# Patient Record
Sex: MALE | Race: WHITE | NOT HISPANIC OR LATINO | Employment: OTHER | URBAN - NONMETROPOLITAN AREA
[De-identification: names, ages, dates, MRNs, and addresses within clinical notes are randomized per-mention and may not be internally consistent; named-entity substitution may affect disease eponyms.]

---

## 2017-02-01 ENCOUNTER — OFFICE VISIT (OUTPATIENT)
Dept: NEUROLOGY | Facility: CLINIC | Age: 77
End: 2017-02-01

## 2017-02-01 VITALS
BODY MASS INDEX: 22.73 KG/M2 | WEIGHT: 150 LBS | SYSTOLIC BLOOD PRESSURE: 110 MMHG | DIASTOLIC BLOOD PRESSURE: 64 MMHG | HEIGHT: 68 IN

## 2017-02-01 DIAGNOSIS — I65.21 STENOSIS OF RIGHT CAROTID ARTERY: ICD-10-CM

## 2017-02-01 DIAGNOSIS — G62.9 AXONAL POLYNEUROPATHY: ICD-10-CM

## 2017-02-01 DIAGNOSIS — I63.313 CEREBROVASCULAR ACCIDENT (CVA) DUE TO BILATERAL THROMBOSIS OF MIDDLE CEREBRAL ARTERIES (HCC): Primary | ICD-10-CM

## 2017-02-01 DIAGNOSIS — G25.81 RLS (RESTLESS LEGS SYNDROME): ICD-10-CM

## 2017-02-01 DIAGNOSIS — I99.9 VASCULAR DISEASE: ICD-10-CM

## 2017-02-01 PROCEDURE — 99214 OFFICE O/P EST MOD 30 MIN: CPT | Performed by: PHYSICIAN ASSISTANT

## 2017-02-01 RX ORDER — ATENOLOL 50 MG/1
TABLET ORAL
COMMUNITY
End: 2018-01-31 | Stop reason: SDUPTHER

## 2017-02-01 RX ORDER — MONTELUKAST SODIUM 4 MG/1
1 TABLET, CHEWABLE ORAL 2 TIMES DAILY
COMMUNITY
End: 2017-10-05 | Stop reason: SDUPTHER

## 2017-02-01 RX ORDER — ASPIRIN 325 MG
TABLET ORAL
COMMUNITY
Start: 2015-12-12 | End: 2017-08-01

## 2017-02-01 RX ORDER — ASPIRIN AND DIPYRIDAMOLE 25; 200 MG/1; MG/1
1 CAPSULE, EXTENDED RELEASE ORAL 2 TIMES DAILY
Qty: 180 CAPSULE | Refills: 3 | Status: SHIPPED | OUTPATIENT
Start: 2017-02-01 | End: 2017-02-06

## 2017-02-02 DIAGNOSIS — N40.0 BENIGN PROSTATIC HYPERPLASIA, PRESENCE OF LOWER URINARY TRACT SYMPTOMS UNSPECIFIED, UNSPECIFIED MORPHOLOGY: ICD-10-CM

## 2017-02-02 DIAGNOSIS — E55.9 UNSPECIFIED VITAMIN D DEFICIENCY: ICD-10-CM

## 2017-02-02 DIAGNOSIS — R53.83 FATIGUE, UNSPECIFIED TYPE: ICD-10-CM

## 2017-02-02 DIAGNOSIS — R73.9 HYPERGLYCEMIA: ICD-10-CM

## 2017-02-02 DIAGNOSIS — E78.2 MIXED HYPERLIPIDEMIA: Primary | ICD-10-CM

## 2017-02-02 LAB
25(OH)D3+25(OH)D2 SERPL-MCNC: 30.6 NG/ML (ref 30–100)
ALBUMIN SERPL-MCNC: 4.5 G/DL (ref 3.5–5)
ALBUMIN/GLOB SERPL: 1.4 G/DL (ref 1.1–2.5)
ALP SERPL-CCNC: 153 U/L (ref 24–120)
ALT SERPL-CCNC: 30 U/L (ref 0–54)
AST SERPL-CCNC: 23 U/L (ref 7–45)
BASOPHILS # BLD AUTO: 0.02 10*3/MM3 (ref 0–0.2)
BASOPHILS NFR BLD AUTO: 0.3 % (ref 0–2)
BILIRUB SERPL-MCNC: 0.8 MG/DL (ref 0.1–1)
BUN SERPL-MCNC: 27 MG/DL (ref 5–21)
BUN/CREAT SERPL: 16.8 (ref 7–25)
CALCIUM SERPL-MCNC: 10.1 MG/DL (ref 8.4–10.4)
CHLORIDE SERPL-SCNC: 97 MMOL/L (ref 98–110)
CHOLEST SERPL-MCNC: 216 MG/DL (ref 130–200)
CO2 SERPL-SCNC: 31 MMOL/L (ref 24–31)
CREAT SERPL-MCNC: 1.61 MG/DL (ref 0.5–1.4)
EOSINOPHIL # BLD AUTO: 0.19 10*3/MM3 (ref 0–0.7)
EOSINOPHIL NFR BLD AUTO: 3 % (ref 0–4)
ERYTHROCYTE [DISTWIDTH] IN BLOOD BY AUTOMATED COUNT: 14.3 % (ref 12–15)
GLOBULIN SER CALC-MCNC: 3.3 GM/DL
GLUCOSE SERPL-MCNC: 122 MG/DL (ref 70–100)
HBA1C MFR BLD: 6.4 %
HCT VFR BLD AUTO: 46.2 % (ref 40–52)
HDLC SERPL-MCNC: 37 MG/DL
HGB BLD-MCNC: 14.9 G/DL (ref 14–18)
IMM GRANULOCYTES # BLD: 0.16 10*3/MM3 (ref 0–0.03)
IMM GRANULOCYTES NFR BLD: 2.6 % (ref 0–5)
LDLC SERPL CALC-MCNC: 125 MG/DL (ref 0–99)
LYMPHOCYTES # BLD AUTO: 1.2 10*3/MM3 (ref 0.72–4.86)
LYMPHOCYTES NFR BLD AUTO: 19.2 % (ref 15–45)
MCH RBC QN AUTO: 30.6 PG (ref 28–32)
MCHC RBC AUTO-ENTMCNC: 32.3 G/DL (ref 33–36)
MCV RBC AUTO: 94.9 FL (ref 82–95)
MONOCYTES # BLD AUTO: 1.03 10*3/MM3 (ref 0.19–1.3)
MONOCYTES NFR BLD AUTO: 16.5 % (ref 4–12)
NEUTROPHILS # BLD AUTO: 3.66 10*3/MM3 (ref 1.87–8.4)
NEUTROPHILS NFR BLD AUTO: 58.4 % (ref 39–78)
PLATELET # BLD AUTO: 207 10*3/MM3 (ref 130–400)
POTASSIUM SERPL-SCNC: 5.3 MMOL/L (ref 3.5–5.3)
PROT SERPL-MCNC: 7.8 G/DL (ref 6.3–8.7)
PSA SERPL-MCNC: 0.98 NG/ML (ref 0–4)
RBC # BLD AUTO: 4.87 10*6/MM3 (ref 4.8–5.9)
SODIUM SERPL-SCNC: 137 MMOL/L (ref 135–145)
T4 FREE SERPL-MCNC: 0.98 NG/DL (ref 0.78–2.19)
TRIGL SERPL-MCNC: 272 MG/DL (ref 0–149)
TSH SERPL DL<=0.005 MIU/L-ACNC: 1.57 MIU/ML (ref 0.47–4.68)
VLDLC SERPL CALC-MCNC: 54.4 MG/DL
WBC # BLD AUTO: 6.26 10*3/MM3 (ref 4.8–10.8)

## 2017-02-02 NOTE — PROGRESS NOTES
Subjective   Shabbir Gambino is a 76 y.o. male is here today for follow-up.    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  Returns in follow-up with no new stroke symptoms.  Overall activity is been reasonably well tolerated though he continues to have some chronic fatigue.  He denies any new visual changes, focal weakness that is new or other focal neurologic deficits that are new.    He also has issues with chronic peripheral neuropathy is multifactorial.  An associated with peripheral vascular disease.    Continues to follow with Dr. Barnes and affect plans to see him tomorrow for his annual physical.    Stroke   This is a chronic problem. The current episode started more than 1 year ago. The problem occurs daily. The problem has been unchanged. Associated symptoms include fatigue and weakness. Pertinent negatives include no chills, fever, headaches or neck pain. The symptoms are aggravated by stress. Treatments tried: Pharmacotherapy, control underlying disease. The treatment provided moderate relief.       The following portions of the patient's history were reviewed and updated as appropriate: allergies, current medications, past family history, past medical history, past social history, past surgical history and problem list.    Review of Systems   Constitutional: Positive for fatigue. Negative for chills and fever.   HENT: Positive for hearing loss. Negative for nosebleeds and trouble swallowing.    Eyes: Negative.    Respiratory: Negative.    Cardiovascular: Negative.    Gastrointestinal: Negative.    Endocrine: Negative.    Genitourinary: Negative.    Musculoskeletal: Positive for gait problem. Negative for back pain and neck pain.   Skin: Negative.    Allergic/Immunologic: Negative.    Neurological: Positive for weakness. Negative for dizziness, speech difficulty and headaches.   Hematological: Negative.    Psychiatric/Behavioral: Positive  for sleep disturbance. Negative for dysphoric mood. The patient is not nervous/anxious.        Objective   Physical Exam   Constitutional: He is oriented to person, place, and time. Vital signs are normal. He appears well-developed and well-nourished. No distress.   HENT:   Head: Normocephalic and atraumatic.   Right Ear: External ear normal. Decreased hearing is noted.   Left Ear: External ear normal. Decreased hearing is noted.   Nose: Nose normal.   Mouth/Throat: Uvula is midline, oropharynx is clear and moist and mucous membranes are normal.   Eyes: Conjunctivae, EOM and lids are normal. Pupils are equal, round, and reactive to light. No scleral icterus.   Fundoscopic exam:       The right eye shows no hemorrhage and no papilledema. The right eye shows red reflex.        The left eye shows no hemorrhage and no papilledema. The left eye shows red reflex.   Neck: Phonation normal. No spinous process tenderness and no muscular tenderness present. Carotid bruit is not present. Decreased range of motion present. No thyroid mass and no thyromegaly present.   Cardiovascular: Normal rate, regular rhythm, S1 normal, S2 normal and normal heart sounds.    No murmur heard.  Pulmonary/Chest: Effort normal and breath sounds normal. No stridor. No respiratory distress. He has no wheezes. He has no rales.   Musculoskeletal: He exhibits no edema or tenderness.        Lumbar back: Normal.   Lymphadenopathy:     He has no cervical adenopathy.   Neurological: He is alert and oriented to person, place, and time. He displays atrophy. He displays no tremor. A cranial nerve deficit and sensory deficit is present. He exhibits normal muscle tone. Coordination and gait abnormal. GCS eye subscore is 4. GCS verbal subscore is 5. GCS motor subscore is 6.   Reflex Scores:       Tricep reflexes are 1+ on the right side and 1+ on the left side.       Bicep reflexes are 1+ on the right side and 1+ on the left side.       Brachioradialis reflexes  are 1+ on the right side and 1+ on the left side.       Patellar reflexes are 1+ on the right side and 1+ on the left side.       Achilles reflexes are 0 on the right side and 0 on the left side.  Presbycusis, peripheral sensory deficits in all 4 extremities worse in the lower extremities with diminished light touch and vibratory sense.  Deep tendon reflexes diminished but symmetric.  Mild distal atrophy in bilateral lower extremities.  Functional strength bilaterally.  Functional ataxia likely due to sensory deficit.   Skin: Skin is warm and dry. No ecchymosis, no lesion and no rash noted. No cyanosis. Nails show no clubbing.   Psychiatric: He has a normal mood and affect. His speech is normal and behavior is normal. Judgment and thought content normal. He is not actively hallucinating. Cognition and memory are normal. He is attentive.   Nursing note and vitals reviewed.        Assessment/Plan   Shabbir was seen today for stroke.    Diagnoses and all orders for this visit:    Cerebrovascular accident (CVA) due to bilateral thrombosis of middle cerebral arteries  -     aspirin-dipyridamole (AGGRENOX)  MG per 12 hr capsule; Take 1 capsule by mouth 2 (Two) Times a Day.  -     US Color Flow Doppler Carotid Bilateral; Future    Axonal polyneuropathy    Vascular disease  -     aspirin-dipyridamole (AGGRENOX)  MG per 12 hr capsule; Take 1 capsule by mouth 2 (Two) Times a Day.    Stenosis of right carotid artery  -     aspirin-dipyridamole (AGGRENOX)  MG per 12 hr capsule; Take 1 capsule by mouth 2 (Two) Times a Day.  -     US Color Flow Doppler Carotid Bilateral; Future    RLS (restless legs syndrome)    Medications will be unchanged.    After review of September 2015 carotid ultrasound, it is recommended that he have a repeat carotid ultrasound performed.    15 minutes a 25 minute outpatient visit was spent in counseling and coordination of care.          EMR Dragon transcription disclaimer:  Much of  this encounter note is an electronic transcription/translation of spoken language to printed text.  The electronic translation of spoken language may permit erroneous, or at times, nonsensical words or phrases to be inadvertently transcribed.  The author has reviewed the note for such errors, however some may still exist.

## 2017-02-06 ENCOUNTER — OFFICE VISIT (OUTPATIENT)
Dept: FAMILY MEDICINE CLINIC | Facility: CLINIC | Age: 77
End: 2017-02-06

## 2017-02-06 VITALS
WEIGHT: 146.8 LBS | HEART RATE: 117 BPM | OXYGEN SATURATION: 98 % | HEIGHT: 68 IN | BODY MASS INDEX: 22.25 KG/M2 | TEMPERATURE: 98.1 F | SYSTOLIC BLOOD PRESSURE: 120 MMHG | DIASTOLIC BLOOD PRESSURE: 62 MMHG

## 2017-02-06 DIAGNOSIS — Z12.11 ENCOUNTER FOR COLONOSCOPY DUE TO HISTORY OF ADENOMATOUS COLONIC POLYPS: ICD-10-CM

## 2017-02-06 DIAGNOSIS — Z00.00 ANNUAL PHYSICAL EXAM: Primary | ICD-10-CM

## 2017-02-06 DIAGNOSIS — Z23 NEED FOR PROPHYLACTIC VACCINATION AGAINST STREPTOCOCCUS PNEUMONIAE (PNEUMOCOCCUS): ICD-10-CM

## 2017-02-06 DIAGNOSIS — Z86.010 ENCOUNTER FOR COLONOSCOPY DUE TO HISTORY OF ADENOMATOUS COLONIC POLYPS: ICD-10-CM

## 2017-02-06 LAB
BILIRUB BLD-MCNC: NEGATIVE MG/DL
CLARITY, POC: CLEAR
COLOR UR: YELLOW
GLUCOSE UR STRIP-MCNC: NEGATIVE MG/DL
KETONES UR QL: NEGATIVE
LEUKOCYTE EST, POC: NEGATIVE
NITRITE UR-MCNC: NEGATIVE MG/ML
PH UR: 5.5 [PH] (ref 5–8)
PROT UR STRIP-MCNC: NEGATIVE MG/DL
RBC # UR STRIP: NEGATIVE /UL
SP GR UR: 1.02 (ref 1–1.03)
UROBILINOGEN UR QL: NORMAL

## 2017-02-06 PROCEDURE — 90670 PCV13 VACCINE IM: CPT | Performed by: FAMILY MEDICINE

## 2017-02-06 PROCEDURE — G0009 ADMIN PNEUMOCOCCAL VACCINE: HCPCS | Performed by: FAMILY MEDICINE

## 2017-02-06 PROCEDURE — G0439 PPPS, SUBSEQ VISIT: HCPCS | Performed by: FAMILY MEDICINE

## 2017-02-06 NOTE — PROGRESS NOTES
QUICK REFERENCE INFORMATION:  The ABCs of the Annual Wellness Visit    Subsequent Medicare Wellness Visit    HEALTH RISK ASSESSMENT    Recent Hospitalizations:  No recent hospitalization(s)..        Current Medical Providers:  Patient Care Team:  Rigoberto Barnes MD as PCP - General (Family Medicine)  Rigoberto Barnes MD as PCP - Family Medicine  MD Ruddy Padron PA as Physician Assistant (Neurology)  Stanton Escalante MD as Consulting Physician (Cardiology)        Smoking Status:  History   Smoking Status   • Never Smoker   Smokeless Tobacco   • Never Used       Alcohol Consumption:  History   Alcohol Use No       Depression Screen:   PHQ-9 Depression Screening 2/6/2017   Little interest or pleasure in doing things 0   Feeling down, depressed, or hopeless 0   Trouble falling or staying asleep, or sleeping too much 0   Feeling tired or having little energy 0   Poor appetite or overeating 0   Feeling bad about yourself - or that you are a failure or have let yourself or your family down 0   Trouble concentrating on things, such as reading the newspaper or watching television 0   Moving or speaking so slowly that other people could have noticed. Or the opposite - being so fidgety or restless that you have been moving around a lot more than usual 0   Thoughts that you would be better off dead, or of hurting yourself in some way 0   PHQ-9 Total Score 0   If you checked off any problems, how difficult have these problems made it for you to do your work, take care of things at home, or get along with other people? Not difficult at all       Health Habits and Functional and Cognitive Screening:  Functional & Cognitive Status 2/6/2017   Do you have difficulty preparing food and eating? No   Do you have difficulty bathing yourself? No   Do you have difficulty getting dressed? No   Do you have difficulty using the toilet? No   Do you have difficulty moving around from place to place?  "No   In the past year have you fallen or experienced a near fall? No   Do you need help using the phone?  No   Are you deaf or do you have serious difficulty hearing?  No   Do you need help with transportation? No   Do you need help shopping? No   Do you need help preparing meals?  No   Do you need help with housework?  No   Do you need help with laundry? No   Do you need help taking your medications? No   Do you need help managing money? No   Do you have difficulty concentrating, remembering or making decisions? No     -- The Timed Up and Go (TUG) Test (CDC Version)                  - Testing directions adhered to:                                  1) Patients wears regular footwear and may use walking aid(s) if    needed.                                  2) Patient to sit back in standard arm chair and identify a line 10 feet    away from patient on floor.                                  3) Test time begins at \"Go\" and stops when patient reseated in arm    chair.                    - Instructions read aloud (and demonstrated as applicable) to patient:                                      When I say \"Go,\" I want you to:                                    1) Stand up from the chair.                                  2) Walk to the line on the floor (10 feet away) at your normal pace.                                  3) Turn.                                  4) Walk back to the chair at your normal pace.                                  5) Sit down again.                    - Result: 5                    - Observations during test:Slow tentative gait               Does the patient have evidence of cognitive impairment? No    Asprin use counseling:yes    Finger Rub Hearing Test (right ear):passed  Finger Rub Hearing Test (left ear):passed    Recent Lab Results:  CMP:  Lab Results   Component Value Date     (H) 02/02/2017    BUN 27 (H) 02/02/2017    CREATININE 1.61 (H) 02/02/2017    EGFRIFNONA 42 (L) 02/02/2017 "    EGFRIFAFRI 51 (L) 02/02/2017    BCR 16.8 02/02/2017     02/02/2017    K 5.3 02/02/2017    CO2 31.0 02/02/2017    CALCIUM 10.1 02/02/2017    PROTENTOTREF 7.8 02/02/2017    ALBUMIN 4.50 02/02/2017    LABGLOBREF 3.3 02/02/2017    LABIL2 1.4 02/02/2017    BILITOT 0.8 02/02/2017    ALKPHOS 153 (H) 02/02/2017    AST 23 02/02/2017    ALT 30 02/02/2017     Lipid Panel:  Lab Results   Component Value Date    CHLPL 216 (H) 02/02/2017    TRIG 272 (H) 02/02/2017    HDL 37 (L) 02/02/2017    VLDL 54.4 02/02/2017     (H) 02/02/2017     LDL:     HbA1c:  Lab Results   Component Value Date    HGBA1C 6.40 02/02/2017     Urine Microalbumin:     Visual Acuity:  No exam data present    Age-appropriate Screening Schedule:  Refer to the list below for future screening recommendations based on patient's age, sex and/or medical conditions. Orders for these recommended tests are listed in the plan section. The patient has been provided with a written plan.    Health Maintenance   Topic Date Due   • TDAP/TD VACCINES (1 - Tdap) 07/29/1959   • PNEUMOCOCCAL VACCINES (65+ LOW/MEDIUM RISK) (1 of 2 - PCV13) 07/29/2004   • INFLUENZA VACCINE  08/01/2016   • ZOSTER VACCINE  02/02/2017   • LIPID PANEL  02/02/2018        Subjective   History of Present Illness    Shabbir Gambino is a 76 y.o. male who presents for an Subsequent Wellness Visit. In addition, we addressed the following health issues:    The following portions of the patient's history were reviewed and updated as appropriate: allergies, current medications, past medical history, past surgical history and problem list.    Outpatient Medications Prior to Visit   Medication Sig Dispense Refill   • allopurinol (ZYLOPRIM) 300 MG tablet Take 300 mg by mouth Daily.     • amLODIPine (NORVASC) 2.5 MG tablet Take 2.5 mg by mouth Daily.     • aspirin (AJIT ASPIRIN) 325 MG tablet Take 1 tablet by mouth 2 times daily     • atenolol (TENORMIN) 50 MG tablet Take 50 mg by mouth daily      • carbidopa-levodopa (SINEMET)  MG per tablet Take 2 tablets by mouth Every Night.     • CINNAMON PO Take  by mouth Daily.     • colestipol (COLESTID) 1 G tablet Take 1 g by mouth 2 (Two) Times a Day.     • indapamide (LOZOL) 2.5 MG tablet Take 2.5 mg by mouth Every Morning.     • Multiple Vitamins-Minerals (EYE VITAMINS PO) Take  by mouth Daily.     • aspirin-dipyridamole (AGGRENOX)  MG per 12 hr capsule Take 1 capsule by mouth 2 (Two) Times a Day. 180 capsule 3   • B Complex Vitamins (VITAMIN B COMPLEX PO) Take  by mouth Daily.     • Cyanocobalamin (VITAMIN B-12) 1000 MCG sublingual tablet Place  under the tongue Every 3 (Three) Months.       No facility-administered medications prior to visit.        Patient Active Problem List   Diagnosis   • Vascular disease   • Stroke   • RLS (restless legs syndrome)   • Insomnia   • Hypertension   • Gout   • Axonal polyneuropathy   • Atherosclerotic coronary vascular disease   • Arthritis   • Stenosis of right carotid artery       Identification of Risk Factors:  Risk factors include: cardiovascular risk and inactivity.    Review of Systems   Constitutional: Negative.    HENT: Negative.    Eyes: Negative.    Respiratory: Negative.    Cardiovascular: Negative.    Gastrointestinal:        History of adenomatous polyps last colonoscopy 2012   Endocrine:        Borderline diabetic-dietary guidelines given   Genitourinary: Negative.    Musculoskeletal: Negative.    Neurological: Negative.    Hematological: Negative.    Psychiatric/Behavioral: Negative.        Compared to one year ago, the patient feels his physical health is the same.  Compared to one year ago, the patient feels his mental health is the same.    Objective     Physical Exam   Constitutional: He is oriented to person, place, and time. He appears well-developed and well-nourished.   HENT:   Head: Normocephalic.   Right Ear: External ear normal.   Left Ear: External ear normal.   Mouth/Throat: Oropharynx  "is clear and moist.   Eyes: Conjunctivae are normal. Pupils are equal, round, and reactive to light.   Neck: No thyromegaly present.   Cardiovascular: Normal rate and regular rhythm.    Pulmonary/Chest: Effort normal and breath sounds normal.   Abdominal: Soft. Bowel sounds are normal.   No pulsatile mass   Genitourinary: Rectum normal, prostate normal and penis normal.   Musculoskeletal: Normal range of motion.   Lymphadenopathy:     He has no cervical adenopathy.   Neurological: He is alert and oriented to person, place, and time.   Skin: Skin is warm and dry.   Psychiatric: He has a normal mood and affect. His behavior is normal. Judgment and thought content normal.   Vitals reviewed.      Vitals:    02/06/17 1356   BP: 120/62   Pulse: 117   Temp: 98.1 °F (36.7 °C)   SpO2: 98%   Weight: 146 lb 12.8 oz (66.6 kg)   Height: 68\" (172.7 cm)   PainSc: 0-No pain       Body mass index is 22.32 kg/(m^2).  Discussed the patient's BMI with him. The BMI is in the acceptable range.    Assessment/Plan   Patient Self-Management and Personalized Health Advice  The patient has been provided with information about: diet, exercise, prevention of cardiac or vascular disease and fall prevention and preventive services including:   · Advanced directives: has NO advanced directive - not interested in additional information, Colorectal cancer screening, colonoscopy referral placed, Diabetes screening, see lab orders, Fall Risk plan of care done, Pneumococcal vaccine , Prostate cancer screening discussed.    Visit Diagnoses:  No diagnosis found.    No orders of the defined types were placed in this encounter.      Outpatient Encounter Prescriptions as of 2/6/2017   Medication Sig Dispense Refill   • allopurinol (ZYLOPRIM) 300 MG tablet Take 300 mg by mouth Daily.     • amLODIPine (NORVASC) 2.5 MG tablet Take 2.5 mg by mouth Daily.     • aspirin (AJIT ASPIRIN) 325 MG tablet Take 1 tablet by mouth 2 times daily     • atenolol (TENORMIN) " 50 MG tablet Take 50 mg by mouth daily     • carbidopa-levodopa (SINEMET)  MG per tablet Take 2 tablets by mouth Every Night.     • CINNAMON PO Take  by mouth Daily.     • colestipol (COLESTID) 1 G tablet Take 1 g by mouth 2 (Two) Times a Day.     • indapamide (LOZOL) 2.5 MG tablet Take 2.5 mg by mouth Every Morning.     • Multiple Vitamins-Minerals (EYE VITAMINS PO) Take  by mouth Daily.     • [DISCONTINUED] aspirin-dipyridamole (AGGRENOX)  MG per 12 hr capsule Take 1 capsule by mouth 2 (Two) Times a Day. 180 capsule 3   • [DISCONTINUED] B Complex Vitamins (VITAMIN B COMPLEX PO) Take  by mouth Daily.     • [DISCONTINUED] Cyanocobalamin (VITAMIN B-12) 1000 MCG sublingual tablet Place  under the tongue Every 3 (Three) Months.       No facility-administered encounter medications on file as of 2/6/2017.        Reviewed use of high risk medication in the elderly: yes  Reviewed for potential of harmful drug interactions in the elderly: yes    Follow Up:  Return in 6 months (on 8/6/2017).     An After Visit Summary and PPPS with all of these plans were given to the patient.

## 2017-02-06 NOTE — PATIENT INSTRUCTIONS
Fall Prevention in the Home   Falls can cause injuries and can affect people from all age groups. There are many simple things that you can do to make your home safe and to help prevent falls.  WHAT CAN I DO ON THE OUTSIDE OF MY HOME?  · Regularly repair the edges of walkways and driveways and fix any cracks.  · Remove high doorway thresholds.  · Trim any shrubbery on the main path into your home.  · Use bright outdoor lighting.  · Clear walkways of debris and clutter, including tools and rocks.  · Regularly check that handrails are securely fastened and in good repair. Both sides of any steps should have handrails.  · Install guardrails along the edges of any raised decks or porches.  · Have leaves, snow, and ice cleared regularly.  · Use sand or salt on walkways during winter months.  · In the garage, clean up any spills right away, including grease or oil spills.  WHAT CAN I DO IN THE BATHROOM?  · Use night lights.  · Install grab bars by the toilet and in the tub and shower. Do not use towel bars as grab bars.  · Use non-skid mats or decals on the floor of the tub or shower.  · If you need to sit down while you are in the shower, use a plastic, non-slip stool..  · Keep the floor dry. Immediately clean up any water that spills on the floor.  · Remove soap buildup in the tub or shower on a regular basis.  · Attach bath mats securely with double-sided non-slip rug tape.  · Remove throw rugs and other tripping hazards from the floor.  WHAT CAN I DO IN THE BEDROOM?  · Use night lights.  · Make sure that a bedside light is easy to reach.  · Do not use oversized bedding that drapes onto the floor.  · Have a firm chair that has side arms to use for getting dressed.  · Remove throw rugs and other tripping hazards from the floor.  WHAT CAN I DO IN THE KITCHEN?   · Clean up any spills right away.  · Avoid walking on wet floors.  · Place frequently used items in easy-to-reach places.  · If you need to reach for something  above you, use a sturdy step stool that has a grab bar.  · Keep electrical cables out of the way.  · Do not use floor polish or wax that makes floors slippery. If you have to use wax, make sure that it is non-skid floor wax.  · Remove throw rugs and other tripping hazards from the floor.  WHAT CAN I DO IN THE STAIRWAYS?  · Do not leave any items on the stairs.  · Make sure that there are handrails on both sides of the stairs. Fix handrails that are broken or loose. Make sure that handrails are as long as the stairways.  · Check any carpeting to make sure that it is firmly attached to the stairs. Fix any carpet that is loose or worn.  · Avoid having throw rugs at the top or bottom of stairways, or secure the rugs with carpet tape to prevent them from moving.  · Make sure that you have a light switch at the top of the stairs and the bottom of the stairs. If you do not have them, have them installed.  WHAT ARE SOME OTHER FALL PREVENTION TIPS?  · Wear closed-toe shoes that fit well and support your feet. Wear shoes that have rubber soles or low heels.  · When you use a stepladder, make sure that it is completely opened and that the sides are firmly locked. Have someone hold the ladder while you are using it. Do not climb a closed stepladder.  · Add color or contrast paint or tape to grab bars and handrails in your home. Place contrasting color strips on the first and last steps.  · Use mobility aids as needed, such as canes, walkers, scooters, and crutches.  · Turn on lights if it is dark. Replace any light bulbs that burn out.  · Set up furniture so that there are clear paths. Keep the furniture in the same spot.  · Fix any uneven floor surfaces.  · Choose a carpet design that does not hide the edge of steps of a stairway.  · Be aware of any and all pets.  · Review your medicines with your healthcare provider. Some medicines can cause dizziness or changes in blood pressure, which increase your risk of falling.  Talk  with your health care provider about other ways that you can decrease your risk of falls. This may include working with a physical therapist or  to improve your strength, balance, and endurance.     This information is not intended to replace advice given to you by your health care provider. Make sure you discuss any questions you have with your health care provider.     Document Released: 2003 Document Revised: 2016 Document Reviewed: 2016  [employer] Interactive Patient Education © [employer]    Medicare Wellness  Personal Prevention Plan of Service     Date of Office Visit:  2017  Encounter Provider:  Rigoberto Barnes MD  Place of Service:  [facility]  Patient Name: Shabbir Gambino  :  1940    As part of the Medicare Wellness portion of your visit today, we are providing you with this personalized preventive plan of services (PPPS). This plan is based upon recommendations of the [place] Preventive Services Task Force (USPSTF) and the Advisory Committee on Immunization Practices (ACIP).    This lists the preventive care services that should be considered, and provides dates of when you are due. Items listed as completed are up-to-date and do not require any further intervention.    Health Maintenance   Topic Date Due   • TDAP/TD VACCINES (1 - Tdap) 1958   • PNEUMOCOCCAL VACCINES (65+ LOW/MEDIUM RISK) (1 of 2 - PCV13) 2004   • INFLUENZA VACCINE  2016   • ZOSTER VACCINE  2017   • MEDICARE ANNUAL WELLNESS  2017   • LIPID PANEL  2018            Diabetes Mellitus and Food  It is important for you to manage your blood sugar (glucose) level. Your blood glucose level can be greatly affected by what you eat. Eating healthier foods in the appropriate amounts throughout the day at about the same time each day will help you control your blood glucose level. It can also help slow or prevent worsening  of your diabetes mellitus. Healthy eating may even help you improve the level of your blood pressure and reach or maintain a healthy weight.   General recommendations for healthful eating and cooking habits include:  · Eating meals and snacks regularly. Avoid going long periods of time without eating to lose weight.  · Eating a diet that consists mainly of plant-based foods, such as fruits, vegetables, nuts, legumes, and whole grains.  · Using low-heat cooking methods, such as baking, instead of high-heat cooking methods, such as deep frying.  Work with your dietitian to make sure you understand how to use the Nutrition Facts information on food labels.  HOW CAN FOOD AFFECT ME?  Carbohydrates  Carbohydrates affect your blood glucose level more than any other type of food. Your dietitian will help you determine how many carbohydrates to eat at each meal and teach you how to count carbohydrates. Counting carbohydrates is important to keep your blood glucose at a healthy level, especially if you are using insulin or taking certain medicines for diabetes mellitus.  Alcohol  Alcohol can cause sudden decreases in blood glucose (hypoglycemia), especially if you use insulin or take certain medicines for diabetes mellitus. Hypoglycemia can be a life-threatening condition. Symptoms of hypoglycemia (sleepiness, dizziness, and disorientation) are similar to symptoms of having too much alcohol.   If your health care provider has given you approval to drink alcohol, do so in moderation and use the following guidelines:  · Women should not have more than one drink per day, and men should not have more than two drinks per day. One drink is equal to:    12 oz of beer.    5 oz of wine.    1½ oz of hard liquor.  · Do not drink on an empty stomach.  · Keep yourself hydrated. Have water, diet soda, or unsweetened iced tea.  · Regular soda, juice, and other mixers might contain a lot of carbohydrates and should be counted.  WHAT FOODS  ARE NOT RECOMMENDED?  As you make food choices, it is important to remember that all foods are not the same. Some foods have fewer nutrients per serving than other foods, even though they might have the same number of calories or carbohydrates. It is difficult to get your body what it needs when you eat foods with fewer nutrients. Examples of foods that you should avoid that are high in calories and carbohydrates but low in nutrients include:  · Trans fats (most processed foods list trans fats on the Nutrition Facts label).  · Regular soda.  · Juice.  · Candy.  · Sweets, such as cake, pie, doughnuts, and cookies.  · Fried foods.  WHAT FOODS CAN I EAT?  Eat nutrient-rich foods, which will nourish your body and keep you healthy. The food you should eat also will depend on several factors, including:  · The calories you need.  · The medicines you take.  · Your weight.  · Your blood glucose level.  · Your blood pressure level.  · Your cholesterol level.  You should eat a variety of foods, including:  · Protein.    Lean cuts of meat.    Proteins low in saturated fats, such as fish, egg whites, and beans. Avoid processed meats.  · Fruits and vegetables.    Fruits and vegetables that may help control blood glucose levels, such as apples, mangoes, and yams.  · Dairy products.    Choose fat-free or low-fat dairy products, such as milk, yogurt, and cheese.  · Grains, bread, pasta, and rice.    Choose whole grain products, such as multigrain bread, whole oats, and brown rice. These foods may help control blood pressure.  · Fats.    Foods containing healthful fats, such as nuts, avocado, olive oil, canola oil, and fish.  DOES EVERYONE WITH DIABETES MELLITUS HAVE THE SAME MEAL PLAN?  Because every person with diabetes mellitus is different, there is not one meal plan that works for everyone. It is very important that you meet with a dietitian who will help you create a meal plan that is just right for you.     This information  is not intended to replace advice given to you by your health care provider. Make sure you discuss any questions you have with your health care provider.     Document Released: 09/14/2006 Document Revised: 01/08/2016 Document Reviewed: 11/14/2014  [employer] Interactive Patient Education ©2016 [employer]

## 2017-02-08 DIAGNOSIS — I63.313 CEREBRAL INFARCTION DUE TO BILATERAL THROMBOSIS OF MIDDLE CEREBRAL ARTERIES (HCC): ICD-10-CM

## 2017-02-08 DIAGNOSIS — I65.21 STENOSIS OF RIGHT CAROTID ARTERY: Primary | ICD-10-CM

## 2017-02-17 ENCOUNTER — HOSPITAL ENCOUNTER (OUTPATIENT)
Dept: ULTRASOUND IMAGING | Facility: HOSPITAL | Age: 77
Discharge: HOME | End: 2017-02-17
Admitting: PHYSICIAN ASSISTANT

## 2017-02-17 DIAGNOSIS — I63.313 CEREBRAL INFARCTION DUE TO BILATERAL THROMBOSIS OF MIDDLE CEREBRAL ARTERIES (HCC): ICD-10-CM

## 2017-02-17 DIAGNOSIS — I65.21 STENOSIS OF RIGHT CAROTID ARTERY: ICD-10-CM

## 2017-02-17 PROCEDURE — 93880 EXTRACRANIAL BILAT STUDY: CPT | Performed by: SURGERY

## 2017-02-17 PROCEDURE — 93880 EXTRACRANIAL BILAT STUDY: CPT

## 2017-07-13 ENCOUNTER — OFFICE VISIT (OUTPATIENT)
Dept: FAMILY MEDICINE CLINIC | Facility: CLINIC | Age: 77
End: 2017-07-13

## 2017-07-13 VITALS
SYSTOLIC BLOOD PRESSURE: 120 MMHG | WEIGHT: 148.8 LBS | TEMPERATURE: 98.1 F | HEART RATE: 57 BPM | OXYGEN SATURATION: 98 % | DIASTOLIC BLOOD PRESSURE: 60 MMHG | BODY MASS INDEX: 22.55 KG/M2 | HEIGHT: 68 IN

## 2017-07-13 DIAGNOSIS — M10.9 GOUT, UNSPECIFIED CAUSE, UNSPECIFIED CHRONICITY, UNSPECIFIED SITE: Primary | ICD-10-CM

## 2017-07-13 PROCEDURE — 96372 THER/PROPH/DIAG INJ SC/IM: CPT | Performed by: FAMILY MEDICINE

## 2017-07-13 PROCEDURE — 99213 OFFICE O/P EST LOW 20 MIN: CPT | Performed by: FAMILY MEDICINE

## 2017-07-13 RX ORDER — METHYLPREDNISOLONE ACETATE 40 MG/ML
40 INJECTION, SUSPENSION INTRA-ARTICULAR; INTRALESIONAL; INTRAMUSCULAR; SOFT TISSUE ONCE
Status: COMPLETED | OUTPATIENT
Start: 2017-07-13 | End: 2017-07-13

## 2017-07-13 RX ORDER — PROBENECID AND COLCHICINE 500; .5 MG/1; MG/1
1 TABLET ORAL DAILY
Qty: 20 TABLET | Refills: 1 | Status: SHIPPED | OUTPATIENT
Start: 2017-07-13 | End: 2017-08-01

## 2017-07-13 RX ADMIN — METHYLPREDNISOLONE ACETATE 40 MG: 40 INJECTION, SUSPENSION INTRA-ARTICULAR; INTRALESIONAL; INTRAMUSCULAR; SOFT TISSUE at 09:52

## 2017-07-14 LAB
BASOPHILS # BLD AUTO: 0.02 10*3/MM3 (ref 0–0.2)
BASOPHILS NFR BLD AUTO: 0.2 % (ref 0–2)
BUN SERPL-MCNC: 26 MG/DL (ref 5–21)
BUN/CREAT SERPL: 17.7 (ref 7–25)
CALCIUM SERPL-MCNC: 9.7 MG/DL (ref 8.4–10.4)
CHLORIDE SERPL-SCNC: 98 MMOL/L (ref 98–110)
CO2 SERPL-SCNC: 25 MMOL/L (ref 24–31)
CREAT SERPL-MCNC: 1.47 MG/DL (ref 0.5–1.4)
EOSINOPHIL # BLD AUTO: 0.08 10*3/MM3 (ref 0–0.7)
EOSINOPHIL NFR BLD AUTO: 0.8 % (ref 0–4)
ERYTHROCYTE [DISTWIDTH] IN BLOOD BY AUTOMATED COUNT: 14.2 % (ref 12–15)
GLUCOSE SERPL-MCNC: 122 MG/DL (ref 70–100)
HCT VFR BLD AUTO: 43.5 % (ref 40–52)
HGB BLD-MCNC: 14.2 G/DL (ref 14–18)
IMM GRANULOCYTES # BLD: 0.12 10*3/MM3 (ref 0–0.03)
IMM GRANULOCYTES NFR BLD: 1.2 % (ref 0–5)
LYMPHOCYTES # BLD AUTO: 1.48 10*3/MM3 (ref 0.72–4.86)
LYMPHOCYTES NFR BLD AUTO: 14.8 % (ref 15–45)
MCH RBC QN AUTO: 30.4 PG (ref 28–32)
MCHC RBC AUTO-ENTMCNC: 32.6 G/DL (ref 33–36)
MCV RBC AUTO: 93.1 FL (ref 82–95)
MONOCYTES # BLD AUTO: 1.09 10*3/MM3 (ref 0.19–1.3)
MONOCYTES NFR BLD AUTO: 10.9 % (ref 4–12)
NEUTROPHILS # BLD AUTO: 7.18 10*3/MM3 (ref 1.87–8.4)
NEUTROPHILS NFR BLD AUTO: 72.1 % (ref 39–78)
PLATELET # BLD AUTO: 206 10*3/MM3 (ref 130–400)
POTASSIUM SERPL-SCNC: 5 MMOL/L (ref 3.5–5.3)
RBC # BLD AUTO: 4.67 10*6/MM3 (ref 4.8–5.9)
SODIUM SERPL-SCNC: 134 MMOL/L (ref 135–145)
URATE SERPL-MCNC: 5 MG/DL (ref 3.5–8.5)
WBC # BLD AUTO: 9.97 10*3/MM3 (ref 4.8–10.8)

## 2017-07-28 RX ORDER — AMLODIPINE BESYLATE 2.5 MG/1
2.5 TABLET ORAL NIGHTLY
Qty: 30 TABLET | Refills: 5 | Status: SHIPPED | OUTPATIENT
Start: 2017-07-28 | End: 2018-02-01 | Stop reason: SDUPTHER

## 2017-08-01 ENCOUNTER — OFFICE VISIT (OUTPATIENT)
Dept: NEUROLOGY | Facility: CLINIC | Age: 77
End: 2017-08-01

## 2017-08-01 VITALS
BODY MASS INDEX: 22.28 KG/M2 | SYSTOLIC BLOOD PRESSURE: 130 MMHG | HEART RATE: 56 BPM | HEIGHT: 68 IN | OXYGEN SATURATION: 98 % | WEIGHT: 147 LBS | DIASTOLIC BLOOD PRESSURE: 72 MMHG

## 2017-08-01 DIAGNOSIS — G62.9 AXONAL POLYNEUROPATHY: ICD-10-CM

## 2017-08-01 DIAGNOSIS — I63.313 CEREBROVASCULAR ACCIDENT (CVA) DUE TO BILATERAL THROMBOSIS OF MIDDLE CEREBRAL ARTERIES (HCC): Primary | ICD-10-CM

## 2017-08-01 DIAGNOSIS — G25.81 RLS (RESTLESS LEGS SYNDROME): ICD-10-CM

## 2017-08-01 DIAGNOSIS — I65.23 BILATERAL CAROTID ARTERY STENOSIS: ICD-10-CM

## 2017-08-01 DIAGNOSIS — R13.10 DYSPHAGIA, UNSPECIFIED TYPE: ICD-10-CM

## 2017-08-01 PROCEDURE — 99214 OFFICE O/P EST MOD 30 MIN: CPT | Performed by: PHYSICIAN ASSISTANT

## 2017-08-01 RX ORDER — ASPIRIN AND DIPYRIDAMOLE 25; 200 MG/1; MG/1
1 CAPSULE, EXTENDED RELEASE ORAL 2 TIMES DAILY
Qty: 60 CAPSULE | Refills: 6 | Status: SHIPPED | OUTPATIENT
Start: 2017-08-01 | End: 2018-02-01 | Stop reason: SDUPTHER

## 2017-08-01 RX ORDER — ASPIRIN AND DIPYRIDAMOLE 25; 200 MG/1; MG/1
1 CAPSULE, EXTENDED RELEASE ORAL 2 TIMES DAILY
COMMUNITY
End: 2017-08-01 | Stop reason: SDUPTHER

## 2017-08-04 NOTE — PROGRESS NOTES
Subjective   Shabbir Gambino is a 77 y.o. male is here today for follow-up.    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  Returns in follow-up with no new stroke symptoms.  Overall activity is been reasonably well tolerated though he continues to have some chronic fatigue.  He denies any new visual changes.    The patient has been having some issues with swallowing.  These issues are intermittent and tend to cause a central type discomfort with mechanical type symptoms.  He is able to swallow thin liquids fine and describes more of a mechanical type or obstructive type difficulty with swallowing.    He also has issues with chronic peripheral neuropathy is multifactorial.  An associated with peripheral vascular disease.    Continues to follow with Dr. Barnes and affect plans to see him tomorrow for his annual physical.    Stroke   This is a chronic problem. The current episode started more than 1 year ago. The problem occurs daily. The problem has been unchanged. Associated symptoms include fatigue. Pertinent negatives include no chills, fever, headaches or neck pain. The symptoms are aggravated by stress. Treatments tried: Pharmacotherapy, control underlying disease. The treatment provided moderate relief.       The following portions of the patient's history were reviewed and updated as appropriate: allergies, current medications, past family history, past medical history, past social history, past surgical history and problem list.    Review of Systems   Constitutional: Positive for fatigue. Negative for chills and fever.   HENT: Positive for hearing loss. Negative for nosebleeds and trouble swallowing.    Eyes: Negative.    Respiratory: Negative.    Cardiovascular: Negative.    Gastrointestinal: Negative.    Endocrine: Negative.    Genitourinary: Negative.    Musculoskeletal: Positive for gait problem. Negative for back pain and neck pain.   Skin:  Negative.    Allergic/Immunologic: Negative.    Neurological: Negative for dizziness, speech difficulty and headaches.   Hematological: Negative.    Psychiatric/Behavioral: Positive for sleep disturbance. Negative for dysphoric mood. The patient is not nervous/anxious.        Objective   Physical Exam   Constitutional: He is oriented to person, place, and time. Vital signs are normal. He appears well-developed and well-nourished. No distress.   HENT:   Head: Normocephalic and atraumatic.   Right Ear: External ear normal. Decreased hearing is noted.   Left Ear: External ear normal. Decreased hearing is noted.   Nose: Nose normal.   Mouth/Throat: Uvula is midline, oropharynx is clear and moist and mucous membranes are normal.   Eyes: Conjunctivae, EOM and lids are normal. Pupils are equal, round, and reactive to light. No scleral icterus.   Fundoscopic exam:       The right eye shows no hemorrhage and no papilledema. The right eye shows red reflex.        The left eye shows no hemorrhage and no papilledema. The left eye shows red reflex.   Neck: Phonation normal. No spinous process tenderness and no muscular tenderness present. Carotid bruit is not present. Decreased range of motion present. No thyroid mass and no thyromegaly present.   Cardiovascular: Normal rate, regular rhythm, S1 normal, S2 normal and normal heart sounds.    No murmur heard.  Pulmonary/Chest: Effort normal and breath sounds normal. No stridor. No respiratory distress. He has no wheezes. He has no rales.   Musculoskeletal: He exhibits no edema or tenderness.        Lumbar back: Normal.   Lymphadenopathy:     He has no cervical adenopathy.   Neurological: He is alert and oriented to person, place, and time. He displays atrophy. He displays no tremor. A cranial nerve deficit and sensory deficit is present. He exhibits normal muscle tone. Coordination and gait abnormal. GCS eye subscore is 4. GCS verbal subscore is 5. GCS motor subscore is 6.   Reflex  Scores:       Tricep reflexes are 1+ on the right side and 1+ on the left side.       Bicep reflexes are 1+ on the right side and 1+ on the left side.       Brachioradialis reflexes are 1+ on the right side and 1+ on the left side.       Patellar reflexes are 1+ on the right side and 1+ on the left side.       Achilles reflexes are 0 on the right side and 0 on the left side.  Presbycusis, peripheral sensory deficits in all 4 extremities worse in the lower extremities with diminished light touch and vibratory sense.  Deep tendon reflexes diminished but symmetric.  Mild distal atrophy in bilateral lower extremities.  Functional strength bilaterally.  Functional ataxia likely due to sensory deficit.   Skin: Skin is warm and dry. No ecchymosis, no lesion and no rash noted. No cyanosis. Nails show no clubbing.   Psychiatric: He has a normal mood and affect. His speech is normal and behavior is normal. Judgment and thought content normal. He is not actively hallucinating. Cognition and memory are normal. He is attentive.   Nursing note and vitals reviewed.        Assessment/Plan   Shabbir was seen today for cerebrovascular accident.    Diagnoses and all orders for this visit:    Cerebrovascular accident (CVA) due to bilateral thrombosis of middle cerebral arteries  -     aspirin-dipyridamole (AGGRENOX)  MG per 12 hr capsule; Take 1 capsule by mouth 2 (Two) Times a Day.    Axonal polyneuropathy    RLS (restless legs syndrome)    Bilateral carotid artery stenosis  -     aspirin-dipyridamole (AGGRENOX)  MG per 12 hr capsule; Take 1 capsule by mouth 2 (Two) Times a Day.    Dysphagia, unspecified type  -     Ambulatory Referral to Gastroenterology    The patient appears stable from a cerebrovascular standpoint.  Recommend continue Aggrenox unchanged.    The patient describes a mechanical type dysphagia and I have recommended evaluation with gastroenterology.    20 minutes of a 25 minute outpatient visit was spent  in counseling and coordination of care today.        EMR Dragon transcription disclaimer:  Much of this encounter note is an electronic transcription/translation of spoken language to printed text.  The electronic translation of spoken language may permit erroneous, or at times, nonsensical words or phrases to be inadvertently transcribed.  The author has reviewed the note for such errors, however some may still exist.

## 2017-08-09 ENCOUNTER — OFFICE VISIT (OUTPATIENT)
Dept: GASTROENTEROLOGY | Facility: CLINIC | Age: 77
End: 2017-08-09

## 2017-08-09 VITALS
BODY MASS INDEX: 21.92 KG/M2 | DIASTOLIC BLOOD PRESSURE: 58 MMHG | HEIGHT: 69 IN | HEART RATE: 54 BPM | WEIGHT: 148 LBS | TEMPERATURE: 96.1 F | OXYGEN SATURATION: 99 % | SYSTOLIC BLOOD PRESSURE: 132 MMHG

## 2017-08-09 DIAGNOSIS — R13.19 ESOPHAGEAL DYSPHAGIA: Primary | ICD-10-CM

## 2017-08-09 DIAGNOSIS — D68.9 COAGULOPATHY (HCC): ICD-10-CM

## 2017-08-09 DIAGNOSIS — I10 ESSENTIAL HYPERTENSION: ICD-10-CM

## 2017-08-09 DIAGNOSIS — Z86.010 PERSONAL HISTORY OF COLONIC POLYPS: ICD-10-CM

## 2017-08-09 PROCEDURE — 99204 OFFICE O/P NEW MOD 45 MIN: CPT | Performed by: NURSE PRACTITIONER

## 2017-08-09 NOTE — PROGRESS NOTES
"[facility] GASTROENTEROLOGY - OFFICE NOTE    8/9/2017    Shabbir Gambino   1940    Primary Physician: Rigoberto Barnes MD     Referring :  Babar PHILLIP ( neurology).     Chief Complaint   Patient presents with   • Difficulty Swallowing         HISTORY OF PRESENT ILLNESS    Shabbir Gambino is a 77 y.o. male presents  with difficulty swallowing x  1-2 mo.   Usually solid foods.  Points to lower chest area where it feels as if food is \"hanging up and then start piles up \". Sometimes taking a drink may help but at times can make it worse as well.  He denies unintentional weight loss.  Denies nausea or vomiting.  Denies fevers or chills.  No abdominal pain.  No reflux symptoms.  No history of upper endoscopy.  He is on Aggrenox for history of stroke.      Past Medical History:   Diagnosis Date   • Arthritis    • Atherosclerotic coronary vascular disease    • Axonal polyneuropathy    • Colon polyp    • Gout    • Hypertension    • Insomnia    • RLS (restless legs syndrome)    • Stroke     BILATERAL HEMISPHERIC    • Vascular disease     CAROTID       Past Surgical History:   Procedure Laterality Date   • CAROTID ENDARTERECTOMY Bilateral    • COLONOSCOPY     • CORONARY ARTERY BYPASS GRAFT     • EYE SURGERY      x 2   • KNEE SURGERY Right     x 2       Outpatient Prescriptions Marked as Taking for the 8/9/17 encounter (Office Visit) with VERONIKA Rodriguez   Medication Sig Dispense Refill   • allopurinol (ZYLOPRIM) 300 MG tablet Take 300 mg by mouth Daily.     • amLODIPine (NORVASC) 2.5 MG tablet Take 1 tablet by mouth Every Night. 30 tablet 5   • aspirin-dipyridamole (AGGRENOX)  MG per 12 hr capsule Take 1 capsule by mouth 2 (Two) Times a Day. 60 capsule 6   • atenolol (TENORMIN) 50 MG tablet Take 50 mg by mouth daily     • carbidopa-levodopa (SINEMET)  MG per tablet Take 2 tablets by mouth Every Night.     • CINNAMON PO Take  by mouth Daily.     • colestipol (COLESTID) 1 G tablet Take 1 " g by mouth 2 (Two) Times a Day.     • indapamide (LOZOL) 2.5 MG tablet Take 2.5 mg by mouth Every Morning.     • Multiple Vitamins-Minerals (EYE VITAMINS PO) Take  by mouth Daily.         No Known Allergies    Social History     Social History   • Marital status:      Spouse name: N/A   • Number of children: N/A   • Years of education: N/A     Occupational History   • Not on file.     Social History Main Topics   • Smoking status: Never Smoker   • Smokeless tobacco: Never Used   • Alcohol use No   • Drug use: No   • Sexual activity: Defer     Other Topics Concern   • Not on file     Social History Narrative       Family History   Problem Relation Age of Onset   • Stroke Mother    • Diabetes Mother    • No Known Problems Father    • Esophageal cancer Sister    • No Known Problems Maternal Grandmother    • No Known Problems Maternal Grandfather    • No Known Problems Paternal Grandmother    • No Known Problems Paternal Grandfather    • Colon polyps Neg Hx    • Colon cancer Neg Hx        Review of Systems   Constitutional: Negative for appetite change, chills, fatigue, fever and unexpected weight change.   HENT: Positive for hearing loss and trouble swallowing. Negative for sore throat.    Respiratory: Negative for cough, chest tightness, shortness of breath and wheezing.    Cardiovascular: Negative for chest pain and palpitations.   Gastrointestinal: Negative for abdominal distention, abdominal pain, anal bleeding, blood in stool, constipation, diarrhea, nausea, rectal pain and vomiting.        As mentioned in hpi   Genitourinary: Negative for difficulty urinating, dysuria and hematuria.   Musculoskeletal: Negative for arthralgias and back pain.   Skin: Negative for color change and rash.   Neurological: Negative for dizziness, syncope, light-headedness and headaches.   Psychiatric/Behavioral: Negative for confusion. The patient is not nervous/anxious.        Vitals:    08/09/17 0826   BP: 132/58   BP Location:  "Left arm   Patient Position: Sitting   Cuff Size: Adult   Pulse: 54   Temp: 96.1 °F (35.6 °C)   SpO2: 99%   Weight: 148 lb (67.1 kg)   Height: 68.5\" (174 cm)      Body mass index is 22.18 kg/(m^2).    Physical Exam   Constitutional: He appears well-developed and well-nourished. No distress.   HENT:   Head: Normocephalic and atraumatic.   Eyes: EOM are normal. No scleral icterus.   Neck: Neck supple. No JVD present.   Cardiovascular: Normal rate, regular rhythm and normal heart sounds.    Pulmonary/Chest: Effort normal and breath sounds normal. No stridor.   Abdominal: Soft. Bowel sounds are normal. He exhibits no distension and no mass. There is no tenderness. There is no rebound and no guarding.   Musculoskeletal: He exhibits no edema.   Neurological: He is alert.   Skin: Skin is warm and dry. No rash noted.   Psychiatric: He has a normal mood and affect. His behavior is normal.   Vitals reviewed.      Results for orders placed or performed in visit on 07/13/17   Basic Metabolic Panel   Result Value Ref Range    Glucose 122 (H) 70 - 100 mg/dL    BUN 26 (H) 5 - 21 mg/dL    Creatinine 1.47 (H) 0.50 - 1.40 mg/dL    eGFR Non African Am 46 (L) >60 mL/min/1.73    eGFR African Am 56 (L) >60 mL/min/1.73    BUN/Creatinine Ratio 17.7 7.0 - 25.0    Sodium 134 (L) 135 - 145 mmol/L    Potassium 5.0 3.5 - 5.3 mmol/L    Chloride 98 98 - 110 mmol/L    Total CO2 25.0 24.0 - 31.0 mmol/L    Calcium 9.7 8.4 - 10.4 mg/dL   Uric Acid   Result Value Ref Range    Uric Acid 5.0 3.5 - 8.5 mg/dL   CBC & Differential   Result Value Ref Range    WBC 9.97 4.80 - 10.80 10*3/mm3    RBC 4.67 (L) 4.80 - 5.90 10*6/mm3    Hemoglobin 14.2 14.0 - 18.0 g/dL    Hematocrit 43.5 40.0 - 52.0 %    MCV 93.1 82.0 - 95.0 fL    MCH 30.4 28.0 - 32.0 pg    MCHC 32.6 (L) 33.0 - 36.0 g/dL    RDW 14.2 12.0 - 15.0 %    Platelets 206 130 - 400 10*3/mm3    Neutrophil Rel % 72.1 39.0 - 78.0 %    Lymphocyte Rel % 14.8 (L) 15.0 - 45.0 %    Monocyte Rel % 10.9 4.0 - 12.0 % "    Eosinophil Rel % 0.8 0.0 - 4.0 %    Basophil Rel % 0.2 0.0 - 2.0 %    Neutrophils Absolute 7.18 1.87 - 8.40 10*3/mm3    Lymphocytes Absolute 1.48 0.72 - 4.86 10*3/mm3    Monocytes Absolute 1.09 0.19 - 1.30 10*3/mm3    Eosinophils Absolute 0.08 0.00 - 0.70 10*3/mm3    Basophils Absolute 0.02 0.00 - 0.20 10*3/mm3    Immature Granulocyte Rel % 1.2 0.0 - 5.0 %    Immature Grans Absolute 0.12 (H) 0.00 - 0.03 10*3/mm3           ASSESSMENT AND PLAN    Shabbir was seen today for difficulty swallowing.    Diagnoses and all orders for this visit:    Esophageal dysphagia  -     Case Request; Standing  -     Case Request    Essential hypertension    Coagulopathy  Comments:  aggrenox    Personal history of colonic polyps  -     Case Request; Standing  -     Case Request    Other orders  -     Implement Anesthesia Orders Day of Procedure; Standing  -     Obtain Informed Consent; Standing  -     Modify Scheduled Case Request; Standing  -     Modify Scheduled Case Obtain Informed Consent; Standing        Differential diagnoses discussed, rec cut food small pieces and chew well. Plan for egd. Will send letter to gabriella ventura/dr medina to see if can hold aggrenox 5 d prior to procedure. Instructed patient to take heart or blood pressure medication a.m. of procedure if that is when normally taken    ESOPHAGOGASTRODUODENOSCOPY WITH ANESTHESIA (N/A)   Risk, benefits, and alternatives of endoscopy were explained in full.  They understand that there is a risk of bleeding, perforation, and infection.  The risk of perforation goes up with esophageal dilation.  Other options to evaluate UGI complaints could involve barium swallow or UGI series, but these would be diagnostic tests only.  Patient was given time to ask questions.  I answered them to their satisfaction and they are agreeable to proceeding    The patient was advised on the risks of stopping blood thinners for a procedure.  The risks discussed included the risk of developing  myocardial infarction, CVA, embolus, clogging of the arteries or stents, etc.  We discussed the potential consequences of the risks discussed.  The benefits of stopping as well as the alternatives were discussed as well.   Patient is to hold their anticoagulation medication per the direction of their prescribing physician, Dr Lauren/gabriella wolf .    A letter will be sent to prescribing This is to prevent any risk or complication from bleeding intra and post procedure. If they develop bleeding post procedure they are to go the emergency department for further evaluation and treatment immediately.         Body mass index is 22.18 kg/(m^2).                Stormy Pena, VERONIKA    EMR Dragon/transcription disclaimer:  Much of this encounter note is electronic transcription/translation of spoken language to printed text.  The electronic translation of spoken language may be erroneous, or at times, nonsensical words or phrases may be inadvertently transcribed.  Although I have reviewed the note for such errors, some may still exist.    Addendum; received colonoscopy report, 09/07/2012, by Dr. Camejo, colon polyps noted, there was a small sessile polyp in the ascending colon which was biopsied and removed, and the transverse colon he had a rather large sessile polyp and was taken off by piecemeal technique, he did have scattered diverticula in the left colon and also had internal hemorrhoids noted.  Path report for the polyp in the ascending and transverse were both tubular adenomas with no high-grade dysplasia.

## 2017-08-11 ENCOUNTER — TELEPHONE (OUTPATIENT)
Dept: GASTROENTEROLOGY | Facility: CLINIC | Age: 77
End: 2017-08-11

## 2017-08-11 NOTE — TELEPHONE ENCOUNTER
Let patient know that Babar PHILLIP, has okayed him to be off Aggrenox 3 days prior to procedure.  No Lovenox needed.

## 2017-08-11 NOTE — TELEPHONE ENCOUNTER
Please let patient know that I did receive his colonoscopy report from September 2012, done by Dr. Camejo, based on the findings he is due for a colonoscopy at this time.  We can do the colonoscopy at the same time as the upper endoscopy that is scheduled for 09/07/2017.  Just let me know and I can modify the case request.

## 2017-08-24 NOTE — TELEPHONE ENCOUNTER
PT wife LVM for me.  Called and spoke with PT.  PT told me send a clearance sheet to Dr. Escalante (cardiologist) and he would complete it and send back to us.

## 2017-08-24 NOTE — TELEPHONE ENCOUNTER
Ok, now she says she will call dr samayoa ( his cardiologist and make sure he is ok with mr novak being sedated for procedures including cscope).  Please call her tomorrow to see if she got in touch with him.thanks

## 2017-08-24 NOTE — TELEPHONE ENCOUNTER
Wife phoned and would like to speak with you about colonoscopy.  Dr. Camejo wouldn't do another scope on PT due to his age. In the past, PT has had to get clearance from his heart dr. Wife would like for him to have one.

## 2017-08-28 NOTE — TELEPHONE ENCOUNTER
Ok,  he is scheduled for EGD as well as colonoscopy, remember you will not see that on your side that i modified the case request.   patient know that he is scheduled for both procedures.  I will send laxative prescription to his pharmacy now thinks

## 2017-08-29 RX ORDER — ALLOPURINOL 300 MG/1
TABLET ORAL
Qty: 30 TABLET | Refills: 5 | Status: SHIPPED | OUTPATIENT
Start: 2017-08-29 | End: 2019-04-23

## 2017-09-07 ENCOUNTER — ANESTHESIA EVENT (OUTPATIENT)
Dept: GASTROENTEROLOGY | Facility: HOSPITAL | Age: 77
End: 2017-09-07

## 2017-09-07 ENCOUNTER — ANESTHESIA (OUTPATIENT)
Dept: GASTROENTEROLOGY | Facility: HOSPITAL | Age: 77
End: 2017-09-07

## 2017-09-07 ENCOUNTER — HOSPITAL ENCOUNTER (OUTPATIENT)
Facility: HOSPITAL | Age: 77
Setting detail: HOSPITAL OUTPATIENT SURGERY
Discharge: HOME | End: 2017-09-07
Attending: INTERNAL MEDICINE | Admitting: INTERNAL MEDICINE

## 2017-09-07 VITALS
HEIGHT: 68 IN | RESPIRATION RATE: 19 BRPM | OXYGEN SATURATION: 100 % | TEMPERATURE: 97.4 F | HEART RATE: 62 BPM | DIASTOLIC BLOOD PRESSURE: 70 MMHG | SYSTOLIC BLOOD PRESSURE: 117 MMHG | WEIGHT: 146.6 LBS | BODY MASS INDEX: 22.22 KG/M2

## 2017-09-07 DIAGNOSIS — R13.19 ESOPHAGEAL DYSPHAGIA: ICD-10-CM

## 2017-09-07 PROCEDURE — 25010000002 PROPOFOL 10 MG/ML EMULSION: Performed by: NURSE ANESTHETIST, CERTIFIED REGISTERED

## 2017-09-07 PROCEDURE — 45381 COLONOSCOPY SUBMUCOUS NJX: CPT | Performed by: INTERNAL MEDICINE

## 2017-09-07 PROCEDURE — 43248 EGD GUIDE WIRE INSERTION: CPT | Performed by: INTERNAL MEDICINE

## 2017-09-07 PROCEDURE — 45380 COLONOSCOPY AND BIOPSY: CPT | Performed by: INTERNAL MEDICINE

## 2017-09-07 PROCEDURE — 88305 TISSUE EXAM BY PATHOLOGIST: CPT | Performed by: INTERNAL MEDICINE

## 2017-09-07 PROCEDURE — 45385 COLONOSCOPY W/LESION REMOVAL: CPT | Performed by: INTERNAL MEDICINE

## 2017-09-07 RX ORDER — ONDANSETRON 2 MG/ML
4 INJECTION INTRAMUSCULAR; INTRAVENOUS ONCE AS NEEDED
Status: DISCONTINUED | OUTPATIENT
Start: 2017-09-07 | End: 2017-09-07 | Stop reason: HOSPADM

## 2017-09-07 RX ORDER — PROPOFOL 10 MG/ML
VIAL (ML) INTRAVENOUS AS NEEDED
Status: DISCONTINUED | OUTPATIENT
Start: 2017-09-07 | End: 2017-09-07 | Stop reason: SURG

## 2017-09-07 RX ORDER — SODIUM CHLORIDE 0.9 % (FLUSH) 0.9 %
3 SYRINGE (ML) INJECTION AS NEEDED
Status: DISCONTINUED | OUTPATIENT
Start: 2017-09-07 | End: 2017-09-07 | Stop reason: HOSPADM

## 2017-09-07 RX ORDER — SODIUM CHLORIDE 9 MG/ML
500 INJECTION, SOLUTION INTRAVENOUS CONTINUOUS PRN
Status: DISCONTINUED | OUTPATIENT
Start: 2017-09-07 | End: 2017-09-07 | Stop reason: HOSPADM

## 2017-09-07 RX ORDER — LIDOCAINE HYDROCHLORIDE 20 MG/ML
INJECTION, SOLUTION INFILTRATION; PERINEURAL AS NEEDED
Status: DISCONTINUED | OUTPATIENT
Start: 2017-09-07 | End: 2017-09-07 | Stop reason: SURG

## 2017-09-07 RX ADMIN — SODIUM CHLORIDE 500 ML: 9 INJECTION, SOLUTION INTRAVENOUS at 09:08

## 2017-09-07 RX ADMIN — PROPOFOL 400 MG: 10 INJECTION, EMULSION INTRAVENOUS at 10:05

## 2017-09-07 RX ADMIN — LIDOCAINE HYDROCHLORIDE 50 MG: 20 INJECTION, SOLUTION INFILTRATION; PERINEURAL at 10:05

## 2017-09-07 NOTE — ANESTHESIA PREPROCEDURE EVALUATION
Anesthesia Evaluation     Patient summary reviewed   no history of anesthetic complications:         Airway   Mallampati: II  TM distance: >3 FB  Neck ROM: full  no difficulty expected  Dental      Comment: Partial upper    Pulmonary    (-) COPD, asthma, sleep apnea, not a smoker  Cardiovascular   Exercise tolerance: good (4-7 METS)    PT is on anticoagulation therapy  Patient on routine beta blocker and Beta blocker given within 24 hours of surgery    (+) hypertension, CAD, CABG (7 bypass 10 years ago), PVD (bilateral CEA),   (-) angina    ROS comment: Off aggrenox since 9/3    Neuro/Psych  (+) TIA (3 years ago, right leg weakness), CVA,    (-) seizures  GI/Hepatic/Renal/Endo    (-) liver disease, no renal disease, diabetes    Musculoskeletal     Abdominal    Substance History      OB/GYN          Other                                        Anesthesia Plan    ASA 3     general   total IV anesthesia  intravenous induction   Anesthetic plan and risks discussed with patient.

## 2017-09-07 NOTE — H&P (VIEW-ONLY)
"[facility] GASTROENTEROLOGY - OFFICE NOTE    8/9/2017    Shabbir Gambino   1940    Primary Physician: Rigoberto Barnes MD     Referring :  Babar PHILLIP ( neurology).     Chief Complaint   Patient presents with   • Difficulty Swallowing         HISTORY OF PRESENT ILLNESS    Shabbir Gambino is a 77 y.o. male presents  with difficulty swallowing x  1-2 mo.   Usually solid foods.  Points to lower chest area where it feels as if food is \"hanging up and then start piles up \". Sometimes taking a drink may help but at times can make it worse as well.  He denies unintentional weight loss.  Denies nausea or vomiting.  Denies fevers or chills.  No abdominal pain.  No reflux symptoms.  No history of upper endoscopy.  He is on Aggrenox for history of stroke.      Past Medical History:   Diagnosis Date   • Arthritis    • Atherosclerotic coronary vascular disease    • Axonal polyneuropathy    • Colon polyp    • Gout    • Hypertension    • Insomnia    • RLS (restless legs syndrome)    • Stroke     BILATERAL HEMISPHERIC    • Vascular disease     CAROTID       Past Surgical History:   Procedure Laterality Date   • CAROTID ENDARTERECTOMY Bilateral    • COLONOSCOPY     • CORONARY ARTERY BYPASS GRAFT     • EYE SURGERY      x 2   • KNEE SURGERY Right     x 2       Outpatient Prescriptions Marked as Taking for the 8/9/17 encounter (Office Visit) with VERONIKA Rodriguez   Medication Sig Dispense Refill   • allopurinol (ZYLOPRIM) 300 MG tablet Take 300 mg by mouth Daily.     • amLODIPine (NORVASC) 2.5 MG tablet Take 1 tablet by mouth Every Night. 30 tablet 5   • aspirin-dipyridamole (AGGRENOX)  MG per 12 hr capsule Take 1 capsule by mouth 2 (Two) Times a Day. 60 capsule 6   • atenolol (TENORMIN) 50 MG tablet Take 50 mg by mouth daily     • carbidopa-levodopa (SINEMET)  MG per tablet Take 2 tablets by mouth Every Night.     • CINNAMON PO Take  by mouth Daily.     • colestipol (COLESTID) 1 G tablet Take 1 " g by mouth 2 (Two) Times a Day.     • indapamide (LOZOL) 2.5 MG tablet Take 2.5 mg by mouth Every Morning.     • Multiple Vitamins-Minerals (EYE VITAMINS PO) Take  by mouth Daily.         No Known Allergies    Social History     Social History   • Marital status:      Spouse name: N/A   • Number of children: N/A   • Years of education: N/A     Occupational History   • Not on file.     Social History Main Topics   • Smoking status: Never Smoker   • Smokeless tobacco: Never Used   • Alcohol use No   • Drug use: No   • Sexual activity: Defer     Other Topics Concern   • Not on file     Social History Narrative       Family History   Problem Relation Age of Onset   • Stroke Mother    • Diabetes Mother    • No Known Problems Father    • Esophageal cancer Sister    • No Known Problems Maternal Grandmother    • No Known Problems Maternal Grandfather    • No Known Problems Paternal Grandmother    • No Known Problems Paternal Grandfather    • Colon polyps Neg Hx    • Colon cancer Neg Hx        Review of Systems   Constitutional: Negative for appetite change, chills, fatigue, fever and unexpected weight change.   HENT: Positive for hearing loss and trouble swallowing. Negative for sore throat.    Respiratory: Negative for cough, chest tightness, shortness of breath and wheezing.    Cardiovascular: Negative for chest pain and palpitations.   Gastrointestinal: Negative for abdominal distention, abdominal pain, anal bleeding, blood in stool, constipation, diarrhea, nausea, rectal pain and vomiting.        As mentioned in hpi   Genitourinary: Negative for difficulty urinating, dysuria and hematuria.   Musculoskeletal: Negative for arthralgias and back pain.   Skin: Negative for color change and rash.   Neurological: Negative for dizziness, syncope, light-headedness and headaches.   Psychiatric/Behavioral: Negative for confusion. The patient is not nervous/anxious.        Vitals:    08/09/17 0826   BP: 132/58   BP Location:  "Left arm   Patient Position: Sitting   Cuff Size: Adult   Pulse: 54   Temp: 96.1 °F (35.6 °C)   SpO2: 99%   Weight: 148 lb (67.1 kg)   Height: 68.5\" (174 cm)      Body mass index is 22.18 kg/(m^2).    Physical Exam   Constitutional: He appears well-developed and well-nourished. No distress.   HENT:   Head: Normocephalic and atraumatic.   Eyes: EOM are normal. No scleral icterus.   Neck: Neck supple. No JVD present.   Cardiovascular: Normal rate, regular rhythm and normal heart sounds.    Pulmonary/Chest: Effort normal and breath sounds normal. No stridor.   Abdominal: Soft. Bowel sounds are normal. He exhibits no distension and no mass. There is no tenderness. There is no rebound and no guarding.   Musculoskeletal: He exhibits no edema.   Neurological: He is alert.   Skin: Skin is warm and dry. No rash noted.   Psychiatric: He has a normal mood and affect. His behavior is normal.   Vitals reviewed.      Results for orders placed or performed in visit on 07/13/17   Basic Metabolic Panel   Result Value Ref Range    Glucose 122 (H) 70 - 100 mg/dL    BUN 26 (H) 5 - 21 mg/dL    Creatinine 1.47 (H) 0.50 - 1.40 mg/dL    eGFR Non African Am 46 (L) >60 mL/min/1.73    eGFR African Am 56 (L) >60 mL/min/1.73    BUN/Creatinine Ratio 17.7 7.0 - 25.0    Sodium 134 (L) 135 - 145 mmol/L    Potassium 5.0 3.5 - 5.3 mmol/L    Chloride 98 98 - 110 mmol/L    Total CO2 25.0 24.0 - 31.0 mmol/L    Calcium 9.7 8.4 - 10.4 mg/dL   Uric Acid   Result Value Ref Range    Uric Acid 5.0 3.5 - 8.5 mg/dL   CBC & Differential   Result Value Ref Range    WBC 9.97 4.80 - 10.80 10*3/mm3    RBC 4.67 (L) 4.80 - 5.90 10*6/mm3    Hemoglobin 14.2 14.0 - 18.0 g/dL    Hematocrit 43.5 40.0 - 52.0 %    MCV 93.1 82.0 - 95.0 fL    MCH 30.4 28.0 - 32.0 pg    MCHC 32.6 (L) 33.0 - 36.0 g/dL    RDW 14.2 12.0 - 15.0 %    Platelets 206 130 - 400 10*3/mm3    Neutrophil Rel % 72.1 39.0 - 78.0 %    Lymphocyte Rel % 14.8 (L) 15.0 - 45.0 %    Monocyte Rel % 10.9 4.0 - 12.0 % "    Eosinophil Rel % 0.8 0.0 - 4.0 %    Basophil Rel % 0.2 0.0 - 2.0 %    Neutrophils Absolute 7.18 1.87 - 8.40 10*3/mm3    Lymphocytes Absolute 1.48 0.72 - 4.86 10*3/mm3    Monocytes Absolute 1.09 0.19 - 1.30 10*3/mm3    Eosinophils Absolute 0.08 0.00 - 0.70 10*3/mm3    Basophils Absolute 0.02 0.00 - 0.20 10*3/mm3    Immature Granulocyte Rel % 1.2 0.0 - 5.0 %    Immature Grans Absolute 0.12 (H) 0.00 - 0.03 10*3/mm3           ASSESSMENT AND PLAN    Shabbir was seen today for difficulty swallowing.    Diagnoses and all orders for this visit:    Esophageal dysphagia  -     Case Request; Standing  -     Case Request    Essential hypertension    Coagulopathy  Comments:  aggrenox    Personal history of colonic polyps  -     Case Request; Standing  -     Case Request    Other orders  -     Implement Anesthesia Orders Day of Procedure; Standing  -     Obtain Informed Consent; Standing  -     Modify Scheduled Case Request; Standing  -     Modify Scheduled Case Obtain Informed Consent; Standing        Differential diagnoses discussed, rec cut food small pieces and chew well. Plan for egd. Will send letter to gabriella ventura/dr medina to see if can hold aggrenox 5 d prior to procedure. Instructed patient to take heart or blood pressure medication a.m. of procedure if that is when normally taken    ESOPHAGOGASTRODUODENOSCOPY WITH ANESTHESIA (N/A)   Risk, benefits, and alternatives of endoscopy were explained in full.  They understand that there is a risk of bleeding, perforation, and infection.  The risk of perforation goes up with esophageal dilation.  Other options to evaluate UGI complaints could involve barium swallow or UGI series, but these would be diagnostic tests only.  Patient was given time to ask questions.  I answered them to their satisfaction and they are agreeable to proceeding    The patient was advised on the risks of stopping blood thinners for a procedure.  The risks discussed included the risk of developing  myocardial infarction, CVA, embolus, clogging of the arteries or stents, etc.  We discussed the potential consequences of the risks discussed.  The benefits of stopping as well as the alternatives were discussed as well.   Patient is to hold their anticoagulation medication per the direction of their prescribing physician, Dr Laurne/gabriella wolf .    A letter will be sent to prescribing This is to prevent any risk or complication from bleeding intra and post procedure. If they develop bleeding post procedure they are to go the emergency department for further evaluation and treatment immediately.         Body mass index is 22.18 kg/(m^2).                Stormy Pena, VERONIKA    EMR Dragon/transcription disclaimer:  Much of this encounter note is electronic transcription/translation of spoken language to printed text.  The electronic translation of spoken language may be erroneous, or at times, nonsensical words or phrases may be inadvertently transcribed.  Although I have reviewed the note for such errors, some may still exist.    Addendum; received colonoscopy report, 09/07/2012, by Dr. Camejo, colon polyps noted, there was a small sessile polyp in the ascending colon which was biopsied and removed, and the transverse colon he had a rather large sessile polyp and was taken off by piecemeal technique, he did have scattered diverticula in the left colon and also had internal hemorrhoids noted.  Path report for the polyp in the ascending and transverse were both tubular adenomas with no high-grade dysplasia.

## 2017-09-07 NOTE — PLAN OF CARE
Problem: Patient Care Overview (Adult)  Goal: Plan of Care Review  Outcome: Outcome(s) achieved Date Met:  09/07/17 09/07/17 1107   Coping/Psychosocial Response Interventions   Plan Of Care Reviewed With patient;spouse   Patient Care Overview   Progress progress toward functional goals as expected   Outcome Evaluation   Outcome Summary/Follow up Plan d/c criteria met

## 2017-09-07 NOTE — PLAN OF CARE
Problem: Patient Care Overview (Adult)  Goal: Plan of Care Review  Outcome: Ongoing (interventions implemented as appropriate)    09/07/17 1018   Coping/Psychosocial Response Interventions   Plan Of Care Reviewed With patient   Patient Care Overview   Progress no change   Outcome Evaluation   Outcome Summary/Follow up Plan pt tolerated procedure well.

## 2017-09-07 NOTE — ANESTHESIA POSTPROCEDURE EVALUATION
Patient: Shabbir Gambino    Procedure Summary     Date Anesthesia Start Anesthesia Stop Room / Location    09/07/17 1005 1039 [facility] ENDOSCOPY 6 / [facility]       Procedure Diagnosis Surgeon Provider    ESOPHAGOGASTRODUODENOSCOPY WITH ANESTHESIA (N/A Esophagus); colonoscopy with anesthesia (N/A ) Esophageal dysphagia  (Esophageal dysphagia [R13.14]) MD Fernando Last CRNA          Anesthesia Type: general  Last vitals  BP        Temp        Pulse       Resp        SpO2          Post Anesthesia Care and Evaluation    Patient location during evaluation: PACU  Patient participation: complete - patient participated  Level of consciousness: awake and awake and alert  Pain score: 0  Pain management: adequate  Airway patency: patent  Anesthetic complications: No anesthetic complications    Cardiovascular status: acceptable and stable  Respiratory status: acceptable and unassisted  Hydration status: acceptable

## 2017-09-08 ENCOUNTER — TELEPHONE (OUTPATIENT)
Dept: NEUROLOGY | Facility: CLINIC | Age: 77
End: 2017-09-08

## 2017-09-08 LAB
CYTO UR: NORMAL
LAB AP CASE REPORT: NORMAL
LAB AP CLINICAL INFORMATION: NORMAL
Lab: NORMAL
PATH REPORT.FINAL DX SPEC: NORMAL
PATH REPORT.GROSS SPEC: NORMAL

## 2017-09-08 NOTE — TELEPHONE ENCOUNTER
Mrs Gambino called to see if all blood thinners are the same. Shabbir is on Aggrenox and they pay $140 a month since they are in the donut [place]. She does not want to change blood thinners, she said she was just curious. I told her to discuss it with Ruddy Rosenbaum at their follow up or with their Cardiologist. She did voice understanding.

## 2017-09-13 ENCOUNTER — TRANSCRIBE ORDERS (OUTPATIENT)
Dept: ADMINISTRATIVE | Facility: HOSPITAL | Age: 77
End: 2017-09-13

## 2017-09-13 DIAGNOSIS — I65.23 BILATERAL CAROTID ARTERY OCCLUSION: ICD-10-CM

## 2017-09-13 DIAGNOSIS — I99.9 VASCULAR DISEASE: ICD-10-CM

## 2017-09-13 DIAGNOSIS — R06.02 SOB (SHORTNESS OF BREATH): ICD-10-CM

## 2017-09-13 DIAGNOSIS — Z86.73 HISTORY OF TIA (TRANSIENT ISCHEMIC ATTACK): Primary | ICD-10-CM

## 2017-09-13 DIAGNOSIS — Z03.89 CORONARY ARTERY DISEASE (CAD) EXCLUDED: ICD-10-CM

## 2017-09-18 ENCOUNTER — TELEPHONE (OUTPATIENT)
Dept: GASTROENTEROLOGY | Facility: CLINIC | Age: 77
End: 2017-09-18

## 2017-09-18 NOTE — TELEPHONE ENCOUNTER
I called and was able to speak to the patient's wife.  She did inform me that the patient has seen Dr. Quintero.  The patient has scheduled cardiac evaluation and from her description carotid artery studies.  Then he is to follow back up with Dr. Quintero October 3 to finalize surgical plans.  I asked her to tell the patient call me if he had any questions or concerns.  She was thankful everything was being addressed and will have the patient call if needed

## 2017-09-26 ENCOUNTER — HOSPITAL ENCOUNTER (OUTPATIENT)
Dept: ULTRASOUND IMAGING | Facility: HOSPITAL | Age: 77
Discharge: HOME | End: 2017-09-26
Attending: SPECIALIST

## 2017-09-26 ENCOUNTER — HOSPITAL ENCOUNTER (OUTPATIENT)
Dept: CARDIOLOGY | Facility: HOSPITAL | Age: 77
Discharge: HOME | End: 2017-09-26
Attending: SPECIALIST | Admitting: SPECIALIST

## 2017-09-26 VITALS — DIASTOLIC BLOOD PRESSURE: 68 MMHG | HEART RATE: 54 BPM | SYSTOLIC BLOOD PRESSURE: 124 MMHG

## 2017-09-26 DIAGNOSIS — I65.23 BILATERAL CAROTID ARTERY OCCLUSION: ICD-10-CM

## 2017-09-26 DIAGNOSIS — Z86.73 HISTORY OF TIA (TRANSIENT ISCHEMIC ATTACK): ICD-10-CM

## 2017-09-26 DIAGNOSIS — Z03.89 CORONARY ARTERY DISEASE (CAD) EXCLUDED: ICD-10-CM

## 2017-09-26 DIAGNOSIS — R06.02 SOB (SHORTNESS OF BREATH): ICD-10-CM

## 2017-09-26 LAB
BH CV STRESS BP STAGE 1: NORMAL
BH CV STRESS BP STAGE 2: NORMAL
BH CV STRESS BP STAGE 3: NORMAL
BH CV STRESS BP STAGE 4: NORMAL
BH CV STRESS DOB - ATROPINE STAGE 3: 1
BH CV STRESS DOSE DOBUTAMINE STAGE 1: 10
BH CV STRESS DOSE DOBUTAMINE STAGE 2: 20
BH CV STRESS DOSE DOBUTAMINE STAGE 3: 30
BH CV STRESS DOSE DOBUTAMINE STAGE 4: 40
BH CV STRESS DURATION MIN STAGE 1: 3
BH CV STRESS DURATION MIN STAGE 2: 3
BH CV STRESS DURATION MIN STAGE 3: 3
BH CV STRESS DURATION MIN STAGE 4: 2
BH CV STRESS DURATION SEC STAGE 1: 0
BH CV STRESS DURATION SEC STAGE 2: 0
BH CV STRESS DURATION SEC STAGE 3: 0
BH CV STRESS DURATION SEC STAGE 4: 37
BH CV STRESS HR STAGE 1: 53
BH CV STRESS HR STAGE 2: 60
BH CV STRESS HR STAGE 3: 100
BH CV STRESS HR STAGE 4: 121
BH CV STRESS PROTOCOL 1: NORMAL
BH CV STRESS RECOVERY BP: NORMAL MMHG
BH CV STRESS RECOVERY HR: 78 BPM
BH CV STRESS STAGE 1: 1
BH CV STRESS STAGE 2: 2
BH CV STRESS STAGE 3: 3
BH CV STRESS STAGE 4: 4
MAXIMAL PREDICTED HEART RATE: 142 BPM
PERCENT MAX PREDICTED HR: 85.21 %
STRESS BASELINE BP: NORMAL MMHG
STRESS BASELINE HR: 54 BPM
STRESS PERCENT HR: 100 %
STRESS POST EXERCISE DUR MIN: 11 MIN
STRESS POST EXERCISE DUR SEC: 37 SEC
STRESS POST PEAK BP: NORMAL MMHG
STRESS POST PEAK HR: 121 BPM
STRESS TARGET HR: 121 BPM

## 2017-09-26 PROCEDURE — 93350 STRESS TTE ONLY: CPT

## 2017-09-26 PROCEDURE — 93018 CV STRESS TEST I&R ONLY: CPT | Performed by: INTERNAL MEDICINE

## 2017-09-26 PROCEDURE — 93880 EXTRACRANIAL BILAT STUDY: CPT

## 2017-09-26 PROCEDURE — 93350 STRESS TTE ONLY: CPT | Performed by: INTERNAL MEDICINE

## 2017-09-26 PROCEDURE — 93880 EXTRACRANIAL BILAT STUDY: CPT | Performed by: SURGERY

## 2017-09-26 PROCEDURE — 25010000003 DOBUTAMINE PER 250 MG: Performed by: INTERNAL MEDICINE

## 2017-09-26 PROCEDURE — 93352 ADMIN ECG CONTRAST AGENT: CPT | Performed by: INTERNAL MEDICINE

## 2017-09-26 PROCEDURE — 25010000002 PERFLUTREN 6.52 MG/ML SUSPENSION: Performed by: INTERNAL MEDICINE

## 2017-09-26 PROCEDURE — 93017 CV STRESS TEST TRACING ONLY: CPT

## 2017-09-26 RX ORDER — DOBUTAMINE HYDROCHLORIDE 100 MG/100ML
10-50 INJECTION INTRAVENOUS CONTINUOUS
Status: DISCONTINUED | OUTPATIENT
Start: 2017-09-26 | End: 2017-09-27 | Stop reason: HOSPADM

## 2017-09-26 RX ADMIN — DOBUTAMINE 10 MCG/KG/MIN: 12.5 INJECTION, SOLUTION, CONCENTRATE INTRAVENOUS at 08:51

## 2017-09-26 RX ADMIN — PERFLUTREN 8.48 MG: 6.52 INJECTION, SUSPENSION INTRAVENOUS at 08:51

## 2017-09-26 RX ADMIN — ATROPINE SULFATE 1 MG: 0.1 INJECTION, SOLUTION ENDOTRACHEAL; INTRAMUSCULAR; INTRAVENOUS; SUBCUTANEOUS at 09:03

## 2017-10-05 RX ORDER — MONTELUKAST SODIUM 4 MG/1
TABLET, CHEWABLE ORAL
Qty: 180 TABLET | Refills: 1 | Status: SHIPPED | OUTPATIENT
Start: 2017-10-05 | End: 2019-04-23

## 2017-10-18 ENCOUNTER — APPOINTMENT (OUTPATIENT)
Dept: PREADMISSION TESTING | Facility: HOSPITAL | Age: 77
End: 2017-10-18

## 2017-10-18 ENCOUNTER — HOSPITAL ENCOUNTER (OUTPATIENT)
Dept: GENERAL RADIOLOGY | Facility: HOSPITAL | Age: 77
Discharge: HOME | End: 2017-10-18
Admitting: SPECIALIST

## 2017-10-18 VITALS
BODY MASS INDEX: 23.63 KG/M2 | HEIGHT: 66 IN | HEART RATE: 64 BPM | WEIGHT: 147 LBS | RESPIRATION RATE: 18 BRPM | DIASTOLIC BLOOD PRESSURE: 79 MMHG | SYSTOLIC BLOOD PRESSURE: 140 MMHG | OXYGEN SATURATION: 98 %

## 2017-10-18 LAB
ALBUMIN SERPL-MCNC: 4.3 G/DL (ref 3.5–5)
ALBUMIN/GLOB SERPL: 1.3 G/DL (ref 1.1–2.5)
ALP SERPL-CCNC: 130 U/L (ref 24–120)
ALT SERPL W P-5'-P-CCNC: 28 U/L (ref 0–54)
ANION GAP SERPL CALCULATED.3IONS-SCNC: 11 MMOL/L (ref 4–13)
AST SERPL-CCNC: 24 U/L (ref 7–45)
BASOPHILS # BLD AUTO: 0.04 10*3/MM3 (ref 0–0.2)
BASOPHILS NFR BLD AUTO: 0.5 % (ref 0–2)
BILIRUB SERPL-MCNC: 0.5 MG/DL (ref 0.1–1)
BUN BLD-MCNC: 22 MG/DL (ref 5–21)
BUN/CREAT SERPL: 16.8 (ref 7–25)
CALCIUM SPEC-SCNC: 9.7 MG/DL (ref 8.4–10.4)
CHLORIDE SERPL-SCNC: 98 MMOL/L (ref 98–110)
CO2 SERPL-SCNC: 28 MMOL/L (ref 24–31)
CREAT BLD-MCNC: 1.31 MG/DL (ref 0.5–1.4)
DEPRECATED RDW RBC AUTO: 52.5 FL (ref 40–54)
EOSINOPHIL # BLD AUTO: 0.3 10*3/MM3 (ref 0–0.7)
EOSINOPHIL NFR BLD AUTO: 4 % (ref 0–4)
ERYTHROCYTE [DISTWIDTH] IN BLOOD BY AUTOMATED COUNT: 15 % (ref 12–15)
GFR SERPL CREATININE-BSD FRML MDRD: 53 ML/MIN/1.73
GLOBULIN UR ELPH-MCNC: 3.3 GM/DL
GLUCOSE BLD-MCNC: 109 MG/DL (ref 70–100)
HCT VFR BLD AUTO: 41.6 % (ref 40–52)
HGB BLD-MCNC: 13.7 G/DL (ref 14–18)
IMM GRANULOCYTES # BLD: 0.18 10*3/MM3 (ref 0–0.03)
IMM GRANULOCYTES NFR BLD: 2.4 % (ref 0–5)
LYMPHOCYTES # BLD AUTO: 1.73 10*3/MM3 (ref 0.72–4.86)
LYMPHOCYTES NFR BLD AUTO: 22.9 % (ref 15–45)
MCH RBC QN AUTO: 31.5 PG (ref 28–32)
MCHC RBC AUTO-ENTMCNC: 32.9 G/DL (ref 33–36)
MCV RBC AUTO: 95.6 FL (ref 82–95)
MONOCYTES # BLD AUTO: 0.95 10*3/MM3 (ref 0.19–1.3)
MONOCYTES NFR BLD AUTO: 12.6 % (ref 4–12)
NEUTROPHILS # BLD AUTO: 4.35 10*3/MM3 (ref 1.87–8.4)
NEUTROPHILS NFR BLD AUTO: 57.6 % (ref 39–78)
PLATELET # BLD AUTO: 204 10*3/MM3 (ref 130–400)
PMV BLD AUTO: 10.7 FL (ref 6–12)
POTASSIUM BLD-SCNC: 4.3 MMOL/L (ref 3.5–5.3)
PROT SERPL-MCNC: 7.6 G/DL (ref 6.3–8.7)
RBC # BLD AUTO: 4.35 10*6/MM3 (ref 4.8–5.9)
SODIUM BLD-SCNC: 137 MMOL/L (ref 135–145)
WBC NRBC COR # BLD: 7.55 10*3/MM3 (ref 4.8–10.8)

## 2017-10-18 PROCEDURE — 80053 COMPREHEN METABOLIC PANEL: CPT | Performed by: SPECIALIST

## 2017-10-18 PROCEDURE — 71010 HC CHEST PA OR AP: CPT

## 2017-10-18 PROCEDURE — 93005 ELECTROCARDIOGRAM TRACING: CPT

## 2017-10-18 PROCEDURE — 85025 COMPLETE CBC W/AUTO DIFF WBC: CPT | Performed by: SPECIALIST

## 2017-10-18 PROCEDURE — 93010 ELECTROCARDIOGRAM REPORT: CPT | Performed by: INTERNAL MEDICINE

## 2017-10-18 NOTE — DISCHARGE INSTRUCTIONS
DAY OF SURGERY INSTRUCTIONS        YOUR SURGEON: LLOYD BRIAN    PROCEDURE: COLON RESECTION MID TRANSVERSE LAPAROSCOPIC     DATE OF SURGERY: October 24, 2017    ARRIVAL TIME: AS DIRECTED BY OFFICE    DAY OF SURGERY TAKE ONLY THESE MEDICATIONS: TBD         BEFORE YOU COME TO THE HOSPITAL  (Pre-op instructions)  • Do not eat, drink, smoke or chew gum after midnight the night before surgery.  This also includes no mints.  • Morning of surgery take only the medicines you have been instructed with a sip of water unless otherwise instructed  by your physician.  • Do not shave, wear makeup or dark nail polish.  • Remove all jewelry including rings.  • Leave anything you consider valuable at home.  • Leave your suitcase in the car until after your surgery.  • Bring the following with you if applicable:  o Picture ID and insurance, Medicare or Medicaid cards  o Co-pay/deductible required by insurance (cash, check, credit card)  o Copy of advance directive, living will or power-of- documents if not brought to PAT  o CPAP or BIPAP mask and tubing  o Relaxation aids (MP3 player, book, magazine)  • On the day of surgery check in at registration located at the main entrance of the hospital.       Outpatient Surgery Guidelines, Adult  Outpatient procedures are those for which the person having the procedure is allowed to go home the same day as the procedure. Various procedures are done on an outpatient basis. You should follow some general guidelines if you will be having an outpatient procedure.  LET YOUR HEALTH CARE PROVIDER KNOW ABOUT:  · Any allergies you have.  · All medicines you are taking, including vitamins, herbs, eye drops, creams, and over-the-counter medicines.  · Previous problems you or members of your family have had with the use of anesthetics.  · Any blood disorders you have.  · Previous surgeries you have had.  · Medical conditions you have.  RISKS AND COMPLICATIONS  Your health care provider will  discuss possible risks and complications with you before surgery. Common risks and complications include:    · Problems due to the use of anesthetics.  · Blood loss and replacement (does not apply to minor surgical procedures).  · Temporary increase in pain due to surgery.  · Uncorrected pain or problems that the surgery was meant to correct.  · Infection.  · New damage.  BEFORE THE PROCEDURE  · Ask your health care provider about changing or stopping your regular medicines. You may need to stop taking certain medicines in the days or weeks before the procedure.  · Stop smoking at least 2 weeks before surgery. This lowers your risk for complications during and after surgery. Ask your health care provider for help with this if needed.  · Eat your usual meals and a light supper the day before surgery. Continue fluid intake. Do not drink alcohol.  · Do not eat or drink after midnight the night before your surgery.   · Arrange for someone to take you home and to stay with you for 24 hours after the procedure. Medicine given for your procedure may affect your ability to drive or to care for yourself.  · Call your health care provider's office if you develop an illness or problem that may prevent you from safely having your procedure.  AFTER THE PROCEDURE  After surgery, you will be taken to a recovery area, where your progress will be monitored. If there are no complications, you will be allowed to go home when you are awake, stable, and taking fluids well. You may have numbness around the surgical site. Healing will take some time. You will have tenderness at the surgical site and may have some swelling and bruising. You may also have some nausea.  HOME CARE INSTRUCTIONS  · Do not drive for 24 hours, or as directed by your health care provider. Do not drive while taking prescription pain medicines.  · Do not drink alcohol for 24 hours.  · Do not make important decisions or sign legal documents for 24 hours.  · You may  resume a normal diet and activities as directed.  · Do not lift anything heavier than 10 pounds (4.5 kg) or play contact sports until your health care provider says it is okay.  · Change your bandages (dressings) as directed.  · Only take over-the-counter or prescription medicines as directed by your health care provider.  · Follow up with your health care provider as directed.  SEEK MEDICAL CARE IF:  · You have increased bleeding (more than a small spot) from the surgical site.  · You have redness, swelling, or increasing pain in the wound.  · You see pus coming from the wound.  · You have a fever.  · You notice a bad smell coming from the wound or dressing.  · You feel lightheaded or faint.  · You develop a rash.  · You have trouble breathing.  · You develop allergies.  MAKE SURE YOU:  · Understand these instructions.  · Will watch your condition.  · Will get help right away if you are not doing well or get worse.     This information is not intended to replace advice given to you by your health care provider. Make sure you discuss any questions you have with your health care provider.     Document Released: 09/12/2002 Document Revised: 05/03/2016 Document Reviewed: 05/22/2014  [employer] Interactive Patient Education ©2016 [employer]       Fall Prevention in Hospitals, Adult  As a hospital patient, your condition and the treatments you receive can increase your risk for falls. Some additional risk factors for falls in a hospital include:  · Being in an unfamiliar environment.  · Being on bed rest.  · Your surgery.  · Taking certain medicines.  · Your tubing requirements, such as intravenous (IV) therapy or catheters.  It is important that you learn how to decrease fall risks while at the hospital. Below are important tips that can help prevent falls.  SAFETY TIPS FOR PREVENTING FALLS  Talk about your risk of falling.  · Ask your health care provider why you are at risk for falling. Is it your medicine, illness,  tubing placement, or something else?  · Make a plan with your health care provider to keep you safe from falls.  · Ask your health care provider or pharmacist about side effects of your medicines. Some medicines can make you dizzy or affect your coordination.  Ask for help.  · Ask for help before getting out of bed. You may need to press your call button.  · Ask for assistance in getting safely to the toilet.  · Ask for a walker or cane to be put at your bedside. Ask that most of the side rails on your bed be placed up before your health care provider leaves the room.  · Ask family or friends to sit with you.  · Ask for things that are out of your reach, such as your glasses, hearing aids, telephone, bedside table, or call button.  Follow these tips to avoid falling:  · Stay lying or seated, rather than standing, while waiting for help.  · Wear rubber-soled slippers or shoes whenever you walk in the hospital.  · Avoid quick, sudden movements.  ¨ Change positions slowly.  ¨ Sit on the side of your bed before standing.  ¨ Stand up slowly and wait before you start to walk.  · Let your health care provider know if there is a spill on the floor.  · Pay careful attention to the medical equipment, electrical cords, and tubes around you.  · When you need help, use your call button by your bed or in the bathroom. Wait for one of your health care providers to help you.  · If you feel dizzy or unsure of your footing, return to bed and wait for assistance.  · Avoid being distracted by the TV, telephone, or another person in your room.  · Do not lean or support yourself on rolling objects, such as IV poles or bedside tables.     This information is not intended to replace advice given to you by your health care provider. Make sure you discuss any questions you have with your health care provider.     Document Released: 12/15/2001 Document Revised: 01/08/2016 Document Reviewed: 08/25/2013  [employer] Interactive Patient Education  ©2016 [employer]       Surgical Site Infections FAQs  What is a Surgical Site Infection (SSI)?  A surgical site infection is an infection that occurs after surgery in the part of the body where the surgery took place. Most patients who have surgery do not develop an infection. However, infections develop in about 1 to 3 out of every 100 patients who have surgery.  Some of the common symptoms of a surgical site infection are:  · Redness and pain around the area where you had surgery  · Drainage of cloudy fluid from your surgical wound  · Fever  Can SSIs be treated?  Yes. Most surgical site infections can be treated with antibiotics. The antibiotic given to you depends on the bacteria (germs) causing the infection. Sometimes patients with SSIs also need another surgery to treat the infection.  What are some of the things that hospitals are doing to prevent SSIs?  To prevent SSIs, doctors, nurses, and other healthcare providers:  · Clean their hands and arms up to their elbows with an antiseptic agent just before the surgery.  · Clean their hands with soap and water or an alcohol-based hand rub before and after caring for each patient.  · May remove some of your hair immediately before your surgery using electric clippers if the hair is in the same area where the procedure will occur. They should not shave you with a razor.  · Wear special hair covers, masks, gowns, and gloves during surgery to keep the surgery area clean.  · Give you antibiotics before your surgery starts. In most cases, you should get antibiotics within 60 minutes before the surgery starts and the antibiotics should be stopped within 24 hours after surgery.  · Clean the skin at the site of your surgery with a special soap that kills germs.  What can I do to help prevent SSIs?  Before your surgery:  · Tell your doctor about other medical problems you may have. Health problems such as allergies, diabetes, and obesity could affect your surgery and  your treatment.  · Quit smoking. Patients who smoke get more infections. Talk to your doctor about how you can quit before your surgery.  · Do not shave near where you will have surgery. Shaving with a razor can irritate your skin and make it easier to develop an infection.  At the time of your surgery:  · Speak up if someone tries to shave you with a razor before surgery. Ask why you need to be shaved and talk with your surgeon if you have any concerns.  · Ask if you will get antibiotics before surgery.  After your surgery:  · Make sure that your healthcare providers clean their hands before examining you, either with soap and water or an alcohol-based hand rub.  · If you do not see your providers clean their hands, please ask them to do so.  · Family and friends who visit you should not touch the surgical wound or dressings.  · Family and friends should clean their hands with soap and water or an alcohol-based hand rub before and after visiting you. If you do not see them clean their hands, ask them to clean their hands.  What do I need to do when I go home from the hospital?  · Before you go home, your doctor or nurse should explain everything you need to know about taking care of your wound. Make sure you understand how to care for your wound before you leave the hospital.  · Always clean your hands before and after caring for your wound.  · Before you go home, make sure you know who to contact if you have questions or problems after you get home.  · If you have any symptoms of an infection, such as redness and pain at the surgery site, drainage, or fever, call your doctor immediately.  If you have additional questions, please ask your doctor or nurse.  Developed and co-sponsored by The Society for Healthcare Epidemiology of [place] (SHEA); Infectious Diseases Society of [place] (IDSA); American Hospital Association; Association for Professionals in Infection Control and Epidemiology (APIC); Centers for Disease  Control and Prevention (CDC); and The Joint Commission.     This information is not intended to replace advice given to you by your health care provider. Make sure you discuss any questions you have with your health care provider.     Document Released: 12/23/2014 Document Revised: 01/08/2016 Document Reviewed: 03/02/2016  [employer] Interactive Patient Education ©2016 [employer].       [facility]  CHG 4% Patient Instruction Sheet    Preparing the Skin Before Surgery  Preparing or “prepping” skin before surgery can reduce the risk of infection at the surgical site. To make the process easier,[facility] has chosen 4% Chlorhexidine Gluconate (CHG) antiseptic solution.   The steps below outline the prepping process and should be carefully followed.                                                                                                                                                      Prep the skin at the following time(s):                                                      We recommend you shower the night before surgery, and again the morning of surgery with the 4% CHG antiseptic solution using half of the bottle and a cloth each time.  Dress in clean clothes/sleepwear after showering.  See instructions below for application.          Do not apply any lotions or moisturizers.       Do not shave the area to be prepped for at least 2 days prior to surgery.    Clipping the hair may be done immediately prior to your surgery at the hospital    if needed.    Directions:  Thoroughly rinse your body with water.  Apply 4% CHG to a cloth and wash skin gently, paying special attention to the operative site.  Rinse again thoroughly.  Once you have begun using this product do not apply anything else to your skin. If itching or redness persists, rinse affected areas and discontinue use.    When using this product:  • Keep out of eyes, ears, and mouth.  • If solution should contact these areas, rinse out promptly  and thoroughly with water.  • For external use only.  • Do not use in genital area, on your face or head.      PATIENT/FAMILY/RESPONSIBLE PARTY VERBALIZES UNDERSTANDING OF ABOVE EDUCATION.  COPY OF PAIN SCALE GIVEN AND REVIEWED WITH VERBALIZED UNDERSTANDING.

## 2017-10-24 ENCOUNTER — ANESTHESIA EVENT (OUTPATIENT)
Dept: PERIOP | Facility: HOSPITAL | Age: 77
End: 2017-10-24

## 2017-10-24 ENCOUNTER — ANESTHESIA (OUTPATIENT)
Dept: PERIOP | Facility: HOSPITAL | Age: 77
End: 2017-10-24

## 2017-10-24 ENCOUNTER — HOSPITAL ENCOUNTER (INPATIENT)
Facility: HOSPITAL | Age: 77
LOS: 5 days | Discharge: HOME | End: 2017-10-29
Attending: SPECIALIST | Admitting: SPECIALIST

## 2017-10-24 DIAGNOSIS — D12.6 DYSPLASIA OF COLON: ICD-10-CM

## 2017-10-24 PROBLEM — I67.9 CEREBROVASCULAR DISEASE: Status: ACTIVE | Noted: 2017-10-24

## 2017-10-24 PROBLEM — I65.23 BILATERAL CAROTID ARTERY STENOSIS: Status: RESOLVED | Noted: 2017-02-01 | Resolved: 2017-10-24

## 2017-10-24 PROBLEM — I49.9 IRREGULAR HEART BEAT: Status: ACTIVE | Noted: 2017-10-24

## 2017-10-24 LAB
ABO GROUP BLD: NORMAL
BLD GP AB SCN SERPL QL: NEGATIVE
RH BLD: POSITIVE

## 2017-10-24 PROCEDURE — 0DTL4ZZ RESECTION OF TRANSVERSE COLON, PERCUTANEOUS ENDOSCOPIC APPROACH: ICD-10-PCS | Performed by: SPECIALIST

## 2017-10-24 PROCEDURE — 25010000002 CEFOXITIN PER 1 G: Performed by: SPECIALIST

## 2017-10-24 PROCEDURE — 25010000002 PROPOFOL 10 MG/ML EMULSION: Performed by: NURSE ANESTHETIST, CERTIFIED REGISTERED

## 2017-10-24 PROCEDURE — 94799 UNLISTED PULMONARY SVC/PX: CPT

## 2017-10-24 PROCEDURE — 25010000002 ONDANSETRON PER 1 MG: Performed by: NURSE ANESTHETIST, CERTIFIED REGISTERED

## 2017-10-24 PROCEDURE — 25010000002 CEFOXITIN: Performed by: SPECIALIST

## 2017-10-24 PROCEDURE — 86901 BLOOD TYPING SEROLOGIC RH(D): CPT | Performed by: SPECIALIST

## 2017-10-24 PROCEDURE — C1765 ADHESION BARRIER: HCPCS | Performed by: SPECIALIST

## 2017-10-24 PROCEDURE — 86900 BLOOD TYPING SEROLOGIC ABO: CPT | Performed by: SPECIALIST

## 2017-10-24 PROCEDURE — 25010000002 MORPHINE PER 10 MG: Performed by: SPECIALIST

## 2017-10-24 PROCEDURE — 86850 RBC ANTIBODY SCREEN: CPT | Performed by: SPECIALIST

## 2017-10-24 PROCEDURE — 88309 TISSUE EXAM BY PATHOLOGIST: CPT | Performed by: SPECIALIST

## 2017-10-24 PROCEDURE — 25010000002 DEXAMETHASONE PER 1 MG: Performed by: NURSE ANESTHETIST, CERTIFIED REGISTERED

## 2017-10-24 RX ORDER — SODIUM CHLORIDE, SODIUM LACTATE, POTASSIUM CHLORIDE, CALCIUM CHLORIDE 600; 310; 30; 20 MG/100ML; MG/100ML; MG/100ML; MG/100ML
30 INJECTION, SOLUTION INTRAVENOUS CONTINUOUS
Status: DISCONTINUED | OUTPATIENT
Start: 2017-10-24 | End: 2017-10-24

## 2017-10-24 RX ORDER — FAMOTIDINE 20 MG/1
20 TABLET, FILM COATED ORAL EVERY 12 HOURS SCHEDULED
Status: DISCONTINUED | OUTPATIENT
Start: 2017-10-24 | End: 2017-10-29 | Stop reason: HOSPADM

## 2017-10-24 RX ORDER — ATENOLOL 50 MG/1
50 TABLET ORAL
Status: DISCONTINUED | OUTPATIENT
Start: 2017-10-24 | End: 2017-10-29 | Stop reason: HOSPADM

## 2017-10-24 RX ORDER — HALOPERIDOL 5 MG/ML
1 INJECTION INTRAMUSCULAR ONCE
Status: DISCONTINUED | OUTPATIENT
Start: 2017-10-24 | End: 2017-10-24

## 2017-10-24 RX ORDER — ROCURONIUM BROMIDE 10 MG/ML
INJECTION, SOLUTION INTRAVENOUS AS NEEDED
Status: DISCONTINUED | OUTPATIENT
Start: 2017-10-24 | End: 2017-10-24 | Stop reason: SURG

## 2017-10-24 RX ORDER — MAGNESIUM HYDROXIDE 1200 MG/15ML
LIQUID ORAL AS NEEDED
Status: DISCONTINUED | OUTPATIENT
Start: 2017-10-24 | End: 2017-10-24 | Stop reason: HOSPADM

## 2017-10-24 RX ORDER — ALLOPURINOL 300 MG/1
300 TABLET ORAL DAILY
Status: DISCONTINUED | OUTPATIENT
Start: 2017-10-27 | End: 2017-10-29 | Stop reason: HOSPADM

## 2017-10-24 RX ORDER — FLUMAZENIL 0.1 MG/ML
0.2 INJECTION INTRAVENOUS AS NEEDED
Status: DISCONTINUED | OUTPATIENT
Start: 2017-10-24 | End: 2017-10-24 | Stop reason: HOSPADM

## 2017-10-24 RX ORDER — ASPIRIN AND DIPYRIDAMOLE 25; 200 MG/1; MG/1
1 CAPSULE, EXTENDED RELEASE ORAL EVERY 12 HOURS SCHEDULED
Status: DISCONTINUED | OUTPATIENT
Start: 2017-10-24 | End: 2017-10-29 | Stop reason: HOSPADM

## 2017-10-24 RX ORDER — MORPHINE SULFATE 2 MG/ML
1 INJECTION, SOLUTION INTRAMUSCULAR; INTRAVENOUS EVERY 4 HOURS PRN
Status: DISCONTINUED | OUTPATIENT
Start: 2017-10-24 | End: 2017-10-29 | Stop reason: HOSPADM

## 2017-10-24 RX ORDER — INDAPAMIDE 2.5 MG/1
2.5 TABLET, FILM COATED ORAL EVERY MORNING
Status: DISCONTINUED | OUTPATIENT
Start: 2017-10-25 | End: 2017-10-29 | Stop reason: HOSPADM

## 2017-10-24 RX ORDER — SODIUM CHLORIDE 0.9 % (FLUSH) 0.9 %
1-10 SYRINGE (ML) INJECTION AS NEEDED
Status: DISCONTINUED | OUTPATIENT
Start: 2017-10-24 | End: 2017-10-24 | Stop reason: HOSPADM

## 2017-10-24 RX ORDER — AMLODIPINE BESYLATE 5 MG/1
2.5 TABLET ORAL NIGHTLY
Status: DISCONTINUED | OUTPATIENT
Start: 2017-10-24 | End: 2017-10-29 | Stop reason: HOSPADM

## 2017-10-24 RX ORDER — MORPHINE SULFATE 2 MG/ML
2 INJECTION, SOLUTION INTRAMUSCULAR; INTRAVENOUS AS NEEDED
Status: DISCONTINUED | OUTPATIENT
Start: 2017-10-24 | End: 2017-10-24 | Stop reason: HOSPADM

## 2017-10-24 RX ORDER — ONDANSETRON 2 MG/ML
4 INJECTION INTRAMUSCULAR; INTRAVENOUS AS NEEDED
Status: DISCONTINUED | OUTPATIENT
Start: 2017-10-24 | End: 2017-10-24 | Stop reason: HOSPADM

## 2017-10-24 RX ORDER — SUFENTANIL CITRATE 50 UG/ML
INJECTION EPIDURAL; INTRAVENOUS AS NEEDED
Status: DISCONTINUED | OUTPATIENT
Start: 2017-10-24 | End: 2017-10-24 | Stop reason: SURG

## 2017-10-24 RX ORDER — MEPERIDINE HYDROCHLORIDE 25 MG/ML
12.5 INJECTION INTRAMUSCULAR; INTRAVENOUS; SUBCUTANEOUS
Status: DISCONTINUED | OUTPATIENT
Start: 2017-10-24 | End: 2017-10-24 | Stop reason: HOSPADM

## 2017-10-24 RX ORDER — NALOXONE HCL 0.4 MG/ML
0.1 VIAL (ML) INJECTION
Status: DISCONTINUED | OUTPATIENT
Start: 2017-10-24 | End: 2017-10-29 | Stop reason: HOSPADM

## 2017-10-24 RX ORDER — LIDOCAINE HYDROCHLORIDE 40 MG/ML
SOLUTION TOPICAL AS NEEDED
Status: DISCONTINUED | OUTPATIENT
Start: 2017-10-24 | End: 2017-10-24 | Stop reason: SURG

## 2017-10-24 RX ORDER — ALVIMOPAN 12 MG/1
12 CAPSULE ORAL EVERY 12 HOURS SCHEDULED
Status: DISCONTINUED | OUTPATIENT
Start: 2017-10-25 | End: 2017-10-29 | Stop reason: HOSPADM

## 2017-10-24 RX ORDER — LABETALOL HYDROCHLORIDE 5 MG/ML
5 INJECTION, SOLUTION INTRAVENOUS
Status: DISCONTINUED | OUTPATIENT
Start: 2017-10-24 | End: 2017-10-24 | Stop reason: HOSPADM

## 2017-10-24 RX ORDER — OXYCODONE HYDROCHLORIDE AND ACETAMINOPHEN 5; 325 MG/1; MG/1
1 TABLET ORAL EVERY 4 HOURS PRN
Status: DISCONTINUED | OUTPATIENT
Start: 2017-10-24 | End: 2017-10-29 | Stop reason: HOSPADM

## 2017-10-24 RX ORDER — MINERAL OIL AND WHITE PETROLATUM 150; 830 MG/G; MG/G
OINTMENT OPHTHALMIC AS NEEDED
Status: DISCONTINUED | OUTPATIENT
Start: 2017-10-24 | End: 2017-10-24 | Stop reason: SURG

## 2017-10-24 RX ORDER — DEXAMETHASONE SODIUM PHOSPHATE 4 MG/ML
INJECTION, SOLUTION INTRA-ARTICULAR; INTRALESIONAL; INTRAMUSCULAR; INTRAVENOUS; SOFT TISSUE AS NEEDED
Status: DISCONTINUED | OUTPATIENT
Start: 2017-10-24 | End: 2017-10-24 | Stop reason: SURG

## 2017-10-24 RX ORDER — PHENYLEPHRINE HCL IN 0.9% NACL 0.8MG/10ML
SYRINGE (ML) INTRAVENOUS AS NEEDED
Status: DISCONTINUED | OUTPATIENT
Start: 2017-10-24 | End: 2017-10-24 | Stop reason: SURG

## 2017-10-24 RX ORDER — NALOXONE HCL 0.4 MG/ML
0.04 VIAL (ML) INJECTION AS NEEDED
Status: DISCONTINUED | OUTPATIENT
Start: 2017-10-24 | End: 2017-10-24 | Stop reason: HOSPADM

## 2017-10-24 RX ORDER — NALOXONE HCL 0.4 MG/ML
0.4 VIAL (ML) INJECTION
Status: DISCONTINUED | OUTPATIENT
Start: 2017-10-24 | End: 2017-10-29 | Stop reason: HOSPADM

## 2017-10-24 RX ORDER — BUPIVACAINE HYDROCHLORIDE AND EPINEPHRINE 5; 5 MG/ML; UG/ML
INJECTION, SOLUTION PERINEURAL AS NEEDED
Status: DISCONTINUED | OUTPATIENT
Start: 2017-10-24 | End: 2017-10-24 | Stop reason: HOSPADM

## 2017-10-24 RX ORDER — ASPIRIN 81 MG/1
81 TABLET, CHEWABLE ORAL DAILY
Status: DISCONTINUED | OUTPATIENT
Start: 2017-10-25 | End: 2017-10-29 | Stop reason: HOSPADM

## 2017-10-24 RX ORDER — ONDANSETRON HCL IN 0.9 % NACL 8 MG/50 ML
8 INTRAVENOUS SOLUTION, PIGGYBACK (ML) INTRAVENOUS EVERY 6 HOURS PRN
Status: DISCONTINUED | OUTPATIENT
Start: 2017-10-24 | End: 2017-10-29 | Stop reason: HOSPADM

## 2017-10-24 RX ORDER — METOCLOPRAMIDE HYDROCHLORIDE 5 MG/ML
5 INJECTION INTRAMUSCULAR; INTRAVENOUS
Status: DISCONTINUED | OUTPATIENT
Start: 2017-10-24 | End: 2017-10-24 | Stop reason: HOSPADM

## 2017-10-24 RX ORDER — PROPOFOL 10 MG/ML
VIAL (ML) INTRAVENOUS AS NEEDED
Status: DISCONTINUED | OUTPATIENT
Start: 2017-10-24 | End: 2017-10-24 | Stop reason: SURG

## 2017-10-24 RX ORDER — LORAZEPAM 2 MG/ML
1 INJECTION INTRAMUSCULAR EVERY 4 HOURS PRN
Status: DISCONTINUED | OUTPATIENT
Start: 2017-10-24 | End: 2017-10-24

## 2017-10-24 RX ORDER — ONDANSETRON 2 MG/ML
INJECTION INTRAMUSCULAR; INTRAVENOUS AS NEEDED
Status: DISCONTINUED | OUTPATIENT
Start: 2017-10-24 | End: 2017-10-24 | Stop reason: SURG

## 2017-10-24 RX ORDER — SUCRALFATE ORAL 1 G/10ML
1 SUSPENSION ORAL EVERY 6 HOURS SCHEDULED
Status: DISCONTINUED | OUTPATIENT
Start: 2017-10-24 | End: 2017-10-29 | Stop reason: HOSPADM

## 2017-10-24 RX ORDER — MIDAZOLAM HYDROCHLORIDE 1 MG/ML
1 INJECTION INTRAMUSCULAR; INTRAVENOUS
Status: DISCONTINUED | OUTPATIENT
Start: 2017-10-24 | End: 2017-10-24 | Stop reason: HOSPADM

## 2017-10-24 RX ORDER — LORAZEPAM 2 MG/ML
0.5 INJECTION INTRAMUSCULAR EVERY 4 HOURS PRN
Status: DISCONTINUED | OUTPATIENT
Start: 2017-10-24 | End: 2017-10-29 | Stop reason: HOSPADM

## 2017-10-24 RX ORDER — IPRATROPIUM BROMIDE AND ALBUTEROL SULFATE 2.5; .5 MG/3ML; MG/3ML
3 SOLUTION RESPIRATORY (INHALATION) ONCE AS NEEDED
Status: DISCONTINUED | OUTPATIENT
Start: 2017-10-24 | End: 2017-10-24 | Stop reason: HOSPADM

## 2017-10-24 RX ORDER — HYDRALAZINE HYDROCHLORIDE 20 MG/ML
5 INJECTION INTRAMUSCULAR; INTRAVENOUS
Status: DISCONTINUED | OUTPATIENT
Start: 2017-10-24 | End: 2017-10-24 | Stop reason: HOSPADM

## 2017-10-24 RX ORDER — DEXTROSE AND SODIUM CHLORIDE 5; .45 G/100ML; G/100ML
75 INJECTION, SOLUTION INTRAVENOUS CONTINUOUS
Status: DISCONTINUED | OUTPATIENT
Start: 2017-10-24 | End: 2017-10-29 | Stop reason: HOSPADM

## 2017-10-24 RX ORDER — MORPHINE SULFATE IN 0.9 % NACL 50 MG/50ML
PATIENT CONTROLLED ANALGESIA SYRINGE INTRAVENOUS CONTINUOUS
Status: DISCONTINUED | OUTPATIENT
Start: 2017-10-24 | End: 2017-10-26

## 2017-10-24 RX ORDER — ONDANSETRON 8 MG/1
8 TABLET, ORALLY DISINTEGRATING ORAL EVERY 6 HOURS PRN
Status: DISCONTINUED | OUTPATIENT
Start: 2017-10-24 | End: 2017-10-29 | Stop reason: HOSPADM

## 2017-10-24 RX ORDER — SODIUM CHLORIDE 9 MG/ML
INJECTION, SOLUTION INTRAVENOUS AS NEEDED
Status: DISCONTINUED | OUTPATIENT
Start: 2017-10-24 | End: 2017-10-24 | Stop reason: HOSPADM

## 2017-10-24 RX ORDER — MIDAZOLAM HYDROCHLORIDE 1 MG/ML
2 INJECTION INTRAMUSCULAR; INTRAVENOUS
Status: DISCONTINUED | OUTPATIENT
Start: 2017-10-24 | End: 2017-10-24 | Stop reason: HOSPADM

## 2017-10-24 RX ORDER — VASOPRESSIN 20 U/ML
INJECTION PARENTERAL AS NEEDED
Status: DISCONTINUED | OUTPATIENT
Start: 2017-10-24 | End: 2017-10-24 | Stop reason: SURG

## 2017-10-24 RX ORDER — SODIUM CHLORIDE, SODIUM LACTATE, POTASSIUM CHLORIDE, CALCIUM CHLORIDE 600; 310; 30; 20 MG/100ML; MG/100ML; MG/100ML; MG/100ML
100 INJECTION, SOLUTION INTRAVENOUS CONTINUOUS
Status: DISCONTINUED | OUTPATIENT
Start: 2017-10-24 | End: 2017-10-24

## 2017-10-24 RX ADMIN — SODIUM CHLORIDE, POTASSIUM CHLORIDE, SODIUM LACTATE AND CALCIUM CHLORIDE 30 ML/HR: 600; 310; 30; 20 INJECTION, SOLUTION INTRAVENOUS at 07:11

## 2017-10-24 RX ADMIN — EPHEDRINE SULFATE 5 MG: 50 INJECTION INTRAMUSCULAR; INTRAVENOUS; SUBCUTANEOUS at 09:14

## 2017-10-24 RX ADMIN — ROCURONIUM BROMIDE 10 MG: 10 INJECTION INTRAVENOUS at 10:10

## 2017-10-24 RX ADMIN — LIDOCAINE HYDROCHLORIDE 1 EACH: 40 SOLUTION TOPICAL at 08:04

## 2017-10-24 RX ADMIN — ROCURONIUM BROMIDE 10 MG: 10 INJECTION INTRAVENOUS at 09:30

## 2017-10-24 RX ADMIN — Medication 800 MCG: at 08:21

## 2017-10-24 RX ADMIN — MINERAL OIL AND WHITE PETROLATUM 1 APPLICATION: 150; 830 OINTMENT OPHTHALMIC at 08:21

## 2017-10-24 RX ADMIN — METRONIDAZOLE 500 MG: 500 INJECTION, SOLUTION INTRAVENOUS at 15:46

## 2017-10-24 RX ADMIN — METRONIDAZOLE 500 MG: 500 INJECTION, SOLUTION INTRAVENOUS at 21:17

## 2017-10-24 RX ADMIN — SUGAMMADEX 100 MG: 100 INJECTION, SOLUTION INTRAVENOUS at 11:27

## 2017-10-24 RX ADMIN — EPHEDRINE SULFATE 15 MG: 50 INJECTION INTRAMUSCULAR; INTRAVENOUS; SUBCUTANEOUS at 09:20

## 2017-10-24 RX ADMIN — AMLODIPINE BESYLATE 2.5 MG: 5 TABLET ORAL at 23:22

## 2017-10-24 RX ADMIN — ROCURONIUM BROMIDE 5 MG: 10 INJECTION INTRAVENOUS at 10:50

## 2017-10-24 RX ADMIN — SODIUM CHLORIDE, POTASSIUM CHLORIDE, SODIUM LACTATE AND CALCIUM CHLORIDE: 600; 310; 30; 20 INJECTION, SOLUTION INTRAVENOUS at 09:05

## 2017-10-24 RX ADMIN — METRONIDAZOLE 500 MG: 500 INJECTION, SOLUTION INTRAVENOUS at 08:04

## 2017-10-24 RX ADMIN — SODIUM CHLORIDE, POTASSIUM CHLORIDE, SODIUM LACTATE AND CALCIUM CHLORIDE: 600; 310; 30; 20 INJECTION, SOLUTION INTRAVENOUS at 08:55

## 2017-10-24 RX ADMIN — CEFOXITIN 2 G: 2 INJECTION, POWDER, FOR SOLUTION INTRAVENOUS at 23:29

## 2017-10-24 RX ADMIN — VASOPRESSIN 2 UNITS: 20 INJECTION INTRAVENOUS at 08:15

## 2017-10-24 RX ADMIN — EPHEDRINE SULFATE 20 MG: 50 INJECTION INTRAMUSCULAR; INTRAVENOUS; SUBCUTANEOUS at 08:35

## 2017-10-24 RX ADMIN — SUFENTANIL CITRATE 50 MCG: 50 INJECTION, SOLUTION EPIDURAL; INTRAVENOUS at 08:04

## 2017-10-24 RX ADMIN — PROPOFOL 50 MG: 10 INJECTION, EMULSION INTRAVENOUS at 08:04

## 2017-10-24 RX ADMIN — DEXAMETHASONE SODIUM PHOSPHATE 4 MG: 4 INJECTION, SOLUTION INTRAMUSCULAR; INTRAVENOUS at 08:55

## 2017-10-24 RX ADMIN — DEXTROSE AND SODIUM CHLORIDE 125 ML/HR: 5; 450 INJECTION, SOLUTION INTRAVENOUS at 13:39

## 2017-10-24 RX ADMIN — ASPIRIN AND DIPYRIDAMOLE 1 CAPSULE: 25; 200 CAPSULE, EXTENDED RELEASE ORAL at 23:22

## 2017-10-24 RX ADMIN — ROCURONIUM BROMIDE 50 MG: 10 INJECTION INTRAVENOUS at 08:04

## 2017-10-24 RX ADMIN — LIDOCAINE HYDROCHLORIDE 0.5 ML: 10 INJECTION, SOLUTION EPIDURAL; INFILTRATION; INTRACAUDAL; PERINEURAL at 07:41

## 2017-10-24 RX ADMIN — CEFOXITIN 2 G: 2 INJECTION, POWDER, FOR SOLUTION INTRAVENOUS at 08:04

## 2017-10-24 RX ADMIN — Medication: at 13:36

## 2017-10-24 RX ADMIN — FAMOTIDINE 20 MG: 20 TABLET, FILM COATED ORAL at 23:23

## 2017-10-24 RX ADMIN — SODIUM CHLORIDE, POTASSIUM CHLORIDE, SODIUM LACTATE AND CALCIUM CHLORIDE: 600; 310; 30; 20 INJECTION, SOLUTION INTRAVENOUS at 11:20

## 2017-10-24 RX ADMIN — ONDANSETRON HYDROCHLORIDE 4 MG: 2 SOLUTION INTRAMUSCULAR; INTRAVENOUS at 08:55

## 2017-10-24 RX ADMIN — METRONIDAZOLE 500 MG: 500 INJECTION, SOLUTION INTRAVENOUS at 07:41

## 2017-10-24 RX ADMIN — CEFOXITIN 2 G: 2 INJECTION, POWDER, FOR SOLUTION INTRAVENOUS at 16:50

## 2017-10-24 RX ADMIN — SODIUM CHLORIDE, POTASSIUM CHLORIDE, SODIUM LACTATE AND CALCIUM CHLORIDE 100 ML/HR: 600; 310; 30; 20 INJECTION, SOLUTION INTRAVENOUS at 07:41

## 2017-10-24 NOTE — ANESTHESIA PROCEDURE NOTES
Airway  Urgency: elective    Date/Time: 10/24/2017 8:05 AM  Airway not difficult    General Information and Staff    Patient location during procedure: OR  CRNA: TASIA TAM    Indications and Patient Condition  Indications for airway management: airway protection    Preoxygenated: yes  MILS maintained throughout  Mask difficulty assessment: 1 - vent by mask    Final Airway Details  Final airway type: endotracheal airway      Successful airway: ETT  Cuffed: yes   Successful intubation technique: direct laryngoscopy  Endotracheal tube insertion site: oral  Blade: Ash  Blade size: #2  ETT size: 8.0 mm  Cormack-Lehane Classification: grade IIa - partial view of glottis  Placement verified by: chest auscultation   Cuff volume (mL): 18  Measured from: lips  ETT to lips (cm): 22  Number of attempts at approach: 1

## 2017-10-24 NOTE — ANESTHESIA POSTPROCEDURE EVALUATION
Patient: Shabbir Gambino    Procedure Summary     Date Anesthesia Start Anesthesia Stop Room / Location    10/24/17 0804 1140 [facility] OR 04 / [facility]       Procedure Diagnosis Surgeon Provider    COLON RESECTION  MID TRANSVERSE LAPAROSCOPIC (N/A Abdomen) (COLONO POLYP ) MD Mello Hightower CRNA          Anesthesia Type: general  Last vitals  BP   122/58 (10/24/17 1309)   Temp   97.2 °F (36.2 °C) (10/24/17 1309)   Pulse   80 (10/24/17 1309)   Resp   16 (10/24/17 1309)     SpO2   97 % (10/24/17 1309)     Post Anesthesia Care and Evaluation      Comments: Patient discharged from PACU prior to anesthesia evaluation based on Nilsa Score.  For details, see RN note.     /58 (BP Location: Left arm, Patient Position: Lying)  Pulse 80  Temp 97.2 °F (36.2 °C) (Oral)   Resp 16  SpO2 97%

## 2017-10-24 NOTE — ANESTHESIA PREPROCEDURE EVALUATION
Anesthesia Evaluation     NPO Solid Status: > 8 hours  NPO Liquid Status: > 8 hours     Airway   Mallampati: II  TM distance: >3 FB  no difficulty expected  Dental          Pulmonary - normal exam   (-) pneumonia, asthma, shortness of breath, sleep apnea, not a smoker  Cardiovascular - normal exam    (+) hypertension well controlled, CAD, CABG,       Neuro/Psych  (+) TIA, CVA,    (-) seizures  GI/Hepatic/Renal/Endo    (-) PUD, liver disease, no renal disease    Musculoskeletal     (+) gait problem, joint swelling,   Abdominal  - normal exam   Substance History   (-) alcohol use     OB/GYN negative ob/gyn ROS         Other   (+) arthritis         Phys Exam Other: Partial plate ouyt                                Anesthesia Plan    ASA 3     general     intravenous induction   Anesthetic plan and risks discussed with patient.

## 2017-10-25 ENCOUNTER — APPOINTMENT (OUTPATIENT)
Dept: GENERAL RADIOLOGY | Facility: HOSPITAL | Age: 77
End: 2017-10-25

## 2017-10-25 PROBLEM — D72.829 LEUKOCYTOSIS: Status: ACTIVE | Noted: 2017-10-25

## 2017-10-25 LAB
ALBUMIN SERPL-MCNC: 3.1 G/DL (ref 3.5–5)
ALBUMIN/GLOB SERPL: 1.1 G/DL (ref 1.1–2.5)
ALP SERPL-CCNC: 73 U/L (ref 24–120)
ALT SERPL W P-5'-P-CCNC: 30 U/L (ref 0–54)
ANION GAP SERPL CALCULATED.3IONS-SCNC: 10 MMOL/L (ref 4–13)
AST SERPL-CCNC: 23 U/L (ref 7–45)
BASOPHILS # BLD AUTO: 0 10*3/MM3 (ref 0–0.2)
BASOPHILS NFR BLD AUTO: 0 % (ref 0–2)
BILIRUB SERPL-MCNC: 0.6 MG/DL (ref 0.1–1)
BUN BLD-MCNC: 22 MG/DL (ref 5–21)
BUN/CREAT SERPL: 19.8 (ref 7–25)
CALCIUM SPEC-SCNC: 8.4 MG/DL (ref 8.4–10.4)
CHLORIDE SERPL-SCNC: 100 MMOL/L (ref 98–110)
CO2 SERPL-SCNC: 23 MMOL/L (ref 24–31)
CREAT BLD-MCNC: 1.11 MG/DL (ref 0.5–1.4)
CYTO UR: NORMAL
DEPRECATED RDW RBC AUTO: 49.4 FL (ref 40–54)
EOSINOPHIL # BLD AUTO: 0 10*3/MM3 (ref 0–0.7)
EOSINOPHIL NFR BLD AUTO: 0 % (ref 0–4)
ERYTHROCYTE [DISTWIDTH] IN BLOOD BY AUTOMATED COUNT: 14.5 % (ref 12–15)
GFR SERPL CREATININE-BSD FRML MDRD: 64 ML/MIN/1.73
GLOBULIN UR ELPH-MCNC: 2.7 GM/DL
GLUCOSE BLD-MCNC: 164 MG/DL (ref 70–100)
HCT VFR BLD AUTO: 34.4 % (ref 40–52)
HGB BLD-MCNC: 11.5 G/DL (ref 14–18)
IMM GRANULOCYTES # BLD: 0.07 10*3/MM3 (ref 0–0.03)
IMM GRANULOCYTES NFR BLD: 0.5 % (ref 0–5)
LAB AP CASE REPORT: NORMAL
LYMPHOCYTES # BLD AUTO: 0.45 10*3/MM3 (ref 0.72–4.86)
LYMPHOCYTES NFR BLD AUTO: 3.4 % (ref 15–45)
Lab: NORMAL
MCH RBC QN AUTO: 31.1 PG (ref 28–32)
MCHC RBC AUTO-ENTMCNC: 33.4 G/DL (ref 33–36)
MCV RBC AUTO: 93 FL (ref 82–95)
MONOCYTES # BLD AUTO: 1.27 10*3/MM3 (ref 0.19–1.3)
MONOCYTES NFR BLD AUTO: 9.7 % (ref 4–12)
NEUTROPHILS # BLD AUTO: 11.31 10*3/MM3 (ref 1.87–8.4)
NEUTROPHILS NFR BLD AUTO: 86.4 % (ref 39–78)
PATH REPORT.FINAL DX SPEC: NORMAL
PATH REPORT.GROSS SPEC: NORMAL
PLATELET # BLD AUTO: 178 10*3/MM3 (ref 130–400)
PMV BLD AUTO: 11.2 FL (ref 6–12)
POTASSIUM BLD-SCNC: 4.6 MMOL/L (ref 3.5–5.3)
PROT SERPL-MCNC: 5.8 G/DL (ref 6.3–8.7)
RBC # BLD AUTO: 3.7 10*6/MM3 (ref 4.8–5.9)
SODIUM BLD-SCNC: 133 MMOL/L (ref 135–145)
WBC NRBC COR # BLD: 13.1 10*3/MM3 (ref 4.8–10.8)

## 2017-10-25 PROCEDURE — 71010 HC CHEST PA OR AP: CPT

## 2017-10-25 PROCEDURE — 80053 COMPREHEN METABOLIC PANEL: CPT | Performed by: SPECIALIST

## 2017-10-25 PROCEDURE — 25010000002 CEFOXITIN PER 1 G: Performed by: SPECIALIST

## 2017-10-25 PROCEDURE — 25010000002 ENOXAPARIN PER 10 MG: Performed by: SPECIALIST

## 2017-10-25 PROCEDURE — 85025 COMPLETE CBC W/AUTO DIFF WBC: CPT | Performed by: FAMILY MEDICINE

## 2017-10-25 RX ORDER — DIPHENHYDRAMINE HCL 25 MG
25 CAPSULE ORAL NIGHTLY PRN
Status: DISCONTINUED | OUTPATIENT
Start: 2017-10-25 | End: 2017-10-29 | Stop reason: HOSPADM

## 2017-10-25 RX ADMIN — ENOXAPARIN SODIUM 30 MG: 30 INJECTION SUBCUTANEOUS at 13:21

## 2017-10-25 RX ADMIN — ASPIRIN AND DIPYRIDAMOLE 1 CAPSULE: 25; 200 CAPSULE, EXTENDED RELEASE ORAL at 20:39

## 2017-10-25 RX ADMIN — INDAPAMIDE 2.5 MG: 2.5 TABLET, FILM COATED ORAL at 08:24

## 2017-10-25 RX ADMIN — METRONIDAZOLE 500 MG: 500 INJECTION, SOLUTION INTRAVENOUS at 02:15

## 2017-10-25 RX ADMIN — DEXTROSE AND SODIUM CHLORIDE 125 ML/HR: 5; 450 INJECTION, SOLUTION INTRAVENOUS at 02:14

## 2017-10-25 RX ADMIN — SUCRALFATE 1 G: 1 SUSPENSION ORAL at 18:27

## 2017-10-25 RX ADMIN — ATENOLOL 50 MG: 50 TABLET ORAL at 08:23

## 2017-10-25 RX ADMIN — CEFOXITIN 2 G: 2 INJECTION, POWDER, FOR SOLUTION INTRAVENOUS at 15:49

## 2017-10-25 RX ADMIN — SUCRALFATE 1 G: 1 SUSPENSION ORAL at 08:24

## 2017-10-25 RX ADMIN — DEXTROSE AND SODIUM CHLORIDE 125 ML/HR: 5; 450 INJECTION, SOLUTION INTRAVENOUS at 12:33

## 2017-10-25 RX ADMIN — SUCRALFATE 1 G: 1 SUSPENSION ORAL at 12:08

## 2017-10-25 RX ADMIN — DEXTROSE AND SODIUM CHLORIDE 125 ML/HR: 5; 450 INJECTION, SOLUTION INTRAVENOUS at 22:24

## 2017-10-25 RX ADMIN — AMLODIPINE BESYLATE 2.5 MG: 5 TABLET ORAL at 20:38

## 2017-10-25 RX ADMIN — FAMOTIDINE 20 MG: 20 TABLET, FILM COATED ORAL at 20:38

## 2017-10-25 RX ADMIN — ASPIRIN AND DIPYRIDAMOLE 1 CAPSULE: 25; 200 CAPSULE, EXTENDED RELEASE ORAL at 08:24

## 2017-10-25 RX ADMIN — ALVIMOPAN 12 MG: 12 CAPSULE ORAL at 08:24

## 2017-10-25 RX ADMIN — FAMOTIDINE 20 MG: 20 TABLET, FILM COATED ORAL at 08:23

## 2017-10-25 RX ADMIN — Medication 81 MG: at 08:24

## 2017-10-25 RX ADMIN — CEFOXITIN 2 G: 2 INJECTION, POWDER, FOR SOLUTION INTRAVENOUS at 08:23

## 2017-10-25 RX ADMIN — ALVIMOPAN 12 MG: 12 CAPSULE ORAL at 20:38

## 2017-10-26 LAB
BASOPHILS # BLD AUTO: 0.02 10*3/MM3 (ref 0–0.2)
BASOPHILS NFR BLD AUTO: 0.2 % (ref 0–2)
DEPRECATED RDW RBC AUTO: 51.4 FL (ref 40–54)
EOSINOPHIL # BLD AUTO: 0.02 10*3/MM3 (ref 0–0.7)
EOSINOPHIL NFR BLD AUTO: 0.2 % (ref 0–4)
ERYTHROCYTE [DISTWIDTH] IN BLOOD BY AUTOMATED COUNT: 14.9 % (ref 12–15)
HCT VFR BLD AUTO: 36.1 % (ref 40–52)
HGB BLD-MCNC: 11.8 G/DL (ref 14–18)
IMM GRANULOCYTES # BLD: 0.11 10*3/MM3 (ref 0–0.03)
IMM GRANULOCYTES NFR BLD: 0.9 % (ref 0–5)
LYMPHOCYTES # BLD AUTO: 1.1 10*3/MM3 (ref 0.72–4.86)
LYMPHOCYTES NFR BLD AUTO: 9.3 % (ref 15–45)
MCH RBC QN AUTO: 31.1 PG (ref 28–32)
MCHC RBC AUTO-ENTMCNC: 32.7 G/DL (ref 33–36)
MCV RBC AUTO: 95 FL (ref 82–95)
MONOCYTES # BLD AUTO: 1.52 10*3/MM3 (ref 0.19–1.3)
MONOCYTES NFR BLD AUTO: 12.8 % (ref 4–12)
NEUTROPHILS # BLD AUTO: 9.09 10*3/MM3 (ref 1.87–8.4)
NEUTROPHILS NFR BLD AUTO: 76.6 % (ref 39–78)
PLATELET # BLD AUTO: 206 10*3/MM3 (ref 130–400)
PMV BLD AUTO: 11.4 FL (ref 6–12)
RBC # BLD AUTO: 3.8 10*6/MM3 (ref 4.8–5.9)
WBC NRBC COR # BLD: 11.86 10*3/MM3 (ref 4.8–10.8)

## 2017-10-26 PROCEDURE — 25010000002 ENOXAPARIN PER 10 MG: Performed by: SPECIALIST

## 2017-10-26 PROCEDURE — 85025 COMPLETE CBC W/AUTO DIFF WBC: CPT | Performed by: FAMILY MEDICINE

## 2017-10-26 PROCEDURE — 25010000002 CEFOXITIN PER 1 G: Performed by: SPECIALIST

## 2017-10-26 RX ADMIN — ASPIRIN AND DIPYRIDAMOLE 1 CAPSULE: 25; 200 CAPSULE, EXTENDED RELEASE ORAL at 20:56

## 2017-10-26 RX ADMIN — SUCRALFATE 1 G: 1 SUSPENSION ORAL at 06:40

## 2017-10-26 RX ADMIN — CEFOXITIN 2 G: 2 INJECTION, POWDER, FOR SOLUTION INTRAVENOUS at 00:10

## 2017-10-26 RX ADMIN — AMLODIPINE BESYLATE 2.5 MG: 5 TABLET ORAL at 20:56

## 2017-10-26 RX ADMIN — ASPIRIN AND DIPYRIDAMOLE 1 CAPSULE: 25; 200 CAPSULE, EXTENDED RELEASE ORAL at 09:42

## 2017-10-26 RX ADMIN — DEXTROSE AND SODIUM CHLORIDE 125 ML/HR: 5; 450 INJECTION, SOLUTION INTRAVENOUS at 15:12

## 2017-10-26 RX ADMIN — ENOXAPARIN SODIUM 30 MG: 30 INJECTION SUBCUTANEOUS at 03:12

## 2017-10-26 RX ADMIN — ATENOLOL 50 MG: 50 TABLET ORAL at 08:43

## 2017-10-26 RX ADMIN — CEFOXITIN 2 G: 2 INJECTION, POWDER, FOR SOLUTION INTRAVENOUS at 08:43

## 2017-10-26 RX ADMIN — FAMOTIDINE 20 MG: 20 TABLET, FILM COATED ORAL at 08:43

## 2017-10-26 RX ADMIN — METHYLCELLULOSE 1000 MG: 500 TABLET ORAL at 17:20

## 2017-10-26 RX ADMIN — SUCRALFATE 1 G: 1 SUSPENSION ORAL at 00:10

## 2017-10-26 RX ADMIN — METHYLCELLULOSE 1000 MG: 500 TABLET ORAL at 08:43

## 2017-10-26 RX ADMIN — ENOXAPARIN SODIUM 30 MG: 30 INJECTION SUBCUTANEOUS at 15:00

## 2017-10-26 RX ADMIN — INDAPAMIDE 2.5 MG: 2.5 TABLET, FILM COATED ORAL at 08:43

## 2017-10-26 RX ADMIN — Medication 81 MG: at 08:43

## 2017-10-26 RX ADMIN — CEFOXITIN 2 G: 2 INJECTION, POWDER, FOR SOLUTION INTRAVENOUS at 15:09

## 2017-10-26 RX ADMIN — SUCRALFATE 1 G: 1 SUSPENSION ORAL at 11:53

## 2017-10-26 RX ADMIN — FAMOTIDINE 20 MG: 20 TABLET, FILM COATED ORAL at 20:56

## 2017-10-26 RX ADMIN — ALVIMOPAN 12 MG: 12 CAPSULE ORAL at 20:56

## 2017-10-26 RX ADMIN — DEXTROSE AND SODIUM CHLORIDE 125 ML/HR: 5; 450 INJECTION, SOLUTION INTRAVENOUS at 06:48

## 2017-10-26 RX ADMIN — SUCRALFATE 1 G: 1 SUSPENSION ORAL at 17:20

## 2017-10-26 RX ADMIN — ALVIMOPAN 12 MG: 12 CAPSULE ORAL at 08:43

## 2017-10-27 LAB
ALBUMIN SERPL-MCNC: 3.4 G/DL (ref 3.5–5)
ALBUMIN/GLOB SERPL: 1.2 G/DL (ref 1.1–2.5)
ALP SERPL-CCNC: 99 U/L (ref 24–120)
ALT SERPL W P-5'-P-CCNC: 26 U/L (ref 0–54)
ANION GAP SERPL CALCULATED.3IONS-SCNC: 6 MMOL/L (ref 4–13)
AST SERPL-CCNC: 26 U/L (ref 7–45)
BILIRUB SERPL-MCNC: 0.8 MG/DL (ref 0.1–1)
BUN BLD-MCNC: 12 MG/DL (ref 5–21)
BUN/CREAT SERPL: 11.1 (ref 7–25)
CALCIUM SPEC-SCNC: 8.9 MG/DL (ref 8.4–10.4)
CHLORIDE SERPL-SCNC: 100 MMOL/L (ref 98–110)
CO2 SERPL-SCNC: 29 MMOL/L (ref 24–31)
CREAT BLD-MCNC: 1.08 MG/DL (ref 0.5–1.4)
DEPRECATED RDW RBC AUTO: 51.5 FL (ref 40–54)
ERYTHROCYTE [DISTWIDTH] IN BLOOD BY AUTOMATED COUNT: 14.7 % (ref 12–15)
GFR SERPL CREATININE-BSD FRML MDRD: 66 ML/MIN/1.73
GLOBULIN UR ELPH-MCNC: 2.8 GM/DL
GLUCOSE BLD-MCNC: 138 MG/DL (ref 70–100)
HCT VFR BLD AUTO: 35.1 % (ref 40–52)
HGB BLD-MCNC: 11.4 G/DL (ref 14–18)
MCH RBC QN AUTO: 31 PG (ref 28–32)
MCHC RBC AUTO-ENTMCNC: 32.5 G/DL (ref 33–36)
MCV RBC AUTO: 95.4 FL (ref 82–95)
PLATELET # BLD AUTO: 179 10*3/MM3 (ref 130–400)
PMV BLD AUTO: 11.1 FL (ref 6–12)
POTASSIUM BLD-SCNC: 3.8 MMOL/L (ref 3.5–5.3)
PROT SERPL-MCNC: 6.2 G/DL (ref 6.3–8.7)
RBC # BLD AUTO: 3.68 10*6/MM3 (ref 4.8–5.9)
SODIUM BLD-SCNC: 135 MMOL/L (ref 135–145)
WBC NRBC COR # BLD: 6.79 10*3/MM3 (ref 4.8–10.8)

## 2017-10-27 PROCEDURE — G8979 MOBILITY GOAL STATUS: HCPCS | Performed by: PHYSICAL THERAPIST

## 2017-10-27 PROCEDURE — 97162 PT EVAL MOD COMPLEX 30 MIN: CPT

## 2017-10-27 PROCEDURE — 25010000002 ENOXAPARIN PER 10 MG: Performed by: SPECIALIST

## 2017-10-27 PROCEDURE — G8978 MOBILITY CURRENT STATUS: HCPCS | Performed by: PHYSICAL THERAPIST

## 2017-10-27 PROCEDURE — 85027 COMPLETE CBC AUTOMATED: CPT | Performed by: SPECIALIST

## 2017-10-27 PROCEDURE — 80053 COMPREHEN METABOLIC PANEL: CPT | Performed by: SPECIALIST

## 2017-10-27 PROCEDURE — 25010000002 CEFOXITIN PER 1 G: Performed by: SPECIALIST

## 2017-10-27 RX ORDER — ONDANSETRON HCL 8 MG
8 TABLET ORAL EVERY 8 HOURS PRN
Qty: 10 TABLET | Refills: 1 | Status: SHIPPED | OUTPATIENT
Start: 2017-10-29 | End: 2018-07-23

## 2017-10-27 RX ORDER — METHYLCELLULOSE 2 G/19G
2 POWDER, FOR SOLUTION ORAL DAILY
Qty: 60 G | Refills: 6 | Status: SHIPPED | OUTPATIENT
Start: 2017-10-27 | End: 2017-11-26

## 2017-10-27 RX ORDER — HYDROCODONE BITARTRATE AND ACETAMINOPHEN 7.5; 325 MG/1; MG/1
1 TABLET ORAL EVERY 4 HOURS PRN
Qty: 40 TABLET | Refills: 0 | Status: SHIPPED | OUTPATIENT
Start: 2017-10-29 | End: 2018-02-01

## 2017-10-27 RX ADMIN — SUCRALFATE 1 G: 1 SUSPENSION ORAL at 12:25

## 2017-10-27 RX ADMIN — AMLODIPINE BESYLATE 2.5 MG: 5 TABLET ORAL at 20:25

## 2017-10-27 RX ADMIN — INDAPAMIDE 2.5 MG: 2.5 TABLET, FILM COATED ORAL at 06:28

## 2017-10-27 RX ADMIN — ATENOLOL 50 MG: 50 TABLET ORAL at 09:22

## 2017-10-27 RX ADMIN — DEXTROSE AND SODIUM CHLORIDE 125 ML/HR: 5; 450 INJECTION, SOLUTION INTRAVENOUS at 09:18

## 2017-10-27 RX ADMIN — SUCRALFATE 1 G: 1 SUSPENSION ORAL at 06:28

## 2017-10-27 RX ADMIN — SUCRALFATE 1 G: 1 SUSPENSION ORAL at 17:51

## 2017-10-27 RX ADMIN — CARBIDOPA AND LEVODOPA 1 TABLET: 25; 100 TABLET ORAL at 20:25

## 2017-10-27 RX ADMIN — ASPIRIN AND DIPYRIDAMOLE 1 CAPSULE: 25; 200 CAPSULE, EXTENDED RELEASE ORAL at 09:21

## 2017-10-27 RX ADMIN — DEXTROSE AND SODIUM CHLORIDE 75 ML/HR: 5; 450 INJECTION, SOLUTION INTRAVENOUS at 19:47

## 2017-10-27 RX ADMIN — ALVIMOPAN 12 MG: 12 CAPSULE ORAL at 20:25

## 2017-10-27 RX ADMIN — SUCRALFATE 1 G: 1 SUSPENSION ORAL at 00:08

## 2017-10-27 RX ADMIN — METHYLCELLULOSE 1000 MG: 500 TABLET ORAL at 09:21

## 2017-10-27 RX ADMIN — DEXTROSE AND SODIUM CHLORIDE 125 ML/HR: 5; 450 INJECTION, SOLUTION INTRAVENOUS at 00:06

## 2017-10-27 RX ADMIN — CEFOXITIN 2 G: 2 INJECTION, POWDER, FOR SOLUTION INTRAVENOUS at 10:09

## 2017-10-27 RX ADMIN — CEFOXITIN 2 G: 2 INJECTION, POWDER, FOR SOLUTION INTRAVENOUS at 17:51

## 2017-10-27 RX ADMIN — ALVIMOPAN 12 MG: 12 CAPSULE ORAL at 09:21

## 2017-10-27 RX ADMIN — ENOXAPARIN SODIUM 30 MG: 30 INJECTION SUBCUTANEOUS at 15:57

## 2017-10-27 RX ADMIN — METHYLCELLULOSE 1000 MG: 500 TABLET ORAL at 17:51

## 2017-10-27 RX ADMIN — ASPIRIN AND DIPYRIDAMOLE 1 CAPSULE: 25; 200 CAPSULE, EXTENDED RELEASE ORAL at 20:25

## 2017-10-27 RX ADMIN — ALLOPURINOL 300 MG: 300 TABLET ORAL at 09:24

## 2017-10-27 RX ADMIN — Medication 81 MG: at 09:22

## 2017-10-27 RX ADMIN — FAMOTIDINE 20 MG: 20 TABLET, FILM COATED ORAL at 20:25

## 2017-10-27 RX ADMIN — CEFOXITIN 2 G: 2 INJECTION, POWDER, FOR SOLUTION INTRAVENOUS at 00:07

## 2017-10-27 RX ADMIN — FAMOTIDINE 20 MG: 20 TABLET, FILM COATED ORAL at 09:21

## 2017-10-27 RX ADMIN — ENOXAPARIN SODIUM 30 MG: 30 INJECTION SUBCUTANEOUS at 02:07

## 2017-10-28 PROCEDURE — 25010000002 CEFOXITIN PER 1 G: Performed by: SPECIALIST

## 2017-10-28 PROCEDURE — 25010000002 ENOXAPARIN PER 10 MG: Performed by: SPECIALIST

## 2017-10-28 RX ADMIN — SUCRALFATE 1 G: 1 SUSPENSION ORAL at 17:42

## 2017-10-28 RX ADMIN — ATENOLOL 50 MG: 50 TABLET ORAL at 08:27

## 2017-10-28 RX ADMIN — FAMOTIDINE 20 MG: 20 TABLET, FILM COATED ORAL at 20:41

## 2017-10-28 RX ADMIN — ENOXAPARIN SODIUM 30 MG: 30 INJECTION SUBCUTANEOUS at 14:22

## 2017-10-28 RX ADMIN — CEFOXITIN 2 G: 2 INJECTION, POWDER, FOR SOLUTION INTRAVENOUS at 09:35

## 2017-10-28 RX ADMIN — CEFOXITIN 2 G: 2 INJECTION, POWDER, FOR SOLUTION INTRAVENOUS at 17:42

## 2017-10-28 RX ADMIN — ASPIRIN AND DIPYRIDAMOLE 1 CAPSULE: 25; 200 CAPSULE, EXTENDED RELEASE ORAL at 20:41

## 2017-10-28 RX ADMIN — ENOXAPARIN SODIUM 30 MG: 30 INJECTION SUBCUTANEOUS at 03:19

## 2017-10-28 RX ADMIN — ALLOPURINOL 300 MG: 300 TABLET ORAL at 08:27

## 2017-10-28 RX ADMIN — CEFOXITIN 2 G: 2 INJECTION, POWDER, FOR SOLUTION INTRAVENOUS at 03:19

## 2017-10-28 RX ADMIN — ALVIMOPAN 12 MG: 12 CAPSULE ORAL at 20:41

## 2017-10-28 RX ADMIN — CARBIDOPA AND LEVODOPA 1 TABLET: 25; 100 TABLET ORAL at 20:41

## 2017-10-28 RX ADMIN — ASPIRIN AND DIPYRIDAMOLE 1 CAPSULE: 25; 200 CAPSULE, EXTENDED RELEASE ORAL at 08:27

## 2017-10-28 RX ADMIN — SUCRALFATE 1 G: 1 SUSPENSION ORAL at 07:34

## 2017-10-28 RX ADMIN — ALVIMOPAN 12 MG: 12 CAPSULE ORAL at 08:27

## 2017-10-28 RX ADMIN — METHYLCELLULOSE 1000 MG: 500 TABLET ORAL at 08:31

## 2017-10-28 RX ADMIN — METHYLCELLULOSE 1000 MG: 500 TABLET ORAL at 17:42

## 2017-10-28 RX ADMIN — DEXTROSE AND SODIUM CHLORIDE 75 ML/HR: 5; 450 INJECTION, SOLUTION INTRAVENOUS at 09:35

## 2017-10-28 RX ADMIN — INDAPAMIDE 2.5 MG: 2.5 TABLET, FILM COATED ORAL at 07:35

## 2017-10-28 RX ADMIN — SUCRALFATE 1 G: 1 SUSPENSION ORAL at 00:17

## 2017-10-28 RX ADMIN — FAMOTIDINE 20 MG: 20 TABLET, FILM COATED ORAL at 08:27

## 2017-10-28 RX ADMIN — SUCRALFATE 1 G: 1 SUSPENSION ORAL at 11:24

## 2017-10-28 RX ADMIN — AMLODIPINE BESYLATE 2.5 MG: 5 TABLET ORAL at 20:41

## 2017-10-29 VITALS
DIASTOLIC BLOOD PRESSURE: 47 MMHG | BODY MASS INDEX: 23.63 KG/M2 | HEART RATE: 57 BPM | SYSTOLIC BLOOD PRESSURE: 115 MMHG | RESPIRATION RATE: 18 BRPM | OXYGEN SATURATION: 99 % | WEIGHT: 147 LBS | TEMPERATURE: 98.6 F | HEIGHT: 66 IN

## 2017-10-29 PROCEDURE — 25010000002 CEFOXITIN PER 1 G: Performed by: SPECIALIST

## 2017-10-29 PROCEDURE — 25010000002 ENOXAPARIN PER 10 MG: Performed by: SPECIALIST

## 2017-10-29 RX ADMIN — ALVIMOPAN 12 MG: 12 CAPSULE ORAL at 08:53

## 2017-10-29 RX ADMIN — ALLOPURINOL 300 MG: 300 TABLET ORAL at 08:53

## 2017-10-29 RX ADMIN — METHYLCELLULOSE 1000 MG: 500 TABLET ORAL at 08:53

## 2017-10-29 RX ADMIN — SUCRALFATE 1 G: 1 SUSPENSION ORAL at 11:59

## 2017-10-29 RX ADMIN — Medication 81 MG: at 08:53

## 2017-10-29 RX ADMIN — SUCRALFATE 1 G: 1 SUSPENSION ORAL at 00:39

## 2017-10-29 RX ADMIN — ENOXAPARIN SODIUM 30 MG: 30 INJECTION SUBCUTANEOUS at 00:39

## 2017-10-29 RX ADMIN — ASPIRIN AND DIPYRIDAMOLE 1 CAPSULE: 25; 200 CAPSULE, EXTENDED RELEASE ORAL at 08:53

## 2017-10-29 RX ADMIN — SUCRALFATE 1 G: 1 SUSPENSION ORAL at 05:50

## 2017-10-29 RX ADMIN — CEFOXITIN 2 G: 2 INJECTION, POWDER, FOR SOLUTION INTRAVENOUS at 00:39

## 2017-10-29 RX ADMIN — FAMOTIDINE 20 MG: 20 TABLET, FILM COATED ORAL at 08:53

## 2017-10-29 RX ADMIN — CEFOXITIN 2 G: 2 INJECTION, POWDER, FOR SOLUTION INTRAVENOUS at 09:33

## 2017-10-29 RX ADMIN — DEXTROSE AND SODIUM CHLORIDE 75 ML/HR: 5; 450 INJECTION, SOLUTION INTRAVENOUS at 00:39

## 2017-10-29 RX ADMIN — INDAPAMIDE 2.5 MG: 2.5 TABLET, FILM COATED ORAL at 05:50

## 2017-10-30 ENCOUNTER — EPISODE CHANGES (OUTPATIENT)
Dept: CASE MANAGEMENT | Facility: OTHER | Age: 77
End: 2017-10-30

## 2017-11-19 NOTE — PROGRESS NOTES
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    Procedure/CPT® Codes:      Procedure(s):  COLON RESECTION  MID TRANSVERSE LAPAROSCOPIC assisted    Surgeon(s):  Adal Quintero MD    Anesthesia: General    Staff:   Circulator: Augie Kevin RN; Stephenie Carvajal RN; Shanelle Baires RN  Scrub Person: Twyla Adkins; Shabbir Restrepo; Lynsey Dang; Leona Eng    Estimated Blood Loss: 100 mL    Specimens:                  ID Type Source Tests Collected by Time Destination   A : mid-transverse colon  Tissue Large Intestine, Transverse Colon TISSUE EXAM Adal Quintero MD 10/24/2017 1001          Drains:       [REMOVED] Urethral Catheter 10/24/17 0815 100% silicone 16 10 10 (Removed)   Removed 10/24/17 1145       Indications:Mr. Shabbir Gambino is a 77-year-old gentleman who had a colonoscopy accomplished and had an abnormality noted by Dr. Gomez.  He had 3 previous colonoscopies and upper endoscopy the last was in September and there were several sessile polyps in the descending colon and cecum.  These were all 6-7 mm there were subsequently removed but there was a larger polyp in the mid to distal transverse colon at 42 cm it was semi-sessile and covering two thirds of the colon.  Also another area at 40-43 cm showing high-grade dysplasia.  This area needs to be removed he is aware the procedure the risk and benefits and with full knowledge of this an apparent understanding he gives his informed consent for surgery     Findings: Laparoscopic exploration mobilization of the hepatic flexure mid transverse colon and splenic flexure and descending colon to permit a limited open exploration and resection of the transverse colon and splenic flexure and  anastomosis.    Complications: No complications pathology returned as tubulovillous adenoma with high-grade dysplasia 1.4 cm also 2 other tubular adenomas noted  no tumor in the lymph nodes.  Margins are clear        Procedure: Patient was placed in a supine position the surgical suite and after adequate prepping and draping had been completed with alcohol and Betadine, IV antibiotics given and a timeout completed an incision was completed in the infraumbilical region.  With this accomplished dissection was completed down to the fascia and 2-0 Vicryl stay sutures were placed on either side of midline.  The peritoneum was reached and entered a blunt trocar was inserted and insufflation with CO2 was completed to maintain the abdominal pressure between 10 and 14 mm of pressure.  At this point under direct visualization 3   5 mm trochars were placed one in the right mid abdomen and one left lower abdomen and also one in the left upper abdomen.  Using these 3 trocar sites mobilization of the descending colon the splenic flexure and the transverse colon was accomplished.  Full visualization was completed the omentum was fully taken off the transverse colon as well as the splenic flexure mobilization of splenic flexure was enabled to be accomplished and with this able to be accomplished the laparoscopic portion of the procedure was completed.  At this point   the laparoscopic instruments were removed from the field and correct sponge lap and needle count were obtained.  At this point we opened up the area around the periumbilical region and placed a small hand port around the central portion of the abdomen.  With this hand port in place this was enough to allow the mobilized transverse colon and splenic flexure to be brought into the incision and the areas that had been previously marked and noted with Natalya ink were well demonstrated and plans for resection of the central portion of the transverse colon to the splenic  flexure.  With this planned resection was accomplished as well as the corresponding mesentery and having completed this the mesentery was then closed to prevent any internal hernia and anastomosis was completed between the proximal transverse colon and the distal splenic flexure colon.  A handsewn single-layer anastomosis was accomplished and with this completed the colon was placed back into the abdomen the anastomosis was constructed using a single layer 2-0 Vicryl suture for the anastomosis.  At this point omentum was placed over the anastomosis the wound was irrigated with a bike irrigation which changed to a clean closing set and the fascia was closed using interrupted figure-of-eight suture of #1 Prolene these were all placed and then tied also a piece of Seprafilm were placed in the subfascial region and with the fascia closed the deep dermis was reapproximated using simple inverted interrupted sutures of 3-0 Vicryl and a running subcuticular suture of 4-0 Vicryl.  Following this Mastisol, Steri-Strips, 4 x 4 and Tegaderm applied the patient was then extubated and transferred to the recovery room in good condition.  Blood loss approximately 100 mL complications none.      Date: 11/19/2017  Time: 12:17 PM    EMR Dragon/Transcription disclaimer: Much of this encounter note is an electronic transcription/translation of spoken language to printed text. The electronic translation of spoken language may permit erroneous, or at times, nonsensical words or phrases to be inadvertently transcribed; although I have reviewed the note for such errors, some may still exist.

## 2017-11-21 ENCOUNTER — EPISODE CHANGES (OUTPATIENT)
Dept: CASE MANAGEMENT | Facility: OTHER | Age: 77
End: 2017-11-21

## 2017-12-11 DIAGNOSIS — M10.9 GOUT, UNSPECIFIED CAUSE, UNSPECIFIED CHRONICITY, UNSPECIFIED SITE: ICD-10-CM

## 2017-12-11 RX ORDER — PROBENECID AND COLCHICINE 500; .5 MG/1; MG/1
TABLET ORAL
Qty: 20 TABLET | Refills: 0 | Status: SHIPPED | OUTPATIENT
Start: 2017-12-11 | End: 2018-01-17 | Stop reason: SDUPTHER

## 2018-01-17 DIAGNOSIS — M10.9 GOUT, UNSPECIFIED CAUSE, UNSPECIFIED CHRONICITY, UNSPECIFIED SITE: ICD-10-CM

## 2018-01-17 RX ORDER — PROBENECID AND COLCHICINE 500; .5 MG/1; MG/1
TABLET ORAL
Qty: 20 TABLET | Refills: 0 | Status: SHIPPED | OUTPATIENT
Start: 2018-01-17 | End: 2018-03-02 | Stop reason: SDUPTHER

## 2018-01-31 RX ORDER — ATENOLOL 50 MG/1
TABLET ORAL
Qty: 90 TABLET | Refills: 0 | Status: SHIPPED | OUTPATIENT
Start: 2018-01-31 | End: 2018-05-04 | Stop reason: SDUPTHER

## 2018-02-01 ENCOUNTER — OFFICE VISIT (OUTPATIENT)
Dept: NEUROLOGY | Facility: CLINIC | Age: 78
End: 2018-02-01

## 2018-02-01 VITALS
HEIGHT: 66 IN | WEIGHT: 145 LBS | SYSTOLIC BLOOD PRESSURE: 120 MMHG | BODY MASS INDEX: 23.3 KG/M2 | HEART RATE: 62 BPM | DIASTOLIC BLOOD PRESSURE: 70 MMHG

## 2018-02-01 DIAGNOSIS — I63.313 CEREBROVASCULAR ACCIDENT (CVA) DUE TO BILATERAL THROMBOSIS OF MIDDLE CEREBRAL ARTERIES (HCC): ICD-10-CM

## 2018-02-01 DIAGNOSIS — I65.23 BILATERAL CAROTID ARTERY STENOSIS: ICD-10-CM

## 2018-02-01 DIAGNOSIS — I67.9 CEREBROVASCULAR DISEASE: Primary | ICD-10-CM

## 2018-02-01 PROCEDURE — 99213 OFFICE O/P EST LOW 20 MIN: CPT | Performed by: PHYSICIAN ASSISTANT

## 2018-02-01 RX ORDER — ASPIRIN AND DIPYRIDAMOLE 25; 200 MG/1; MG/1
1 CAPSULE, EXTENDED RELEASE ORAL 2 TIMES DAILY
Qty: 180 CAPSULE | Refills: 3 | Status: SHIPPED | OUTPATIENT
Start: 2018-02-01 | End: 2019-01-11 | Stop reason: SDUPTHER

## 2018-02-01 RX ORDER — AMLODIPINE BESYLATE 2.5 MG/1
TABLET ORAL
Qty: 30 TABLET | Refills: 0 | Status: SHIPPED | OUTPATIENT
Start: 2018-02-01 | End: 2018-03-02 | Stop reason: SDUPTHER

## 2018-02-01 NOTE — PROGRESS NOTES
Subjective   Shabbir Gambino is a 77 y.o. male is here today for follow-up.    HPI Comments: The patient had the discovery of some significant polyps in his colon and had a colon resection.  He has done well from this.    Stroke   This is a chronic problem. The current episode started more than 1 year ago. The problem occurs daily. The problem has been unchanged. Associated symptoms include fatigue. Pertinent negatives include no chills, fever, headaches or neck pain. The symptoms are aggravated by stress. Treatments tried: Pharmacotherapy, control underlying disease. The treatment provided moderate relief.       The following portions of the patient's history were reviewed and updated as appropriate: allergies, current medications, past family history, past medical history, past social history, past surgical history and problem list.    Review of Systems   Constitutional: Positive for fatigue. Negative for chills and fever.   HENT: Positive for hearing loss. Negative for nosebleeds and trouble swallowing.    Eyes: Negative.    Respiratory: Negative.    Cardiovascular: Negative.    Gastrointestinal: Negative.    Endocrine: Negative.    Genitourinary: Negative.    Musculoskeletal: Positive for gait problem. Negative for back pain and neck pain.   Skin: Negative.    Allergic/Immunologic: Negative.    Neurological: Negative for dizziness, speech difficulty and headaches.   Hematological: Negative.    Psychiatric/Behavioral: Positive for sleep disturbance. Negative for dysphoric mood. The patient is not nervous/anxious.        Objective   Physical Exam   Constitutional: He is oriented to person, place, and time. Vital signs are normal. He appears well-developed and well-nourished. No distress.   HENT:   Head: Normocephalic and atraumatic.   Right Ear: External ear normal. Decreased hearing is noted.   Left Ear: External ear normal. Decreased hearing is noted.   Nose: Nose normal.   Mouth/Throat: Uvula is midline,  oropharynx is clear and moist and mucous membranes are normal.   Eyes: Conjunctivae, EOM and lids are normal. Pupils are equal, round, and reactive to light. No scleral icterus.   Fundoscopic exam:       The right eye shows no hemorrhage and no papilledema. The right eye shows red reflex.        The left eye shows no hemorrhage and no papilledema. The left eye shows red reflex.   Neck: Phonation normal. No spinous process tenderness and no muscular tenderness present. Carotid bruit is not present. Decreased range of motion present. No thyroid mass and no thyromegaly present.   Cardiovascular: Normal rate, regular rhythm, S1 normal, S2 normal and normal heart sounds.    No murmur heard.  Pulmonary/Chest: Effort normal and breath sounds normal. No stridor. No respiratory distress. He has no wheezes. He has no rales.   Musculoskeletal: He exhibits no edema or tenderness.        Lumbar back: Normal.   Lymphadenopathy:     He has no cervical adenopathy.   Neurological: He is alert and oriented to person, place, and time. He displays atrophy. He displays no tremor. A cranial nerve deficit and sensory deficit is present. He exhibits normal muscle tone. Coordination and gait abnormal. GCS eye subscore is 4. GCS verbal subscore is 5. GCS motor subscore is 6.   Reflex Scores:       Tricep reflexes are 1+ on the right side and 1+ on the left side.       Bicep reflexes are 1+ on the right side and 1+ on the left side.       Brachioradialis reflexes are 1+ on the right side and 1+ on the left side.       Patellar reflexes are 1+ on the right side and 1+ on the left side.       Achilles reflexes are 0 on the right side and 0 on the left side.  Presbycusis, peripheral sensory deficits in all 4 extremities worse in the lower extremities with diminished light touch and vibratory sense.  Deep tendon reflexes diminished but symmetric.  Mild distal atrophy in bilateral lower extremities.  Functional strength bilaterally.  Functional  ataxia likely due to sensory deficit.   Skin: Skin is warm and dry. No ecchymosis, no lesion and no rash noted. No cyanosis. Nails show no clubbing.   Psychiatric: He has a normal mood and affect. His speech is normal and behavior is normal. Judgment and thought content normal. He is not actively hallucinating. Cognition and memory are normal. He is attentive.   Nursing note and vitals reviewed.        Assessment/Plan   Shabbir was seen today for stroke and restless legs syndrome.    Diagnoses and all orders for this visit:    Cerebrovascular disease  -     aspirin-dipyridamole (AGGRENOX)  MG per 12 hr capsule; Take 1 capsule by mouth 2 (Two) Times a Day.    Cerebrovascular accident (CVA) due to bilateral thrombosis of middle cerebral arteries  -     aspirin-dipyridamole (AGGRENOX)  MG per 12 hr capsule; Take 1 capsule by mouth 2 (Two) Times a Day.    Bilateral carotid artery stenosis  -     aspirin-dipyridamole (AGGRENOX)  MG per 12 hr capsule; Take 1 capsule by mouth 2 (Two) Times a Day.    Other orders  -     Cancel: Nerve Block    The patient has had some significant recent medical and surgical events, he is neurologically stable and recovering well.  He still has mild dysphagia but this is improving over time.  No changes were made in his medication regimen or therapy today.    10 minutes of a 15 minute outpatient visit was spent in counseling and coordination of care today.          EMR Dragon transcription disclaimer:  Much of this encounter note is an electronic transcription/translation of spoken language to printed text.  The electronic translation of spoken language may permit erroneous, or at times, nonsensical words or phrases to be inadvertently transcribed.  The author has reviewed the note for such errors, however some may still exist.

## 2018-02-16 ENCOUNTER — OFFICE VISIT (OUTPATIENT)
Dept: FAMILY MEDICINE CLINIC | Facility: CLINIC | Age: 78
End: 2018-02-16

## 2018-02-16 VITALS
BODY MASS INDEX: 22.92 KG/M2 | WEIGHT: 142.6 LBS | HEART RATE: 50 BPM | DIASTOLIC BLOOD PRESSURE: 58 MMHG | SYSTOLIC BLOOD PRESSURE: 124 MMHG | HEIGHT: 66 IN | OXYGEN SATURATION: 99 % | TEMPERATURE: 97.7 F

## 2018-02-16 DIAGNOSIS — R53.83 FATIGUE, UNSPECIFIED TYPE: ICD-10-CM

## 2018-02-16 DIAGNOSIS — Z00.00 ANNUAL PHYSICAL EXAM: Primary | ICD-10-CM

## 2018-02-16 DIAGNOSIS — D64.9 ANEMIA, UNSPECIFIED TYPE: ICD-10-CM

## 2018-02-16 DIAGNOSIS — Z12.5 SPECIAL SCREENING FOR MALIGNANT NEOPLASM OF PROSTATE: ICD-10-CM

## 2018-02-16 DIAGNOSIS — E55.9 VITAMIN D INSUFFICIENCY: ICD-10-CM

## 2018-02-16 DIAGNOSIS — M10.9 GOUT, UNSPECIFIED CAUSE, UNSPECIFIED CHRONICITY, UNSPECIFIED SITE: ICD-10-CM

## 2018-02-16 DIAGNOSIS — E78.2 MIXED HYPERLIPIDEMIA: ICD-10-CM

## 2018-02-16 DIAGNOSIS — Z12.12 SCREENING FOR MALIGNANT NEOPLASM OF THE RECTUM: ICD-10-CM

## 2018-02-16 LAB
BILIRUB BLD-MCNC: NEGATIVE MG/DL
CLARITY, POC: CLEAR
COLOR UR: YELLOW
GLUCOSE UR STRIP-MCNC: NEGATIVE MG/DL
KETONES UR QL: NEGATIVE
LEUKOCYTE EST, POC: NEGATIVE
NITRITE UR-MCNC: NEGATIVE MG/ML
PH UR: 5.5 [PH] (ref 5–8)
PROT UR STRIP-MCNC: NEGATIVE MG/DL
RBC # UR STRIP: ABNORMAL /UL
SP GR UR: 1.02 (ref 1–1.03)
UROBILINOGEN UR QL: NORMAL

## 2018-02-16 PROCEDURE — G0439 PPPS, SUBSEQ VISIT: HCPCS | Performed by: FAMILY MEDICINE

## 2018-02-16 PROCEDURE — G0102 PROSTATE CA SCREENING; DRE: HCPCS | Performed by: FAMILY MEDICINE

## 2018-02-16 PROCEDURE — 81003 URINALYSIS AUTO W/O SCOPE: CPT | Performed by: FAMILY MEDICINE

## 2018-02-16 NOTE — PROGRESS NOTES
QUICK REFERENCE INFORMATION:  The ABCs of the Annual Wellness Visit    Subsequent Medicare Wellness Visit    HEALTH RISK ASSESSMENT    1940    Recent Hospitalizations:  Recently treated at the following:  [facility].        Current Medical Providers:  Patient Care Team:  Rigoberto Barnes MD as PCP - General (Family Medicine)  Rigoberto Barnes MD as PCP - Family Medicine  Dania Epstein OD as PCP - Claims Attributed  MD Ruddy Padron PA as Physician Assistant (Neurology)  Stanton Escalante MD as Consulting Physician (Cardiology)  Denise Paredes RN as Care Coordinator (Population Health)        Smoking Status:  History   Smoking Status   • Never Smoker   Smokeless Tobacco   • Never Used       Alcohol Consumption:  History   Alcohol Use No       Depression Screen:   PHQ-2/PHQ-9 Depression Screening 2/16/2018   Little interest or pleasure in doing things 0   Feeling down, depressed, or hopeless 0   Trouble falling or staying asleep, or sleeping too much -   Feeling tired or having little energy -   Poor appetite or overeating -   Feeling bad about yourself - or that you are a failure or have let yourself or your family down -   Trouble concentrating on things, such as reading the newspaper or watching television -   Moving or speaking so slowly that other people could have noticed. Or the opposite - being so fidgety or restless that you have been moving around a lot more than usual -   Thoughts that you would be better off dead, or of hurting yourself in some way -   Total Score 0   If you checked off any problems, how difficult have these problems made it for you to do your work, take care of things at home, or get along with other people? -       Health Habits and Functional and Cognitive Screening:  Functional & Cognitive Status 2/16/2018   Do you have difficulty preparing food and eating? No   Do you have difficulty bathing yourself, getting dressed or  "grooming yourself? No   Do you have difficulty using the toilet? No   Do you have difficulty moving around from place to place? No   Do you have trouble with steps or getting out of a bed or a chair? No   In the past year have you fallen or experienced a near fall? No   Current Diet Well Balanced Diet   Dental Exam Up to date   Eye Exam Up to date   Exercise (times per week) 0 times per week   Current Exercise Activities Include None   Do you need help using the phone?  No   Are you deaf or do you have serious difficulty hearing?  No   Do you need help with transportation? No   Do you need help shopping? No   Do you need help preparing meals?  No   Do you need help with housework?  No   Do you need help with laundry? No   Do you need help taking your medications? No   Do you need help managing money? No   Have you felt unusual stress, anger or loneliness in the last month? No   Who do you live with? Spouse   If you need help, do you have trouble finding someone available to you? No   Have you been bothered in the last four weeks by sexual problems? No   Do you have difficulty concentrating, remembering or making decisions? No     -- The Timed Up and Go (TUG) Test (CDC Version)                  - Testing directions adhered to:                                  1) Patients wears regular footwear and may use walking aid(s) if    needed.                                  2) Patient to sit back in standard arm chair and identify a line 10 feet    away from patient on floor.                                  3) Test time begins at \"Go\" and stops when patient reseated in arm    chair.                    - Instructions read aloud (and demonstrated as applicable) to patient:                                      When I say \"Go,\" I want you to:                                    1) Stand up from the chair.                                  2) Walk to the line on the floor (10 feet away) at your normal pace.                         "          3) Turn.                                  4) Walk back to the chair at your normal pace.                                  5) Sit down again.                    - Result: 3                    - Observations during test:Normal gait            Does the patient have evidence of cognitive impairment? No    Aspirin use counseling: Taking ASA appropriately as indicated      Recent Lab Results:  CMP:  Lab Results   Component Value Date     (H) 07/14/2017    BUN 12 10/27/2017    CREATININE 1.08 10/27/2017    EGFRIFNONA 66 10/27/2017    EGFRIFAFRI 56 (L) 07/14/2017    BCR 11.1 10/27/2017     10/27/2017    K 3.8 10/27/2017    CO2 29.0 10/27/2017    CALCIUM 8.9 10/27/2017    PROTENTOTREF 7.8 02/02/2017    ALBUMIN 3.40 (L) 10/27/2017    LABGLOBREF 3.3 02/02/2017    LABIL2 1.2 10/27/2017    BILITOT 0.8 10/27/2017    ALKPHOS 99 10/27/2017    AST 26 10/27/2017    ALT 26 10/27/2017     Lipid Panel:  Lab Results   Component Value Date    TRIG 272 (H) 02/02/2017    HDL 37 (L) 02/02/2017    VLDL 54.4 02/02/2017     HbA1c:  Lab Results   Component Value Date    HGBA1C 6.40 02/02/2017       Visual Acuity:  Right eye 20/20 and Left eye 20/20 w/o glasses.      Age-appropriate Screening Schedule:  Refer to the list below for future screening recommendations based on patient's age, sex and/or medical conditions. Orders for these recommended tests are listed in the plan section. The patient has been provided with a written plan.    Health Maintenance   Topic Date Due   • ZOSTER VACCINE  02/02/2017   • LIPID PANEL  02/02/2018   • PNEUMOCOCCAL VACCINES (65+ LOW/MEDIUM RISK) (2 of 2 - PPSV23) 02/06/2018   • COLONOSCOPY  09/07/2018   • INFLUENZA VACCINE  Completed   • TDAP/TD VACCINES  Excluded        Subjective   History of Present Illness    Shabbir Gambino is a 77 y.o. male who presents for an Subsequent Wellness Visit.    The following portions of the patient's history were reviewed and updated as appropriate:  allergies, current medications, past family history, past medical history, past social history, past surgical history and problem list.    Outpatient Medications Prior to Visit   Medication Sig Dispense Refill   • allopurinol (ZYLOPRIM) 300 MG tablet TAKE ONE TABLET BY MOUTH EVERY DAY FOR GOUT PREVENTION 30 tablet 5   • amLODIPine (NORVASC) 2.5 MG tablet TAKE ONE TABLET BY MOUTH EVERY EVENING 30 tablet 0   • aspirin 81 MG tablet Take 81 mg by mouth Daily.     • aspirin-dipyridamole (AGGRENOX)  MG per 12 hr capsule Take 1 capsule by mouth 2 (Two) Times a Day. 180 capsule 3   • atenolol (TENORMIN) 50 MG tablet TAKE 1 TABLET BY MOUTH DAILY 90 tablet 0   • carbidopa-levodopa (SINEMET)  MG per tablet Take 1 tablet by mouth Every Night.     • CINNAMON PO Take 500 mg by mouth Daily.     • colchicine-probenecid (COL-BENEMID) 0.5-500 MG tablet TAKE ONE TABLET BY MOUTH EVERY DAY 20 tablet 0   • colestipol (COLESTID) 1 g tablet TAKE 1 TABLET BY MOUTH TWICE A  tablet 1   • DiphenhydrAMINE HCl, Sleep, (ZZZQUIL PO) Take 1 dose by mouth every night at bedtime.     • indapamide (LOZOL) 2.5 MG tablet Take 2.5 mg by mouth Every Morning.     • Multiple Vitamins-Minerals (EYE VITAMINS PO) Take 1 tablet by mouth Daily.     • ZOFRAN 8 MG tablet Take 1 tablet by mouth Every 8 (Eight) Hours As Needed for Nausea or Vomiting for up to 10 doses. 10 tablet 1     No facility-administered medications prior to visit.        Patient Active Problem List   Diagnosis   • Vascular disease   • RLS (restless legs syndrome)   • Insomnia   • Hypertension   • Gout   • Axonal polyneuropathy   • Atherosclerotic coronary vascular disease   • Arthritis   • Esophageal dysphagia   • Dysplasia of colon   • Irregular heart beat   • Cerebrovascular disease   • Leukocytosis       Advance Care Planning:  has NO advance directive - information provided to the patient today    Identification of Risk Factors:  Risk factors include: weight , unhealthy  diet, inactivity and increased fall risk.    Review of Systems   Cardiovascular: Negative for chest pain and leg swelling.   Gastrointestinal:        EGD and colonoscopy fall of 2017-exploratory for colonic polyp removal   Genitourinary: Negative for difficulty urinating.   Musculoskeletal: Positive for arthralgias.   All other systems reviewed and are negative.      Compared to one year ago, the patient feels his physical health is the same.  Compared to one year ago, the patient feels his mental health is the same.    Objective     Physical Exam   Constitutional: He is oriented to person, place, and time. He appears well-developed and well-nourished.   HENT:   Right Ear: External ear normal.   Left Ear: External ear normal.   Mouth/Throat: Oropharynx is clear and moist.   Eyes: EOM are normal. Pupils are equal, round, and reactive to light.   Neck: No thyromegaly present.   Surgical past carotid endarterectomies with bruits-checked yearly   Cardiovascular: Normal rate and regular rhythm.    Pulmonary/Chest: Effort normal and breath sounds normal.   Abdominal: Soft. Bowel sounds are normal.   Recent exploratory lap scars for colonic polyp removal-well-healed   Genitourinary: Rectum normal, prostate normal and penis normal. No penile tenderness.   Genitourinary Comments: Testicles normal no hernias or inguinal adenopathy-prostate normal no nodules guaiac-negative   Musculoskeletal: He exhibits no edema.   Lymphadenopathy:     He has no cervical adenopathy.   Neurological: He is alert and oriented to person, place, and time.   Skin: Skin is warm and dry.   Psychiatric: He has a normal mood and affect. His behavior is normal. Judgment and thought content normal.   Nursing note and vitals reviewed.      Vitals:    02/16/18 1004   BP: 124/58   BP Location: Left arm   Patient Position: Sitting   Cuff Size: Adult   Pulse: 50   Temp: 97.7 °F (36.5 °C)   TempSrc: Oral   SpO2: 99%   Weight: 64.7 kg (142 lb 9.6 oz)   Height:  "167.6 cm (65.98\")   PainSc: 0-No pain       Body mass index is 23.03 kg/(m^2).  Discussed the patient's BMI with him. BMI is within normal parameters. No follow-up required.    Assessment/Plan   Patient Self-Management and Personalized Health Advice  The patient has been provided with information about: diet, exercise, prevention of cardiac or vascular disease and fall prevention and preventive services including:   · Advance directive, Colorectal cancer screening, fecal occult blood test, Fall Risk plan of care done, Prostate cancer screening discussed.    Visit Diagnoses:    ICD-10-CM ICD-9-CM   1. Annual physical exam Z00.00 V70.0   2. Special screening for malignant neoplasm of prostate Z12.5 V76.44   3. Fatigue, unspecified type R53.83 780.79   4. Mixed hyperlipidemia E78.2 272.2   5. Vitamin D insufficiency E55.9 268.9   6. Gout, unspecified cause, unspecified chronicity, unspecified site M10.9 274.9   7. Anemia, unspecified type D64.9 285.9       No orders of the defined types were placed in this encounter.      Outpatient Encounter Prescriptions as of 2/16/2018   Medication Sig Dispense Refill   • allopurinol (ZYLOPRIM) 300 MG tablet TAKE ONE TABLET BY MOUTH EVERY DAY FOR GOUT PREVENTION 30 tablet 5   • amLODIPine (NORVASC) 2.5 MG tablet TAKE ONE TABLET BY MOUTH EVERY EVENING 30 tablet 0   • aspirin 81 MG tablet Take 81 mg by mouth Daily.     • aspirin-dipyridamole (AGGRENOX)  MG per 12 hr capsule Take 1 capsule by mouth 2 (Two) Times a Day. 180 capsule 3   • atenolol (TENORMIN) 50 MG tablet TAKE 1 TABLET BY MOUTH DAILY 90 tablet 0   • carbidopa-levodopa (SINEMET)  MG per tablet Take 1 tablet by mouth Every Night.     • CINNAMON PO Take 500 mg by mouth Daily.     • colchicine-probenecid (COL-BENEMID) 0.5-500 MG tablet TAKE ONE TABLET BY MOUTH EVERY DAY 20 tablet 0   • colestipol (COLESTID) 1 g tablet TAKE 1 TABLET BY MOUTH TWICE A  tablet 1   • DiphenhydrAMINE HCl, Sleep, (ZZZQUIL PO) Take " 1 dose by mouth every night at bedtime.     • indapamide (LOZOL) 2.5 MG tablet Take 2.5 mg by mouth Every Morning.     • Multiple Vitamins-Minerals (EYE VITAMINS PO) Take 1 tablet by mouth Daily.     • ZOFRAN 8 MG tablet Take 1 tablet by mouth Every 8 (Eight) Hours As Needed for Nausea or Vomiting for up to 10 doses. 10 tablet 1     No facility-administered encounter medications on file as of 2/16/2018.        Reviewed use of high risk medication in the elderly: yes  Reviewed for potential of harmful drug interactions in the elderly: yes    Follow Up:  Return in 6 months (on 8/16/2018).     An After Visit Summary and PPPS with all of these plans were given to the patient.       plan-continue seeing specialist

## 2018-02-16 NOTE — PATIENT INSTRUCTIONS
Exercising to Stay Healthy  Exercising regularly is important. It has many health benefits, such as:  · Improving your overall fitness, flexibility, and endurance.  · Increasing your bone density.  · Helping with weight control.  · Decreasing your body fat.  · Increasing your muscle strength.  · Reducing stress and tension.  · Improving your overall health.  In order to become healthy and stay healthy, it is recommended that you do moderate-intensity and vigorous-intensity exercise. You can tell that you are exercising at a moderate intensity if you have a higher heart rate and faster breathing, but you are still able to hold a conversation. You can tell that you are exercising at a vigorous intensity if you are breathing much harder and faster and cannot hold a conversation while exercising.  How often should I exercise?  Choose an activity that you enjoy and set realistic goals. Your health care provider can help you to make an activity plan that works for you. Exercise regularly as directed by your health care provider. This may include:  · Doing resistance training twice each week, such as:  ¨ Push-ups.  ¨ Sit-ups.  ¨ Lifting weights.  ¨ Using resistance bands.  · Doing a given intensity of exercise for a given amount of time. Choose from these options:  ¨ 150 minutes of moderate-intensity exercise every week.  ¨ 75 minutes of vigorous-intensity exercise every week.  ¨ A mix of moderate-intensity and vigorous-intensity exercise every week.  Children, pregnant women, people who are out of shape, people who are overweight, and older adults may need to consult a health care provider for individual recommendations. If you have any sort of medical condition, be sure to consult your health care provider before starting a new exercise program.  What are some exercise ideas?  Some moderate-intensity exercise ideas include:  · Walking at a rate of 1 mile in 15  minutes.  · Biking.  · Hiking.  · Golfing.  · Dancing.  Some vigorous-intensity exercise ideas include:  · Walking at a rate of at least 4.5 miles per hour.  · Jogging or running at a rate of 5 miles per hour.  · Biking at a rate of at least 10 miles per hour.  · Lap swimming.  · Roller-skating or in-line skating.  · Cross-country skiing.  · Vigorous competitive sports, such as football, basketball, and soccer.  · Jumping rope.  · Aerobic dancing.  What are some everyday activities that can help me to get exercise?  · Yard work, such as:  ¨ Pushing a .  ¨ Raking and bagging leaves.  · Washing and waxing your car.  · Pushing a stroller.  · Shoveling snow.  · Gardening.  · Washing windows or floors.  How can I be more active in my day-to-day activities?  · Use the stairs instead of the elevator.  · Take a walk during your lunch break.  · If you drive, park your car farther away from work or school.  · If you take public transportation, get off one stop early and walk the rest of the way.  · Make all of your phone calls while standing up and walking around.  · Get up, stretch, and walk around every 30 minutes throughout the day.  What guidelines should I follow while exercising?  · Do not exercise so much that you hurt yourself, feel dizzy, or get very short of breath.  · Consult your health care provider before starting a new exercise program.  · Wear comfortable clothes and shoes with good support.  · Drink plenty of water while you exercise to prevent dehydration or heat stroke. Body water is lost during exercise and must be replaced.  · Work out until you breathe faster and your heart beats faster.  This information is not intended to replace advice given to you by your health care provider. Make sure you discuss any questions you have with your health care provider.  Document Released: 01/20/2012 Document Revised: 05/25/2017 Document Reviewed: 05/21/2015  [employer] Interactive Patient Education © 2017  [employer]    Fall Prevention in the Home  Falls can cause injuries and can affect people from all age groups. There are many simple things that you can do to make your home safe and to help prevent falls.  What can I do on the outside of my home?  · Regularly repair the edges of walkways and driveways and fix any cracks.  · Remove high doorway thresholds.  · Trim any shrubbery on the main path into your home.  · Use bright outdoor lighting.  · Clear walkways of debris and clutter, including tools and rocks.  · Regularly check that handrails are securely fastened and in good repair. Both sides of any steps should have handrails.  · Install guardrails along the edges of any raised decks or porches.  · Have leaves, snow, and ice cleared regularly.  · Use sand or salt on walkways during winter months.  · In the garage, clean up any spills right away, including grease or oil spills.  What can I do in the bathroom?  · Use night lights.  · Install grab bars by the toilet and in the tub and shower. Do not use towel bars as grab bars.  · Use non-skid mats or decals on the floor of the tub or shower.  · If you need to sit down while you are in the shower, use a plastic, non-slip stool.  · Keep the floor dry. Immediately clean up any water that spills on the floor.  · Remove soap buildup in the tub or shower on a regular basis.  · Attach bath mats securely with double-sided non-slip rug tape.  · Remove throw rugs and other tripping hazards from the floor.  What can I do in the bedroom?  · Use night lights.  · Make sure that a bedside light is easy to reach.  · Do not use oversized bedding that drapes onto the floor.  · Have a firm chair that has side arms to use for getting dressed.  · Remove throw rugs and other tripping hazards from the floor.  What can I do in the kitchen?  · Clean up any spills right away.  · Avoid walking on wet floors.  · Place frequently used items in easy-to-reach places.  · If you need to reach  for something above you, use a sturdy step stool that has a grab bar.  · Keep electrical cables out of the way.  · Do not use floor polish or wax that makes floors slippery. If you have to use wax, make sure that it is non-skid floor wax.  · Remove throw rugs and other tripping hazards from the floor.  What can I do in the stairways?  · Do not leave any items on the stairs.  · Make sure that there are handrails on both sides of the stairs. Fix handrails that are broken or loose. Make sure that handrails are as long as the stairways.  · Check any carpeting to make sure that it is firmly attached to the stairs. Fix any carpet that is loose or worn.  · Avoid having throw rugs at the top or bottom of stairways, or secure the rugs with carpet tape to prevent them from moving.  · Make sure that you have a light switch at the top of the stairs and the bottom of the stairs. If you do not have them, have them installed.  What are some other fall prevention tips?  · Wear closed-toe shoes that fit well and support your feet. Wear shoes that have rubber soles or low heels.  · When you use a stepladder, make sure that it is completely opened and that the sides are firmly locked. Have someone hold the ladder while you are using it. Do not climb a closed stepladder.  · Add color or contrast paint or tape to grab bars and handrails in your home. Place contrasting color strips on the first and last steps.  · Use mobility aids as needed, such as canes, walkers, scooters, and crutches.  · Turn on lights if it is dark. Replace any light bulbs that burn out.  · Set up furniture so that there are clear paths. Keep the furniture in the same spot.  · Fix any uneven floor surfaces.  · Choose a carpet design that does not hide the edge of steps of a stairway.  · Be aware of any and all pets.  · Review your medicines with your healthcare provider. Some medicines can cause dizziness or changes in blood pressure, which increase your risk of  falling.  Talk with your health care provider about other ways that you can decrease your risk of falls. This may include working with a physical therapist or  to improve your strength, balance, and endurance.  This information is not intended to replace advice given to you by your health care provider. Make sure you discuss any questions you have with your health care provider.  Document Released: 2003 Document Revised: 2017 Document Reviewed: 2016  [employer] Interactive Patient Education © 2017 [employer]    Medicare Wellness  Personal Prevention Plan of Service     Date of Office Visit:  2018  Encounter Provider:  Rigoberto Barnes MD  Place of Service:  [facility]  Patient Name: Shabbir Gambino  :  1940    As part of the Medicare Wellness portion of your visit today, we are providing you with this personalized preventive plan of services (PPPS). This plan is based upon recommendations of the [place] Preventive Services Task Force (USPSTF) and the Advisory Committee on Immunization Practices (ACIP).    This lists the preventive care services that should be considered, and provides dates of when you are due. Items listed as completed are up-to-date and do not require any further intervention.    Health Maintenance   Topic Date Due   • TDAP/TD VACCINES (1 - Tdap) 1959   • ZOSTER VACCINE  2017   • LIPID PANEL  2018   • MEDICARE ANNUAL WELLNESS  2018   • PNEUMOCOCCAL VACCINES (65+ LOW/MEDIUM RISK) (2 of 2 - PPSV23) 2018   • COLONOSCOPY  2018   • INFLUENZA VACCINE  Completed       No orders of the defined types were placed in this encounter.      Return in 6 months (on 2018).

## 2018-02-17 LAB
25(OH)D3+25(OH)D2 SERPL-MCNC: 22.1 NG/ML (ref 30–100)
ALBUMIN SERPL-MCNC: 4.8 G/DL (ref 3.5–5)
ALBUMIN/GLOB SERPL: 1.5 G/DL (ref 1.1–2.5)
ALP SERPL-CCNC: 127 U/L (ref 24–120)
ALT SERPL-CCNC: 24 U/L (ref 0–54)
AST SERPL-CCNC: 25 U/L (ref 7–45)
BASOPHILS # BLD AUTO: 0.06 10*3/MM3 (ref 0–0.2)
BASOPHILS NFR BLD AUTO: 0.8 % (ref 0–2)
BILIRUB SERPL-MCNC: 0.9 MG/DL (ref 0.1–1)
BUN SERPL-MCNC: 25 MG/DL (ref 5–21)
BUN/CREAT SERPL: 17.2 (ref 7–25)
CALCIUM SERPL-MCNC: 10.7 MG/DL (ref 8.4–10.4)
CHLORIDE SERPL-SCNC: 94 MMOL/L (ref 98–110)
CHOLEST SERPL-MCNC: 266 MG/DL (ref 130–200)
CO2 SERPL-SCNC: 30 MMOL/L (ref 24–31)
CREAT SERPL-MCNC: 1.45 MG/DL (ref 0.5–1.4)
EOSINOPHIL # BLD AUTO: 0.22 10*3/MM3 (ref 0–0.7)
EOSINOPHIL NFR BLD AUTO: 3 % (ref 0–4)
ERYTHROCYTE [DISTWIDTH] IN BLOOD BY AUTOMATED COUNT: 13.4 % (ref 12–15)
FERRITIN SERPL-MCNC: 142 NG/ML (ref 17.9–464)
GFR SERPLBLD CREATININE-BSD FMLA CKD-EPI: 47 ML/MIN/1.73
GFR SERPLBLD CREATININE-BSD FMLA CKD-EPI: 57 ML/MIN/1.73
GLOBULIN SER CALC-MCNC: 3.2 GM/DL
GLUCOSE SERPL-MCNC: 110 MG/DL (ref 70–100)
HCT VFR BLD AUTO: 50.3 % (ref 40–52)
HDLC SERPL-MCNC: 41 MG/DL
HGB BLD-MCNC: 16.2 G/DL (ref 14–18)
IMM GRANULOCYTES # BLD: 0.13 10*3/MM3 (ref 0–0.03)
IMM GRANULOCYTES NFR BLD: 1.8 % (ref 0–5)
LDLC SERPL CALC-MCNC: 161 MG/DL (ref 0–99)
LYMPHOCYTES # BLD AUTO: 1.6 10*3/MM3 (ref 0.72–4.86)
LYMPHOCYTES NFR BLD AUTO: 21.7 % (ref 15–45)
MCH RBC QN AUTO: 29.9 PG (ref 28–32)
MCHC RBC AUTO-ENTMCNC: 32.2 G/DL (ref 33–36)
MCV RBC AUTO: 92.8 FL (ref 82–95)
MONOCYTES # BLD AUTO: 0.92 10*3/MM3 (ref 0.19–1.3)
MONOCYTES NFR BLD AUTO: 12.5 % (ref 4–12)
NEUTROPHILS # BLD AUTO: 4.45 10*3/MM3 (ref 1.87–8.4)
NEUTROPHILS NFR BLD AUTO: 60.2 % (ref 39–78)
NRBC BLD AUTO-RTO: 0 /100 WBC (ref 0–0)
PLATELET # BLD AUTO: 236 10*3/MM3 (ref 130–400)
POTASSIUM SERPL-SCNC: 5.2 MMOL/L (ref 3.5–5.3)
PROT SERPL-MCNC: 8 G/DL (ref 6.3–8.7)
PSA SERPL-MCNC: 1.31 NG/ML (ref 0–4)
RBC # BLD AUTO: 5.42 10*6/MM3 (ref 4.8–5.9)
SODIUM SERPL-SCNC: 137 MMOL/L (ref 135–145)
T4 FREE SERPL-MCNC: 1.01 NG/DL (ref 0.78–2.19)
TRIGL SERPL-MCNC: 318 MG/DL (ref 0–149)
TSH SERPL DL<=0.005 MIU/L-ACNC: 2.29 MIU/ML (ref 0.47–4.68)
URATE SERPL-MCNC: 8.4 MG/DL (ref 3.5–8.5)
VLDLC SERPL CALC-MCNC: 63.6 MG/DL
WBC # BLD AUTO: 7.38 10*3/MM3 (ref 4.8–10.8)

## 2018-02-20 DIAGNOSIS — Z12.12 SCREENING FOR MALIGNANT NEOPLASM OF THE RECTUM: ICD-10-CM

## 2018-02-21 LAB — HEMOCCULT STL QL IA: NEGATIVE

## 2018-03-02 DIAGNOSIS — M10.9 GOUT, UNSPECIFIED CAUSE, UNSPECIFIED CHRONICITY, UNSPECIFIED SITE: ICD-10-CM

## 2018-03-02 RX ORDER — AMLODIPINE BESYLATE 2.5 MG/1
TABLET ORAL
Qty: 90 TABLET | Refills: 3 | Status: SHIPPED | OUTPATIENT
Start: 2018-03-02 | End: 2019-02-26 | Stop reason: SDUPTHER

## 2018-03-02 RX ORDER — PROBENECID AND COLCHICINE 500; .5 MG/1; MG/1
TABLET ORAL
Qty: 20 TABLET | Refills: 0 | Status: SHIPPED | OUTPATIENT
Start: 2018-03-02 | End: 2018-04-17 | Stop reason: SDUPTHER

## 2018-04-17 DIAGNOSIS — M10.9 GOUT, UNSPECIFIED CAUSE, UNSPECIFIED CHRONICITY, UNSPECIFIED SITE: ICD-10-CM

## 2018-04-17 RX ORDER — PROBENECID AND COLCHICINE 500; .5 MG/1; MG/1
TABLET ORAL
Qty: 20 TABLET | Refills: 1 | Status: SHIPPED | OUTPATIENT
Start: 2018-04-17 | End: 2018-07-14 | Stop reason: SDUPTHER

## 2018-05-04 RX ORDER — ATENOLOL 50 MG/1
TABLET ORAL
Qty: 90 TABLET | Refills: 2 | Status: SHIPPED | OUTPATIENT
Start: 2018-05-04 | End: 2019-02-07 | Stop reason: SDUPTHER

## 2018-05-10 ENCOUNTER — TELEPHONE (OUTPATIENT)
Dept: CARDIOLOGY | Age: 78
End: 2018-05-10

## 2018-07-03 DIAGNOSIS — E78.5 DYSLIPIDEMIA: ICD-10-CM

## 2018-07-03 PROBLEM — I25.10 ATHEROSCLEROTIC CORONARY VASCULAR DISEASE: Status: ACTIVE | Noted: 2018-07-03

## 2018-07-03 RX ORDER — CHLORAL HYDRATE 500 MG
3000 CAPSULE ORAL 3 TIMES DAILY
COMMUNITY
End: 2018-07-06

## 2018-07-03 RX ORDER — ASPIRIN AND DIPYRIDAMOLE 25; 200 MG/1; MG/1
1 CAPSULE, EXTENDED RELEASE ORAL 2 TIMES DAILY
Status: ON HOLD | COMMUNITY
Start: 2018-02-01 | End: 2019-03-19 | Stop reason: HOSPADM

## 2018-07-03 RX ORDER — MONTELUKAST SODIUM 4 MG/1
TABLET, CHEWABLE ORAL
Status: ON HOLD | COMMUNITY
Start: 2017-10-05 | End: 2019-03-19

## 2018-07-06 ENCOUNTER — TELEPHONE (OUTPATIENT)
Dept: FAMILY MEDICINE CLINIC | Facility: CLINIC | Age: 78
End: 2018-07-06

## 2018-07-06 ENCOUNTER — OFFICE VISIT (OUTPATIENT)
Dept: CARDIOLOGY | Age: 78
End: 2018-07-06
Payer: MEDICARE

## 2018-07-06 VITALS
BODY MASS INDEX: 21.03 KG/M2 | WEIGHT: 134 LBS | HEART RATE: 48 BPM | DIASTOLIC BLOOD PRESSURE: 68 MMHG | HEIGHT: 67 IN | SYSTOLIC BLOOD PRESSURE: 120 MMHG

## 2018-07-06 DIAGNOSIS — E78.5 DYSLIPIDEMIA: ICD-10-CM

## 2018-07-06 DIAGNOSIS — I67.9 CEREBROVASCULAR DISEASE: Primary | Chronic | ICD-10-CM

## 2018-07-06 DIAGNOSIS — R09.89 BILATERAL CAROTID BRUITS: ICD-10-CM

## 2018-07-06 DIAGNOSIS — I65.23 CAROTID OCCLUSION, BILATERAL: ICD-10-CM

## 2018-07-06 PROBLEM — R13.10 DYSPHAGIA: Status: ACTIVE | Noted: 2018-07-06

## 2018-07-06 PROBLEM — K63.5 COLONIC POLYP: Status: ACTIVE | Noted: 2018-07-06

## 2018-07-06 PROBLEM — K21.9 GERD (GASTROESOPHAGEAL REFLUX DISEASE): Status: ACTIVE | Noted: 2018-07-06

## 2018-07-06 PROCEDURE — 99212 OFFICE O/P EST SF 10 MIN: CPT | Performed by: INTERNAL MEDICINE

## 2018-07-06 PROCEDURE — 4040F PNEUMOC VAC/ADMIN/RCVD: CPT | Performed by: INTERNAL MEDICINE

## 2018-07-06 PROCEDURE — G8598 ASA/ANTIPLAT THER USED: HCPCS | Performed by: INTERNAL MEDICINE

## 2018-07-06 PROCEDURE — 93000 ELECTROCARDIOGRAM COMPLETE: CPT | Performed by: INTERNAL MEDICINE

## 2018-07-06 PROCEDURE — G8420 CALC BMI NORM PARAMETERS: HCPCS | Performed by: INTERNAL MEDICINE

## 2018-07-06 PROCEDURE — 1036F TOBACCO NON-USER: CPT | Performed by: INTERNAL MEDICINE

## 2018-07-06 PROCEDURE — G8427 DOCREV CUR MEDS BY ELIG CLIN: HCPCS | Performed by: INTERNAL MEDICINE

## 2018-07-06 PROCEDURE — 1123F ACP DISCUSS/DSCN MKR DOCD: CPT | Performed by: INTERNAL MEDICINE

## 2018-07-06 ASSESSMENT — ENCOUNTER SYMPTOMS
CHOKING: 0
BACK PAIN: 0
WHEEZING: 0
NAUSEA: 0
APNEA: 0
CHEST TIGHTNESS: 0
ABDOMINAL DISTENTION: 0
BLOOD IN STOOL: 0
STRIDOR: 0
SHORTNESS OF BREATH: 1

## 2018-07-06 NOTE — PROGRESS NOTES
dipyridamole-aspirin (AGGRENOX)  MG per extended release capsule, Take 1 capsule by mouth, Disp: , Rfl:     colestipol (COLESTID) 1 g tablet, TAKE 1 TABLET BY MOUTH TWICE A DAY, Disp: , Rfl:     allopurinol (ZYLOPRIM) 300 MG tablet, Take 300 mg by mouth daily, Disp: , Rfl:     amLODIPine (NORVASC) 2.5 MG tablet, Take 2.5 mg by mouth daily, Disp: , Rfl:     atenolol (TENORMIN) 50 MG tablet, Take 50 mg by mouth daily, Disp: , Rfl:     indapamide (LOZOL) 2.5 MG tablet, Take 2.5 mg by mouth every morning, Disp: , Rfl:     carbidopa-levodopa (SINEMET)  MG per tablet, Take 1 tablet by mouth 2 times daily as needed, Disp: , Rfl:     Cinnamon 500 MG CAPS, Take 1 capsule by mouth nightly, Disp: , Rfl:     Review of Systems   Constitutional: Positive for fatigue. Negative for activity change, diaphoresis and unexpected weight change. HENT: Negative for congestion, dental problem, hearing loss and tinnitus. Eyes: Negative for visual disturbance. Respiratory: Positive for shortness of breath. Negative for apnea, choking, chest tightness, wheezing and stridor. Cardiovascular: Negative for chest pain, palpitations and leg swelling. 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 Dysphagia since carotid endarterectomies  S/P open colonic polypectomy 2017   Endocrine: Negative for cold intolerance, heat intolerance, polydipsia and polyuria. Dyslipidemia - statin intolerance   Genitourinary: Negative for decreased urine volume and difficulty urinating. Musculoskeletal: Negative for arthralgias, back pain, gait problem and myalgias. Skin: Negative for pallor and rash. Allergic/Immunologic: Negative for environmental allergies, food allergies and immunocompromised state.    Neurological: Negative for dizziness, tremors, seizures, syncope, speech difficulty, weakness, light-headedness, numbness and headaches. S/P CVA 6/11   Psychiatric/Behavioral: Negative for agitation, confusion, dysphoric mood and sleep disturbance. The patient is not nervous/anxious. /68   Pulse (!) 48   Ht 5' 7\" (1.702 m)   Wt 134 lb (60.8 kg)   BMI 20.99 kg/m²   Physical Exam   Constitutional: He is oriented to person, place, and time. He appears well-developed and well-nourished. No distress. HENT:   Head: Normocephalic. Normal male balding pattern   Eyes: Conjunctivae and EOM are normal. Pupils are equal, round, and reactive to light. Neck: No JVD present. Carotid bruit is not present. No thyromegaly present. Bilateral carotid endarterectomy scars  Bilateral elevated pitch bruits     Cardiovascular: Normal rate, regular rhythm, intact distal pulses and normal pulses. PMI is not displaced. Exam reveals gallop (S4). Exam reveals no friction rub. Pulmonary/Chest: Breath sounds normal. No respiratory distress. He has no wheezes. He has no rales. He exhibits no tenderness. Healed sternotomy scar   Abdominal: Soft. Bowel sounds are normal. He exhibits no distension and no mass. There is no tenderness. There is no rebound and no guarding. Musculoskeletal: He exhibits no deformity. Neurological: He is alert and oriented to person, place, and time. Skin: Skin is warm. No rash noted. He is not diaphoretic. No erythema. No pallor. Psychiatric: He has a normal mood and affect. His behavior is normal. Judgment and thought content normal.   Vitals reviewed. Orders Placed This Encounter   Procedures    Lipid Panel     Standing Status:   Future     Standing Expiration Date:   7/6/2019     Order Specific Question:   Is Patient Fasting?/# of Hours     Answer:    Yes    Hepatic Function Panel     Standing Status:   Future     Standing Expiration Date:   7/6/2019    EKG 12 lead     Order Specific

## 2018-07-06 NOTE — TELEPHONE ENCOUNTER
PT SAW DR LOPEZ THIS AM-HE IS WORKING ON CHOL MEDICATION CHANGE.  REQUESTING CHOL MEDS HAS TRIED IN THE PAST.           HOW MANY YEARS HAS PT BEEN UNDER CARE FOR CHOL MANAGEMENT?      PLEASE ADVISE YAIR         send the record--he refused chol med for yrs.

## 2018-07-06 NOTE — TELEPHONE ENCOUNTER
MEDICATIONS FROM [place] [id number] INCLUDED THE FOLLOWING:    CRESTOR 5MG  CRESTOR 10MG  COLESTID 5g  LIPITOR 80MG  COLESTID 5g  COLESTID 1g      PT'S SPOUSE WAS ADVISED OF MEDICATIONS LISTED ABOVE.

## 2018-07-10 ENCOUNTER — HOSPITAL ENCOUNTER (OUTPATIENT)
Dept: VASCULAR LAB | Age: 78
Discharge: HOME | End: 2018-07-10
Payer: MEDICARE

## 2018-07-10 DIAGNOSIS — R09.89 BILATERAL CAROTID BRUITS: ICD-10-CM

## 2018-07-10 DIAGNOSIS — I67.9 CEREBROVASCULAR DISEASE: Chronic | ICD-10-CM

## 2018-07-10 DIAGNOSIS — E78.5 DYSLIPIDEMIA: ICD-10-CM

## 2018-07-10 LAB
ALBUMIN SERPL-MCNC: 4.2 G/DL (ref 3.5–5.2)
ALP BLD-CCNC: 105 U/L (ref 40–130)
ALT SERPL-CCNC: <5 U/L (ref 5–41)
AST SERPL-CCNC: 18 U/L (ref 5–40)
BILIRUB SERPL-MCNC: 0.6 MG/DL (ref 0.2–1.2)
BILIRUBIN DIRECT: 0.2 MG/DL (ref 0–0.3)
BILIRUBIN, INDIRECT: 0.4 MG/DL (ref 0.1–1)
CHOLESTEROL, TOTAL: 251 MG/DL (ref 160–199)
HDLC SERPL-MCNC: 43 MG/DL (ref 55–121)
LDL CHOLESTEROL CALCULATED: 165 MG/DL
TOTAL PROTEIN: 7.4 G/DL (ref 6.6–8.7)
TRIGL SERPL-MCNC: 215 MG/DL (ref 0–149)

## 2018-07-10 PROCEDURE — 93880 EXTRACRANIAL BILAT STUDY: CPT

## 2018-07-13 DIAGNOSIS — M10.9 GOUT, UNSPECIFIED CAUSE, UNSPECIFIED CHRONICITY, UNSPECIFIED SITE: ICD-10-CM

## 2018-07-14 RX ORDER — PROBENECID AND COLCHICINE 500; .5 MG/1; MG/1
TABLET ORAL
Qty: 20 TABLET | Refills: 1 | Status: SHIPPED | OUTPATIENT
Start: 2018-07-14 | End: 2018-10-13 | Stop reason: SDUPTHER

## 2018-07-19 NOTE — PROGRESS NOTES
[facility] Gastroenterology    Primary Physician Deann Griffin MD    7/23/2018    Shabbir Gambino   1940      Chief Complaint   Patient presents with   • Difficulty Swallowing     due for colonoscopy in oct.       Subjective     HPI    Shabbir Gambino is a 77 y.o. male who presents as a referral for preventative maintenance. He has no complaints of nausea or vomiting. No change in bowels. No wt loss. No BRBPR. No melena. No abdominal pain.   The patient does  have history of colon polyps. The patient does  have history of colon cancer.   There is no family history of colon polyps. There is no family history of colon cancer.     Last colonoscopy 9/2017   - Three 6 to 7 mm polyps in the proximal ascending colon and in the cecum, removed with a hot snare.  Resected and retrieved.  - One large polyp in the transverse colon not amendable to colonoscopic removal with a small polyp  next to it. Biopsied. Tattooed.  - Diverticulosis in the entire examined colon.  - The examination was otherwise normal on direct and retroflexion views.      Notes Recorded by Randy Gomez MD on 9/18/2017 at 3:58 PM  Recall colonoscopy in 1 year.  Patient is aware pathology   Component 10mo ago   Clinical Information    pcp- danita hernandez, deann griffin.  Pre- hx colon polyps / post- colon polyps   Final Diagnosis   1.  Polyps, cecum/ascending colon, polypectomies:  Tubular adenomas, negative for high-grade dysplasia.     2.  Polyp, colon at 43 cm, polypectomy:  Tubular adenoma with focal high-grade dysplasia.           He had surgery by dr cerda 10-24-18 with laparoscopic expiration mobilization of splenic flexure and hepatic flexure and subsequent removal of this transverse colon with excision and closure.     Final Diagnosis  10/2017    Mid transverse colon, segmental resection:  Tubulovillous adenoma with focal high-grade dysplasia (measuring 1.4 cm in greatest dimension).  Tubular adenoma (x2) with no  evidence of high grade dysplasia (measuring 0.3 cm in greatest dimension each).   Negative for invasive carcinoma.  Two lymph nodes with no tumor seen (0/2).  Resection margins are negative for high grade dysplasia and invasive carcinoma (see comment).       Dysphagia  X 3 - 4 yrs, intermittent.  Worse x 2 mo.   Noted with solids and liquids. Solid foods worse. Points to nape of neck where food or liquids will hang. Sometimes regurgitation will relieve. Drinking liquids will help when solids hang. Has lost 10 # / 1 yr. Belches a lot. No gerd symptoms. No n/v. No fever.        Last egd 9/2017 benign esophageal stenosis dilated.   helped for about 1 week.       Past Medical History:   Diagnosis Date   • Atherosclerotic coronary vascular disease    • Axonal polyneuropathy    • Colon polyp    • Gout    • History of transfusion    • Hypertension    • Insomnia    • Irregular heart beat    • Precancerous skin lesion    • RLS (restless legs syndrome)    • TIA (transient ischemic attack)     2014   • Vascular disease     CAROTID       Past Surgical History:   Procedure Laterality Date   • CAROTID ENDARTERECTOMY Bilateral     2007, 2008   • COLON RESECTION N/A 10/24/2017    Procedure: COLON RESECTION  MID TRANSVERSE LAPAROSCOPIC;  Surgeon: Adal Quintero MD;  Location: [facility] OR;  Service:    • COLONOSCOPY      MULTIPLE    • COLONOSCOPY N/A 9/7/2017    Procedure: colonoscopy with anesthesia;  Surgeon: Randy Gomez MD;  Location: [facility] ENDOSCOPY;  Service:    • CORONARY ARTERY BYPASS GRAFT  2006   • ENDOSCOPY N/A 9/7/2017    Procedure: ESOPHAGOGASTRODUODENOSCOPY WITH ANESTHESIA;  Surgeon: Randy Gomez MD;  Location: [facility] ENDOSCOPY;  Service:    • EYE SURGERY Bilateral     2014, 2015   • KNEE SURGERY Right     x 2       Outpatient Prescriptions Marked as Taking for the 7/23/18 encounter (Office Visit) with VERONIKA Rodriguez   Medication Sig Dispense Refill   • allopurinol (ZYLOPRIM) 300 MG tablet TAKE ONE  TABLET BY MOUTH EVERY DAY FOR GOUT PREVENTION 30 tablet 5   • amLODIPine (NORVASC) 2.5 MG tablet TAKE ONE TABLET BY MOUTH EVERY EVENING 90 tablet 3   • aspirin-dipyridamole (AGGRENOX)  MG per 12 hr capsule Take 1 capsule by mouth 2 (Two) Times a Day. 180 capsule 3   • atenolol (TENORMIN) 50 MG tablet TAKE 1 TABLET BY MOUTH DAILY 90 tablet 2   • carbidopa-levodopa (SINEMET)  MG per tablet TAKE 1 OR 2 TABLETS AT BEDTIME 60 tablet 5   • CINNAMON PO Take 500 mg by mouth Daily.     • colestipol (COLESTID) 1 g tablet TAKE 1 TABLET BY MOUTH TWICE A  tablet 1   • indapamide (LOZOL) 2.5 MG tablet Take 2.5 mg by mouth Every Morning.     • Multiple Vitamins-Minerals (EYE VITAMINS PO) Take 1 tablet by mouth Daily.         Allergies   Allergen Reactions   • Other Anaphylaxis     UNKNOWN TO PATIENT ENVIRONMENTAL/FOOD ALLERGY    • Statins Other (See Comments)     Leg cramps       Social History     Social History   • Marital status:      Spouse name: N/A   • Number of children: N/A   • Years of education: N/A     Occupational History   • Not on file.     Social History Main Topics   • Smoking status: Never Smoker   • Smokeless tobacco: Never Used   • Alcohol use No   • Drug use: No   • Sexual activity: Defer     Other Topics Concern   • Not on file     Social History Narrative   • No narrative on file       Family History   Problem Relation Age of Onset   • Stroke Mother    • Diabetes Mother    • No Known Problems Father    • Esophageal cancer Sister    • No Known Problems Maternal Grandmother    • No Known Problems Maternal Grandfather    • No Known Problems Paternal Grandmother    • No Known Problems Paternal Grandfather    • Colon polyps Neg Hx    • Colon cancer Neg Hx        Review of Systems   Constitutional: Negative for appetite change, chills, fatigue, fever and unexpected weight change.   HENT: Negative for sore throat and trouble swallowing.    Eyes: Negative for visual disturbance.    Respiratory: Negative for cough, chest tightness, shortness of breath and wheezing.    Cardiovascular: Negative for chest pain and palpitations.   Gastrointestinal: Negative for abdominal distention, abdominal pain, anal bleeding, blood in stool, constipation, diarrhea, nausea, rectal pain and vomiting.        As mentioned in hpi   Genitourinary: Negative for difficulty urinating and hematuria.   Musculoskeletal: Negative for arthralgias and back pain.   Skin: Negative for color change and rash.   Neurological: Negative for dizziness, seizures, syncope, light-headedness and headaches.   Hematological: Negative for adenopathy.   Psychiatric/Behavioral: Negative for confusion. The patient is not nervous/anxious.        Objective     Vitals:    07/23/18 1355   BP: 156/64   Pulse: 56   Temp: 96.7 °F (35.9 °C)   SpO2: 100%     1    07/23/18  1355   Weight: 63 kg (139 lb)     Body mass index is 21.77 kg/m².    Physical Exam   Constitutional: He appears well-developed and well-nourished. No distress.   HENT:   Head: Normocephalic and atraumatic.   Eyes: EOM are normal. No scleral icterus.   Neck: Neck supple. No JVD present.   Cardiovascular: Normal rate, regular rhythm and normal heart sounds.    Pulmonary/Chest: Effort normal and breath sounds normal. No stridor.   Abdominal: Soft. Bowel sounds are normal. He exhibits no distension and no mass. There is no tenderness. There is no rebound and no guarding.   Musculoskeletal: Normal range of motion. He exhibits no deformity.   Neurological: He is alert.   Skin: Skin is warm and dry. No rash noted.   Psychiatric: He has a normal mood and affect. His behavior is normal.   Vitals reviewed.      Imaging Results (most recent)     None          Assessment/Plan     Shabbir was seen today for difficulty swallowing.    Diagnoses and all orders for this visit:    History of adenomatous polyp of colon  Comments:  history  of colon polyp path tubular adenoma with focal high grade  dyspasia requiring surgical resection 10/2017   Orders:  -     Case Request; Standing  -     Case Request    Pharyngoesophageal dysphagia  -     FL Esophagram Complete  -     Case Request; Standing  -     Case Request    Weight loss  -     Case Request; Standing  -     Case Request    Coagulopathy (CMS/HCC)  Comments:  aggrenox for TIA    Other orders  -     Follow Anesthesia Guidelines / Standing Orders; Future  -     Implement Anesthesia Orders Day of Procedure; Standing  -     Obtain Informed Consent; Standing  -     polyethylene glycol-electrolytes (NULYTELY WITH FLAVOR PACKS) 420 g solution; Take 4,000 mL by mouth See Admin Instructions.    plan for colonoscopy and egd.  Will also check esophagram. Recommend cut food small pieces and chew well. Will send letter to gabriella medeiros np to see if can hold aggrenox 5 days prior to procedures.            Body mass index is 21.77 kg/m².    Patient's Body mass index is 21.77 kg/m². BMI is within normal parameters. No follow-up required.      ESOPHAGOGASTRODUODENOSCOPY WITH ANESTHESIA (N/A), COLONOSCOPY WITH ANESTHESIA (N/A)  All risks, benefits, alternatives, and indications of colonoscopy procedure have been discussed with the patient. Risks to include perforation of the colon requiring possible surgery or colostomy, risk of bleeding from biopsies or removal of colon tissue, possibility of missing a colon polyp or cancer, or adverse drug reaction.  Benefits to include the diagnosis and management of disease of the colon and rectum. Alternatives to include barium enema, radiographic evaluation, lab testing or no intervention. Pt verbalizes understanding and agrees.     Risk, benefits, and alternatives of endoscopy were explained in full.  They understand that there is a risk of bleeding, perforation, and infection.  The risk of perforation goes up with esophageal dilation.  Other options to evaluate UGI complaints could involve barium swallow or UGI series, but these would  be diagnostic tests only.  Patient was given time to ask questions.  I answered them to their satisfaction and they are agreeable to proceeding.     The patient was advised on the risks of stopping blood thinners for a procedure.  The risks discussed included the risk of developing myocardial infarction, CVA, embolus, clogging of the arteries or stents, etc.  We discussed the potential consequences of the risks discussed.  The benefits of stopping as well as the alternatives were discussed as well.   Patient is to hold their anticoagulation medication per the direction of their prescribing physician, Babar wolf .    A letter will be sent to prescribing This is to prevent any risk or complication from bleeding intra and post procedure. If they develop bleeding post procedure they are to go the emergency department for further evaluation and treatment immediately.           VERONIKA Ryan      EMR Dragon/transcription disclaimer:  Much of this encounter note is electronic transcription/translation of spoken language to printed text.  The electronic translation of spoken language may be erroneous, or at times, nonsensical words or phrases may be inadvertently transcribed.  Although I have reviewed the note for such errors, some may still exist.

## 2018-07-23 ENCOUNTER — OFFICE VISIT (OUTPATIENT)
Dept: GASTROENTEROLOGY | Facility: CLINIC | Age: 78
End: 2018-07-23

## 2018-07-23 VITALS
OXYGEN SATURATION: 100 % | WEIGHT: 139 LBS | HEART RATE: 56 BPM | SYSTOLIC BLOOD PRESSURE: 156 MMHG | DIASTOLIC BLOOD PRESSURE: 64 MMHG | BODY MASS INDEX: 21.82 KG/M2 | HEIGHT: 67 IN | TEMPERATURE: 96.7 F

## 2018-07-23 DIAGNOSIS — D68.9 COAGULOPATHY (HCC): ICD-10-CM

## 2018-07-23 DIAGNOSIS — R63.4 WEIGHT LOSS: ICD-10-CM

## 2018-07-23 DIAGNOSIS — Z86.010 HISTORY OF ADENOMATOUS POLYP OF COLON: Primary | ICD-10-CM

## 2018-07-23 DIAGNOSIS — R13.14 PHARYNGOESOPHAGEAL DYSPHAGIA: ICD-10-CM

## 2018-07-23 PROBLEM — Z86.0101 HISTORY OF ADENOMATOUS POLYP OF COLON: Status: ACTIVE | Noted: 2018-07-23

## 2018-07-23 PROCEDURE — 99214 OFFICE O/P EST MOD 30 MIN: CPT | Performed by: NURSE PRACTITIONER

## 2018-07-23 RX ORDER — POLYETHYLENE GLYCOL 3350, SODIUM CHLORIDE, SODIUM BICARBONATE, POTASSIUM CHLORIDE 420; 11.2; 5.72; 1.48 G/4L; G/4L; G/4L; G/4L
4000 POWDER, FOR SOLUTION ORAL SEE ADMIN INSTRUCTIONS
Qty: 4000 ML | Refills: 0 | Status: SHIPPED | OUTPATIENT
Start: 2018-07-23 | End: 2019-04-23

## 2018-07-24 ENCOUNTER — TELEPHONE (OUTPATIENT)
Dept: GASTROENTEROLOGY | Facility: CLINIC | Age: 78
End: 2018-07-24

## 2018-07-24 NOTE — TELEPHONE ENCOUNTER
Let patient know message below from gabriella wolf. thanks    ----- Message from MARJAN Arguelles sent at 7/23/2018  4:49 PM CDT -----      This patient can be off of his Aggrenox for 5 days as requested.  He will not require Lovenox.    He has an appointment with us prior to his scheduled procedure, I will review this issue with him then as well.    Thank you  ----- Message -----  From: VERONIKA Rodriguez  Sent: 7/23/2018   3:23 PM  To: MARJAN Arguelles

## 2018-07-31 ENCOUNTER — OFFICE VISIT (OUTPATIENT)
Dept: CARDIOLOGY | Age: 78
End: 2018-07-31
Payer: MEDICARE

## 2018-07-31 VITALS
HEIGHT: 67 IN | DIASTOLIC BLOOD PRESSURE: 70 MMHG | BODY MASS INDEX: 21.66 KG/M2 | SYSTOLIC BLOOD PRESSURE: 108 MMHG | WEIGHT: 138 LBS | HEART RATE: 46 BPM

## 2018-07-31 DIAGNOSIS — I25.10 CORONARY ARTERY DISEASE INVOLVING NATIVE CORONARY ARTERY OF NATIVE HEART WITHOUT ANGINA PECTORIS: Primary | ICD-10-CM

## 2018-07-31 PROCEDURE — G8420 CALC BMI NORM PARAMETERS: HCPCS | Performed by: INTERNAL MEDICINE

## 2018-07-31 PROCEDURE — 1101F PT FALLS ASSESS-DOCD LE1/YR: CPT | Performed by: INTERNAL MEDICINE

## 2018-07-31 PROCEDURE — G8598 ASA/ANTIPLAT THER USED: HCPCS | Performed by: INTERNAL MEDICINE

## 2018-07-31 PROCEDURE — 1036F TOBACCO NON-USER: CPT | Performed by: INTERNAL MEDICINE

## 2018-07-31 PROCEDURE — 99212 OFFICE O/P EST SF 10 MIN: CPT | Performed by: INTERNAL MEDICINE

## 2018-07-31 PROCEDURE — G8427 DOCREV CUR MEDS BY ELIG CLIN: HCPCS | Performed by: INTERNAL MEDICINE

## 2018-07-31 PROCEDURE — 1123F ACP DISCUSS/DSCN MKR DOCD: CPT | Performed by: INTERNAL MEDICINE

## 2018-07-31 PROCEDURE — 4040F PNEUMOC VAC/ADMIN/RCVD: CPT | Performed by: INTERNAL MEDICINE

## 2018-08-01 ENCOUNTER — HOSPITAL ENCOUNTER (OUTPATIENT)
Dept: GENERAL RADIOLOGY | Facility: HOSPITAL | Age: 78
Discharge: HOME | End: 2018-08-01
Admitting: NURSE PRACTITIONER

## 2018-08-01 PROCEDURE — 63710000001 BARIUM SULFATE 700 MG TABLET: Performed by: NURSE PRACTITIONER

## 2018-08-01 PROCEDURE — 63710000001 BARIUM SULFATE 96 % RECONSTITUTED SUSPENSION: Performed by: NURSE PRACTITIONER

## 2018-08-01 PROCEDURE — A9270 NON-COVERED ITEM OR SERVICE: HCPCS | Performed by: NURSE PRACTITIONER

## 2018-08-01 PROCEDURE — 74220 X-RAY XM ESOPHAGUS 1CNTRST: CPT

## 2018-08-01 PROCEDURE — 63710000001 BARIUM SULFATE 98 % RECONSTITUTED SUSPENSION: Performed by: NURSE PRACTITIONER

## 2018-08-01 PROCEDURE — 63710000001 SOD BICARB-CITRIC ACID-SIMETHICONE 2.21-1.53-0.04 G PACK: Performed by: NURSE PRACTITIONER

## 2018-08-01 RX ADMIN — BARIUM SULFATE 30 ML: 980 POWDER, FOR SUSPENSION ORAL at 11:24

## 2018-08-01 RX ADMIN — ANTACID/ANTIFLATULENT 1 TABLET: 380; 550; 10; 10 GRANULE, EFFERVESCENT ORAL at 11:23

## 2018-08-01 RX ADMIN — BARIUM SULFATE 700 MG: 700 TABLET ORAL at 11:23

## 2018-08-01 RX ADMIN — BARIUM SULFATE 65 ML: 960 POWDER, FOR SUSPENSION ORAL at 11:24

## 2018-08-02 ENCOUNTER — TELEPHONE (OUTPATIENT)
Dept: GASTROENTEROLOGY | Facility: CLINIC | Age: 78
End: 2018-08-02

## 2018-08-02 NOTE — TELEPHONE ENCOUNTER
Let patient know esophagram showed few tertiary contractions which means does not squeeze as well as used to, the barium tablet did delay  at the end of esophagus but passed.   No masses or strictures noted. I recommend he keep appt for egd on 8-7-18. I will send copy to pcp. Thanks

## 2018-08-07 ENCOUNTER — ANESTHESIA (OUTPATIENT)
Dept: GASTROENTEROLOGY | Facility: HOSPITAL | Age: 78
End: 2018-08-07

## 2018-08-07 ENCOUNTER — HOSPITAL ENCOUNTER (OUTPATIENT)
Facility: HOSPITAL | Age: 78
Setting detail: HOSPITAL OUTPATIENT SURGERY
Discharge: HOME | End: 2018-08-07
Attending: INTERNAL MEDICINE | Admitting: NURSE ANESTHETIST, CERTIFIED REGISTERED

## 2018-08-07 ENCOUNTER — ANESTHESIA EVENT (OUTPATIENT)
Dept: GASTROENTEROLOGY | Facility: HOSPITAL | Age: 78
End: 2018-08-07

## 2018-08-07 VITALS
RESPIRATION RATE: 20 BRPM | WEIGHT: 134 LBS | HEIGHT: 67 IN | TEMPERATURE: 97.2 F | SYSTOLIC BLOOD PRESSURE: 115 MMHG | OXYGEN SATURATION: 99 % | BODY MASS INDEX: 21.03 KG/M2 | HEART RATE: 53 BPM | DIASTOLIC BLOOD PRESSURE: 55 MMHG

## 2018-08-07 DIAGNOSIS — R13.14 PHARYNGOESOPHAGEAL DYSPHAGIA: ICD-10-CM

## 2018-08-07 DIAGNOSIS — R63.4 WEIGHT LOSS: ICD-10-CM

## 2018-08-07 DIAGNOSIS — Z86.010 HISTORY OF ADENOMATOUS POLYP OF COLON: ICD-10-CM

## 2018-08-07 PROCEDURE — 88305 TISSUE EXAM BY PATHOLOGIST: CPT | Performed by: INTERNAL MEDICINE

## 2018-08-07 PROCEDURE — 25010000002 PROPOFOL 10 MG/ML EMULSION: Performed by: NURSE ANESTHETIST, CERTIFIED REGISTERED

## 2018-08-07 PROCEDURE — 43248 EGD GUIDE WIRE INSERTION: CPT | Performed by: INTERNAL MEDICINE

## 2018-08-07 PROCEDURE — 45385 COLONOSCOPY W/LESION REMOVAL: CPT | Performed by: INTERNAL MEDICINE

## 2018-08-07 RX ORDER — ONDANSETRON 2 MG/ML
4 INJECTION INTRAMUSCULAR; INTRAVENOUS ONCE AS NEEDED
Status: DISCONTINUED | OUTPATIENT
Start: 2018-08-07 | End: 2018-08-07 | Stop reason: HOSPADM

## 2018-08-07 RX ORDER — SODIUM CHLORIDE 9 MG/ML
500 INJECTION, SOLUTION INTRAVENOUS CONTINUOUS PRN
Status: DISCONTINUED | OUTPATIENT
Start: 2018-08-07 | End: 2018-08-07 | Stop reason: HOSPADM

## 2018-08-07 RX ORDER — PROPOFOL 10 MG/ML
VIAL (ML) INTRAVENOUS AS NEEDED
Status: DISCONTINUED | OUTPATIENT
Start: 2018-08-07 | End: 2018-08-07 | Stop reason: SURG

## 2018-08-07 RX ORDER — LIDOCAINE HYDROCHLORIDE 20 MG/ML
INJECTION, SOLUTION INFILTRATION; PERINEURAL AS NEEDED
Status: DISCONTINUED | OUTPATIENT
Start: 2018-08-07 | End: 2018-08-07 | Stop reason: SURG

## 2018-08-07 RX ORDER — SODIUM CHLORIDE 0.9 % (FLUSH) 0.9 %
3 SYRINGE (ML) INJECTION AS NEEDED
Status: DISCONTINUED | OUTPATIENT
Start: 2018-08-07 | End: 2018-08-07 | Stop reason: HOSPADM

## 2018-08-07 RX ADMIN — PROPOFOL 70 MG: 10 INJECTION, EMULSION INTRAVENOUS at 08:29

## 2018-08-07 RX ADMIN — PROPOFOL 50 MG: 10 INJECTION, EMULSION INTRAVENOUS at 08:51

## 2018-08-07 RX ADMIN — SODIUM CHLORIDE 500 ML: 9 INJECTION, SOLUTION INTRAVENOUS at 07:21

## 2018-08-07 RX ADMIN — LIDOCAINE HYDROCHLORIDE 50 MG: 20 INJECTION, SOLUTION INFILTRATION; PERINEURAL at 08:29

## 2018-08-07 RX ADMIN — PROPOFOL 60 MG: 10 INJECTION, EMULSION INTRAVENOUS at 08:41

## 2018-08-07 RX ADMIN — PROPOFOL 70 MG: 10 INJECTION, EMULSION INTRAVENOUS at 08:37

## 2018-08-07 NOTE — H&P (VIEW-ONLY)
[facility] Gastroenterology    Primary Physician Deann Griffin MD    7/23/2018    Shabbir Gambino   1940      Chief Complaint   Patient presents with   • Difficulty Swallowing     due for colonoscopy in oct.       Subjective     HPI    Shabbir Gambino is a 77 y.o. male who presents as a referral for preventative maintenance. He has no complaints of nausea or vomiting. No change in bowels. No wt loss. No BRBPR. No melena. No abdominal pain.   The patient does  have history of colon polyps. The patient does  have history of colon cancer.   There is no family history of colon polyps. There is no family history of colon cancer.     Last colonoscopy 9/2017   - Three 6 to 7 mm polyps in the proximal ascending colon and in the cecum, removed with a hot snare.  Resected and retrieved.  - One large polyp in the transverse colon not amendable to colonoscopic removal with a small polyp  next to it. Biopsied. Tattooed.  - Diverticulosis in the entire examined colon.  - The examination was otherwise normal on direct and retroflexion views.      Notes Recorded by Randy Gomez MD on 9/18/2017 at 3:58 PM  Recall colonoscopy in 1 year.  Patient is aware pathology   Component 10mo ago   Clinical Information    pcp- danita hernandez, edann griffin.  Pre- hx colon polyps / post- colon polyps   Final Diagnosis   1.  Polyps, cecum/ascending colon, polypectomies:  Tubular adenomas, negative for high-grade dysplasia.     2.  Polyp, colon at 43 cm, polypectomy:  Tubular adenoma with focal high-grade dysplasia.           He had surgery by dr cerda 10-24-18 with laparoscopic expiration mobilization of splenic flexure and hepatic flexure and subsequent removal of this transverse colon with excision and closure.     Final Diagnosis  10/2017    Mid transverse colon, segmental resection:  Tubulovillous adenoma with focal high-grade dysplasia (measuring 1.4 cm in greatest dimension).  Tubular adenoma (x2) with no  evidence of high grade dysplasia (measuring 0.3 cm in greatest dimension each).   Negative for invasive carcinoma.  Two lymph nodes with no tumor seen (0/2).  Resection margins are negative for high grade dysplasia and invasive carcinoma (see comment).       Dysphagia  X 3 - 4 yrs, intermittent.  Worse x 2 mo.   Noted with solids and liquids. Solid foods worse. Points to nape of neck where food or liquids will hang. Sometimes regurgitation will relieve. Drinking liquids will help when solids hang. Has lost 10 # / 1 yr. Belches a lot. No gerd symptoms. No n/v. No fever.        Last egd 9/2017 benign esophageal stenosis dilated.   helped for about 1 week.       Past Medical History:   Diagnosis Date   • Atherosclerotic coronary vascular disease    • Axonal polyneuropathy    • Colon polyp    • Gout    • History of transfusion    • Hypertension    • Insomnia    • Irregular heart beat    • Precancerous skin lesion    • RLS (restless legs syndrome)    • TIA (transient ischemic attack)     2014   • Vascular disease     CAROTID       Past Surgical History:   Procedure Laterality Date   • CAROTID ENDARTERECTOMY Bilateral     2007, 2008   • COLON RESECTION N/A 10/24/2017    Procedure: COLON RESECTION  MID TRANSVERSE LAPAROSCOPIC;  Surgeon: Adal Quintero MD;  Location: [facility] OR;  Service:    • COLONOSCOPY      MULTIPLE    • COLONOSCOPY N/A 9/7/2017    Procedure: colonoscopy with anesthesia;  Surgeon: Randy Gomez MD;  Location: [facility] ENDOSCOPY;  Service:    • CORONARY ARTERY BYPASS GRAFT  2006   • ENDOSCOPY N/A 9/7/2017    Procedure: ESOPHAGOGASTRODUODENOSCOPY WITH ANESTHESIA;  Surgeon: Randy Gomez MD;  Location: [facility] ENDOSCOPY;  Service:    • EYE SURGERY Bilateral     2014, 2015   • KNEE SURGERY Right     x 2       Outpatient Prescriptions Marked as Taking for the 7/23/18 encounter (Office Visit) with VERONIKA Rodriguez   Medication Sig Dispense Refill   • allopurinol (ZYLOPRIM) 300 MG tablet TAKE ONE  TABLET BY MOUTH EVERY DAY FOR GOUT PREVENTION 30 tablet 5   • amLODIPine (NORVASC) 2.5 MG tablet TAKE ONE TABLET BY MOUTH EVERY EVENING 90 tablet 3   • aspirin-dipyridamole (AGGRENOX)  MG per 12 hr capsule Take 1 capsule by mouth 2 (Two) Times a Day. 180 capsule 3   • atenolol (TENORMIN) 50 MG tablet TAKE 1 TABLET BY MOUTH DAILY 90 tablet 2   • carbidopa-levodopa (SINEMET)  MG per tablet TAKE 1 OR 2 TABLETS AT BEDTIME 60 tablet 5   • CINNAMON PO Take 500 mg by mouth Daily.     • colestipol (COLESTID) 1 g tablet TAKE 1 TABLET BY MOUTH TWICE A  tablet 1   • indapamide (LOZOL) 2.5 MG tablet Take 2.5 mg by mouth Every Morning.     • Multiple Vitamins-Minerals (EYE VITAMINS PO) Take 1 tablet by mouth Daily.         Allergies   Allergen Reactions   • Other Anaphylaxis     UNKNOWN TO PATIENT ENVIRONMENTAL/FOOD ALLERGY    • Statins Other (See Comments)     Leg cramps       Social History     Social History   • Marital status:      Spouse name: N/A   • Number of children: N/A   • Years of education: N/A     Occupational History   • Not on file.     Social History Main Topics   • Smoking status: Never Smoker   • Smokeless tobacco: Never Used   • Alcohol use No   • Drug use: No   • Sexual activity: Defer     Other Topics Concern   • Not on file     Social History Narrative   • No narrative on file       Family History   Problem Relation Age of Onset   • Stroke Mother    • Diabetes Mother    • No Known Problems Father    • Esophageal cancer Sister    • No Known Problems Maternal Grandmother    • No Known Problems Maternal Grandfather    • No Known Problems Paternal Grandmother    • No Known Problems Paternal Grandfather    • Colon polyps Neg Hx    • Colon cancer Neg Hx        Review of Systems   Constitutional: Negative for appetite change, chills, fatigue, fever and unexpected weight change.   HENT: Negative for sore throat and trouble swallowing.    Eyes: Negative for visual disturbance.      Respiratory: Negative for cough, chest tightness, shortness of breath and wheezing.    Cardiovascular: Negative for chest pain and palpitations.   Gastrointestinal: Negative for abdominal distention, abdominal pain, anal bleeding, blood in stool, constipation, diarrhea, nausea, rectal pain and vomiting.        As mentioned in hpi   Genitourinary: Negative for difficulty urinating and hematuria.   Musculoskeletal: Negative for arthralgias and back pain.   Skin: Negative for color change and rash.   Neurological: Negative for dizziness, seizures, syncope, light-headedness and headaches.   Hematological: Negative for adenopathy.   Psychiatric/Behavioral: Negative for confusion. The patient is not nervous/anxious.        Objective     Vitals:    07/23/18 1355   BP: 156/64   Pulse: 56   Temp: 96.7 °F (35.9 °C)   SpO2: 100%     1    07/23/18  1355   Weight: 63 kg (139 lb)     Body mass index is 21.77 kg/m².    Physical Exam   Constitutional: He appears well-developed and well-nourished. No distress.   HENT:   Head: Normocephalic and atraumatic.   Eyes: EOM are normal. No scleral icterus.   Neck: Neck supple. No JVD present.   Cardiovascular: Normal rate, regular rhythm and normal heart sounds.    Pulmonary/Chest: Effort normal and breath sounds normal. No stridor.   Abdominal: Soft. Bowel sounds are normal. He exhibits no distension and no mass. There is no tenderness. There is no rebound and no guarding.   Musculoskeletal: Normal range of motion. He exhibits no deformity.   Neurological: He is alert.   Skin: Skin is warm and dry. No rash noted.   Psychiatric: He has a normal mood and affect. His behavior is normal.   Vitals reviewed.      Imaging Results (most recent)     None          Assessment/Plan     Shabbir was seen today for difficulty swallowing.    Diagnoses and all orders for this visit:    History of adenomatous polyp of colon  Comments:  history  of colon polyp path tubular adenoma with focal high grade  dyspasia requiring surgical resection 10/2017   Orders:  -     Case Request; Standing  -     Case Request    Pharyngoesophageal dysphagia  -     FL Esophagram Complete  -     Case Request; Standing  -     Case Request    Weight loss  -     Case Request; Standing  -     Case Request    Coagulopathy (CMS/HCC)  Comments:  aggrenox for TIA    Other orders  -     Follow Anesthesia Guidelines / Standing Orders; Future  -     Implement Anesthesia Orders Day of Procedure; Standing  -     Obtain Informed Consent; Standing  -     polyethylene glycol-electrolytes (NULYTELY WITH FLAVOR PACKS) 420 g solution; Take 4,000 mL by mouth See Admin Instructions.    plan for colonoscopy and egd.  Will also check esophagram. Recommend cut food small pieces and chew well. Will send letter to gabriella medeiros np to see if can hold aggrenox 5 days prior to procedures.            Body mass index is 21.77 kg/m².    Patient's Body mass index is 21.77 kg/m². BMI is within normal parameters. No follow-up required.      ESOPHAGOGASTRODUODENOSCOPY WITH ANESTHESIA (N/A), COLONOSCOPY WITH ANESTHESIA (N/A)  All risks, benefits, alternatives, and indications of colonoscopy procedure have been discussed with the patient. Risks to include perforation of the colon requiring possible surgery or colostomy, risk of bleeding from biopsies or removal of colon tissue, possibility of missing a colon polyp or cancer, or adverse drug reaction.  Benefits to include the diagnosis and management of disease of the colon and rectum. Alternatives to include barium enema, radiographic evaluation, lab testing or no intervention. Pt verbalizes understanding and agrees.     Risk, benefits, and alternatives of endoscopy were explained in full.  They understand that there is a risk of bleeding, perforation, and infection.  The risk of perforation goes up with esophageal dilation.  Other options to evaluate UGI complaints could involve barium swallow or UGI series, but these would  be diagnostic tests only.  Patient was given time to ask questions.  I answered them to their satisfaction and they are agreeable to proceeding.     The patient was advised on the risks of stopping blood thinners for a procedure.  The risks discussed included the risk of developing myocardial infarction, CVA, embolus, clogging of the arteries or stents, etc.  We discussed the potential consequences of the risks discussed.  The benefits of stopping as well as the alternatives were discussed as well.   Patient is to hold their anticoagulation medication per the direction of their prescribing physician, Babar wolf .    A letter will be sent to prescribing This is to prevent any risk or complication from bleeding intra and post procedure. If they develop bleeding post procedure they are to go the emergency department for further evaluation and treatment immediately.           VERONIKA Ryan      EMR Dragon/transcription disclaimer:  Much of this encounter note is electronic transcription/translation of spoken language to printed text.  The electronic translation of spoken language may be erroneous, or at times, nonsensical words or phrases may be inadvertently transcribed.  Although I have reviewed the note for such errors, some may still exist.

## 2018-08-07 NOTE — ANESTHESIA PREPROCEDURE EVALUATION
Anesthesia Evaluation     Patient summary reviewed   no history of anesthetic complications:  NPO Solid Status: > 8 hours  NPO Liquid Status: > 2 hours           Airway   Mallampati: II  TM distance: >3 FB  Neck ROM: full  no difficulty expected  Dental      Comment: Partial upper    Pulmonary    (-) COPD, asthma, sleep apnea, not a smoker  Cardiovascular   Exercise tolerance: good (4-7 METS)    PT is on anticoagulation therapy  Patient on routine beta blocker and Beta blocker given within 24 hours of surgery    (+) hypertension, CAD, CABG (12 years ago, 7v), PVD (bilateral CEA), hyperlipidemia,   (-) angina, cardiac stents    ROS comment: Off aggrenox since 8/1    Neuro/Psych  (+) TIA (3 years ago, right leg weakness), CVA,     (-) seizures  GI/Hepatic/Renal/Endo    (-) liver disease, no renal disease, diabetes    Musculoskeletal     Abdominal    Substance History      OB/GYN          Other                          Anesthesia Plan    ASA 3     general   total IV anesthesia  intravenous induction   Anesthetic plan and risks discussed with patient.

## 2018-08-07 NOTE — ANESTHESIA POSTPROCEDURE EVALUATION
Patient: Shabbir Gambino    Procedure Summary     Date:  08/07/18 Room / Location:  [facility] ENDOSCOPY 5 / [facility]    Anesthesia Start:  0824 Anesthesia Stop:  0905    Procedures:       ESOPHAGOGASTRODUODENOSCOPY WITH ANESTHESIA (N/A )      COLONOSCOPY WITH ANESTHESIA (N/A ) Diagnosis:       Weight loss      Pharyngoesophageal dysphagia      History of adenomatous polyp of colon      (Weight loss [R63.4])      (Pharyngoesophageal dysphagia [R13.14])      (History of adenomatous polyp of colon [Z86.010])    Surgeon:  Randy Gomez MD Provider:  Sameer Tovar CRNA    Anesthesia Type:  general ASA Status:  3          Anesthesia Type: general  Last vitals  BP   97/52 (08/07/18 0902)   Temp   97.2 °F (36.2 °C) (08/07/18 0659)   Pulse   56 (08/07/18 0902)   Resp   14 (08/07/18 0905)     SpO2   100 % (08/07/18 0902)     Post Anesthesia Care and Evaluation    Patient location during evaluation: PHASE II  Patient participation: complete - patient participated  Level of consciousness: awake  Pain score: 1  Pain management: adequate  Airway patency: patent  Anesthetic complications: No anesthetic complications  PONV Status: none  Cardiovascular status: acceptable  Respiratory status: acceptable  Hydration status: acceptable

## 2018-08-10 LAB
CYTO UR: NORMAL
LAB AP CASE REPORT: NORMAL
PATH REPORT.FINAL DX SPEC: NORMAL
PATH REPORT.GROSS SPEC: NORMAL

## 2018-09-11 ENCOUNTER — TELEPHONE (OUTPATIENT)
Dept: CARDIOLOGY | Age: 78
End: 2018-09-11

## 2018-09-11 NOTE — TELEPHONE ENCOUNTER
Patients primary care office called stating the patient called them because they were unable to get a hold of us. The patient states that Dr. Gregory Huerta started him on an injectable medication and he was not given instructions on how to administer it. The patient would like a call back about this. I am not sure who is supposed to educate patients on injecting Praulent.

## 2018-09-11 NOTE — TELEPHONE ENCOUNTER
I'm not either, I have never gave an injection before. Do you think I should get with Palaklouiseshai Ready on this since she is covering for Ravindra Carter while she is on maternity leave?

## 2018-10-12 DIAGNOSIS — M10.9 GOUT, UNSPECIFIED CAUSE, UNSPECIFIED CHRONICITY, UNSPECIFIED SITE: ICD-10-CM

## 2018-10-13 RX ORDER — PROBENECID AND COLCHICINE 500; .5 MG/1; MG/1
TABLET ORAL
Qty: 20 TABLET | Refills: 1 | Status: SHIPPED | OUTPATIENT
Start: 2018-10-13 | End: 2019-01-10 | Stop reason: SDUPTHER

## 2018-10-22 ENCOUNTER — EPISODE CHANGES (OUTPATIENT)
Dept: CASE MANAGEMENT | Facility: OTHER | Age: 78
End: 2018-10-22

## 2018-10-23 ENCOUNTER — TELEPHONE (OUTPATIENT)
Dept: CARDIOLOGY | Age: 78
End: 2018-10-23

## 2018-11-01 ENCOUNTER — OFFICE VISIT (OUTPATIENT)
Dept: NEUROLOGY | Facility: CLINIC | Age: 78
End: 2018-11-01

## 2018-11-01 VITALS
SYSTOLIC BLOOD PRESSURE: 110 MMHG | HEIGHT: 67 IN | BODY MASS INDEX: 21.03 KG/M2 | DIASTOLIC BLOOD PRESSURE: 50 MMHG | WEIGHT: 134 LBS | HEART RATE: 48 BPM

## 2018-11-01 DIAGNOSIS — I67.9 CEREBROVASCULAR DISEASE: Primary | ICD-10-CM

## 2018-11-01 DIAGNOSIS — G25.81 RLS (RESTLESS LEGS SYNDROME): ICD-10-CM

## 2018-11-01 PROCEDURE — 99213 OFFICE O/P EST LOW 20 MIN: CPT | Performed by: PHYSICIAN ASSISTANT

## 2018-11-05 NOTE — PROGRESS NOTES
Jaycee Gambino is a 78 y.o. male is here today for follow-up.    Stroke   This is a chronic problem. The current episode started more than 1 year ago. The problem occurs daily. The problem has been unchanged. Associated symptoms include fatigue. Pertinent negatives include no chills, fever, headaches, neck pain or weakness. The symptoms are aggravated by stress. Treatments tried: Pharmacotherapy, control underlying disease. The treatment provided moderate relief.       The following portions of the patient's history were reviewed and updated as appropriate: allergies, current medications, past family history, past medical history, past social history, past surgical history and problem list.    Review of Systems   Constitutional: Positive for fatigue. Negative for chills and fever.   HENT: Positive for hearing loss. Negative for trouble swallowing.    Eyes: Negative.    Respiratory: Negative.    Cardiovascular: Negative.    Gastrointestinal: Negative.    Endocrine: Negative.    Genitourinary: Negative.    Musculoskeletal: Positive for gait problem. Negative for back pain and neck pain.   Skin: Negative.    Allergic/Immunologic: Negative.    Neurological: Negative for dizziness, speech difficulty, weakness and headaches.   Hematological: Negative.    Psychiatric/Behavioral: Positive for sleep disturbance. Negative for dysphoric mood. The patient is not nervous/anxious.        Objective   Physical Exam   Constitutional: He is oriented to person, place, and time. Vital signs are normal. He appears well-developed and well-nourished. No distress.   HENT:   Head: Normocephalic and atraumatic.   Right Ear: External ear normal. Decreased hearing is noted.   Left Ear: External ear normal. Decreased hearing is noted.   Nose: Nose normal.   Mouth/Throat: Uvula is midline, oropharynx is clear and moist and mucous membranes are normal.   Eyes: Pupils are equal, round, and reactive to light. Conjunctivae, EOM and  lids are normal. No scleral icterus.   Fundoscopic exam:       The right eye shows no hemorrhage and no papilledema. The right eye shows red reflex.        The left eye shows no hemorrhage and no papilledema. The left eye shows red reflex.   Neck: Phonation normal. No spinous process tenderness and no muscular tenderness present. Carotid bruit is not present. Decreased range of motion present. No thyroid mass and no thyromegaly present.   Cardiovascular: Normal rate, regular rhythm, S1 normal, S2 normal and normal heart sounds.    No murmur heard.  Pulmonary/Chest: Effort normal and breath sounds normal. No stridor. No respiratory distress. He has no wheezes. He has no rales.   Musculoskeletal: He exhibits no edema or tenderness.        Lumbar back: Normal.   Lymphadenopathy:     He has no cervical adenopathy.   Neurological: He is alert and oriented to person, place, and time. He displays atrophy. He displays no tremor. A cranial nerve deficit and sensory deficit is present. He exhibits normal muscle tone. Coordination and gait abnormal. GCS eye subscore is 4. GCS verbal subscore is 5. GCS motor subscore is 6.   Reflex Scores:       Tricep reflexes are 1+ on the right side and 1+ on the left side.       Bicep reflexes are 1+ on the right side and 1+ on the left side.       Brachioradialis reflexes are 1+ on the right side and 1+ on the left side.       Patellar reflexes are 1+ on the right side and 1+ on the left side.       Achilles reflexes are 0 on the right side and 0 on the left side.  Presbycusis, peripheral sensory deficits in all 4 extremities worse in the lower extremities with diminished light touch and vibratory sense.  Deep tendon reflexes diminished but symmetric.  Mild distal atrophy in bilateral lower extremities.  Functional strength bilaterally.  Functional ataxia likely due to sensory deficit.   Skin: Skin is warm and dry. No ecchymosis, no lesion and no rash noted. No cyanosis. Nails show no  clubbing.   Psychiatric: He has a normal mood and affect. His speech is normal and behavior is normal. Judgment and thought content normal. He is not actively hallucinating. Cognition and memory are normal. He is attentive.   Nursing note and vitals reviewed.        Assessment/Plan   Shabbir was seen today for stroke.    Diagnoses and all orders for this visit:    Cerebrovascular disease    RLS (restless legs syndrome)    The patient is neurologically stable.  Recommended that he continue on his present regimen.    10 minutes of a 15 minute outpatient visit was spent in counseling and coordination of care today.    Dictated utilizing Dragon dictation.

## 2018-11-18 NOTE — PROGRESS NOTES
"Shabbir Gambino    1940    Chief Complaint   Patient presents with   • Gout     Left great toe       Vitals:    07/13/17 0924   BP: 120/60   BP Location: Right arm   Patient Position: Sitting   Cuff Size: Adult   Pulse: 57   Temp: 98.1 °F (36.7 °C)   TempSrc: Oral   SpO2: 98%   Weight: 148 lb 12.8 oz (67.5 kg)   Height: 68\" (172.7 cm)   PainSc: 4  Comment: Gout in great left toe   PainLoc: Toe       Gout   This is a recurrent problem. The current episode started yesterday. The problem occurs daily. The problem has been gradually worsening. Nothing aggravates the symptoms. He has tried nothing for the symptoms. The treatment provided no relief.       Review of Systems   Musculoskeletal: Positive for gout.        Left great toe       Past Medical History:   Diagnosis Date   • Arthritis    • Atherosclerotic coronary vascular disease    • Axonal polyneuropathy    • Gout    • Hypertension    • Insomnia    • RLS (restless legs syndrome)    • Stroke     BILATERAL HEMISPHERIC    • Vascular disease     CAROTID         Current Outpatient Prescriptions:   •  allopurinol (ZYLOPRIM) 300 MG tablet, Take 300 mg by mouth Daily., Disp: , Rfl:   •  amLODIPine (NORVASC) 2.5 MG tablet, Take 2.5 mg by mouth Daily., Disp: , Rfl:   •  aspirin (AJIT ASPIRIN) 325 MG tablet, Take 1 tablet by mouth 2 times daily, Disp: , Rfl:   •  atenolol (TENORMIN) 50 MG tablet, Take 50 mg by mouth daily, Disp: , Rfl:   •  carbidopa-levodopa (SINEMET)  MG per tablet, Take 2 tablets by mouth Every Night., Disp: , Rfl:   •  CINNAMON PO, Take  by mouth Daily., Disp: , Rfl:   •  colestipol (COLESTID) 1 G tablet, Take 1 g by mouth 2 (Two) Times a Day., Disp: , Rfl:   •  indapamide (LOZOL) 2.5 MG tablet, Take 2.5 mg by mouth Every Morning., Disp: , Rfl:   •  Multiple Vitamins-Minerals (EYE VITAMINS PO), Take  by mouth Daily., Disp: , Rfl:   •  colchicine-probenecid (COL-BENEMID) 0.5-500 MG tablet, Take 1 tablet by mouth Daily., Disp: 20 tablet, " "Rfl: 1    Current Facility-Administered Medications:   •  methylPREDNISolone acetate (DEPO-medrol) injection 40 mg, 40 mg, Intramuscular, Once, Rigoberto Barnes MD    No Known Allergies    Patient Active Problem List   Diagnosis   • Vascular disease   • Stroke   • RLS (restless legs syndrome)   • Insomnia   • Hypertension   • Gout   • Axonal polyneuropathy   • Atherosclerotic coronary vascular disease   • Arthritis   • Stenosis of right carotid artery       Social History     Social History   • Marital status:      Spouse name: N/A   • Number of children: N/A   • Years of education: N/A     Social History Main Topics   • Smoking status: Never Smoker   • Smokeless tobacco: Never Used   • Alcohol use No   • Drug use: No   • Sexual activity: Defer     Other Topics Concern   • None     Social History Narrative       Past Surgical History:   Procedure Laterality Date   • CAROTID ENDARTERECTOMY Bilateral    • CORONARY ARTERY BYPASS GRAFT     • EYE SURGERY     • KNEE SURGERY Right        Physical Exam   Constitutional: He is oriented to person, place, and time. He appears well-developed and well-nourished.   Cardiovascular: Normal rate and regular rhythm.    Pulmonary/Chest: Effort normal and breath sounds normal.   Musculoskeletal:   Warm red swollen left great toe   Neurological: He is alert and oriented to person, place, and time.   Skin: Skin is warm.   Psychiatric: He has a normal mood and affect. His behavior is normal. Judgment and thought content normal.   Vitals reviewed.      Vitals:    07/13/17 0924   BP: 120/60   BP Location: Right arm   Patient Position: Sitting   Cuff Size: Adult   Pulse: 57   Temp: 98.1 °F (36.7 °C)   TempSrc: Oral   SpO2: 98%   Weight: 148 lb 12.8 oz (67.5 kg)   Height: 68\" (172.7 cm)       Shabbir was seen today for gout.    Diagnoses and all orders for this visit:    Gout, unspecified cause, unspecified chronicity, unspecified site  -     methylPREDNISolone acetate " (DEPO-medrol) injection 40 mg; Inject 1 mL into the shoulder, thigh, or buttocks 1 (One) Time.  -     CBC & Differential  -     Basic Metabolic Panel  -     Uric Acid  -     colchicine-probenecid (COL-BENEMID) 0.5-500 MG tablet; Take 1 tablet by mouth Daily.        Problem List Items Addressed This Visit        Other    Gout - Primary    Relevant Medications    methylPREDNISolone acetate (DEPO-medrol) injection 40 mg (Start on 7/13/2017 10:15 AM)    colchicine-probenecid (COL-BENEMID) 0.5-500 MG tablet    Other Relevant Orders    CBC & Differential    Basic Metabolic Panel    Uric Acid                  Plan above plus may have to increase Zyloprim                    Rigoberto Barnes MD  7/13/2017   Home

## 2018-12-17 RX ORDER — INDAPAMIDE 2.5 MG/1
2.5 TABLET, FILM COATED ORAL EVERY MORNING
Qty: 30 TABLET | Refills: 5 | Status: SHIPPED | OUTPATIENT
Start: 2018-12-17 | End: 2019-06-24 | Stop reason: SDUPTHER

## 2019-01-10 DIAGNOSIS — M10.9 GOUT, UNSPECIFIED CAUSE, UNSPECIFIED CHRONICITY, UNSPECIFIED SITE: ICD-10-CM

## 2019-01-11 ENCOUNTER — TELEPHONE (OUTPATIENT)
Dept: NEUROLOGY | Facility: CLINIC | Age: 79
End: 2019-01-11

## 2019-01-11 DIAGNOSIS — I65.23 BILATERAL CAROTID ARTERY STENOSIS: ICD-10-CM

## 2019-01-11 DIAGNOSIS — I67.9 CEREBROVASCULAR DISEASE: ICD-10-CM

## 2019-01-11 DIAGNOSIS — I63.313 CEREBROVASCULAR ACCIDENT (CVA) DUE TO BILATERAL THROMBOSIS OF MIDDLE CEREBRAL ARTERIES (HCC): ICD-10-CM

## 2019-01-11 RX ORDER — ASPIRIN/DIPYRIDAMOLE 25MG-200MG
1 CAPSULE,EXTENDED RELEASE MULTIPHASE 12HR ORAL 2 TIMES DAILY
Qty: 180 CAPSULE | Refills: 3 | Status: SHIPPED | OUTPATIENT
Start: 2019-01-11 | End: 2019-02-05 | Stop reason: SDUPTHER

## 2019-01-11 RX ORDER — PROBENECID AND COLCHICINE 500; .5 MG/1; MG/1
TABLET ORAL
Qty: 20 TABLET | Refills: 1 | Status: SHIPPED | OUTPATIENT
Start: 2019-01-11 | End: 2019-04-01 | Stop reason: SDUPTHER

## 2019-01-31 ENCOUNTER — OFFICE VISIT (OUTPATIENT)
Dept: CARDIOLOGY | Age: 79
End: 2019-01-31
Payer: MEDICARE

## 2019-01-31 VITALS
HEART RATE: 53 BPM | BODY MASS INDEX: 21.35 KG/M2 | HEIGHT: 67 IN | DIASTOLIC BLOOD PRESSURE: 52 MMHG | SYSTOLIC BLOOD PRESSURE: 116 MMHG | WEIGHT: 136 LBS

## 2019-01-31 DIAGNOSIS — R09.89 LEFT CAROTID BRUIT: ICD-10-CM

## 2019-01-31 DIAGNOSIS — I10 ESSENTIAL HYPERTENSION: ICD-10-CM

## 2019-01-31 DIAGNOSIS — E78.2 MIXED HYPERLIPIDEMIA: ICD-10-CM

## 2019-01-31 DIAGNOSIS — I25.10 CORONARY ARTERY DISEASE INVOLVING NATIVE CORONARY ARTERY OF NATIVE HEART WITHOUT ANGINA PECTORIS: Primary | Chronic | ICD-10-CM

## 2019-01-31 PROCEDURE — 1123F ACP DISCUSS/DSCN MKR DOCD: CPT | Performed by: INTERNAL MEDICINE

## 2019-01-31 PROCEDURE — G8484 FLU IMMUNIZE NO ADMIN: HCPCS | Performed by: INTERNAL MEDICINE

## 2019-01-31 PROCEDURE — G8598 ASA/ANTIPLAT THER USED: HCPCS | Performed by: INTERNAL MEDICINE

## 2019-01-31 PROCEDURE — 1101F PT FALLS ASSESS-DOCD LE1/YR: CPT | Performed by: INTERNAL MEDICINE

## 2019-01-31 PROCEDURE — G8420 CALC BMI NORM PARAMETERS: HCPCS | Performed by: INTERNAL MEDICINE

## 2019-01-31 PROCEDURE — 1036F TOBACCO NON-USER: CPT | Performed by: INTERNAL MEDICINE

## 2019-01-31 PROCEDURE — 4040F PNEUMOC VAC/ADMIN/RCVD: CPT | Performed by: INTERNAL MEDICINE

## 2019-01-31 PROCEDURE — 93000 ELECTROCARDIOGRAM COMPLETE: CPT | Performed by: INTERNAL MEDICINE

## 2019-01-31 PROCEDURE — G8427 DOCREV CUR MEDS BY ELIG CLIN: HCPCS | Performed by: INTERNAL MEDICINE

## 2019-01-31 PROCEDURE — 99213 OFFICE O/P EST LOW 20 MIN: CPT | Performed by: INTERNAL MEDICINE

## 2019-02-05 ENCOUNTER — OFFICE VISIT (OUTPATIENT)
Dept: FAMILY MEDICINE CLINIC | Facility: CLINIC | Age: 79
End: 2019-02-05

## 2019-02-05 VITALS
HEIGHT: 67 IN | TEMPERATURE: 98 F | HEART RATE: 61 BPM | BODY MASS INDEX: 21.03 KG/M2 | WEIGHT: 134 LBS | DIASTOLIC BLOOD PRESSURE: 74 MMHG | OXYGEN SATURATION: 97 % | SYSTOLIC BLOOD PRESSURE: 140 MMHG

## 2019-02-05 DIAGNOSIS — R53.83 FATIGUE, UNSPECIFIED TYPE: Primary | ICD-10-CM

## 2019-02-05 DIAGNOSIS — R05.9 COUGH: ICD-10-CM

## 2019-02-05 DIAGNOSIS — M1A.9XX0 CHRONIC GOUT WITHOUT TOPHUS, UNSPECIFIED CAUSE, UNSPECIFIED SITE: ICD-10-CM

## 2019-02-05 DIAGNOSIS — J06.9 ACUTE URI: ICD-10-CM

## 2019-02-05 LAB
ALBUMIN SERPL-MCNC: 4.4 G/DL (ref 3.5–5)
ALBUMIN/GLOB SERPL: 1.4 G/DL (ref 1.1–2.5)
ALP SERPL-CCNC: 128 U/L (ref 24–120)
ALT SERPL-CCNC: 21 U/L (ref 0–54)
AST SERPL-CCNC: 29 U/L (ref 7–45)
BASOPHILS # BLD AUTO: 0.07 10*3/MM3 (ref 0–0.2)
BASOPHILS NFR BLD AUTO: 0.7 % (ref 0–2)
BILIRUB SERPL-MCNC: 1 MG/DL (ref 0.1–1)
BUN SERPL-MCNC: 33 MG/DL (ref 5–21)
BUN/CREAT SERPL: 18.8 (ref 7–25)
CALCIUM SERPL-MCNC: 9.8 MG/DL (ref 8.4–10.4)
CHLORIDE SERPL-SCNC: 93 MMOL/L (ref 98–110)
CO2 SERPL-SCNC: 27 MMOL/L (ref 24–31)
CREAT SERPL-MCNC: 1.76 MG/DL (ref 0.5–1.4)
EOSINOPHIL # BLD AUTO: 0.13 10*3/MM3 (ref 0–0.7)
EOSINOPHIL NFR BLD AUTO: 1.3 % (ref 0–4)
ERYTHROCYTE [DISTWIDTH] IN BLOOD BY AUTOMATED COUNT: 13.2 % (ref 12–15)
GLOBULIN SER CALC-MCNC: 3.1 GM/DL
GLUCOSE SERPL-MCNC: 104 MG/DL (ref 70–100)
HCT VFR BLD AUTO: 47.1 % (ref 40–52)
HGB BLD-MCNC: 15.2 G/DL (ref 14–18)
IMM GRANULOCYTES # BLD AUTO: 0.28 10*3/MM3 (ref 0–0.03)
IMM GRANULOCYTES NFR BLD AUTO: 2.8 % (ref 0–5)
LYMPHOCYTES # BLD AUTO: 1.32 10*3/MM3 (ref 0.72–4.86)
LYMPHOCYTES NFR BLD AUTO: 13.2 % (ref 15–45)
MCH RBC QN AUTO: 29.9 PG (ref 28–32)
MCHC RBC AUTO-ENTMCNC: 32.3 G/DL (ref 33–36)
MCV RBC AUTO: 92.7 FL (ref 82–95)
MONOCYTES # BLD AUTO: 1.47 10*3/MM3 (ref 0.19–1.3)
MONOCYTES NFR BLD AUTO: 14.7 % (ref 4–12)
NEUTROPHILS # BLD AUTO: 6.74 10*3/MM3 (ref 1.87–8.4)
NEUTROPHILS NFR BLD AUTO: 67.3 % (ref 39–78)
NRBC BLD AUTO-RTO: 0 /100 WBC (ref 0–0)
PLATELET # BLD AUTO: 240 10*3/MM3 (ref 130–400)
POTASSIUM SERPL-SCNC: 4.9 MMOL/L (ref 3.5–5.3)
PROT SERPL-MCNC: 7.5 G/DL (ref 6.3–8.7)
RBC # BLD AUTO: 5.08 10*6/MM3 (ref 4.8–5.9)
SODIUM SERPL-SCNC: 135 MMOL/L (ref 135–145)
T4 FREE SERPL-MCNC: 0.89 NG/DL (ref 0.78–2.19)
TSH SERPL DL<=0.005 MIU/L-ACNC: 1.49 MIU/ML (ref 0.47–4.68)
URATE SERPL-MCNC: 7.6 MG/DL (ref 3.5–8.5)
WBC # BLD AUTO: 10.01 10*3/MM3 (ref 4.8–10.8)

## 2019-02-05 PROCEDURE — 99213 OFFICE O/P EST LOW 20 MIN: CPT | Performed by: FAMILY MEDICINE

## 2019-02-05 PROCEDURE — 96372 THER/PROPH/DIAG INJ SC/IM: CPT | Performed by: FAMILY MEDICINE

## 2019-02-05 RX ORDER — METHYLPREDNISOLONE ACETATE 40 MG/ML
20 INJECTION, SUSPENSION INTRA-ARTICULAR; INTRALESIONAL; INTRAMUSCULAR; SOFT TISSUE ONCE
Status: COMPLETED | OUTPATIENT
Start: 2019-02-05 | End: 2019-02-05

## 2019-02-05 RX ORDER — ASPIRIN AND DIPYRIDAMOLE 25; 200 MG/1; MG/1
1 CAPSULE, EXTENDED RELEASE ORAL 2 TIMES DAILY
COMMUNITY
End: 2019-04-23

## 2019-02-05 RX ORDER — AZITHROMYCIN 250 MG/1
TABLET, FILM COATED ORAL
Qty: 6 TABLET | Refills: 0 | Status: SHIPPED | OUTPATIENT
Start: 2019-02-05 | End: 2019-02-27

## 2019-02-05 RX ADMIN — METHYLPREDNISOLONE ACETATE 20 MG: 40 INJECTION, SUSPENSION INTRA-ARTICULAR; INTRALESIONAL; INTRAMUSCULAR; SOFT TISSUE at 12:51

## 2019-02-07 RX ORDER — ATENOLOL 50 MG/1
TABLET ORAL
Qty: 90 TABLET | Refills: 0 | Status: SHIPPED | OUTPATIENT
Start: 2019-02-07 | End: 2019-05-10 | Stop reason: SDUPTHER

## 2019-02-20 ENCOUNTER — HOSPITAL ENCOUNTER (OUTPATIENT)
Dept: NUCLEAR MEDICINE | Age: 79
Discharge: HOME | End: 2019-02-22
Payer: MEDICARE

## 2019-02-20 ENCOUNTER — HOSPITAL ENCOUNTER (OUTPATIENT)
Dept: NON INVASIVE DIAGNOSTICS | Age: 79
Discharge: HOME | End: 2019-02-20
Payer: MEDICARE

## 2019-02-20 ENCOUNTER — HOSPITAL ENCOUNTER (OUTPATIENT)
Dept: VASCULAR LAB | Age: 79
Discharge: HOME | End: 2019-02-20
Payer: MEDICARE

## 2019-02-20 DIAGNOSIS — I25.10 CORONARY ARTERY DISEASE INVOLVING NATIVE CORONARY ARTERY OF NATIVE HEART WITHOUT ANGINA PECTORIS: Chronic | ICD-10-CM

## 2019-02-20 DIAGNOSIS — E78.2 MIXED HYPERLIPIDEMIA: ICD-10-CM

## 2019-02-20 DIAGNOSIS — R09.89 LEFT CAROTID BRUIT: ICD-10-CM

## 2019-02-20 LAB
LV EF: 67 %
LVEF MODALITY: NORMAL

## 2019-02-20 PROCEDURE — 93017 CV STRESS TEST TRACING ONLY: CPT

## 2019-02-20 PROCEDURE — 6360000002 HC RX W HCPCS: Performed by: INTERNAL MEDICINE

## 2019-02-20 PROCEDURE — A9500 TC99M SESTAMIBI: HCPCS | Performed by: INTERNAL MEDICINE

## 2019-02-20 PROCEDURE — 3430000000 HC RX DIAGNOSTIC RADIOPHARMACEUTICAL: Performed by: INTERNAL MEDICINE

## 2019-02-20 PROCEDURE — 93880 EXTRACRANIAL BILAT STUDY: CPT

## 2019-02-20 PROCEDURE — 78452 HT MUSCLE IMAGE SPECT MULT: CPT

## 2019-02-20 RX ADMIN — TETRAKIS(2-METHOXYISOBUTYLISOCYANIDE)COPPER(I) TETRAFLUOROBORATE 30 MILLICURIE: 1 INJECTION, POWDER, LYOPHILIZED, FOR SOLUTION INTRAVENOUS at 13:02

## 2019-02-20 RX ADMIN — TETRAKIS(2-METHOXYISOBUTYLISOCYANIDE)COPPER(I) TETRAFLUOROBORATE 10 MILLICURIE: 1 INJECTION, POWDER, LYOPHILIZED, FOR SOLUTION INTRAVENOUS at 13:01

## 2019-02-20 RX ADMIN — REGADENOSON 0.4 MG: 0.08 INJECTION, SOLUTION INTRAVENOUS at 13:02

## 2019-02-22 ENCOUNTER — OFFICE VISIT (OUTPATIENT)
Dept: CARDIOLOGY | Age: 79
End: 2019-02-22
Payer: MEDICARE

## 2019-02-22 VITALS
HEART RATE: 52 BPM | SYSTOLIC BLOOD PRESSURE: 124 MMHG | HEIGHT: 68 IN | DIASTOLIC BLOOD PRESSURE: 64 MMHG | WEIGHT: 136 LBS | BODY MASS INDEX: 20.61 KG/M2

## 2019-02-22 DIAGNOSIS — C44.90 SKIN CANCER: ICD-10-CM

## 2019-02-22 DIAGNOSIS — I25.10 CORONARY ARTERY DISEASE INVOLVING NATIVE CORONARY ARTERY OF NATIVE HEART WITHOUT ANGINA PECTORIS: Chronic | ICD-10-CM

## 2019-02-22 DIAGNOSIS — R94.39 POSITIVE CARDIAC STRESS TEST: ICD-10-CM

## 2019-02-22 DIAGNOSIS — I67.9 CEREBROVASCULAR DISEASE: Primary | Chronic | ICD-10-CM

## 2019-02-22 DIAGNOSIS — I65.23 BILATERAL CAROTID ARTERY STENOSIS: ICD-10-CM

## 2019-02-22 DIAGNOSIS — I25.10 CORONARY ARTERY DISEASE INVOLVING NATIVE CORONARY ARTERY OF NATIVE HEART WITHOUT ANGINA PECTORIS: Primary | Chronic | ICD-10-CM

## 2019-02-22 PROCEDURE — G8598 ASA/ANTIPLAT THER USED: HCPCS | Performed by: INTERNAL MEDICINE

## 2019-02-22 PROCEDURE — G8427 DOCREV CUR MEDS BY ELIG CLIN: HCPCS | Performed by: INTERNAL MEDICINE

## 2019-02-22 PROCEDURE — 1036F TOBACCO NON-USER: CPT | Performed by: INTERNAL MEDICINE

## 2019-02-22 PROCEDURE — 4040F PNEUMOC VAC/ADMIN/RCVD: CPT | Performed by: INTERNAL MEDICINE

## 2019-02-22 PROCEDURE — 99212 OFFICE O/P EST SF 10 MIN: CPT | Performed by: INTERNAL MEDICINE

## 2019-02-22 PROCEDURE — 1123F ACP DISCUSS/DSCN MKR DOCD: CPT | Performed by: INTERNAL MEDICINE

## 2019-02-22 PROCEDURE — 1101F PT FALLS ASSESS-DOCD LE1/YR: CPT | Performed by: INTERNAL MEDICINE

## 2019-02-22 PROCEDURE — G8420 CALC BMI NORM PARAMETERS: HCPCS | Performed by: INTERNAL MEDICINE

## 2019-02-22 PROCEDURE — G8484 FLU IMMUNIZE NO ADMIN: HCPCS | Performed by: INTERNAL MEDICINE

## 2019-02-25 ENCOUNTER — TELEPHONE (OUTPATIENT)
Dept: CARDIOLOGY | Age: 79
End: 2019-02-25

## 2019-02-26 RX ORDER — AMLODIPINE BESYLATE 2.5 MG/1
TABLET ORAL
Qty: 90 TABLET | Refills: 3 | Status: SHIPPED | OUTPATIENT
Start: 2019-02-26 | End: 2020-02-24

## 2019-02-27 ENCOUNTER — OFFICE VISIT (OUTPATIENT)
Dept: FAMILY MEDICINE CLINIC | Facility: CLINIC | Age: 79
End: 2019-02-27

## 2019-02-27 VITALS
WEIGHT: 132.6 LBS | HEIGHT: 67 IN | BODY MASS INDEX: 20.81 KG/M2 | HEART RATE: 100 BPM | SYSTOLIC BLOOD PRESSURE: 118 MMHG | DIASTOLIC BLOOD PRESSURE: 52 MMHG | OXYGEN SATURATION: 98 % | TEMPERATURE: 97.4 F

## 2019-02-27 DIAGNOSIS — Z12.11 SCREENING FOR COLORECTAL CANCER: ICD-10-CM

## 2019-02-27 DIAGNOSIS — R53.83 FATIGUE, UNSPECIFIED TYPE: Primary | ICD-10-CM

## 2019-02-27 DIAGNOSIS — I10 ESSENTIAL HYPERTENSION: ICD-10-CM

## 2019-02-27 DIAGNOSIS — E55.9 VITAMIN D DEFICIENCY: ICD-10-CM

## 2019-02-27 DIAGNOSIS — Z12.12 SCREENING FOR COLORECTAL CANCER: ICD-10-CM

## 2019-02-27 DIAGNOSIS — E78.2 MIXED HYPERLIPIDEMIA: ICD-10-CM

## 2019-02-27 DIAGNOSIS — E79.0 HYPERURICEMIA: ICD-10-CM

## 2019-02-27 DIAGNOSIS — Z12.5 SPECIAL SCREENING FOR MALIGNANT NEOPLASM OF PROSTATE: ICD-10-CM

## 2019-02-27 DIAGNOSIS — Z00.00 ANNUAL PHYSICAL EXAM: ICD-10-CM

## 2019-02-27 PROBLEM — C44.90 SKIN CANCER: Status: ACTIVE | Noted: 2019-02-22

## 2019-02-27 LAB
25(OH)D3+25(OH)D2 SERPL-MCNC: 40.7 NG/ML (ref 30–100)
ALBUMIN SERPL-MCNC: 4.4 G/DL (ref 3.5–5)
ALBUMIN/GLOB SERPL: 1.5 G/DL (ref 1.1–2.5)
ALP SERPL-CCNC: 120 U/L (ref 24–120)
ALT SERPL-CCNC: 20 U/L (ref 0–54)
AST SERPL-CCNC: 35 U/L (ref 7–45)
BASOPHILS # BLD MANUAL: 0.06 10*3/MM3 (ref 0–0.2)
BASOPHILS NFR BLD MANUAL: 1 % (ref 0–2)
BILIRUB BLD-MCNC: NEGATIVE MG/DL
BILIRUB SERPL-MCNC: 0.6 MG/DL (ref 0.1–1)
BUN SERPL-MCNC: 35 MG/DL (ref 5–21)
BUN/CREAT SERPL: 22 (ref 7–25)
CALCIUM SERPL-MCNC: 9.7 MG/DL (ref 8.4–10.4)
CHLORIDE SERPL-SCNC: 93 MMOL/L (ref 98–110)
CHOLEST SERPL-MCNC: 240 MG/DL (ref 130–200)
CLARITY, POC: CLEAR
CO2 SERPL-SCNC: 26 MMOL/L (ref 24–31)
COLOR UR: YELLOW
CREAT SERPL-MCNC: 1.59 MG/DL (ref 0.5–1.4)
DIFFERENTIAL COMMENT: ABNORMAL
EOSINOPHIL # BLD MANUAL: 0.18 10*3/MM3 (ref 0–0.7)
EOSINOPHIL NFR BLD MANUAL: 3.1 % (ref 0–4)
ERYTHROCYTE [DISTWIDTH] IN BLOOD BY AUTOMATED COUNT: 13.5 % (ref 12–15)
GLOBULIN SER CALC-MCNC: 3 GM/DL
GLUCOSE SERPL-MCNC: 112 MG/DL (ref 70–100)
GLUCOSE UR STRIP-MCNC: NEGATIVE MG/DL
HCT VFR BLD AUTO: 46.9 % (ref 40–52)
HDLC SERPL-MCNC: 39 MG/DL
HGB BLD-MCNC: 15 G/DL (ref 14–18)
KETONES UR QL: NEGATIVE
LDLC SERPL CALC-MCNC: 165 MG/DL (ref 0–99)
LEUKOCYTE EST, POC: NEGATIVE
LYMPHOCYTES # BLD MANUAL: 0.41 10*3/MM3 (ref 0.72–4.86)
LYMPHOCYTES NFR BLD MANUAL: 7.1 % (ref 15–45)
MCH RBC QN AUTO: 30.1 PG (ref 28–32)
MCHC RBC AUTO-ENTMCNC: 32 G/DL (ref 33–36)
MCV RBC AUTO: 94.2 FL (ref 82–95)
MONOCYTES # BLD MANUAL: 1.25 10*3/MM3 (ref 0.19–1.3)
MONOCYTES NFR BLD MANUAL: 21.4 % (ref 4–12)
NEUTROPHILS # BLD MANUAL: 3.7 10*3/MM3 (ref 1.87–8.4)
NEUTROPHILS NFR BLD MANUAL: 63.3 % (ref 39–78)
NITRITE UR-MCNC: NEGATIVE MG/ML
PH UR: 5.5 [PH] (ref 5–8)
PLATELET # BLD AUTO: 206 10*3/MM3 (ref 130–400)
PLATELET BLD QL SMEAR: ABNORMAL
POTASSIUM SERPL-SCNC: 4.9 MMOL/L (ref 3.5–5.3)
PROT SERPL-MCNC: 7.4 G/DL (ref 6.3–8.7)
PROT UR STRIP-MCNC: NEGATIVE MG/DL
PSA SERPL-MCNC: 1.08 NG/ML (ref 0–4)
RBC # BLD AUTO: 4.98 10*6/MM3 (ref 4.8–5.9)
RBC # UR STRIP: NEGATIVE /UL
RBC MORPH BLD: ABNORMAL
SODIUM SERPL-SCNC: 132 MMOL/L (ref 135–145)
SP GR UR: 1.02 (ref 1–1.03)
T4 FREE SERPL-MCNC: 0.87 NG/DL (ref 0.78–2.19)
TRIGL SERPL-MCNC: 180 MG/DL (ref 0–149)
TSH SERPL DL<=0.005 MIU/L-ACNC: 1.48 MIU/ML (ref 0.47–4.68)
URATE SERPL-MCNC: 8.3 MG/DL (ref 3.5–8.5)
UROBILINOGEN UR QL: NORMAL
VLDLC SERPL CALC-MCNC: 36 MG/DL
WBC # BLD AUTO: 5.84 10*3/MM3 (ref 4.8–10.8)

## 2019-02-27 PROCEDURE — G0439 PPPS, SUBSEQ VISIT: HCPCS | Performed by: FAMILY MEDICINE

## 2019-02-27 PROCEDURE — 81003 URINALYSIS AUTO W/O SCOPE: CPT | Performed by: FAMILY MEDICINE

## 2019-02-27 NOTE — PROGRESS NOTES
QUICK REFERENCE INFORMATION:  The ABCs of the Annual Wellness Visit    Subsequent Medicare Wellness Visit    HEALTH RISK ASSESSMENT    1940    Recent Hospitalizations:  No hospitalization(s) within the last year..        Current Medical Providers:  Patient Care Team:  Rigoberto Barnes MD as PCP - General (Family Medicine)  Rigoberto Barnes MD as PCP - Family Medicine  Stanton Escalante MD as PCP - Claims Attributed  Rigoberto Barnes MD Hodges, Christopher Ray, PA as Physician Assistant (Neurology)  Stanton Escalante MD as Consulting Physician (Cardiology)  Randy Gomez MD as Consulting Physician (Gastroenterology)        Smoking Status:  Social History     Tobacco Use   Smoking Status Never Smoker   Smokeless Tobacco Never Used       Alcohol Consumption:  Social History     Substance and Sexual Activity   Alcohol Use No       Depression Screen:   PHQ-2/PHQ-9 Depression Screening 2/27/2019   Little interest or pleasure in doing things 0   Feeling down, depressed, or hopeless 0   Trouble falling or staying asleep, or sleeping too much -   Feeling tired or having little energy -   Poor appetite or overeating -   Feeling bad about yourself - or that you are a failure or have let yourself or your family down -   Trouble concentrating on things, such as reading the newspaper or watching television -   Moving or speaking so slowly that other people could have noticed. Or the opposite - being so fidgety or restless that you have been moving around a lot more than usual -   Thoughts that you would be better off dead, or of hurting yourself in some way -   Total Score 0   If you checked off any problems, how difficult have these problems made it for you to do your work, take care of things at home, or get along with other people? -       Health Habits and Functional and Cognitive Screening:  Functional & Cognitive Status 2/27/2019   Do you have difficulty preparing food and eating? No   Do you have  difficulty bathing yourself, getting dressed or grooming yourself? No   Do you have difficulty using the toilet? No   Do you have difficulty moving around from place to place? No   Do you have trouble with steps or getting out of a bed or a chair? No   In the past year have you fallen or experienced a near fall? No   Current Diet Well Balanced Diet   Dental Exam Up to date   Eye Exam Up to date   Exercise (times per week) 7 times per week   Current Exercise Activities Include Yard Work   Do you need help using the phone?  No   Are you deaf or do you have serious difficulty hearing?  Yes   Do you need help with transportation? Yes   Do you need help shopping? No   Do you need help preparing meals?  No   Do you need help with housework?  No   Do you need help with laundry? No   Do you need help taking your medications? No   Do you need help managing money? No   Do you ever drive or ride in a car without wearing a seat belt? No   Have you felt unusual stress, anger or loneliness in the last month? No   Who do you live with? Spouse   If you need help, do you have trouble finding someone available to you? No   Have you been bothered in the last four weeks by sexual problems? No   Do you have difficulty concentrating, remembering or making decisions? No           Does the patient have evidence of cognitive impairment? Yes    Aspirin use counseling: Taking ASA appropriately as indicated      Recent Lab Results:  CMP:  Lab Results   Component Value Date     (H) 02/05/2019    BUN 33 (H) 02/05/2019    CREATININE 1.76 (H) 02/05/2019    EGFRIFNONA 38 (L) 02/05/2019    EGFRIFAFRI 45 (L) 02/05/2019    BCR 18.8 02/05/2019     02/05/2019    K 4.9 02/05/2019    CO2 27.0 02/05/2019    CALCIUM 9.8 02/05/2019    PROTENTOTREF 7.5 02/05/2019    ALBUMIN 4.40 02/05/2019    LABGLOBREF 3.1 02/05/2019    LABIL2 1.4 02/05/2019    BILITOT 1.0 02/05/2019    ALKPHOS 128 (H) 02/05/2019    AST 29 02/05/2019    ALT 21 02/05/2019      Lipid Panel:  Lab Results   Component Value Date    TRIG 318 (H) 02/16/2018    HDL 41 02/16/2018    VLDL 63.6 02/16/2018     HbA1c:  Lab Results   Component Value Date    HGBA1C 6.40 02/02/2017       Visual Acuity:  No exam data present    Age-appropriate Screening Schedule:  Refer to the list below for future screening recommendations based on patient's age, sex and/or medical conditions. Orders for these recommended tests are listed in the plan section. The patient has been provided with a written plan.    Health Maintenance   Topic Date Due   • PNEUMOCOCCAL VACCINES (65+ LOW/MEDIUM RISK) (2 of 2 - PPSV23) 03/17/2019 (Originally 2/6/2018)   • ZOSTER VACCINE (1 of 2) 02/27/2020 (Originally 7/29/1989)   • LIPID PANEL  07/10/2019   • COLONOSCOPY  08/07/2021   • INFLUENZA VACCINE  Completed   • TDAP/TD VACCINES  Discontinued        Subjective   History of Present Illness    Shabbir Gambino is a 78 y.o. male who presents for an Subsequent Wellness Visit.    The following portions of the patient's history were reviewed and updated as appropriate: allergies, current medications, past family history, past medical history, past social history, past surgical history and problem list.    Outpatient Medications Prior to Visit   Medication Sig Dispense Refill   • allopurinol (ZYLOPRIM) 300 MG tablet TAKE ONE TABLET BY MOUTH EVERY DAY FOR GOUT PREVENTION 30 tablet 5   • amLODIPine (NORVASC) 2.5 MG tablet TAKE ONE TABLET BY MOUTH EVERY EVENING 90 tablet 3   • aspirin-dipyridamole (AGGRENOX)  MG per 12 hr capsule Take 1 capsule by mouth 2 (Two) Times a Day.     • atenolol (TENORMIN) 50 MG tablet TAKE 1 TABLET BY MOUTH DAILY 90 tablet 0   • carbidopa-levodopa (SINEMET)  MG per tablet TAKE ONE TO TWO TABLETS BY MOUTH AT BEDTIME 60 tablet 0   • CINNAMON PO Take 500 mg by mouth Daily.     • colchicine-probenecid (COL-BENEMID) 0.5-500 MG tablet TAKE ONE TABLET BY MOUTH EVERY DAY 20 tablet 1   • colestipol (COLESTID)  1 g tablet TAKE 1 TABLET BY MOUTH TWICE A  tablet 1   • indapamide (LOZOL) 2.5 MG tablet Take 2.5 mg by mouth Every Morning.     • Multiple Vitamins-Minerals (EYE VITAMINS PO) Take 1 tablet by mouth Daily.     • polyethylene glycol-electrolytes (NULYTELY WITH FLAVOR PACKS) 420 g solution Take 4,000 mL by mouth See Admin Instructions. 4000 mL 0   • azithromycin (ZITHROMAX Z-MALISSA) 250 MG tablet Take 2 tablets the first day, then 1 tablet daily for 4 days. 6 tablet 0     No facility-administered medications prior to visit.        Patient Active Problem List   Diagnosis   • Vascular disease   • RLS (restless legs syndrome)   • Insomnia   • Hypertension   • Gout   • Axonal polyneuropathy   • Atherosclerotic coronary vascular disease   • Arthritis   • Esophageal dysphagia   • Dysplasia of colon   • Irregular heart beat   • Cerebrovascular disease   • Leukocytosis   • Weight loss   • Pharyngoesophageal dysphagia   • History of adenomatous polyp of colon   • Skin cancer   • Coronary artery disease involving native coronary artery       Advance Care Planning:  has NO advance directive - information provided to the patient today    Identification of Risk Factors:  Risk factors include: inactivity and increased fall risk.    Review of Systems   Cardiovascular:        12 years out bypass surgery-having further testing done next week   Genitourinary: Negative for difficulty urinating.   Musculoskeletal: Positive for arthralgias.   All other systems reviewed and are negative.      Compared to one year ago, the patient feels his physical health is worse.  Compared to one year ago, the patient feels his mental health is worse.    Objective     Physical Exam   Constitutional: He is oriented to person, place, and time. He appears well-developed and well-nourished.   HENT:   Right Ear: External ear normal.   Left Ear: External ear normal.   Mouth/Throat: Oropharynx is clear and moist.   Eyes: EOM are normal. Pupils are equal,  "round, and reactive to light.   Neck: No thyromegaly present.   Recent carotid Dopplers okay   Cardiovascular: Normal rate and regular rhythm.   Pulmonary/Chest: Effort normal and breath sounds normal.   Abdominal: Soft. Bowel sounds are normal. He exhibits no mass.   Genitourinary:   Genitourinary Comments: Refused prostate exam because of age   Musculoskeletal: He exhibits no edema.   Lymphadenopathy:     He has no cervical adenopathy.   Neurological: He is alert and oriented to person, place, and time.   Skin: Skin is warm and dry. Capillary refill takes less than 2 seconds.   Psychiatric: He has a normal mood and affect. His behavior is normal. Judgment and thought content normal.   Nursing note and vitals reviewed.      Vitals:    02/27/19 0858 02/27/19 0902   BP:  118/52   BP Location:  Right arm   Patient Position:  Sitting   Cuff Size:  Adult   Pulse:  100   Temp:  97.4 °F (36.3 °C)   SpO2:  98%   Weight: 60.1 kg (132 lb 9.6 oz) 60.1 kg (132 lb 9.6 oz)   Height: 170.2 cm (67\") 170.2 cm (67\")   PainSc:  0-No pain       Patient's Body mass index is 20.77 kg/m². BMI is within normal parameters. No follow-up required..      Assessment/Plan   Patient Self-Management and Personalized Health Advice  The patient has been provided with information about: diet, exercise and fall prevention and preventive services including:   · Advance directive, Colorectal cancer screening, cologuard test ordered, Fall Risk plan of care done.    Visit Diagnoses:    ICD-10-CM ICD-9-CM   1. Fatigue, unspecified type R53.83 780.79   2. Screening for colorectal cancer Z12.11 V76.51    Z12.12    3. Mixed hyperlipidemia E78.2 272.2   4. Essential hypertension I10 401.9   5. Special screening for malignant neoplasm of prostate Z12.5 V76.44   6. Hyperuricemia E79.0 790.6   7. Vitamin D deficiency E55.9 268.9   8. Annual physical exam Z00.00 V70.0       Orders Placed This Encounter   Procedures   • TSH   • T4, free   • Cologuard - Stool, Per " Rectum     Standing Status:   Future     Standing Expiration Date:   2/27/2020   • Comprehensive Metabolic Panel   • Lipid Panel   • PSA Screen   • Vitamin D 25 Hydroxy   • Uric Acid   • POC Urinalysis Dipstick, Multipro   • CBC & Differential     Order Specific Question:   Manual Differential     Answer:   No       Outpatient Encounter Medications as of 2/27/2019   Medication Sig Dispense Refill   • allopurinol (ZYLOPRIM) 300 MG tablet TAKE ONE TABLET BY MOUTH EVERY DAY FOR GOUT PREVENTION 30 tablet 5   • amLODIPine (NORVASC) 2.5 MG tablet TAKE ONE TABLET BY MOUTH EVERY EVENING 90 tablet 3   • aspirin-dipyridamole (AGGRENOX)  MG per 12 hr capsule Take 1 capsule by mouth 2 (Two) Times a Day.     • atenolol (TENORMIN) 50 MG tablet TAKE 1 TABLET BY MOUTH DAILY 90 tablet 0   • carbidopa-levodopa (SINEMET)  MG per tablet TAKE ONE TO TWO TABLETS BY MOUTH AT BEDTIME 60 tablet 0   • CINNAMON PO Take 500 mg by mouth Daily.     • colchicine-probenecid (COL-BENEMID) 0.5-500 MG tablet TAKE ONE TABLET BY MOUTH EVERY DAY 20 tablet 1   • colestipol (COLESTID) 1 g tablet TAKE 1 TABLET BY MOUTH TWICE A  tablet 1   • indapamide (LOZOL) 2.5 MG tablet Take 2.5 mg by mouth Every Morning.     • Multiple Vitamins-Minerals (EYE VITAMINS PO) Take 1 tablet by mouth Daily.     • polyethylene glycol-electrolytes (NULYTELY WITH FLAVOR PACKS) 420 g solution Take 4,000 mL by mouth See Admin Instructions. 4000 mL 0   • [DISCONTINUED] azithromycin (ZITHROMAX Z-MALISSA) 250 MG tablet Take 2 tablets the first day, then 1 tablet daily for 4 days. 6 tablet 0     No facility-administered encounter medications on file as of 2/27/2019.        Reviewed use of high risk medication in the elderly: yes  Reviewed for potential of harmful drug interactions in the elderly: yes    Follow Up:  Return in 6 months (on 8/27/2019).     An After Visit Summary and PPPS with all of these plans were given to the patient.    Cardiovascular testing next  week

## 2019-02-27 NOTE — PATIENT INSTRUCTIONS
Exercising to Stay Healthy  Exercising regularly is important. It has many health benefits, such as:  · Improving your overall fitness, flexibility, and endurance.  · Increasing your bone density.  · Helping with weight control.  · Decreasing your body fat.  · Increasing your muscle strength.  · Reducing stress and tension.  · Improving your overall health.    In order to become healthy and stay healthy, it is recommended that you do moderate-intensity and vigorous-intensity exercise. You can tell that you are exercising at a moderate intensity if you have a higher heart rate and faster breathing, but you are still able to hold a conversation. You can tell that you are exercising at a vigorous intensity if you are breathing much harder and faster and cannot hold a conversation while exercising.  How often should I exercise?  Choose an activity that you enjoy and set realistic goals. Your health care provider can help you to make an activity plan that works for you. Exercise regularly as directed by your health care provider. This may include:  · Doing resistance training twice each week, such as:  ? Push-ups.  ? Sit-ups.  ? Lifting weights.  ? Using resistance bands.  · Doing a given intensity of exercise for a given amount of time. Choose from these options:  ? 150 minutes of moderate-intensity exercise every week.  ? 75 minutes of vigorous-intensity exercise every week.  ? A mix of moderate-intensity and vigorous-intensity exercise every week.    Children, pregnant women, people who are out of shape, people who are overweight, and older adults may need to consult a health care provider for individual recommendations. If you have any sort of medical condition, be sure to consult your health care provider before starting a new exercise program.  What are some exercise ideas?  Some moderate-intensity exercise ideas include:  · Walking at a rate of 1 mile in 15  minutes.  · Biking.  · Hiking.  · Golfing.  · Dancing.    Some vigorous-intensity exercise ideas include:  · Walking at a rate of at least 4.5 miles per hour.  · Jogging or running at a rate of 5 miles per hour.  · Biking at a rate of at least 10 miles per hour.  · Lap swimming.  · Roller-skating or in-line skating.  · Cross-country skiing.  · Vigorous competitive sports, such as football, basketball, and soccer.  · Jumping rope.  · Aerobic dancing.    What are some everyday activities that can help me to get exercise?  · Yard work, such as:  ? Pushing a .  ? Raking and bagging leaves.  · Washing and waxing your car.  · Pushing a stroller.  · Shoveling snow.  · Gardening.  · Washing windows or floors.  How can I be more active in my day-to-day activities?  · Use the stairs instead of the elevator.  · Take a walk during your lunch break.  · If you drive, park your car farther away from work or school.  · If you take public transportation, get off one stop early and walk the rest of the way.  · Make all of your phone calls while standing up and walking around.  · Get up, stretch, and walk around every 30 minutes throughout the day.  What guidelines should I follow while exercising?  · Do not exercise so much that you hurt yourself, feel dizzy, or get very short of breath.  · Consult your health care provider before starting a new exercise program.  · Wear comfortable clothes and shoes with good support.  · Drink plenty of water while you exercise to prevent dehydration or heat stroke. Body water is lost during exercise and must be replaced.  · Work out until you breathe faster and your heart beats faster.  This information is not intended to replace advice given to you by your health care provider. Make sure you discuss any questions you have with your health care provider.  Document Released: 01/20/2012 Document Revised: 05/25/2017 Document Reviewed: 05/21/2015  [employer] Interactive Patient Education © 2018  [employer]    Fall Prevention in the Home  Falls can cause injuries and can affect people from all age groups. There are many simple things that you can do to make your home safe and to help prevent falls.  What can I do on the outside of my home?  · Regularly repair the edges of walkways and driveways and fix any cracks.  · Remove high doorway thresholds.  · Trim any shrubbery on the main path into your home.  · Use bright outdoor lighting.  · Clear walkways of debris and clutter, including tools and rocks.  · Regularly check that handrails are securely fastened and in good repair. Both sides of any steps should have handrails.  · Install guardrails along the edges of any raised decks or porches.  · Have leaves, snow, and ice cleared regularly.  · Use sand or salt on walkways during winter months.  · In the garage, clean up any spills right away, including grease or oil spills.  What can I do in the bathroom?  · Use night lights.  · Install grab bars by the toilet and in the tub and shower. Do not use towel bars as grab bars.  · Use non-skid mats or decals on the floor of the tub or shower.  · If you need to sit down while you are in the shower, use a plastic, non-slip stool.  · Keep the floor dry. Immediately clean up any water that spills on the floor.  · Remove soap buildup in the tub or shower on a regular basis.  · Attach bath mats securely with double-sided non-slip rug tape.  · Remove throw rugs and other tripping hazards from the floor.  What can I do in the bedroom?  · Use night lights.  · Make sure that a bedside light is easy to reach.  · Do not use oversized bedding that drapes onto the floor.  · Have a firm chair that has side arms to use for getting dressed.  · Remove throw rugs and other tripping hazards from the floor.  What can I do in the kitchen?  · Clean up any spills right away.  · Avoid walking on wet floors.  · Place frequently used items in easy-to-reach places.  · If you need to reach  for something above you, use a sturdy step stool that has a grab bar.  · Keep electrical cables out of the way.  · Do not use floor polish or wax that makes floors slippery. If you have to use wax, make sure that it is non-skid floor wax.  · Remove throw rugs and other tripping hazards from the floor.  What can I do in the stairways?  · Do not leave any items on the stairs.  · Make sure that there are handrails on both sides of the stairs. Fix handrails that are broken or loose. Make sure that handrails are as long as the stairways.  · Check any carpeting to make sure that it is firmly attached to the stairs. Fix any carpet that is loose or worn.  · Avoid having throw rugs at the top or bottom of stairways, or secure the rugs with carpet tape to prevent them from moving.  · Make sure that you have a light switch at the top of the stairs and the bottom of the stairs. If you do not have them, have them installed.  What are some other fall prevention tips?  · Wear closed-toe shoes that fit well and support your feet. Wear shoes that have rubber soles or low heels.  · When you use a stepladder, make sure that it is completely opened and that the sides are firmly locked. Have someone hold the ladder while you are using it. Do not climb a closed stepladder.  · Add color or contrast paint or tape to grab bars and handrails in your home. Place contrasting color strips on the first and last steps.  · Use mobility aids as needed, such as canes, walkers, scooters, and crutches.  · Turn on lights if it is dark. Replace any light bulbs that burn out.  · Set up furniture so that there are clear paths. Keep the furniture in the same spot.  · Fix any uneven floor surfaces.  · Choose a carpet design that does not hide the edge of steps of a stairway.  · Be aware of any and all pets.  · Review your medicines with your healthcare provider. Some medicines can cause dizziness or changes in blood pressure, which increase your risk of  falling.  Talk with your health care provider about other ways that you can decrease your risk of falls. This may include working with a physical therapist or  to improve your strength, balance, and endurance.  This information is not intended to replace advice given to you by your health care provider. Make sure you discuss any questions you have with your health care provider.  Document Released: 2003 Document Revised: 2017 Document Reviewed: 2016  [employer] Interactive Patient Education © 2018 [employer]    Medicare Wellness  Personal Prevention Plan of Service     Date of Office Visit:  2019  Encounter Provider:  Rigoberto Barnes MD  Place of Service:  [facility]  Patient Name: Shabbir Gambino  :  1940    As part of the Medicare Wellness portion of your visit today, we are providing you with this personalized preventive plan of services (PPPS). This plan is based upon recommendations of the [place] Preventive Services Task Force (USPSTF) and the Advisory Committee on Immunization Practices (ACIP).    This lists the preventive care services that should be considered, and provides dates of when you are due. Items listed as completed are up-to-date and do not require any further intervention.    Health Maintenance   Topic Date Due   • PNEUMOCOCCAL VACCINES (65+ LOW/MEDIUM RISK) (2 of 2 - PPSV23) 2019 (Originally 2018)   • ZOSTER VACCINE (1 of 2) 2020 (Originally 1989)   • LIPID PANEL  07/10/2019   • MEDICARE ANNUAL WELLNESS  2020   • COLONOSCOPY  2021   • INFLUENZA VACCINE  Completed   • TDAP/TD VACCINES  Discontinued       Orders Placed This Encounter   Procedures   • TSH   • T4, free   • Cologuard - Stool, Per Rectum     Standing Status:   Future     Standing Expiration Date:   2020   • Comprehensive Metabolic Panel   • Lipid Panel   • PSA Screen   • Vitamin D 25 Hydroxy   • Uric Acid    • POC Urinalysis Dipstick, Multipro   • CBC & Differential     Order Specific Question:   Manual Differential     Answer:   No       Return in 6 months (on 8/27/2019).

## 2019-02-28 ENCOUNTER — OFFICE VISIT (OUTPATIENT)
Dept: VASCULAR SURGERY | Age: 79
End: 2019-02-28
Payer: MEDICARE

## 2019-02-28 VITALS
OXYGEN SATURATION: 98 % | SYSTOLIC BLOOD PRESSURE: 118 MMHG | DIASTOLIC BLOOD PRESSURE: 60 MMHG | RESPIRATION RATE: 18 BRPM | HEART RATE: 52 BPM

## 2019-02-28 DIAGNOSIS — I65.23 BILATERAL CAROTID ARTERY STENOSIS: ICD-10-CM

## 2019-02-28 PROCEDURE — 99202 OFFICE O/P NEW SF 15 MIN: CPT | Performed by: NURSE PRACTITIONER

## 2019-02-28 PROCEDURE — 4040F PNEUMOC VAC/ADMIN/RCVD: CPT | Performed by: NURSE PRACTITIONER

## 2019-02-28 PROCEDURE — 1123F ACP DISCUSS/DSCN MKR DOCD: CPT | Performed by: NURSE PRACTITIONER

## 2019-02-28 PROCEDURE — G8598 ASA/ANTIPLAT THER USED: HCPCS | Performed by: NURSE PRACTITIONER

## 2019-02-28 PROCEDURE — G8420 CALC BMI NORM PARAMETERS: HCPCS | Performed by: NURSE PRACTITIONER

## 2019-02-28 PROCEDURE — 1101F PT FALLS ASSESS-DOCD LE1/YR: CPT | Performed by: NURSE PRACTITIONER

## 2019-02-28 PROCEDURE — G8484 FLU IMMUNIZE NO ADMIN: HCPCS | Performed by: NURSE PRACTITIONER

## 2019-02-28 PROCEDURE — 1036F TOBACCO NON-USER: CPT | Performed by: NURSE PRACTITIONER

## 2019-02-28 PROCEDURE — G8427 DOCREV CUR MEDS BY ELIG CLIN: HCPCS | Performed by: NURSE PRACTITIONER

## 2019-03-07 ENCOUNTER — TELEPHONE (OUTPATIENT)
Dept: CARDIOLOGY | Age: 79
End: 2019-03-07

## 2019-03-19 ENCOUNTER — HOSPITAL ENCOUNTER (OUTPATIENT)
Dept: CARDIAC CATH/INVASIVE PROCEDURES | Age: 79
Discharge: HOME | End: 2019-03-19
Attending: INTERNAL MEDICINE | Admitting: INTERNAL MEDICINE
Payer: MEDICARE

## 2019-03-19 VITALS
DIASTOLIC BLOOD PRESSURE: 62 MMHG | OXYGEN SATURATION: 97 % | HEART RATE: 68 BPM | RESPIRATION RATE: 18 BRPM | HEIGHT: 68 IN | TEMPERATURE: 98.5 F | WEIGHT: 137 LBS | BODY MASS INDEX: 20.76 KG/M2 | SYSTOLIC BLOOD PRESSURE: 115 MMHG

## 2019-03-19 LAB
ALBUMIN SERPL-MCNC: 4.3 G/DL (ref 3.5–5.2)
ALP BLD-CCNC: 125 U/L (ref 40–130)
ALT SERPL-CCNC: <5 U/L (ref 5–41)
ANION GAP SERPL CALCULATED.3IONS-SCNC: 12 MMOL/L (ref 7–19)
AST SERPL-CCNC: 21 U/L (ref 5–40)
BILIRUB SERPL-MCNC: 0.4 MG/DL (ref 0.2–1.2)
BUN BLDV-MCNC: 31 MG/DL (ref 8–23)
CALCIUM SERPL-MCNC: 9.6 MG/DL (ref 8.8–10.2)
CHLORIDE BLD-SCNC: 95 MMOL/L (ref 98–111)
CHOLESTEROL, TOTAL: 241 MG/DL (ref 160–199)
CO2: 27 MMOL/L (ref 22–29)
CREAT SERPL-MCNC: 1.4 MG/DL (ref 0.5–1.2)
GFR NON-AFRICAN AMERICAN: 49
GLUCOSE BLD-MCNC: 123 MG/DL (ref 74–109)
HCT VFR BLD CALC: 45.1 % (ref 42–52)
HDLC SERPL-MCNC: 44 MG/DL (ref 55–121)
HEMOGLOBIN: 14.7 G/DL (ref 14–18)
LDL CHOLESTEROL CALCULATED: 155 MG/DL
MCH RBC QN AUTO: 30.1 PG (ref 27–31)
MCHC RBC AUTO-ENTMCNC: 32.6 G/DL (ref 33–37)
MCV RBC AUTO: 92.2 FL (ref 80–94)
PDW BLD-RTO: 13.2 % (ref 11.5–14.5)
PLATELET # BLD: 208 K/UL (ref 130–400)
PMV BLD AUTO: 9.9 FL (ref 9.4–12.4)
POTASSIUM SERPL-SCNC: 4.4 MMOL/L (ref 3.5–5)
RBC # BLD: 4.89 M/UL (ref 4.7–6.1)
SODIUM BLD-SCNC: 134 MMOL/L (ref 136–145)
TOTAL PROTEIN: 7.7 G/DL (ref 6.6–8.7)
TRIGL SERPL-MCNC: 208 MG/DL (ref 0–149)
WBC # BLD: 7 K/UL (ref 4.8–10.8)

## 2019-03-19 PROCEDURE — C1769 GUIDE WIRE: HCPCS

## 2019-03-19 PROCEDURE — 99152 MOD SED SAME PHYS/QHP 5/>YRS: CPT

## 2019-03-19 PROCEDURE — 93459 L HRT ART/GRFT ANGIO: CPT

## 2019-03-19 PROCEDURE — 6360000002 HC RX W HCPCS

## 2019-03-19 PROCEDURE — 80061 LIPID PANEL: CPT

## 2019-03-19 PROCEDURE — 85027 COMPLETE CBC AUTOMATED: CPT

## 2019-03-19 PROCEDURE — 2709999900 HC NON-CHARGEABLE SUPPLY

## 2019-03-19 PROCEDURE — 93459 L HRT ART/GRFT ANGIO: CPT | Performed by: INTERNAL MEDICINE

## 2019-03-19 PROCEDURE — 92929 PR PRQ TRLUML CORONARY STENT W/ANGIO ADDL ART/BRNCH: CPT | Performed by: INTERNAL MEDICINE

## 2019-03-19 PROCEDURE — 80053 COMPREHEN METABOLIC PANEL: CPT

## 2019-03-19 PROCEDURE — 36415 COLL VENOUS BLD VENIPUNCTURE: CPT

## 2019-03-19 PROCEDURE — 99153 MOD SED SAME PHYS/QHP EA: CPT

## 2019-03-19 PROCEDURE — C1887 CATHETER, GUIDING: HCPCS

## 2019-03-19 PROCEDURE — C1760 CLOSURE DEV, VASC: HCPCS

## 2019-03-19 PROCEDURE — 6370000000 HC RX 637 (ALT 250 FOR IP)

## 2019-03-19 PROCEDURE — 2500000003 HC RX 250 WO HCPCS

## 2019-03-19 PROCEDURE — C1725 CATH, TRANSLUMIN NON-LASER: HCPCS

## 2019-03-19 PROCEDURE — C1874 STENT, COATED/COV W/DEL SYS: HCPCS

## 2019-03-19 PROCEDURE — 92928 PRQ TCAT PLMT NTRAC ST 1 LES: CPT

## 2019-03-19 PROCEDURE — 99152 MOD SED SAME PHYS/QHP 5/>YRS: CPT | Performed by: INTERNAL MEDICINE

## 2019-03-19 PROCEDURE — 92929 HC PRQ CARD STENT W/ANGIO ADDL: CPT

## 2019-03-19 PROCEDURE — C1894 INTRO/SHEATH, NON-LASER: HCPCS

## 2019-03-19 PROCEDURE — 92928 PRQ TCAT PLMT NTRAC ST 1 LES: CPT | Performed by: INTERNAL MEDICINE

## 2019-03-19 PROCEDURE — 6360000004 HC RX CONTRAST MEDICATION: Performed by: INTERNAL MEDICINE

## 2019-03-19 PROCEDURE — 93005 ELECTROCARDIOGRAM TRACING: CPT

## 2019-03-19 RX ORDER — SODIUM CHLORIDE 0.9 % (FLUSH) 0.9 %
10 SYRINGE (ML) INJECTION EVERY 12 HOURS SCHEDULED
Status: DISCONTINUED | OUTPATIENT
Start: 2019-03-19 | End: 2019-03-19

## 2019-03-19 RX ORDER — NITROGLYCERIN 0.4 MG/1
0.4 TABLET SUBLINGUAL EVERY 5 MIN PRN
Status: DISCONTINUED | OUTPATIENT
Start: 2019-03-19 | End: 2019-03-19 | Stop reason: HOSPADM

## 2019-03-19 RX ORDER — SODIUM CHLORIDE 9 MG/ML
INJECTION, SOLUTION INTRAVENOUS CONTINUOUS
Status: DISCONTINUED | OUTPATIENT
Start: 2019-03-19 | End: 2019-03-19

## 2019-03-19 RX ORDER — CLOPIDOGREL BISULFATE 75 MG/1
75 TABLET ORAL DAILY
Status: DISCONTINUED | OUTPATIENT
Start: 2019-03-20 | End: 2019-03-19

## 2019-03-19 RX ORDER — IODIXANOL 320 MG/ML
200 INJECTION, SOLUTION INTRAVASCULAR
Status: COMPLETED | OUTPATIENT
Start: 2019-03-19 | End: 2019-03-19

## 2019-03-19 RX ORDER — SODIUM CHLORIDE 0.9 % (FLUSH) 0.9 %
10 SYRINGE (ML) INJECTION PRN
Status: DISCONTINUED | OUTPATIENT
Start: 2019-03-19 | End: 2019-03-19 | Stop reason: HOSPADM

## 2019-03-19 RX ORDER — SODIUM CHLORIDE 0.9 % (FLUSH) 0.9 %
10 SYRINGE (ML) INJECTION PRN
Status: DISCONTINUED | OUTPATIENT
Start: 2019-03-19 | End: 2019-03-19

## 2019-03-19 RX ORDER — ACETAMINOPHEN 325 MG/1
650 TABLET ORAL EVERY 4 HOURS PRN
Status: DISCONTINUED | OUTPATIENT
Start: 2019-03-19 | End: 2019-03-19 | Stop reason: HOSPADM

## 2019-03-19 RX ORDER — ASPIRIN 81 MG/1
81 TABLET ORAL DAILY
Status: DISCONTINUED | OUTPATIENT
Start: 2019-03-20 | End: 2019-03-19 | Stop reason: HOSPADM

## 2019-03-19 RX ORDER — ASPIRIN 81 MG/1
81 TABLET, CHEWABLE ORAL DAILY
Status: DISCONTINUED | OUTPATIENT
Start: 2019-03-20 | End: 2019-03-19

## 2019-03-19 RX ORDER — CLOPIDOGREL BISULFATE 75 MG/1
75 TABLET ORAL DAILY
Status: DISCONTINUED | OUTPATIENT
Start: 2019-03-20 | End: 2019-03-19 | Stop reason: HOSPADM

## 2019-03-19 RX ORDER — ACETAMINOPHEN 325 MG/1
650 TABLET ORAL EVERY 4 HOURS PRN
Status: DISCONTINUED | OUTPATIENT
Start: 2019-03-19 | End: 2019-03-19

## 2019-03-19 RX ORDER — FENTANYL CITRATE 50 UG/ML
25 INJECTION, SOLUTION INTRAMUSCULAR; INTRAVENOUS
Status: DISCONTINUED | OUTPATIENT
Start: 2019-03-19 | End: 2019-03-19 | Stop reason: HOSPADM

## 2019-03-19 RX ORDER — HYDROCODONE BITARTRATE AND ACETAMINOPHEN 5; 325 MG/1; MG/1
2 TABLET ORAL EVERY 4 HOURS PRN
Status: DISCONTINUED | OUTPATIENT
Start: 2019-03-19 | End: 2019-03-19 | Stop reason: HOSPADM

## 2019-03-19 RX ORDER — CLOPIDOGREL BISULFATE 75 MG/1
75 TABLET ORAL DAILY
Status: CANCELLED | OUTPATIENT
Start: 2019-03-20

## 2019-03-19 RX ORDER — SODIUM CHLORIDE 9 MG/ML
INJECTION, SOLUTION INTRAVENOUS CONTINUOUS
Status: DISCONTINUED | OUTPATIENT
Start: 2019-03-19 | End: 2019-03-19 | Stop reason: HOSPADM

## 2019-03-19 RX ORDER — ATROPINE SULFATE 0.4 MG/ML
0.5 AMPUL (ML) INJECTION
Status: DISCONTINUED | OUTPATIENT
Start: 2019-03-19 | End: 2019-03-19 | Stop reason: HOSPADM

## 2019-03-19 RX ORDER — CLOPIDOGREL BISULFATE 75 MG/1
75 TABLET ORAL DAILY
Qty: 90 TABLET | Refills: 3 | Status: SHIPPED | OUTPATIENT
Start: 2019-03-19 | End: 2020-03-11

## 2019-03-19 RX ORDER — HYDROCODONE BITARTRATE AND ACETAMINOPHEN 5; 325 MG/1; MG/1
1 TABLET ORAL EVERY 4 HOURS PRN
Status: DISCONTINUED | OUTPATIENT
Start: 2019-03-19 | End: 2019-03-19 | Stop reason: HOSPADM

## 2019-03-19 RX ORDER — ALPRAZOLAM 0.25 MG/1
0.5 TABLET ORAL
Status: DISCONTINUED | OUTPATIENT
Start: 2019-03-19 | End: 2019-03-19 | Stop reason: HOSPADM

## 2019-03-19 RX ORDER — ONDANSETRON 2 MG/ML
4 INJECTION INTRAMUSCULAR; INTRAVENOUS EVERY 6 HOURS PRN
Status: DISCONTINUED | OUTPATIENT
Start: 2019-03-19 | End: 2019-03-19 | Stop reason: HOSPADM

## 2019-03-19 RX ORDER — SODIUM CHLORIDE 0.9 % (FLUSH) 0.9 %
10 SYRINGE (ML) INJECTION EVERY 12 HOURS SCHEDULED
Status: DISCONTINUED | OUTPATIENT
Start: 2019-03-19 | End: 2019-03-19 | Stop reason: HOSPADM

## 2019-03-19 RX ADMIN — IODIXANOL 200 ML: 320 INJECTION, SOLUTION INTRAVASCULAR at 11:59

## 2019-03-20 DIAGNOSIS — R19.5 POSITIVE COLORECTAL CANCER SCREENING USING COLOGUARD TEST: Primary | ICD-10-CM

## 2019-03-20 LAB
EKG P AXIS: 60 DEGREES
EKG P-R INTERVAL: 192 MS
EKG Q-T INTERVAL: 422 MS
EKG QRS DURATION: 92 MS
EKG QTC CALCULATION (BAZETT): 417 MS
EKG T AXIS: 1 DEGREES

## 2019-04-01 DIAGNOSIS — M10.9 GOUT, UNSPECIFIED CAUSE, UNSPECIFIED CHRONICITY, UNSPECIFIED SITE: ICD-10-CM

## 2019-04-02 RX ORDER — PROBENECID AND COLCHICINE 500; .5 MG/1; MG/1
TABLET ORAL
Qty: 20 TABLET | Refills: 1 | Status: SHIPPED | OUTPATIENT
Start: 2019-04-02 | End: 2019-06-17 | Stop reason: SDUPTHER

## 2019-04-11 NOTE — PROGRESS NOTES
Subjective   Shabbir Gambino is a 78 y.o. male.     78-year-old  male with sinus congestion-history of hypertension diffuse ASCVD cerebrovascular disease and gout      URI    This is a new problem. The current episode started in the past 7 days. The problem has been waxing and waning. The maximum temperature recorded prior to his arrival was 100.4 - 100.9 F. The fever has been present for 1 to 2 days. Associated symptoms include congestion, coughing, sinus pain and sneezing. He has tried nothing for the symptoms.       The following portions of the patient's history were reviewed and updated as appropriate: allergies, current medications, past family history, past medical history, past social history, past surgical history and problem list.    Review of Systems   HENT: Positive for congestion, sinus pain and sneezing.    Respiratory: Positive for cough.        Objective   Physical Exam   Constitutional: He is oriented to person, place, and time. He appears well-developed and well-nourished.   HENT:   Right Ear: External ear normal.   Left Ear: External ear normal.   Mouth/Throat: Oropharynx is clear and moist.   Cardiovascular: Normal rate and regular rhythm.   Pulmonary/Chest: Effort normal.   Decreased breath sounds on left   Musculoskeletal: He exhibits no edema.   Neurological: He is alert and oriented to person, place, and time.   Psychiatric: He has a normal mood and affect. His behavior is normal.   Nursing note and vitals reviewed.      Assessment/Plan   Patient Active Problem List   Diagnosis   • Vascular disease   • RLS (restless legs syndrome)   • Insomnia   • Hypertension   • Gout   • Axonal polyneuropathy   • Atherosclerotic coronary vascular disease   • Arthritis   • Esophageal dysphagia   • Dysplasia of colon   • Irregular heart beat   • Cerebrovascular disease   • Leukocytosis   • Weight loss   • Pharyngoesophageal dysphagia   • History of adenomatous polyp of colon     Shabbir was seen  today for cough and congestion.    Diagnoses and all orders for this visit:    Fatigue, unspecified type  -     CBC & Differential  -     Comprehensive Metabolic Panel  -     TSH  -     T4, free    Chronic gout without tophus, unspecified cause, unspecified site  -     Comprehensive Metabolic Panel  -     Uric Acid    Acute URI  -     methylPREDNISolone acetate (DEPO-medrol) injection 20 mg; Inject 0.5 mL into the appropriate muscle as directed by prescriber 1 (One) Time.    Cough  -     XR Chest PA & Lateral; Future    Other orders  -     azithromycin (ZITHROMAX Z-MALISSA) 250 MG tablet; Take 2 tablets the first day, then 1 tablet daily for 4 days.       Return if symptoms worsen or fail to improve, for Medicare Wellness.  Plan-above plus chest x-ray        57 year old female with long term history of DM here for elbow osteomyelitis s/p wash out on 04/09/2019, endocrine consulted for uncontrolled DM. Pt w/severe insulin resistance and non-adherence to consistent carb diet at home. BG values remain above goal during daytime. Fasting glucose acceptable. Will increase premeal insulin while pt remains inpatient. BG goal (100-180mg/dl).

## 2019-04-17 ENCOUNTER — OFFICE VISIT (OUTPATIENT)
Dept: CARDIOLOGY | Age: 79
End: 2019-04-17
Payer: MEDICARE

## 2019-04-17 VITALS
BODY MASS INDEX: 20.61 KG/M2 | HEIGHT: 68 IN | DIASTOLIC BLOOD PRESSURE: 58 MMHG | SYSTOLIC BLOOD PRESSURE: 132 MMHG | WEIGHT: 136 LBS | HEART RATE: 52 BPM

## 2019-04-17 DIAGNOSIS — I25.10 CORONARY ARTERY DISEASE INVOLVING NATIVE CORONARY ARTERY OF NATIVE HEART WITHOUT ANGINA PECTORIS: Primary | ICD-10-CM

## 2019-04-17 PROCEDURE — 99203 OFFICE O/P NEW LOW 30 MIN: CPT | Performed by: INTERNAL MEDICINE

## 2019-04-17 RX ORDER — PROBENECID AND COLCHICINE 500; .5 MG/1; MG/1
1 TABLET ORAL DAILY
COMMUNITY

## 2019-04-17 ASSESSMENT — ENCOUNTER SYMPTOMS
SHORTNESS OF BREATH: 0
ABDOMINAL PAIN: 0
CHEST TIGHTNESS: 0
DIARRHEA: 0

## 2019-04-17 NOTE — PROGRESS NOTES
Lou Caal is a 77y.o. year old male presents for follow-up evaluation since his recent PCI 3/19/2019. He has been doing significantly better with no complaints of chest pain or shortness of breath. He reports no fatigue which she had experienced previously. He has been compliant with his medications. REVIEW OF SYSTEMS:  Review of Systems   Constitutional: Negative for activity change and fatigue. HENT: Negative for hearing loss. Eyes: Negative for visual disturbance. Respiratory: Negative for chest tightness and shortness of breath. Cardiovascular: Negative for palpitations and leg swelling. Gastrointestinal: Negative for abdominal pain and diarrhea. Genitourinary: Negative for flank pain and hematuria. Musculoskeletal: Negative for arthralgias and myalgias. Neurological: Negative for seizures and syncope. Psychiatric/Behavioral: Negative. All other systems reviewed and are negative.       Past Medical History:      Diagnosis Date    Arthritis     Skin cancer     On face       Past Surgical History:      Procedure Laterality Date    ABSCESS DRAINAGE Right     knee    CARDIAC SURGERY      cabg 2008    CAROTID ENDARTERECTOMY Left     [place]    CAROTID ENDARTERECTOMY Right     Dr. Severo Lek      summer cat    JOINT REPLACEMENT      rtk    POLYPECTOMY      Colon    SKIN CANCER EXCISION         Medications:  Current Outpatient Medications   Medication Sig Dispense Refill    colchicine-probenecid 0.5-500 MG per tablet Take 1 tablet by mouth daily      NONFORMULARY Over the counter \"Zz\"      clopidogrel (PLAVIX) 75 MG tablet Take 1 tablet by mouth daily 90 tablet 3    indapamide (LOZOL) 2.5 MG tablet Take 1 tablet by mouth every morning 30 tablet 5    Multiple Vitamins-Minerals (ICAPS AREDS 2 PO) Take by mouth daily      amLODIPine (NORVASC) 2.5 MG tablet Take 2.5 mg by mouth daily      atenolol (TENORMIN) 50 MG tablet Take 50 mg by mouth daily      carbidopa-levodopa (SINEMET)  MG per tablet Take 1 tablet by mouth 2 times daily as needed      Cinnamon 500 MG CAPS Take 1 capsule by mouth nightly       No current facility-administered medications for this visit. Allergies:   Other; Niacin and related; Statins; and Zetia [ezetimibe]    Past Social History:  Social History     Socioeconomic History    Marital status:      Spouse name: Not on file    Number of children: Not on file    Years of education: Not on file    Highest education level: Not on file   Occupational History    Not on file   Social Needs    Financial resource strain: Not on file    Food insecurity:     Worry: Not on file     Inability: Not on file    Transportation needs:     Medical: Not on file     Non-medical: Not on file   Tobacco Use    Smoking status: Never Smoker    Smokeless tobacco: Never Used   Substance and Sexual Activity    Alcohol use: No    Drug use: No    Sexual activity: Not on file   Lifestyle    Physical activity:     Days per week: Not on file     Minutes per session: Not on file    Stress: Not on file   Relationships    Social connections:     Talks on phone: Not on file     Gets together: Not on file     Attends Synagogue service: Not on file     Active member of club or organization: Not on file     Attends meetings of clubs or organizations: Not on file     Relationship status: Not on file    Intimate partner violence:     Fear of current or ex partner: Not on file     Emotionally abused: Not on file     Physically abused: Not on file     Forced sexual activity: Not on file   Other Topics Concern    Not on file   Social History Narrative    Not on file       Family History:       Problem Relation Age of Onset    Heart Disease Mother     Heart Disease Maternal Uncle          Physical Examination:  BP (!) 132/58 Comment: right  Pulse 52   Ht 5' 8\" (1.727 m)   Wt 136 lb (61.7 kg)   BMI Physician) on   2019 13:07   ----------------------------------------------------------------      Angiographic Findings      Cardiac Arteries and Lesion Findings     LMCA: Diffuse irregularity. LAD:       Lesion on Mid LAD: Proximal subsection. 100% stenosis 15 mm length.       Lesion on 1st Dia% stenosis 9 mm length.       Comments:D-1 small to moderate size     LCx:       Lesion on Prox CX: 50% stenosis 5 mm length.       Lesion on 1st Ob Ana Laura% stenosis 6 mm length reduced to 0%. Post    Procedure ARIC III flow was present.       Treatment results:Interventional treatment was successful.       Comments:OM-1 stented with 2.25 x 30 mm Resolute [place] Stent(post dilated    to 2.5mm)    Final kissing balloon with mid LCX (3.0mm)       Devices used       - John 2.25 x 30 DEIRDRE. Diameter: 2.25 mm. Length: 30 mm.       Lesion on Mid CX: 95% stenosis 19 mm length reduced to 0%. Post Procedure    ARIC III flow was present. The guidewire cross was successful. Lesion    plaque is ruptured.       Treatment results:Interventional treatment was successful.       Comments:Distal LCX gives rise to all PL branches.    Distal LCX stented with 2.5 x 26 mm Resolute John stent.       Devices used       - John 2.5 x 26 DEIRDRE. Diameter: 2.5 mm. Length: 26 mm.     RCA:       Lesion on Mid RCA: 99% stenosis 34 mm length.       Comments:Mid-distal RCA 99%; Left to right collaterals to small RPDA     Graft Lesions       Lesion on Aorta Right to R PDA: Proximal anastomosis. 100% stenosis 12 mm    length.       Lesion on Aorta Left to 1st Dia% stenosis 9 mm length.       Lesion on Aorta Left to Dist CX (complex): 100% stenosis 9 mm length.       Lesion on Aorta Left to 1st Ob Ana Laura (complex): 100% stenosis 12 mm    length.     Cardiac Grafts      -  There is a graft that originates at the LIMA and attaches to the Dist      LAD (Patent LIMA-dLAD).       - Veena Morteza is a graft that originates at the Aorta Right and attaches to the  [facility]  R PDA (Occluded SVG-RCA).     - Roger Skeens is a graft that originates at the Aorta Left and attaches to the      1st Diag (Occluded SVG to D1).     - Roger Skeens is a graft that originates at the Aorta Left and attaches to the      1st Ob Ana Laura (Occluded SVG-OM1) and to the Dist CX (Y graft; Occluded      SVG to dLCX).        ASSESSMENT and PLAN:    69-year-old gentleman with past medical history coronary artery disease, prior CABG 2006, with catheterization 3/19/2019 with patent LIMA to LAD, occluded vein grafts, status post PCI to mid and distal circumflex as well as OM1 with drug-eluting stents, normal LV ejection fraction, bilateral carotid disease with current intermediate lesions here for follow-up evaluation. 1. He's doing well post PCI. His groin site is healed well. He'll continue his current medications unchanged. He has been compliant. 2. He'll follow-up with me in 5 months. Orders:  No orders of the defined types were placed in this encounter. No orders of the defined types were placed in this encounter. Return in about 5 months (around 9/17/2019). Electronically signed by All Mathews MD on 4/17/2019 at 8:06 PM    [facility]      Thisdictation was generated by voice recognition computer software. Although all attempts are made to edit the dictation for accuracy, there may be errors in the transcription that are not intended.

## 2019-04-23 ENCOUNTER — OFFICE VISIT (OUTPATIENT)
Dept: GASTROENTEROLOGY | Facility: CLINIC | Age: 79
End: 2019-04-23

## 2019-04-23 VITALS
HEART RATE: 42 BPM | OXYGEN SATURATION: 99 % | WEIGHT: 133 LBS | HEIGHT: 68 IN | SYSTOLIC BLOOD PRESSURE: 102 MMHG | BODY MASS INDEX: 20.16 KG/M2 | TEMPERATURE: 95.7 F | DIASTOLIC BLOOD PRESSURE: 58 MMHG

## 2019-04-23 DIAGNOSIS — D68.9 COAGULOPATHY (HCC): ICD-10-CM

## 2019-04-23 DIAGNOSIS — I10 ESSENTIAL HYPERTENSION: ICD-10-CM

## 2019-04-23 DIAGNOSIS — R19.5 POSITIVE COLORECTAL CANCER SCREENING USING COLOGUARD TEST: Primary | ICD-10-CM

## 2019-04-23 DIAGNOSIS — Z86.010 HISTORY OF ADENOMATOUS POLYP OF COLON: ICD-10-CM

## 2019-04-23 PROCEDURE — 99213 OFFICE O/P EST LOW 20 MIN: CPT | Performed by: NURSE PRACTITIONER

## 2019-04-23 RX ORDER — CLOPIDOGREL BISULFATE 75 MG/1
75 TABLET ORAL DAILY
COMMUNITY

## 2019-04-23 NOTE — PROGRESS NOTES
Chief Complaint   Patient presents with   • GI Problem     possitive cologuard test       PCP: Rigoberto Barnes MD  REFER: Rigoberto Barnes,*    Subjective     HPI    He presents to office with positive cologaurd test from PCP office in 1 mo ago.  Coarse is constant.  Length of time test has been positive is unknown.  Testing performed as part of routine physical.  He denies change in bowels.  Bowels described as moving without difficulty, no change. No abdominal pain.  No BRBPR.  No melena.  Weight is stable.  He has undergone previous colonoscopy evaluation.  There is no  family history of colon cancer. Daughter and son with colon polyps.       He has history of incomplete removal of a large sessile adenoma transverse colon in 2017 that required surgical resection, high-grade dysplasia was noted.    Last colonoscopy 8/2018  - Three 5 to 6 mm polyps in the ascending colon, removed with a hot snare. Resected and retrieved.  - Two 3 to 4 mm polyps in the transverse colon, removed with a hot snare. Complete resection. Polyp  tissue not retrieved.  - Patent end-to-end colo-colonic anastomosis, characterized by healthy appearing mucosa.  - Diverticulosis in the sigmoid colon.  - The examination was otherwise normal on direct and retroflexion views.  Tissue Pathology Exam: [id number]   Order: [id number]   Collected:  8/7/2018 08:46 Status:  Final result   Visible to patient:  No (Not Released) Dx:  Weight loss; Pharyngoesophageal dysph...   Component    Final Diagnosis   Large intestine, ascending colon polyps ×3, polypectomy: Adenomatous polyps (3).           Recommend recall 3 year.     Past Medical History:   Diagnosis Date   • Atherosclerotic coronary vascular disease    • Axonal polyneuropathy    • Colon polyp    • Gout    • History of transfusion    • Hyperlipidemia    • Hypertension    • Insomnia    • Irregular heart beat    • Precancerous skin lesion    • RLS (restless legs syndrome)    • TIA  (transient ischemic attack)     2014   • Vascular disease     CAROTID       Past Surgical History:   Procedure Laterality Date   • CAROTID ENDARTERECTOMY Bilateral     2007, 2008   • COLON RESECTION N/A 10/24/2017    Procedure: COLON RESECTION  MID TRANSVERSE LAPAROSCOPIC;  Surgeon: Adal Quintero MD;  Location: [facility] OR;  Service:    • COLONOSCOPY      MULTIPLE    • COLONOSCOPY N/A 9/7/2017    Procedure: colonoscopy with anesthesia;  Surgeon: Randy Gomez MD;  Location: [facility] ENDOSCOPY;  Service:    • COLONOSCOPY N/A 8/7/2018    Procedure: COLONOSCOPY WITH ANESTHESIA;  Surgeon: Randy Gomez MD;  Location: [facility] ENDOSCOPY;  Service: Gastroenterology   • CORONARY ARTERY BYPASS GRAFT  2006   • CORONARY STENT PLACEMENT     • ENDOSCOPY N/A 9/7/2017    Procedure: ESOPHAGOGASTRODUODENOSCOPY WITH ANESTHESIA;  Surgeon: Randy Gomez MD;  Location: [facility] ENDOSCOPY;  Service:    • ENDOSCOPY N/A 8/7/2018    Procedure: ESOPHAGOGASTRODUODENOSCOPY WITH ANESTHESIA;  Surgeon: Randy Gomez MD;  Location: [facility] ENDOSCOPY;  Service: Gastroenterology   • EYE SURGERY Bilateral     2014, 2015   • KNEE SURGERY Right     x 2       Outpatient Medications Marked as Taking for the 4/23/19 encounter (Office Visit) with Stormy Pena APRN   Medication Sig Dispense Refill   • amLODIPine (NORVASC) 2.5 MG tablet TAKE ONE TABLET BY MOUTH EVERY EVENING 90 tablet 3   • atenolol (TENORMIN) 50 MG tablet TAKE 1 TABLET BY MOUTH DAILY 90 tablet 0   • carbidopa-levodopa (SINEMET)  MG per tablet TAKE ONE TO TWO TABLETS BY MOUTH AT BEDTIME 60 tablet 0   • CINNAMON PO Take 500 mg by mouth Daily.     • clopidogrel (PLAVIX) 75 MG tablet Take 75 mg by mouth Daily.     • colchicine-probenecid (COL-BENEMID) 0.5-500 MG tablet TAKE ONE TABLET BY MOUTH EVERY DAY 20 tablet 1   • indapamide (LOZOL) 2.5 MG tablet Take 2.5 mg by mouth Every Morning.     • Multiple Vitamins-Minerals (EYE VITAMINS PO) Take 1 tablet by mouth Daily.    "      Allergies   Allergen Reactions   • Other Anaphylaxis     UNKNOWN TO PATIENT ENVIRONMENTAL/FOOD ALLERGY    • Ezetimibe Unknown (See Comments)     Leg cramps   • Niacin And Related Unknown (See Comments)     Cramping red and flush   • Statins Other (See Comments)     Leg cramps       Social History     Socioeconomic History   • Marital status:      Spouse name: Not on file   • Number of children: Not on file   • Years of education: Not on file   • Highest education level: Not on file   Tobacco Use   • Smoking status: Never Smoker   • Smokeless tobacco: Never Used   Substance and Sexual Activity   • Alcohol use: No   • Drug use: No   • Sexual activity: Defer       Family History   Problem Relation Age of Onset   • Stroke Mother    • Diabetes Mother    • No Known Problems Father    • Esophageal cancer Sister    • No Known Problems Maternal Grandmother    • No Known Problems Maternal Grandfather    • No Known Problems Paternal Grandmother    • No Known Problems Paternal Grandfather    • Colon polyps Neg Hx    • Colon cancer Neg Hx        Review of Systems   Constitutional: Negative for chills, fever and unexpected weight change.   Respiratory: Negative for cough, shortness of breath and wheezing.    Cardiovascular: Negative for chest pain and palpitations.   Gastrointestinal: Negative for abdominal distention, abdominal pain, anal bleeding, blood in stool, constipation, diarrhea, nausea and vomiting.       Objective     Vitals:    04/23/19 0924   BP: 102/58   Pulse: (!) 42   Temp: 95.7 °F (35.4 °C)   SpO2: 99%   Weight: 60.3 kg (133 lb)   Height: 172.7 cm (68\")     Body mass index is 20.22 kg/m².    Physical Exam   Constitutional: He appears well-developed and well-nourished. No distress.   Cardiovascular: Normal rate, regular rhythm and normal heart sounds.   Pulmonary/Chest: Effort normal and breath sounds normal.   Abdominal: Soft. Bowel sounds are normal. He exhibits no distension. There is no " tenderness.   Musculoskeletal: He exhibits no edema.   Neurological: He is alert.   Skin: Skin is warm and dry.   Psychiatric: He has a normal mood and affect. His behavior is normal.   Vitals reviewed.      Imaging Results (most recent)     None          Body mass index is 20.22 kg/m².    Assessment/Plan     Shabbir was seen today for gi problem.    Diagnoses and all orders for this visit:    Positive colorectal cancer screening using Cologuard test    History of adenomatous polyp of colon    Essential hypertension    Coagulopathy (CMS/HCC)  Comments:  plavix , recent cardiac stent.          Will discuss with Dr. Gomez and contact patient.       I have explained a positive cologuard indicates the presence of DNA/hgb in stool that is associated with colon polyps or colon cancer.  There is a chance the test is a false positive with that chance being higher for people over the age of 65. This is due to normal changes in DNA associated with getting older (AGA).           Patient's Body mass index is 20.22 kg/m². BMI is within normal parameters. No follow-up required..      Discussed case with Dr. Gomez.  He recommend colonoscopy patient is able to come off Plavix 7 days prior to procedure the patient's wife is going to call his cardiologist and ask when he would be able to hold Plavix for 7 days.  Typically on [facility] cardiologist usually 1 uninterrupted Plavix therapy for 1 year after a cardiac stent.  However the patient's wife is going to call and confirm that with cardiology and then call me back.  The patient had a colonoscopy August 2018 to the chances of him having a colon cancer would be lower on the list of differential.  Was discussed with patient.      I called patient again today and discussed further that  Cologuard does have a 4% chance of when they are positive that there is a colon cancer present. That stat is when the patient has not had a prior screening exam.   This patient has had a prior screening  colonoscopy so the chance of a colon cancer being present is much less than 4%. Cologuard can be positive for multiple other reasons such as the presence of blood in the stool and not necessarily colon cancer.   The risk of complication with stopping the Plavix to investigate is greater than the very small percent a colon malignancy is present with a negative colonoscopy recently in this patient.  Therefore do not recommend colonoscopy investigation now.  Cologuard was not indicated  in this patient and does not need to be investigated with a colonoscopy at this time because it was done.  I do suggest a colonoscopy as soon as it is reasonable for the patient to come off Plavix per cardiology's recommendations. He verbalizes understanding.

## 2019-04-30 ENCOUNTER — TELEPHONE (OUTPATIENT)
Dept: GASTROENTEROLOGY | Facility: CLINIC | Age: 79
End: 2019-04-30

## 2019-04-30 NOTE — TELEPHONE ENCOUNTER
I called and spoke with the patient to let him know that I did get message back from his cardiologist that they did not want to stop Plavix until he has been on it for 1 year which would be March 2020.  Also discussed once again that  cologuard does have a 4% chance of when they are positive that there is a colon cancer present. That stat is when the patient has not had a prior screening exam.   This patient has had a prior screening colonoscopy so the chance of a colon cancer being present is much less than 4%. Cologuard can be positive for multiple other reasons such as the presence of blood in the stool and not necessarily colon cancer.   The risk of complication with stopping the Plavix to investigate is greater than the very small percent a colon malignancy is present with a negative colonoscopy recently in this patient.  Therefore do not recommend colonoscopy investigation now.  Cologuard was not indicated  in this patient and does not need to be investigated with a colonoscopy at this time because it was done.  I do suggest a colonoscopy as soon as it is reasonable for the patient to come off Plavix per cardiology's recommendations. The patient verbalizes understanding.

## 2019-05-02 ENCOUNTER — OFFICE VISIT (OUTPATIENT)
Dept: NEUROLOGY | Facility: CLINIC | Age: 79
End: 2019-05-02

## 2019-05-02 VITALS
DIASTOLIC BLOOD PRESSURE: 60 MMHG | WEIGHT: 134 LBS | HEIGHT: 68 IN | HEART RATE: 62 BPM | SYSTOLIC BLOOD PRESSURE: 104 MMHG | BODY MASS INDEX: 20.31 KG/M2

## 2019-05-02 DIAGNOSIS — G25.81 RLS (RESTLESS LEGS SYNDROME): ICD-10-CM

## 2019-05-02 DIAGNOSIS — I67.9 CEREBROVASCULAR DISEASE: Primary | ICD-10-CM

## 2019-05-02 PROCEDURE — 99213 OFFICE O/P EST LOW 20 MIN: CPT | Performed by: PHYSICIAN ASSISTANT

## 2019-05-02 NOTE — PROGRESS NOTES
Jaycee Gambino is a 78 y.o. male is here today for follow-up.    Recent significant cardiac intervention with stents at [facility].  The patient is doing well now.      Stroke   This is a chronic problem. The current episode started more than 1 year ago. The problem occurs daily. The problem has been unchanged. Associated symptoms include fatigue. Pertinent negatives include no chills, fever, headaches or weakness. The symptoms are aggravated by stress. Treatments tried: Pharmacotherapy, control underlying disease. The treatment provided moderate relief.       The following portions of the patient's history were reviewed and updated as appropriate: allergies, current medications, past family history, past medical history, past social history, past surgical history and problem list.    Review of Systems   Constitutional: Positive for fatigue. Negative for chills and fever.   HENT: Positive for hearing loss. Negative for trouble swallowing.    Eyes: Negative.    Respiratory: Negative.    Cardiovascular: Negative.    Gastrointestinal: Negative.    Endocrine: Negative.    Genitourinary: Negative.    Musculoskeletal: Positive for gait problem.   Skin: Negative.    Allergic/Immunologic: Negative.    Neurological: Negative for speech difficulty, weakness and headaches.   Hematological: Negative.    Psychiatric/Behavioral: Positive for sleep disturbance. Negative for dysphoric mood. The patient is not nervous/anxious.        Objective   Physical Exam   Constitutional: He is oriented to person, place, and time. Vital signs are normal. He appears well-developed and well-nourished. No distress.   HENT:   Head: Normocephalic and atraumatic.   Right Ear: External ear normal. Decreased hearing is noted.   Left Ear: External ear normal. Decreased hearing is noted.   Nose: Nose normal.   Mouth/Throat: Uvula is midline, oropharynx is clear and moist and mucous membranes are normal.   Eyes: Conjunctivae, EOM  and lids are normal. Pupils are equal, round, and reactive to light. No scleral icterus.   Fundoscopic exam:       The right eye shows no hemorrhage and no papilledema. The right eye shows red reflex.        The left eye shows no hemorrhage and no papilledema. The left eye shows red reflex.   Neck: Trachea normal, normal range of motion and phonation normal. No JVD present. Carotid bruit is not present.   Cardiovascular: Normal rate, regular rhythm, S1 normal, S2 normal and normal heart sounds.   No murmur heard.  Pulmonary/Chest: Effort normal and breath sounds normal.   Musculoskeletal: He exhibits no edema or tenderness.        Lumbar back: Normal.   Lymphadenopathy:     He has no cervical adenopathy.   Neurological: He is alert and oriented to person, place, and time. He displays atrophy. He displays no tremor. A cranial nerve deficit and sensory deficit is present. He exhibits normal muscle tone. Coordination and gait abnormal. GCS eye subscore is 4. GCS verbal subscore is 5. GCS motor subscore is 6.   Reflex Scores:       Tricep reflexes are 1+ on the right side and 1+ on the left side.       Bicep reflexes are 1+ on the right side and 1+ on the left side.       Brachioradialis reflexes are 1+ on the right side and 1+ on the left side.       Patellar reflexes are 1+ on the right side and 1+ on the left side.       Achilles reflexes are 0 on the right side and 0 on the left side.  Presbycusis, peripheral sensory deficits in all 4 extremities worse in the lower extremities with diminished light touch and vibratory sense.  Deep tendon reflexes diminished but symmetric.  Mild distal atrophy in bilateral lower extremities.  Functional strength bilaterally.  Functional ataxia likely due to sensory deficit.   Skin: Skin is warm, dry and intact.   Psychiatric: He has a normal mood and affect. His speech is normal and behavior is normal. Judgment and thought content normal. Cognition and memory are normal.   Nursing  note and vitals reviewed.        Assessment/Plan   Shabbir was seen today for stroke and restless legs syndrome.    Diagnoses and all orders for this visit:    Cerebrovascular disease    RLS (restless legs syndrome)    The patient is neurologically stable.  He is now on Plavix and off Aggrenox.  He will continue unchanged.  His restless leg symptoms are well treated.    No changes are made recommendations today.  Come back in 6 months.    10 minutes of a 15-minute outpatient visit was spent in counseling and coordination of care today.    Dictated utilizing Dragon dictation.

## 2019-05-10 RX ORDER — ATENOLOL 50 MG/1
TABLET ORAL
Qty: 90 TABLET | Refills: 2 | Status: SHIPPED | OUTPATIENT
Start: 2019-05-10 | End: 2020-02-07

## 2019-05-10 RX ORDER — ATENOLOL 50 MG/1
50 TABLET ORAL DAILY
Qty: 90 TABLET | Refills: 3 | Status: SHIPPED | OUTPATIENT
Start: 2019-05-10 | End: 2019-11-05 | Stop reason: SDUPTHER

## 2019-06-17 DIAGNOSIS — M10.9 GOUT, UNSPECIFIED CAUSE, UNSPECIFIED CHRONICITY, UNSPECIFIED SITE: ICD-10-CM

## 2019-06-18 RX ORDER — PROBENECID AND COLCHICINE 500; .5 MG/1; MG/1
TABLET ORAL
Qty: 20 TABLET | Refills: 1 | Status: SHIPPED | OUTPATIENT
Start: 2019-06-18 | End: 2019-08-27 | Stop reason: SDUPTHER

## 2019-06-24 RX ORDER — INDAPAMIDE 2.5 MG/1
2.5 TABLET, FILM COATED ORAL EVERY MORNING
Qty: 30 TABLET | Refills: 5 | Status: SHIPPED | OUTPATIENT
Start: 2019-06-24 | End: 2019-11-26 | Stop reason: SDUPTHER

## 2019-08-27 DIAGNOSIS — M10.9 GOUT, UNSPECIFIED CAUSE, UNSPECIFIED CHRONICITY, UNSPECIFIED SITE: ICD-10-CM

## 2019-08-27 RX ORDER — PROBENECID AND COLCHICINE 500; .5 MG/1; MG/1
TABLET ORAL
Qty: 20 TABLET | Refills: 1 | Status: SHIPPED | OUTPATIENT
Start: 2019-08-27 | End: 2019-11-27 | Stop reason: SDUPTHER

## 2019-08-28 ENCOUNTER — OFFICE VISIT (OUTPATIENT)
Dept: FAMILY MEDICINE CLINIC | Facility: CLINIC | Age: 79
End: 2019-08-28

## 2019-08-28 VITALS
HEART RATE: 48 BPM | OXYGEN SATURATION: 98 % | TEMPERATURE: 97.2 F | BODY MASS INDEX: 20.92 KG/M2 | WEIGHT: 138 LBS | DIASTOLIC BLOOD PRESSURE: 73 MMHG | HEIGHT: 68 IN | SYSTOLIC BLOOD PRESSURE: 164 MMHG

## 2019-08-28 DIAGNOSIS — I73.9 PERIPHERAL VASCULAR DISEASE (HCC): ICD-10-CM

## 2019-08-28 DIAGNOSIS — Z23 NEED FOR PROPHYLACTIC VACCINATION AGAINST STREPTOCOCCUS PNEUMONIAE (PNEUMOCOCCUS): ICD-10-CM

## 2019-08-28 DIAGNOSIS — R53.83 FATIGUE, UNSPECIFIED TYPE: ICD-10-CM

## 2019-08-28 DIAGNOSIS — E78.2 MIXED HYPERLIPIDEMIA: Primary | ICD-10-CM

## 2019-08-28 DIAGNOSIS — E79.0 HYPERURICEMIA: ICD-10-CM

## 2019-08-28 PROCEDURE — 99214 OFFICE O/P EST MOD 30 MIN: CPT | Performed by: FAMILY MEDICINE

## 2019-08-28 PROCEDURE — 90732 PPSV23 VACC 2 YRS+ SUBQ/IM: CPT | Performed by: FAMILY MEDICINE

## 2019-08-28 PROCEDURE — G0009 ADMIN PNEUMOCOCCAL VACCINE: HCPCS | Performed by: FAMILY MEDICINE

## 2019-08-28 NOTE — PROGRESS NOTES
Subjective   Shabbir Gambino is a 79 y.o. male.     -year-old male with recent coronary stents for ischemic heart disease and hyperlipidemia-will need colonoscopy after he had the stents 1 year      Hyperlipidemia   This is a chronic problem. The current episode started more than 1 year ago. Recent lipid tests were reviewed and are normal. Pertinent negatives include no chest pain. Current antihyperlipidemic treatment includes diet change and statins. The current treatment provides moderate improvement of lipids. Risk factors for coronary artery disease include dyslipidemia, hypertension, male sex and a sedentary lifestyle.       The following portions of the patient's history were reviewed and updated as appropriate: allergies, current medications, past family history, past medical history, past social history, past surgical history and problem list.    Review of Systems   Cardiovascular: Negative for chest pain and leg swelling.       Objective   Physical Exam   Constitutional: He is oriented to person, place, and time.   Cardiovascular: Normal rate and regular rhythm.   Pulmonary/Chest: Effort normal and breath sounds normal.   Musculoskeletal: He exhibits no edema.   Neurological: He is alert and oriented to person, place, and time.   Psychiatric: He has a normal mood and affect. His behavior is normal. Judgment and thought content normal.   Nursing note and vitals reviewed.      Assessment/Plan   Patient Active Problem List   Diagnosis   • Vascular disease   • RLS (restless legs syndrome)   • Insomnia   • Hypertension   • Gout   • Axonal polyneuropathy   • Atherosclerotic coronary vascular disease   • Arthritis   • Esophageal dysphagia   • Dysplasia of colon   • Irregular heart beat   • Cerebrovascular disease   • Leukocytosis   • Weight loss   • Pharyngoesophageal dysphagia   • History of adenomatous polyp of colon   • Skin cancer   • Coronary artery disease involving native coronary artery   • Peripheral  vascular disease (CMS/HCC)     Shabbir was seen today for follow-up.    Diagnoses and all orders for this visit:    Mixed hyperlipidemia  -     Comprehensive Metabolic Panel  -     Lipid Panel    Peripheral vascular disease (CMS/HCC)  -     Comprehensive Metabolic Panel  -     Lipid Panel    Fatigue, unspecified type  -     CBC & Differential    Hyperuricemia  -     Uric Acid    Need for prophylactic vaccination against Streptococcus pneumoniae (pneumococcus)  -     Pneumococcal Polysaccharide Vaccine 23-Valent Greater Than or Equal To 3yo Subcutaneous / IM       Return for Annual physical.       Plan above      Electronically signed by Rigoberto Barnes MD 08/28/2019

## 2019-08-28 NOTE — PROGRESS NOTES
Administered Pneumovax 23 into right deltoid. Patient tolerated well, observed no reactions.     Exp 10/26/19

## 2019-08-29 LAB
ALBUMIN SERPL-MCNC: 4.4 G/DL (ref 3.5–5.2)
ALBUMIN/GLOB SERPL: 1.6 G/DL
ALP SERPL-CCNC: 129 U/L (ref 39–117)
ALT SERPL-CCNC: 8 U/L (ref 1–41)
AST SERPL-CCNC: 20 U/L (ref 1–40)
BASOPHILS # BLD AUTO: 0 X10E3/UL (ref 0–0.2)
BASOPHILS NFR BLD AUTO: 0 %
BILIRUB SERPL-MCNC: 0.4 MG/DL (ref 0.2–1.2)
BUN SERPL-MCNC: 25 MG/DL (ref 8–23)
BUN/CREAT SERPL: 16.8 (ref 7–25)
CALCIUM SERPL-MCNC: 9.9 MG/DL (ref 8.6–10.5)
CHLORIDE SERPL-SCNC: 95 MMOL/L (ref 98–107)
CHOLEST SERPL-MCNC: 232 MG/DL (ref 0–200)
CO2 SERPL-SCNC: 27 MMOL/L (ref 22–29)
CREAT SERPL-MCNC: 1.49 MG/DL (ref 0.76–1.27)
EOSINOPHIL # BLD AUTO: 0.2 X10E3/UL (ref 0–0.4)
EOSINOPHIL NFR BLD AUTO: 3 %
ERYTHROCYTE [DISTWIDTH] IN BLOOD BY AUTOMATED COUNT: 14.8 % (ref 12.3–15.4)
GLOBULIN SER CALC-MCNC: 2.8 GM/DL
GLUCOSE SERPL-MCNC: 117 MG/DL (ref 65–99)
HCT VFR BLD AUTO: 39.8 % (ref 37.5–51)
HDLC SERPL-MCNC: 43 MG/DL (ref 40–60)
HGB BLD-MCNC: 13.2 G/DL (ref 13–17.7)
IMM GRANULOCYTES # BLD AUTO: 0.1 X10E3/UL (ref 0–0.1)
IMM GRANULOCYTES NFR BLD AUTO: 2 %
LDLC SERPL CALC-MCNC: 152 MG/DL (ref 0–100)
LYMPHOCYTES # BLD AUTO: 1.5 X10E3/UL (ref 0.7–3.1)
LYMPHOCYTES NFR BLD AUTO: 24 %
MCH RBC QN AUTO: 29.1 PG (ref 26.6–33)
MCHC RBC AUTO-ENTMCNC: 33.2 G/DL (ref 31.5–35.7)
MCV RBC AUTO: 88 FL (ref 79–97)
MONOCYTES # BLD AUTO: 1 X10E3/UL (ref 0.1–0.9)
MONOCYTES NFR BLD AUTO: 16 %
NEUTROPHILS # BLD AUTO: 3.6 X10E3/UL (ref 1.4–7)
NEUTROPHILS NFR BLD AUTO: 55 %
PLATELET # BLD AUTO: 206 X10E3/UL (ref 150–450)
POTASSIUM SERPL-SCNC: 4.8 MMOL/L (ref 3.5–5.2)
PROT SERPL-MCNC: 7.2 G/DL (ref 6–8.5)
RBC # BLD AUTO: 4.53 X10E6/UL (ref 4.14–5.8)
SODIUM SERPL-SCNC: 138 MMOL/L (ref 136–145)
TRIGL SERPL-MCNC: 187 MG/DL (ref 0–150)
URATE SERPL-MCNC: 7.7 MG/DL (ref 3.4–7)
VLDLC SERPL CALC-MCNC: 37.4 MG/DL
WBC # BLD AUTO: 6.5 X10E3/UL (ref 3.4–10.8)

## 2019-09-09 ENCOUNTER — HOSPITAL ENCOUNTER (OUTPATIENT)
Dept: VASCULAR LAB | Age: 79
Discharge: HOME | End: 2019-09-09
Payer: MEDICARE

## 2019-09-09 ENCOUNTER — OFFICE VISIT (OUTPATIENT)
Dept: VASCULAR SURGERY | Age: 79
End: 2019-09-09
Payer: MEDICARE

## 2019-09-09 VITALS
BODY MASS INDEX: 22.73 KG/M2 | DIASTOLIC BLOOD PRESSURE: 70 MMHG | HEIGHT: 68 IN | RESPIRATION RATE: 20 BRPM | WEIGHT: 150 LBS | HEART RATE: 46 BPM | SYSTOLIC BLOOD PRESSURE: 149 MMHG

## 2019-09-09 DIAGNOSIS — I65.23 BILATERAL CAROTID ARTERY STENOSIS: ICD-10-CM

## 2019-09-09 DIAGNOSIS — I65.23 BILATERAL CAROTID ARTERY STENOSIS: Primary | ICD-10-CM

## 2019-09-09 PROCEDURE — 99212 OFFICE O/P EST SF 10 MIN: CPT | Performed by: NURSE PRACTITIONER

## 2019-09-09 PROCEDURE — 1036F TOBACCO NON-USER: CPT | Performed by: NURSE PRACTITIONER

## 2019-09-09 PROCEDURE — G8427 DOCREV CUR MEDS BY ELIG CLIN: HCPCS | Performed by: NURSE PRACTITIONER

## 2019-09-09 PROCEDURE — 1123F ACP DISCUSS/DSCN MKR DOCD: CPT | Performed by: NURSE PRACTITIONER

## 2019-09-09 PROCEDURE — G8420 CALC BMI NORM PARAMETERS: HCPCS | Performed by: NURSE PRACTITIONER

## 2019-09-09 PROCEDURE — 4040F PNEUMOC VAC/ADMIN/RCVD: CPT | Performed by: NURSE PRACTITIONER

## 2019-09-09 PROCEDURE — 93880 EXTRACRANIAL BILAT STUDY: CPT

## 2019-09-09 PROCEDURE — G8598 ASA/ANTIPLAT THER USED: HCPCS | Performed by: NURSE PRACTITIONER

## 2019-09-09 NOTE — PROGRESS NOTES
Use    Smoking status: Never Smoker    Smokeless tobacco: Never Used   Substance Use Topics    Alcohol use: No           Review of Systems    Constitutional - no significant activity change, appetite change, or unexpected weight change. No fever or chills. No diaphoresis or significant fatigue. HENT - no significant rhinorrhea or epistaxis. No tinnitus or significant hearing loss. Eyes - no sudden vision change or amaurosis. Respiratory - no significant shortness of breath, wheezing, or stridor. No apnea, cough, or chest tightness associated with shortness of breath. Cardiovascular - no chest pain, syncope, or significant dizziness. No palpitations or significant leg swelling. No claudication. Gastrointestinal - no abdominal swelling or pain. No blood in stool. No severe constipation, diarrhea, nausea, or vomiting. Genitourinary - No difficulty urinating, dysuria, frequency, or urgency. No flank pain or hematuria. Musculoskeletal - no back pain, gait disturbance, or myalgia. Skin - no color change, rash, pallor, or new wound. Neurologic - no dizziness, facial asymmetry, or light headedness. No seizures. No speech difficulty or lateralizing weakness. Hematologic - no easy bruising or excessive bleeding. Psychiatric - no severe anxiety or nervousness. No confusion. All other review of systems are negative. Physical Exam    BP (!) 149/70 (Site: Right Upper Arm, Position: Sitting, Cuff Size: Medium Adult)   Pulse (!) 46   Resp 20   Ht 5' 8\" (1.727 m)   Wt 150 lb (68 kg)   BMI 22.81 kg/m²       Constitutional - well developed, well nourished. No diaphoresis or acute distress. HENT - head normocephalic. Right external ear canal appears normal.  Left external ear canal appears normal.  Septum appears midline. Eyes - conjunctiva normal.  EOMS normal.  No exudate. No icterus. Neck- ROM appears normal, no tracheal deviation. Cardiovascular - Regular rate and rhythm.   Heart sounds are normal.  No murmur, rub, or gallop. Carotid pulses are 2+ to palpation bilaterally without bruit. Pulmonary - effort appears normal.  No respiratory distress. Lungs - Breath sounds normal. No wheezes or rales. Extremities - Radial and brachial pulses are 2+ to palpation bilaterally. Neurologic - alert and oriented X 3. Physiologic. Face symmetric. Skin - warm, dry, and intact. No rash, erythema, or pallor. Psychiatric - mood, affect, and behavior appear normal.  Judgment and thought processes appear normal.    Risk factors for atherosclerosis of all vascular beds have been reviewed with the patient including:  Family history, tobacco abuse in all forms, elevated cholesterol, hyperlipidemia, and diabetes. Doppler results:    Right CCA/ICA widely patent  Left CCA/ICA 50-69% stenotic  Right vertebral artery flow is antegrade  Left vertebral artery flow is antegrade  Individual velocities reviewed: Yes. Test results were reviewed with the patient. Disease process is stable      Options have been discussed with the patient including continued medical management. Patient has opted to proceed with continued medical management. Assessment    1. Bilateral carotid artery stenosis          Plan    Start/Continue ASA EC 81 mg daily, plavix  plavix 75 mg po daily  Strongly encourage start/continue statin therapy  Recommend no smoking  Follow up in  6  Month(s) with CVLS  Patient instructed to call or proceed to the emergency room with any symptoms of lateralizing weakness, loss of vision in one eye, or episodes slurred speech.

## 2019-09-18 ENCOUNTER — OFFICE VISIT (OUTPATIENT)
Dept: CARDIOLOGY | Age: 79
End: 2019-09-18
Payer: MEDICARE

## 2019-09-18 VITALS
DIASTOLIC BLOOD PRESSURE: 60 MMHG | SYSTOLIC BLOOD PRESSURE: 146 MMHG | WEIGHT: 148 LBS | HEIGHT: 67 IN | BODY MASS INDEX: 23.23 KG/M2 | HEART RATE: 48 BPM

## 2019-09-18 DIAGNOSIS — I25.10 CORONARY ARTERY DISEASE INVOLVING NATIVE CORONARY ARTERY OF NATIVE HEART WITHOUT ANGINA PECTORIS: Primary | ICD-10-CM

## 2019-09-18 PROCEDURE — 99213 OFFICE O/P EST LOW 20 MIN: CPT | Performed by: INTERNAL MEDICINE

## 2019-09-18 ASSESSMENT — ENCOUNTER SYMPTOMS
DIARRHEA: 0
BLOOD IN STOOL: 0
ABDOMINAL PAIN: 0
SHORTNESS OF BREATH: 0
ABDOMINAL DISTENTION: 0
WHEEZING: 0
BACK PAIN: 0
COUGH: 0
VOMITING: 0

## 2019-09-18 NOTE — PROGRESS NOTES
[address] [facility]  Cardiology Office Note  [place], [place]  [address]  Phone: [phone]  Fax: [phone]                            Date:  9/18/2019  Patient: Andrey Parra  Age:  [de-identified] y.o., 1940    Referral: No ref. provider found      PROBLEM LIST:    Patient Active Problem List    Diagnosis Date Noted    Unstable angina ([facility] [address]      Priority: High    Bilateral carotid artery stenosis 02/28/2019     Priority: Low    Skin cancer 02/22/2019     Priority: Low     Overview Note:     S/P excision lesion right lateral canthus  Remaining lesion left malar area      Colonic polyp 07/06/2018     Priority: Low     Overview Note:     2017 S/P open removal      Dysphagia 07/06/2018     Priority: Low    GERD (gastroesophageal reflux disease) 07/06/2018     Priority: Low    Dyslipidemia 07/03/2018     Priority: Low    Acute blood loss anemia 12/12/2015     Priority: Low    Cerebrovascular disease 12/12/2015     Priority: Low     Overview Note:     S/P Bilateral endarterectomies   S/P CVA 6/11      Osteoarthritis of right knee 12/12/2015     Priority: Low    Coronary artery disease involving native coronary artery 12/11/2015     Priority: Low     Overview Note:     2006 CABG x 7  2/20/19 Tigist inferior ischemia EF 67% 8% ischemic burden at risk 0% at rest  3/19/19 Cath TVD patent LIMA-LAD, all VG occluded, successful PCI to distal cx & OM1 with DEIRDRE        Essential hypertension 12/11/2015     Priority: Low     1. Coronary artery disease, status post CABG 2006, catheterization 3/19/2019 with patent LIMA to distal LAD, occluded SVG to RCA, occluded SVG to 1st diagonal, occluded SVG to OM1 and distal circumflex with diffuse RCA small caliber vessel disease, status post PCI to distal circumflex, OM1 with drug-eluting stents. Normal LV ejection fraction  2.  Bilateral intermediate carotid disease, with bilateral prior carotid endarterectomies    PRESENTATION: Erika Malik current or ex partner: Not on file     Emotionally abused: Not on file     Physically abused: Not on file     Forced sexual activity: Not on file   Other Topics Concern    Not on file   Social History Narrative    Not on file       Family History:       Problem Relation Age of Onset    Heart Disease Mother     Heart Disease Maternal Uncle          Physical Examination:  BP (!) 146/60   Pulse (!) 48   Ht 5' 7\" (1.702 m)   Wt 148 lb (67.1 kg)   BMI 23.18 kg/m²   Physical Exam   Constitutional: He is oriented to person, place, and time. He appears well-developed. HENT:   Mouth/Throat: No oropharyngeal exudate. Eyes: Right eye exhibits no discharge. Left eye exhibits no discharge. No scleral icterus. Neck: No JVD present. No thyromegaly present. Bilateral carotid bruits soft on the right and harsh on the left. No JVD   Cardiovascular: Normal rate and regular rhythm. Exam reveals no gallop and no friction rub. No murmur heard. Pulmonary/Chest: Effort normal. No stridor. No respiratory distress. He has no wheezes. He has no rales. Diminished air entry on the left posterior basal segments with no dullness. Abdominal: Soft. Bowel sounds are normal. He exhibits no distension and no mass. There is no hepatosplenomegaly. There is no tenderness. There is no rebound and no guarding. Musculoskeletal: He exhibits no edema or deformity. Neurological: He is alert and oriented to person, place, and time. He displays normal reflexes. He exhibits normal muscle tone. Coordination normal.   Skin: Skin is warm. No rash noted. No erythema. No pallor. Psychiatric: He has a normal mood and affect. Labs:   CBC: No results for input(s): WBC, HGB, HCT, PLT in the last 72 hours. BMP:No results for input(s): NA, K, CO2, BUN, CREATININE, LABGLOM, GLUCOSE in the last 72 hours. BNP: No results for input(s): BNP in the last 72 hours. PT/INR: No results for input(s): PROTIME, INR in the last 72 hours.   APTT:No

## 2019-10-11 ENCOUNTER — FLU SHOT (OUTPATIENT)
Dept: FAMILY MEDICINE CLINIC | Facility: CLINIC | Age: 79
End: 2019-10-11

## 2019-10-11 DIAGNOSIS — Z23 NEED FOR INFLUENZA VACCINATION: Primary | ICD-10-CM

## 2019-10-11 PROCEDURE — G0008 ADMIN INFLUENZA VIRUS VAC: HCPCS | Performed by: FAMILY MEDICINE

## 2019-10-11 PROCEDURE — 90653 IIV ADJUVANT VACCINE IM: CPT | Performed by: FAMILY MEDICINE

## 2019-11-05 ENCOUNTER — OFFICE VISIT (OUTPATIENT)
Dept: NEUROLOGY | Facility: CLINIC | Age: 79
End: 2019-11-05

## 2019-11-05 VITALS
DIASTOLIC BLOOD PRESSURE: 72 MMHG | HEART RATE: 48 BPM | HEIGHT: 68 IN | WEIGHT: 137 LBS | SYSTOLIC BLOOD PRESSURE: 116 MMHG | BODY MASS INDEX: 20.76 KG/M2

## 2019-11-05 DIAGNOSIS — I67.9 CEREBROVASCULAR DISEASE: Primary | ICD-10-CM

## 2019-11-05 DIAGNOSIS — G25.81 RLS (RESTLESS LEGS SYNDROME): ICD-10-CM

## 2019-11-05 PROCEDURE — 99213 OFFICE O/P EST LOW 20 MIN: CPT | Performed by: PHYSICIAN ASSISTANT

## 2019-11-21 NOTE — PROGRESS NOTES
Jaycee Gambino is a 79 y.o. male is here today for follow-up.    Recent significant cardiac intervention with stents at [facility].  The patient is doing well now.      Stroke   This is a chronic problem. The current episode started more than 1 year ago. The problem occurs daily. The problem has been unchanged. Associated symptoms include fatigue. Pertinent negatives include no chills, fever, headaches or weakness. The symptoms are aggravated by stress. Treatments tried: Pharmacotherapy, control underlying disease. The treatment provided moderate relief.       The following portions of the patient's history were reviewed and updated as appropriate: allergies, current medications, past family history, past medical history, past social history, past surgical history and problem list.    Review of Systems   Constitutional: Positive for fatigue. Negative for chills and fever.   HENT: Positive for hearing loss. Negative for trouble swallowing.    Eyes: Negative.    Respiratory: Negative.    Cardiovascular: Negative.    Gastrointestinal: Negative.    Endocrine: Negative.    Genitourinary: Negative.    Musculoskeletal: Positive for gait problem.   Skin: Negative.    Allergic/Immunologic: Negative.    Neurological: Negative for speech difficulty, weakness and headaches.   Hematological: Negative.    Psychiatric/Behavioral: Positive for sleep disturbance. Negative for dysphoric mood. The patient is not nervous/anxious.        Objective   Physical Exam   Constitutional: He is oriented to person, place, and time. Vital signs are normal. He appears well-developed and well-nourished. No distress.   HENT:   Head: Normocephalic and atraumatic.   Right Ear: External ear normal. Decreased hearing is noted.   Left Ear: External ear normal. Decreased hearing is noted.   Nose: Nose normal.   Mouth/Throat: Uvula is midline, oropharynx is clear and moist and mucous membranes are normal.   Eyes: Conjunctivae, EOM  and lids are normal. Pupils are equal, round, and reactive to light. No scleral icterus.   Fundoscopic exam:       The right eye shows no hemorrhage and no papilledema. The right eye shows red reflex.        The left eye shows no hemorrhage and no papilledema. The left eye shows red reflex.   Neck: Trachea normal, normal range of motion and phonation normal. No JVD present. Carotid bruit is not present.   Cardiovascular: Normal rate, regular rhythm, S1 normal, S2 normal and normal heart sounds.   No murmur heard.  Pulmonary/Chest: Effort normal and breath sounds normal.   Musculoskeletal: He exhibits no edema or tenderness.        Lumbar back: Normal.   Lymphadenopathy:     He has no cervical adenopathy.   Neurological: He is alert and oriented to person, place, and time. He displays atrophy. He displays no tremor. A cranial nerve deficit and sensory deficit is present. He exhibits normal muscle tone. Coordination and gait abnormal. GCS eye subscore is 4. GCS verbal subscore is 5. GCS motor subscore is 6.   Reflex Scores:       Tricep reflexes are 1+ on the right side and 1+ on the left side.       Bicep reflexes are 1+ on the right side and 1+ on the left side.       Brachioradialis reflexes are 1+ on the right side and 1+ on the left side.       Patellar reflexes are 1+ on the right side and 1+ on the left side.       Achilles reflexes are 0 on the right side and 0 on the left side.  Presbycusis, peripheral sensory deficits in all 4 extremities worse in the lower extremities with diminished light touch and vibratory sense.  Deep tendon reflexes diminished but symmetric.  Mild distal atrophy in bilateral lower extremities.  Functional strength bilaterally.  Functional ataxia likely due to sensory deficit.   Skin: Skin is warm, dry and intact.   Psychiatric: He has a normal mood and affect. His speech is normal and behavior is normal. Judgment and thought content normal. Cognition and memory are normal.   Nursing  note and vitals reviewed.        Assessment/Plan   Shabbir was seen today for stroke and restless legs syndrome.    Diagnoses and all orders for this visit:    Cerebrovascular disease    RLS (restless legs syndrome)    Neurologically stable.  No changes in present therapy.  Medication and follow up recommendations are reviewed.  The patient's questions and concerns are reviewed.  10 minutes of a 15 minute visit was spent in counseling and coordination of care.

## 2019-11-27 DIAGNOSIS — M10.9 GOUT, UNSPECIFIED CAUSE, UNSPECIFIED CHRONICITY, UNSPECIFIED SITE: ICD-10-CM

## 2019-11-27 RX ORDER — PROBENECID AND COLCHICINE 500; .5 MG/1; MG/1
TABLET ORAL
Qty: 20 TABLET | Refills: 1 | Status: SHIPPED | OUTPATIENT
Start: 2019-11-27 | End: 2020-03-23

## 2019-11-27 RX ORDER — INDAPAMIDE 2.5 MG/1
TABLET, FILM COATED ORAL
Qty: 30 TABLET | Refills: 5 | Status: SHIPPED | OUTPATIENT
Start: 2019-11-27 | End: 2020-06-02

## 2020-01-09 ENCOUNTER — OFFICE VISIT (OUTPATIENT)
Dept: FAMILY MEDICINE CLINIC | Facility: CLINIC | Age: 80
End: 2020-01-09

## 2020-01-09 VITALS
HEART RATE: 60 BPM | TEMPERATURE: 97.8 F | DIASTOLIC BLOOD PRESSURE: 63 MMHG | WEIGHT: 134.8 LBS | SYSTOLIC BLOOD PRESSURE: 127 MMHG | OXYGEN SATURATION: 96 % | BODY MASS INDEX: 20.5 KG/M2

## 2020-01-09 DIAGNOSIS — J02.9 PHARYNGITIS, UNSPECIFIED ETIOLOGY: Primary | ICD-10-CM

## 2020-01-09 PROCEDURE — 99213 OFFICE O/P EST LOW 20 MIN: CPT | Performed by: NURSE PRACTITIONER

## 2020-01-09 RX ORDER — AMOXICILLIN 500 MG/1
500 CAPSULE ORAL 2 TIMES DAILY
Qty: 20 CAPSULE | Refills: 0 | Status: SHIPPED | OUTPATIENT
Start: 2020-01-09 | End: 2020-01-19

## 2020-01-09 NOTE — PROGRESS NOTES
CC: sore throat, cough, fever    History:  Shabbir Gambino is a 79 y.o. male who presents today for evaluation of the above problems.    Sore throat, cough, and fever. Non productive cough that started today. Sore throat for the last 3 days. Took ibuprofen and cough drops. These did help.  Febrile in the evening.  Unknown temp, but had chills and sweats.    HPI  ROS:  Review of Systems   Constitutional: Positive for fever.   HENT: Positive for sore throat and voice change. Negative for congestion, ear pain, postnasal drip and sinus pain.    Respiratory: Positive for cough. Negative for wheezing.    Cardiovascular: Negative for chest pain.   Gastrointestinal: Negative for constipation, diarrhea and nausea.       Allergies   Allergen Reactions   • Other Anaphylaxis     UNKNOWN TO PATIENT ENVIRONMENTAL/FOOD ALLERGY    • Ezetimibe Unknown (See Comments)     Leg cramps   • Niacin And Related Unknown (See Comments)     Cramping red and flush   • Statins Other (See Comments)     Leg cramps     Past Medical History:   Diagnosis Date   • Atherosclerotic coronary vascular disease    • Axonal polyneuropathy    • Colon polyp    • Gout    • History of transfusion    • Hyperlipidemia    • Hypertension    • Insomnia    • Irregular heart beat    • Precancerous skin lesion    • RLS (restless legs syndrome)    • TIA (transient ischemic attack)     2014   • Vascular disease     CAROTID     Past Surgical History:   Procedure Laterality Date   • CAROTID ENDARTERECTOMY Bilateral     2007, 2008   • COLON RESECTION N/A 10/24/2017    Procedure: COLON RESECTION  MID TRANSVERSE LAPAROSCOPIC;  Surgeon: Adal Quintero MD;  Location: [facility] OR;  Service:    • COLONOSCOPY      MULTIPLE    • COLONOSCOPY N/A 9/7/2017    Procedure: colonoscopy with anesthesia;  Surgeon: Randy Gomez MD;  Location: [facility] ENDOSCOPY;  Service:    • COLONOSCOPY N/A 8/7/2018    Procedure: COLONOSCOPY WITH ANESTHESIA;  Surgeon: Randy Gomez MD;  Location:  [facility] ENDOSCOPY;  Service: Gastroenterology   • CORONARY ARTERY BYPASS GRAFT  2006   • CORONARY STENT PLACEMENT     • CORONARY STENT PLACEMENT     • ENDOSCOPY N/A 9/7/2017    Procedure: ESOPHAGOGASTRODUODENOSCOPY WITH ANESTHESIA;  Surgeon: Randy Gomez MD;  Location: [facility] ENDOSCOPY;  Service:    • ENDOSCOPY N/A 8/7/2018    Procedure: ESOPHAGOGASTRODUODENOSCOPY WITH ANESTHESIA;  Surgeon: Randy Gomez MD;  Location: [facility] ENDOSCOPY;  Service: Gastroenterology   • EYE SURGERY Bilateral     2014, 2015   • KNEE SURGERY Right     x 2     Family History   Problem Relation Age of Onset   • Stroke Mother    • Diabetes Mother    • No Known Problems Father    • Esophageal cancer Sister    • No Known Problems Maternal Grandmother    • No Known Problems Maternal Grandfather    • No Known Problems Paternal Grandmother    • No Known Problems Paternal Grandfather    • Colon polyps Neg Hx    • Colon cancer Neg Hx       reports that he has never smoked. He has never used smokeless tobacco. He reports that he does not drink alcohol or use drugs.      Current Outpatient Medications:   •  amLODIPine (NORVASC) 2.5 MG tablet, TAKE ONE TABLET BY MOUTH EVERY EVENING, Disp: 90 tablet, Rfl: 3  •  atenolol (TENORMIN) 50 MG tablet, TAKE 1 TABLET BY MOUTH DAILY, Disp: 90 tablet, Rfl: 2  •  carbidopa-levodopa (SINEMET)  MG per tablet, TAKE ONE TO TWO TABLETS BY MOUTH AT BEDTIME, Disp: 180 tablet, Rfl: 2  •  CINNAMON PO, Take 500 mg by mouth Daily., Disp: , Rfl:   •  clopidogrel (PLAVIX) 75 MG tablet, Take 75 mg by mouth Daily., Disp: , Rfl:   •  colchicine-probenecid (COL-BENEMID) 0.5-500 MG tablet, TAKE ONE TABLET BY MOUTH EVERY DAY, Disp: 20 tablet, Rfl: 1  •  indapamide (LOZOL) 2.5 MG tablet, Take 2.5 mg by mouth Every Morning., Disp: , Rfl:   •  Multiple Vitamins-Minerals (EYE VITAMINS PO), Take 1 tablet by mouth Daily., Disp: , Rfl:   •  amoxicillin (AMOXIL) 500 MG capsule, Take 1 capsule by mouth 2 (Two) Times a Day for  10 days., Disp: 20 capsule, Rfl: 0    OBJECTIVE:  /63 (BP Location: Left arm, Patient Position: Sitting, Cuff Size: Adult)   Pulse 60   Temp 97.8 °F (36.6 °C) (Oral)   Wt 61.1 kg (134 lb 12.8 oz)   SpO2 96%   BMI 20.50 kg/m²    Physical Exam   Constitutional: He is oriented to person, place, and time. Vital signs are normal. He appears well-developed and well-nourished.   HENT:   Right Ear: Tympanic membrane, external ear and ear canal normal.   Left Ear: Tympanic membrane, external ear and ear canal normal.   Mouth/Throat: Posterior oropharyngeal erythema present.   Cardiovascular: Normal rate.  Occasional extrasystoles are present.   Pulmonary/Chest: Effort normal and breath sounds normal.   Neurological: He is alert and oriented to person, place, and time.   Psychiatric: He has a normal mood and affect. His behavior is normal.   Vitals reviewed.      Assessment/Plan    Shabbir was seen today for sore throat, cough and fever.    Diagnoses and all orders for this visit:    Pharyngitis, unspecified etiology  -     amoxicillin (AMOXIL) 500 MG capsule; Take 1 capsule by mouth 2 (Two) Times a Day for 10 days.    Advised to take tylenol, NOT ibuprofen.     An After Visit Summary was printed and given to the patient at discharge.  Return if symptoms worsen or fail to improve.       VERONIKA Marley 01/09/20    Electronically signed.

## 2020-02-07 RX ORDER — ATENOLOL 50 MG/1
TABLET ORAL
Qty: 90 TABLET | Refills: 0 | Status: SHIPPED | OUTPATIENT
Start: 2020-02-07 | End: 2020-08-18

## 2020-02-24 RX ORDER — AMLODIPINE BESYLATE 2.5 MG/1
TABLET ORAL
Qty: 90 TABLET | Refills: 0 | Status: SHIPPED | OUTPATIENT
Start: 2020-02-24 | End: 2020-05-22

## 2020-02-28 ENCOUNTER — OFFICE VISIT (OUTPATIENT)
Dept: FAMILY MEDICINE CLINIC | Facility: CLINIC | Age: 80
End: 2020-02-28

## 2020-02-28 VITALS
BODY MASS INDEX: 19.7 KG/M2 | WEIGHT: 130 LBS | DIASTOLIC BLOOD PRESSURE: 73 MMHG | SYSTOLIC BLOOD PRESSURE: 125 MMHG | HEIGHT: 68 IN | OXYGEN SATURATION: 99 % | HEART RATE: 45 BPM | TEMPERATURE: 97.5 F

## 2020-02-28 DIAGNOSIS — I10 ESSENTIAL HYPERTENSION: ICD-10-CM

## 2020-02-28 DIAGNOSIS — D68.9 COAGULOPATHY (HCC): ICD-10-CM

## 2020-02-28 DIAGNOSIS — E78.2 MIXED HYPERLIPIDEMIA: ICD-10-CM

## 2020-02-28 DIAGNOSIS — R53.83 FATIGUE, UNSPECIFIED TYPE: ICD-10-CM

## 2020-02-28 DIAGNOSIS — I73.9 PERIPHERAL VASCULAR DISEASE (HCC): ICD-10-CM

## 2020-02-28 DIAGNOSIS — Z00.00 ANNUAL PHYSICAL EXAM: ICD-10-CM

## 2020-02-28 DIAGNOSIS — E79.0 HYPERURICEMIA: Primary | ICD-10-CM

## 2020-02-28 DIAGNOSIS — R47.02 DYSPHASIA: ICD-10-CM

## 2020-02-28 PROCEDURE — G0439 PPPS, SUBSEQ VISIT: HCPCS | Performed by: FAMILY MEDICINE

## 2020-02-28 NOTE — PROGRESS NOTES
The ABCs of the Annual Wellness Visit  Subsequent Medicare Wellness Visit    Chief Complaint   Patient presents with   • Medicare Wellness-subsequent     fasting        Subjective   History of Present Illness:  Shabbir Gambino is a 79 y.o. male who presents for a Subsequent Medicare Wellness Visit.    HEALTH RISK ASSESSMENT    Recent Hospitalizations:  No hospitalization(s) within the last year.    Current Medical Providers:  Patient Care Team:  Rigoberto Barnes MD as PCP - General (Family Medicine)  Rigoberto Barnes MD as PCP - Family Medicine  Stanton Escalante MD as PCP - Claims Attributed  Rigoberto Barnes MD Hodges, Christopher Ray, PA as Physician Assistant (Neurology)  Stanton Escalante MD as Consulting Physician (Cardiology)  Randy Gomez MD as Consulting Physician (Gastroenterology)    Smoking Status:  Social History     Tobacco Use   Smoking Status Never Smoker   Smokeless Tobacco Never Used       Alcohol Consumption:  Social History     Substance and Sexual Activity   Alcohol Use No       Depression Screen:   PHQ-2/PHQ-9 Depression Screening 2/28/2020   Little interest or pleasure in doing things 0   Feeling down, depressed, or hopeless 0   Trouble falling or staying asleep, or sleeping too much -   Feeling tired or having little energy -   Poor appetite or overeating -   Feeling bad about yourself - or that you are a failure or have let yourself or your family down -   Trouble concentrating on things, such as reading the newspaper or watching television -   Moving or speaking so slowly that other people could have noticed. Or the opposite - being so fidgety or restless that you have been moving around a lot more than usual -   Thoughts that you would be better off dead, or of hurting yourself in some way -   Total Score 0   If you checked off any problems, how difficult have these problems made it for you to do your work, take care of things at home, or get along with other  people? -       Fall Risk Screen:  LIDYA Fall Risk Assessment was completed, and patient is at LOW risk for falls.Assessment completed on:2/28/2020    Health Habits and Functional and Cognitive Screening:  Functional & Cognitive Status 2/28/2020   Do you have difficulty preparing food and eating? Yes   Do you have difficulty bathing yourself, getting dressed or grooming yourself? No   Do you have difficulty using the toilet? No   Do you have difficulty moving around from place to place? No   Do you have trouble with steps or getting out of a bed or a chair? No   Current Diet Well Balanced Diet   Dental Exam Up to date   Eye Exam Up to date   Exercise (times per week) 5 times per week   Current Exercise Activities Include Yard Work   Do you need help using the phone?  No   Are you deaf or do you have serious difficulty hearing?  No   Do you need help with transportation? No   Do you need help shopping? No   Do you need help preparing meals?  No   Do you need help with housework?  No   Do you need help with laundry? No   Do you need help taking your medications? No   Do you need help managing money? No   Do you ever drive or ride in a car without wearing a seat belt? No   Have you felt unusual stress, anger or loneliness in the last month? No   Who do you live with? Spouse   If you need help, do you have trouble finding someone available to you? No   Have you been bothered in the last four weeks by sexual problems? No   Do you have difficulty concentrating, remembering or making decisions? No         Does the patient have evidence of cognitive impairment? No    Asprin use counseling:Does not need ASA (and currently is not on it)    Age-appropriate Screening Schedule:  Refer to the list below for future screening recommendations based on patient's age, sex and/or medical conditions. Orders for these recommended tests are listed in the plan section. The patient has been provided with a written plan.    Health  Maintenance   Topic Date Due   • ZOSTER VACCINE (1 of 2) 07/29/1989   • LIPID PANEL  08/28/2020   • COLONOSCOPY  08/07/2021   • INFLUENZA VACCINE  Completed   • TDAP/TD VACCINES  Discontinued          The following portions of the patient's history were reviewed and updated as appropriate: allergies, current medications, past family history, past medical history, past social history, past surgical history and problem list.    Outpatient Medications Prior to Visit   Medication Sig Dispense Refill   • amLODIPine (NORVASC) 2.5 MG tablet TAKE ONE TABLET BY MOUTH EVERY EVENING 90 tablet 0   • atenolol (TENORMIN) 50 MG tablet TAKE 1 TABLET BY MOUTH DAILY 90 tablet 0   • carbidopa-levodopa (SINEMET)  MG per tablet TAKE ONE TO TWO TABLETS BY MOUTH AT BEDTIME 180 tablet 2   • CINNAMON PO Take 500 mg by mouth Daily.     • clopidogrel (PLAVIX) 75 MG tablet Take 75 mg by mouth Daily.     • colchicine-probenecid (COL-BENEMID) 0.5-500 MG tablet TAKE ONE TABLET BY MOUTH EVERY DAY 20 tablet 1   • indapamide (LOZOL) 2.5 MG tablet Take 2.5 mg by mouth Every Morning.     • Multiple Vitamins-Minerals (EYE VITAMINS PO) Take 1 tablet by mouth Daily.       No facility-administered medications prior to visit.        Patient Active Problem List   Diagnosis   • Vascular disease   • RLS (restless legs syndrome)   • Insomnia   • Hypertension   • Gout   • Axonal polyneuropathy   • Atherosclerotic coronary vascular disease   • Arthritis   • Esophageal dysphagia   • Dysplasia of colon   • Irregular heart beat   • Cerebrovascular disease   • Leukocytosis   • Weight loss   • Pharyngoesophageal dysphagia   • History of adenomatous polyp of colon   • Skin cancer   • Coronary artery disease involving native coronary artery   • Peripheral vascular disease (CMS/HCC)   • Coagulopathy (CMS/HCC)       Advanced Care Planning:  ACP discussion was held with the patient during this visit. Patient does not have an advance directive, information  "provided.    Review of Systems   Cardiovascular: Negative for chest pain and leg swelling.   Gastrointestinal:        Weight loss and dysphagia   Genitourinary: Negative for difficulty urinating.   All other systems reviewed and are negative.      Compared to one year ago, the patient feels his physical health is worse.  Compared to one year ago, the patient feels his mental health is worse.    Reviewed chart for potential of high risk medication in the elderly: yes  Reviewed chart for potential of harmful drug interactions in the elderly:yes    Objective         Vitals:    02/28/20 0810   BP: 125/73   BP Location: Right arm   Patient Position: Sitting   Cuff Size: Large Adult   Pulse: (!) 45   Temp: 97.5 °F (36.4 °C)   TempSrc: Oral   SpO2: 99%   Weight: 59 kg (130 lb)   Height: 172.7 cm (68\")   PainSc: 0-No pain       Body mass index is 19.77 kg/m².  Discussed the patient's BMI with him. The BMI is below average; no BMI management plan is appropriate.    Physical Exam   Constitutional: He is oriented to person, place, and time.   Thin   HENT:   Right Ear: External ear normal.   Left Ear: External ear normal.   Aphthous ulcers- Gly-Oxide and Biotene   Eyes: Pupils are equal, round, and reactive to light. EOM are normal.   Neck: No thyromegaly present.   Carotid pulses are present   Cardiovascular: Normal rate and regular rhythm.   Pulmonary/Chest: Effort normal and breath sounds normal.   Abdominal: Soft. Bowel sounds are normal.   Genitourinary:   Genitourinary Comments: Deferred because of age   Musculoskeletal: He exhibits no edema.   Lymphadenopathy:     He has no cervical adenopathy.   Neurological: He is alert and oriented to person, place, and time.   Skin: Skin is warm and dry. Capillary refill takes 2 to 3 seconds.   Psychiatric: He has a normal mood and affect. His behavior is normal. Judgment and thought content normal.   Nursing note and vitals reviewed.            Assessment/Plan   Medicare Risks and " Personalized Health Plan  CMS Preventative Services Quick Reference  Fall Risk    The above risks/problems have been discussed with the patient.  Pertinent information has been shared with the patient in the After Visit Summary.  Follow up plans and orders are seen below in the Assessment/Plan Section.    Diagnoses and all orders for this visit:    1. Hyperuricemia (Primary)  -     Uric Acid    2. Coagulopathy (CMS/HCC)    3. Peripheral vascular disease (CMS/HCC)    4. Mixed hyperlipidemia  -     Comprehensive Metabolic Panel  -     Lipid Panel    5. Fatigue, unspecified type  -     TSH  -     T4, free  -     CBC & Differential    6. Essential hypertension  -     POC Urinalysis Dipstick, Multipro    7. Annual physical exam    8. Dysphasia  -     Ambulatory Referral to Gastroenterology      Follow Up:  Return in 6 months (on 8/28/2020).     An After Visit Summary and PPPS were given to the patient.         Plan-referred to gastroenterologist-last scope 2 years ago-above

## 2020-02-28 NOTE — PATIENT INSTRUCTIONS
Fall Prevention in the Home, Adult  Falls can cause injuries and can affect people from all age groups. There are many simple things that you can do to make your home safe and to help prevent falls. Ask for help when making these changes, if needed.  What actions can I take to prevent falls?  General instructions  · Use good lighting in all rooms. Replace any light bulbs that burn out.  · Turn on lights if it is dark. Use night-lights.  · Place frequently used items in easy-to-reach places. Lower the shelves around your home if necessary.  · Set up furniture so that there are clear paths around it. Avoid moving your furniture around.  · Remove throw rugs and other tripping hazards from the floor.  · Avoid walking on wet floors.  · Fix any uneven floor surfaces.  · Add color or contrast paint or tape to grab bars and handrails in your home. Place contrasting color strips on the first and last steps of stairways.  · When you use a stepladder, make sure that it is completely opened and that the sides are firmly locked. Have someone hold the ladder while you are using it. Do not climb a closed stepladder.  · Be aware of any and all pets.  What can I do in the bathroom?         · Keep the floor dry. Immediately clean up any water that spills onto the floor.  · Remove soap buildup in the tub or shower on a regular basis.  · Use non-skid mats or decals on the floor of the tub or shower.  · Attach bath mats securely with double-sided, non-slip rug tape.  · If you need to sit down while you are in the shower, use a plastic, non-slip stool.  · Install grab bars by the toilet and in the tub and shower. Do not use towel bars as grab bars.  What can I do in the bedroom?  · Make sure that a bedside light is easy to reach.  · Do not use oversized bedding that drapes onto the floor.  · Have a firm chair that has side arms to use for getting dressed.  What can I do in the kitchen?  · Clean up any spills right away.  · If you  need to reach for something above you, use a sturdy step stool that has a grab bar.  · Keep electrical cables out of the way.  · Do not use floor polish or wax that makes floors slippery. If you must use wax, make sure that it is non-skid floor wax.  What can I do in the stairways?  · Do not leave any items on the stairs.  · Make sure that you have a light switch at the top of the stairs and the bottom of the stairs. Have them installed if you do not have them.  · Make sure that there are handrails on both sides of the stairs. Fix handrails that are broken or loose. Make sure that handrails are as long as the stairways.  · Install non-slip stair treads on all stairs in your home.  · Avoid having throw rugs at the top or bottom of stairways, or secure the rugs with carpet tape to prevent them from moving.  · Choose a carpet design that does not hide the edge of steps on the stairway.  · Check any carpeting to make sure that it is firmly attached to the stairs. Fix any carpet that is loose or worn.  What can I do on the outside of my home?  · Use bright outdoor lighting.  · Regularly repair the edges of walkways and driveways and fix any cracks.  · Remove high doorway thresholds.  · Trim any shrubbery on the main path into your home.  · Regularly check that handrails are securely fastened and in good repair. Both sides of any steps should have handrails.  · Install guardrails along the edges of any raised decks or porches.  · Clear walkways of debris and clutter, including tools and rocks.  · Have leaves, snow, and ice cleared regularly.  · Use sand or salt on walkways during winter months.  · In the garage, clean up any spills right away, including grease or oil spills.  What other actions can I take?  · Wear closed-toe shoes that fit well and support your feet. Wear shoes that have rubber soles or low heels.  · Use mobility aids as needed, such as canes, walkers, scooters, and crutches.  · Review your medicines with  your health care provider. Some medicines can cause dizziness or changes in blood pressure, which increase your risk of falling.  Talk with your health care provider about other ways that you can decrease your risk of falls. This may include working with a physical therapist or  to improve your strength, balance, and endurance.  Where to find more information  · Centers for Disease Control and Prevention, [place]: https://www.cdc.gov  · National [place] on Aging: https://sx8vqby.luca.nih.gov  Contact a health care provider if:  · You are afraid of falling at home.  · You feel weak, drowsy, or dizzy at home.  · You fall at home.  Summary  · There are many simple things that you can do to make your home safe and to help prevent falls.  · Ways to make your home safe include removing tripping hazards and installing grab bars in the bathroom.  · Ask for help when making these changes in your home.  This information is not intended to replace advice given to you by your health care provider. Make sure you discuss any questions you have with your health care provider.  Document Released: 2003 Document Revised: 2018 Document Reviewed: 2018  [employer] Interactive Patient Education ©  [employer]    Medicare Wellness  Personal Prevention Plan of Service     Date of Office Visit:  2020  Encounter Provider:  Rigoberto Barnes MD  Place of Service:  [facility]  Patient Name: Shabbir Gambino  :  1940    As part of the Medicare Wellness portion of your visit today, we are providing you with this personalized preventive plan of services (PPPS). This plan is based upon recommendations of the [place] Preventive Services Task Force (USPSTF) and the Advisory Committee on Immunization Practices (ACIP).    This lists the preventive care services that should be considered, and provides dates of when you are due. Items listed as completed are  up-to-date and do not require any further intervention.    Health Maintenance   Topic Date Due   • ZOSTER VACCINE (1 of 2) 07/29/1989   • MEDICARE ANNUAL WELLNESS  02/27/2020   • LIPID PANEL  08/28/2020   • COLONOSCOPY  08/07/2021   • Pneumococcal Vaccine Once at 65 Years Old  Completed   • INFLUENZA VACCINE  Completed   • TDAP/TD VACCINES  Discontinued       No orders of the defined types were placed in this encounter.      Return in 6 months (on 8/28/2020).

## 2020-02-29 LAB
ALBUMIN SERPL-MCNC: 4.2 G/DL (ref 3.5–5.2)
ALBUMIN/GLOB SERPL: 1.5 G/DL
ALP SERPL-CCNC: 103 U/L (ref 39–117)
ALT SERPL-CCNC: <5 U/L (ref 1–41)
AST SERPL-CCNC: 20 U/L (ref 1–40)
BASOPHILS # BLD AUTO: 0.05 10*3/MM3 (ref 0–0.2)
BASOPHILS NFR BLD AUTO: 0.7 % (ref 0–1.5)
BILIRUB SERPL-MCNC: 0.6 MG/DL (ref 0.2–1.2)
BUN SERPL-MCNC: 26 MG/DL (ref 8–23)
BUN/CREAT SERPL: 16.6 (ref 7–25)
CALCIUM SERPL-MCNC: 9.7 MG/DL (ref 8.6–10.5)
CHLORIDE SERPL-SCNC: 95 MMOL/L (ref 98–107)
CHOLEST SERPL-MCNC: 204 MG/DL (ref 0–200)
CO2 SERPL-SCNC: 27.9 MMOL/L (ref 22–29)
CREAT SERPL-MCNC: 1.57 MG/DL (ref 0.76–1.27)
EOSINOPHIL # BLD AUTO: 0.3 10*3/MM3 (ref 0–0.4)
EOSINOPHIL NFR BLD AUTO: 4.4 % (ref 0.3–6.2)
ERYTHROCYTE [DISTWIDTH] IN BLOOD BY AUTOMATED COUNT: 14 % (ref 12.3–15.4)
GLOBULIN SER CALC-MCNC: 2.8 GM/DL
GLUCOSE SERPL-MCNC: 105 MG/DL (ref 65–99)
HCT VFR BLD AUTO: 41.5 % (ref 37.5–51)
HDLC SERPL-MCNC: 35 MG/DL (ref 40–60)
HGB BLD-MCNC: 13.4 G/DL (ref 13–17.7)
IMM GRANULOCYTES # BLD AUTO: 0.13 10*3/MM3 (ref 0–0.05)
IMM GRANULOCYTES NFR BLD AUTO: 1.9 % (ref 0–0.5)
LDLC SERPL CALC-MCNC: 132 MG/DL (ref 0–100)
LYMPHOCYTES # BLD AUTO: 1.31 10*3/MM3 (ref 0.7–3.1)
LYMPHOCYTES NFR BLD AUTO: 19.2 % (ref 19.6–45.3)
MCH RBC QN AUTO: 28.5 PG (ref 26.6–33)
MCHC RBC AUTO-ENTMCNC: 32.3 G/DL (ref 31.5–35.7)
MCV RBC AUTO: 88.1 FL (ref 79–97)
MONOCYTES # BLD AUTO: 0.87 10*3/MM3 (ref 0.1–0.9)
MONOCYTES NFR BLD AUTO: 12.7 % (ref 5–12)
NEUTROPHILS # BLD AUTO: 4.17 10*3/MM3 (ref 1.7–7)
NEUTROPHILS NFR BLD AUTO: 61.1 % (ref 42.7–76)
NRBC BLD AUTO-RTO: 0 /100 WBC (ref 0–0.2)
PLATELET # BLD AUTO: 246 10*3/MM3 (ref 140–450)
POTASSIUM SERPL-SCNC: 5.1 MMOL/L (ref 3.5–5.2)
PROT SERPL-MCNC: 7 G/DL (ref 6–8.5)
RBC # BLD AUTO: 4.71 10*6/MM3 (ref 4.14–5.8)
SODIUM SERPL-SCNC: 137 MMOL/L (ref 136–145)
T4 FREE SERPL-MCNC: 1.05 NG/DL (ref 0.93–1.7)
TRIGL SERPL-MCNC: 187 MG/DL (ref 0–150)
TSH SERPL DL<=0.005 MIU/L-ACNC: 2.3 UIU/ML (ref 0.27–4.2)
URATE SERPL-MCNC: 6.7 MG/DL (ref 3.4–7)
VLDLC SERPL CALC-MCNC: 37.4 MG/DL
WBC # BLD AUTO: 6.83 10*3/MM3 (ref 3.4–10.8)

## 2020-03-11 RX ORDER — CLOPIDOGREL BISULFATE 75 MG/1
TABLET ORAL
Qty: 90 TABLET | Refills: 3 | Status: SHIPPED | OUTPATIENT
Start: 2020-03-11 | End: 2021-03-08

## 2020-03-13 ENCOUNTER — TELEPHONE (OUTPATIENT)
Dept: FAMILY MEDICINE CLINIC | Facility: CLINIC | Age: 80
End: 2020-03-13

## 2020-03-16 ENCOUNTER — HOSPITAL ENCOUNTER (OUTPATIENT)
Dept: VASCULAR LAB | Age: 80
Discharge: HOME | End: 2020-03-16
Payer: MEDICARE

## 2020-03-16 ENCOUNTER — TELEPHONE (OUTPATIENT)
Dept: FAMILY MEDICINE CLINIC | Facility: CLINIC | Age: 80
End: 2020-03-16

## 2020-03-16 PROCEDURE — 93880 EXTRACRANIAL BILAT STUDY: CPT

## 2020-03-16 NOTE — TELEPHONE ENCOUNTER
PT'S SPOUSE STATES THOUGHT PT WAS GOING TO BE REFERRED TO A NEW DOCTOR INSTEAD OF [facility]. PT HAS AN APPT TOMORROW-STATES SHE IS GOING TO CALL TO CANCEL APPT.         PT'S SPOUSE IS REQUESTING PT BE SEEN AT [facility].     PLEASE ADVISE

## 2020-03-17 ENCOUNTER — TELEPHONE (OUTPATIENT)
Dept: VASCULAR SURGERY | Age: 80
End: 2020-03-17

## 2020-03-17 ENCOUNTER — OFFICE VISIT (OUTPATIENT)
Dept: GASTROENTEROLOGY | Facility: CLINIC | Age: 80
End: 2020-03-17

## 2020-03-17 VITALS
DIASTOLIC BLOOD PRESSURE: 50 MMHG | BODY MASS INDEX: 19.85 KG/M2 | OXYGEN SATURATION: 99 % | WEIGHT: 131 LBS | HEIGHT: 68 IN | HEART RATE: 52 BPM | SYSTOLIC BLOOD PRESSURE: 134 MMHG | TEMPERATURE: 95.3 F

## 2020-03-17 DIAGNOSIS — R13.14 PHARYNGOESOPHAGEAL DYSPHAGIA: Primary | ICD-10-CM

## 2020-03-17 PROCEDURE — 99213 OFFICE O/P EST LOW 20 MIN: CPT | Performed by: NURSE PRACTITIONER

## 2020-03-17 NOTE — TELEPHONE ENCOUNTER
Per Gume Beltran to Mr. Myah Lawson wife and let her know [facility] had mild progression on his right side and no changes to the left. We will follow-up in 6 months with another study. We will mail the appointment out to him. If he has any sudden onset symptoms to call our office or go to their nearest ER.

## 2020-03-17 NOTE — PROGRESS NOTES
[facility] GASTROENTEROLOGY - OFFICE NOTE    3/17/2020    Shabbir Gambino   1940    Primary Physician: Rigoberto Barnes MD    Chief Complaint   Patient presents with   • Difficulty Swallowing         HISTORY OF PRESENT ILLNESS:    Shabbir Gambino is a 79 y.o. male presents with dysphagia for 6 years. This is intermittent. Does not occur daily.  Noted with solids and liquids. Points to the neck area. Has regurgitated food up to get relief. Had egd with esophageal dilation that did not help. Has lost some weight. Has not being eating as much due to the swallowing issue.   No reflux symptoms. No fever. No cough. No hematemesis.          He does take plavix for history of cardiac stent 3/2019 Dr. Jones.      EGD 8/2018 no esophageal abnormality with esophagus dilated.     Esophagram 7/2018   IMPRESSION:  1. No stricture or mucosal lesion identified.  2. A few tertiary contractions were noted.   3. No elicited gastroesophageal reflux.  4. Transient stasis of the barium tablet just above the GE junction,  clearing with additional thin barium.    Past Medical History:   Diagnosis Date   • Atherosclerotic coronary vascular disease    • Axonal polyneuropathy    • Coagulopathy (CMS/HCC) 2/28/2020   • Colon polyp    • Gout    • History of transfusion    • Hyperlipidemia    • Hypertension    • Insomnia    • Irregular heart beat    • Precancerous skin lesion    • RLS (restless legs syndrome)    • TIA (transient ischemic attack)     2014   • Vascular disease     CAROTID       Past Surgical History:   Procedure Laterality Date   • CAROTID ENDARTERECTOMY Bilateral     2007, 2008   • COLON RESECTION N/A 10/24/2017    Procedure: COLON RESECTION  MID TRANSVERSE LAPAROSCOPIC;  Surgeon: Adal Quintero MD;  Location: [facility] OR;  Service:    • COLONOSCOPY      MULTIPLE    • COLONOSCOPY N/A 9/7/2017    Procedure: colonoscopy with anesthesia;  Surgeon: Randy Gomez MD;  Location: [facility] ENDOSCOPY;  Service:    •  COLONOSCOPY N/A 8/7/2018    Procedure: COLONOSCOPY WITH ANESTHESIA;  Surgeon: Randy Gomez MD;  Location: [facility] ENDOSCOPY;  Service: Gastroenterology   • CORONARY ARTERY BYPASS GRAFT  2006   • CORONARY STENT PLACEMENT     • CORONARY STENT PLACEMENT     • ENDOSCOPY N/A 9/7/2017    Procedure: ESOPHAGOGASTRODUODENOSCOPY WITH ANESTHESIA;  Surgeon: Randy Gomez MD;  Location: [facility] ENDOSCOPY;  Service:    • ENDOSCOPY N/A 8/7/2018    Procedure: ESOPHAGOGASTRODUODENOSCOPY WITH ANESTHESIA;  Surgeon: Randy Goemz MD;  Location: [facility] ENDOSCOPY;  Service: Gastroenterology   • EYE SURGERY Bilateral     2014, 2015   • KNEE SURGERY Right     x 2       Outpatient Medications Marked as Taking for the 3/17/20 encounter (Office Visit) with Stormy Pena APRN   Medication Sig Dispense Refill   • amLODIPine (NORVASC) 2.5 MG tablet TAKE ONE TABLET BY MOUTH EVERY EVENING 90 tablet 0   • atenolol (TENORMIN) 50 MG tablet TAKE 1 TABLET BY MOUTH DAILY 90 tablet 0   • carbidopa-levodopa (SINEMET)  MG per tablet TAKE ONE TO TWO TABLETS BY MOUTH AT BEDTIME 180 tablet 2   • CINNAMON PO Take 500 mg by mouth Daily.     • clopidogrel (PLAVIX) 75 MG tablet Take 75 mg by mouth Daily.     • colchicine-probenecid (COL-BENEMID) 0.5-500 MG tablet TAKE ONE TABLET BY MOUTH EVERY DAY 20 tablet 1   • indapamide (LOZOL) 2.5 MG tablet Take 2.5 mg by mouth Every Morning.     • Multiple Vitamins-Minerals (EYE VITAMINS PO) Take 1 tablet by mouth Daily.         Allergies   Allergen Reactions   • Other Anaphylaxis     UNKNOWN TO PATIENT ENVIRONMENTAL/FOOD ALLERGY    • Ezetimibe Unknown (See Comments)     Leg cramps   • Niacin And Related Unknown (See Comments)     Cramping red and flush   • Statins Other (See Comments)     Leg cramps       Social History     Socioeconomic History   • Marital status:      Spouse name: Not on file   • Number of children: Not on file   • Years of education: Not on file   • Highest education level:  "Not on file   Tobacco Use   • Smoking status: Never Smoker   • Smokeless tobacco: Never Used   Substance and Sexual Activity   • Alcohol use: No   • Drug use: No   • Sexual activity: Defer       Family History   Problem Relation Age of Onset   • Stroke Mother    • Diabetes Mother    • No Known Problems Father    • Esophageal cancer Sister    • No Known Problems Maternal Grandmother    • No Known Problems Maternal Grandfather    • No Known Problems Paternal Grandmother    • No Known Problems Paternal Grandfather    • Colon polyps Neg Hx    • Colon cancer Neg Hx        Review of Systems   Constitutional: Negative for chills, fever and unexpected weight change.   HENT: Positive for trouble swallowing.    Respiratory: Negative for cough, shortness of breath and wheezing.    Cardiovascular: Negative for chest pain and palpitations.   Gastrointestinal: Negative for abdominal distention, abdominal pain, anal bleeding, blood in stool, constipation, diarrhea, nausea and vomiting.        Vitals:    03/17/20 0930   BP: 134/50   Pulse: 52   Temp: 95.3 °F (35.2 °C)   SpO2: 99%   Weight: 59.4 kg (131 lb)   Height: 172.7 cm (68\")      Body mass index is 19.92 kg/m².    Physical Exam   Constitutional: No distress.   Cardiovascular: Normal rate, regular rhythm and normal heart sounds.   Pulmonary/Chest: Effort normal and breath sounds normal.   Abdominal: Soft. Bowel sounds are normal. He exhibits no distension. There is no tenderness.   Musculoskeletal: He exhibits no edema.   Neurological: He is alert.   Skin: Skin is warm and dry.   Vitals reviewed.      Results for orders placed or performed in visit on 02/28/20   TSH   Result Value Ref Range    TSH 2.300 0.270 - 4.200 uIU/mL   T4, free   Result Value Ref Range    Free T4 1.05 0.93 - 1.70 ng/dL   Comprehensive Metabolic Panel   Result Value Ref Range    Glucose 105 (H) 65 - 99 mg/dL    BUN 26 (H) 8 - 23 mg/dL    Creatinine 1.57 (H) 0.76 - 1.27 mg/dL    eGFR Non African Am 43 (L) " >60 mL/min/1.73    eGFR African Am 52 (L) >60 mL/min/1.73    BUN/Creatinine Ratio 16.6 7.0 - 25.0    Sodium 137 136 - 145 mmol/L    Potassium 5.1 3.5 - 5.2 mmol/L    Chloride 95 (L) 98 - 107 mmol/L    Total CO2 27.9 22.0 - 29.0 mmol/L    Calcium 9.7 8.6 - 10.5 mg/dL    Total Protein 7.0 6.0 - 8.5 g/dL    Albumin 4.20 3.50 - 5.20 g/dL    Globulin 2.8 gm/dL    A/G Ratio 1.5 g/dL    Total Bilirubin 0.6 0.2 - 1.2 mg/dL    Alkaline Phosphatase 103 39 - 117 U/L    AST (SGOT) 20 1 - 40 U/L    ALT (SGPT) <5 1 - 41 U/L   Lipid Panel   Result Value Ref Range    Total Cholesterol 204 (H) 0 - 200 mg/dL    Triglycerides 187 (H) 0 - 150 mg/dL    HDL Cholesterol 35 (L) 40 - 60 mg/dL    VLDL Cholesterol 37.4 mg/dL    LDL Cholesterol  132 (H) 0 - 100 mg/dL   Uric Acid   Result Value Ref Range    Uric Acid 6.7 3.4 - 7.0 mg/dL   CBC & Differential   Result Value Ref Range    WBC 6.83 3.40 - 10.80 10*3/mm3    RBC 4.71 4.14 - 5.80 10*6/mm3    Hemoglobin 13.4 13.0 - 17.7 g/dL    Hematocrit 41.5 37.5 - 51.0 %    MCV 88.1 79.0 - 97.0 fL    MCH 28.5 26.6 - 33.0 pg    MCHC 32.3 31.5 - 35.7 g/dL    RDW 14.0 12.3 - 15.4 %    Platelets 246 140 - 450 10*3/mm3    Neutrophil Rel % 61.1 42.7 - 76.0 %    Lymphocyte Rel % 19.2 (L) 19.6 - 45.3 %    Monocyte Rel % 12.7 (H) 5.0 - 12.0 %    Eosinophil Rel % 4.4 0.3 - 6.2 %    Basophil Rel % 0.7 0.0 - 1.5 %    Neutrophils Absolute 4.17 1.70 - 7.00 10*3/mm3    Lymphocytes Absolute 1.31 0.70 - 3.10 10*3/mm3    Monocytes Absolute 0.87 0.10 - 0.90 10*3/mm3    Eosinophils Absolute 0.30 0.00 - 0.40 10*3/mm3    Basophils Absolute 0.05 0.00 - 0.20 10*3/mm3    Immature Granulocyte Rel % 1.9 (H) 0.0 - 0.5 %    Immature Grans Absolute 0.13 (H) 0.00 - 0.05 10*3/mm3    nRBC 0.0 0.0 - 0.2 /100 WBC           ASSESSMENT AND PLAN    Assessment/Plan     Shabbir was seen today for difficulty swallowing.    Diagnoses and all orders for this visit:    Pharyngoesophageal dysphagia  Comments:  chronic   Orders:  -     FL  Esophagram Complete  -     FL Video Swallow With Speech Single Contrast; Future        Differential diagnosis discussed.  His symptoms are chronic.  This has been going on for at least 6 years.  He did have an upper endoscopy August 2018 that noted a normal esophagus, esophageal dilation was performed.  The patient states that that did not help.  July 2018 he did have an esophagram that did note a few tertiary contractions, there was no stricture or lesion noted, transient stasis of the barium tablet just above the GE junction clearing with additional thin barium.  At this point I do recommend that he cut his food in small pieces and chew well.  I recommended he avoid extremely hot or cold liquids.  I am going to recommend repeating esophagram with a swallow with speech evaluation.  We will have him back in the office for follow-up.  I will contact the patient with results of imaging.         With the symptoms being chronic with no significant change I do not plan for an elective upper endoscopy due to COVID-19.  Will reevaluate at next office visit, may need repeat upper endoscopy in the future.  He is on Plavix and we did discuss that if upper endoscopy to be recommended then he would have to be off Plavix 7 days prior to procedure.         Body mass index is 19.92 kg/m².    Patient's Body mass index is 19.92 kg/m². BMI is within normal parameters. No follow-up required..       Return in about 2 months (around 5/17/2020).        VERONIKA Ryan Dragon/transcription disclaimer:  Much of this encounter note is electronic transcription/translation of spoken language to printed text.  The electronic translation of spoken language may be erroneous, or at times, nonsensical words or phrases may be inadvertently transcribed.  Although I have reviewed the note for such errors, some may still exist.

## 2020-03-17 NOTE — TELEPHONE ENCOUNTER
----- Message from ANTHONY Salcedo sent at 3/17/2020  3:19 PM CDT -----  Please let patient know there is mild progression in the right. No change in the left. If not symtoms, he will need 6 months with cvls.   Please put order in

## 2020-03-23 DIAGNOSIS — M10.9 GOUT, UNSPECIFIED CAUSE, UNSPECIFIED CHRONICITY, UNSPECIFIED SITE: ICD-10-CM

## 2020-03-23 RX ORDER — PROBENECID AND COLCHICINE 500; .5 MG/1; MG/1
TABLET ORAL
Qty: 20 TABLET | Refills: 1 | Status: SHIPPED | OUTPATIENT
Start: 2020-03-23 | End: 2020-06-16

## 2020-04-22 ENCOUNTER — TELEPHONE (OUTPATIENT)
Dept: GASTROENTEROLOGY | Facility: CLINIC | Age: 80
End: 2020-04-22

## 2020-04-28 ENCOUNTER — APPOINTMENT (OUTPATIENT)
Dept: GENERAL RADIOLOGY | Facility: HOSPITAL | Age: 80
End: 2020-04-28

## 2020-04-30 ENCOUNTER — APPOINTMENT (OUTPATIENT)
Dept: GENERAL RADIOLOGY | Facility: HOSPITAL | Age: 80
End: 2020-04-30

## 2020-05-22 RX ORDER — AMLODIPINE BESYLATE 2.5 MG/1
TABLET ORAL
Qty: 90 TABLET | Refills: 0 | Status: SHIPPED | OUTPATIENT
Start: 2020-05-22 | End: 2020-08-24

## 2020-06-02 RX ORDER — INDAPAMIDE 2.5 MG/1
TABLET, FILM COATED ORAL
Qty: 30 TABLET | Refills: 5 | Status: SHIPPED | OUTPATIENT
Start: 2020-06-02 | End: 2020-12-07 | Stop reason: SDUPTHER

## 2020-06-10 ENCOUNTER — TELEPHONE (OUTPATIENT)
Dept: CARDIOLOGY | Age: 80
End: 2020-06-10

## 2020-06-10 NOTE — TELEPHONE ENCOUNTER
Date: TBD    Cardiologist: Sonja Woodward    Procedure: tooth extraction    Surgeon: [address] [place] [address] Office Visit: 9/18/19  Reason for office visit and medical concerns addressed at this office visit: cad, cva, htn, hyperlipidemia    Testing Performed and Date of Service:  3/19/19 Cath Triple-vessel disease   Patent LIMA to the distal LAD   All vein grafts are occluded   Successful PCI to distal circumflex and OM1 with drug-eluting stents      Recommendations      Medical management   If recurrent symptomatology can't consider PCI to RCA and 1st diagonal   (these are small caliber vessels)    RCRI = 6.6   METs 4    Current Medications: amlodipine, atenolol, sinemet, cinnamon, plavix, colchicine, indapamide,     Is the patient currently taking an anticoagulant? If so, what is the diagnosis the patient has been given to warrant the need for the anticoagulant? 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

## 2020-06-11 ENCOUNTER — OFFICE VISIT (OUTPATIENT)
Dept: NEUROLOGY | Facility: CLINIC | Age: 80
End: 2020-06-11

## 2020-06-11 VITALS
WEIGHT: 128 LBS | HEART RATE: 58 BPM | SYSTOLIC BLOOD PRESSURE: 138 MMHG | BODY MASS INDEX: 19.4 KG/M2 | HEIGHT: 68 IN | DIASTOLIC BLOOD PRESSURE: 76 MMHG | OXYGEN SATURATION: 99 %

## 2020-06-11 DIAGNOSIS — I67.9 CEREBROVASCULAR DISEASE: Primary | ICD-10-CM

## 2020-06-11 DIAGNOSIS — G25.81 RLS (RESTLESS LEGS SYNDROME): ICD-10-CM

## 2020-06-11 DIAGNOSIS — G62.9 AXONAL POLYNEUROPATHY: ICD-10-CM

## 2020-06-11 DIAGNOSIS — R13.14 PHARYNGOESOPHAGEAL DYSPHAGIA: ICD-10-CM

## 2020-06-11 PROCEDURE — 99213 OFFICE O/P EST LOW 20 MIN: CPT | Performed by: PHYSICIAN ASSISTANT

## 2020-06-15 NOTE — PROGRESS NOTES
Jaycee Gambino is a 79 y.o. male is here today for follow-up.    No new reported stroke symptoms.  His restless leg symptoms are well controlled with medication.  He has been stable medically with no recurrent significant cardiac issues reported.    Stroke   This is a chronic problem. The current episode started more than 1 year ago. The problem occurs daily. The problem has been unchanged. Associated symptoms include fatigue. Pertinent negatives include no chills, fever, headaches or weakness. The symptoms are aggravated by stress. Treatments tried: Pharmacotherapy, control underlying disease. The treatment provided moderate relief.       The following portions of the patient's history were reviewed and updated as appropriate: allergies, current medications, past family history, past medical history, past social history, past surgical history and problem list.    Review of Systems   Constitutional: Positive for fatigue. Negative for chills and fever.   HENT: Positive for hearing loss. Negative for trouble swallowing.    Eyes: Negative.    Respiratory: Negative.    Cardiovascular: Negative.    Gastrointestinal: Negative.    Endocrine: Negative.    Genitourinary: Negative.    Musculoskeletal: Positive for gait problem.   Skin: Negative.    Allergic/Immunologic: Negative.    Neurological: Negative for speech difficulty, weakness and headaches.   Hematological: Negative.    Psychiatric/Behavioral: Positive for sleep disturbance. Negative for dysphoric mood. The patient is not nervous/anxious.        Objective   Physical Exam   Constitutional: He is oriented to person, place, and time. Vital signs are normal.   HENT:   Head: Normocephalic.   Right Ear: External ear normal. Decreased hearing is noted.   Left Ear: External ear normal. Decreased hearing is noted.   Nose: Nose normal.   Mouth/Throat: Uvula is midline, oropharynx is clear and moist and mucous membranes are normal.   Eyes: Pupils are equal,  round, and reactive to light. Conjunctivae, EOM and lids are normal. No scleral icterus.   Neck: Trachea normal, normal range of motion and phonation normal. No JVD present. Carotid bruit is not present.   Cardiovascular: Normal rate and normal heart sounds.   Pulmonary/Chest: Effort normal and breath sounds normal.   Musculoskeletal: He exhibits no edema or tenderness.        Lumbar back: Normal.   Lymphadenopathy:     He has no cervical adenopathy.   Neurological: He is alert and oriented to person, place, and time. He displays atrophy. He displays no tremor. A cranial nerve deficit and sensory deficit is present. He exhibits normal muscle tone. Coordination and gait abnormal. GCS eye subscore is 4. GCS verbal subscore is 5. GCS motor subscore is 6.   Reflex Scores:       Tricep reflexes are 1+ on the right side and 1+ on the left side.       Bicep reflexes are 1+ on the right side and 1+ on the left side.       Brachioradialis reflexes are 1+ on the right side and 1+ on the left side.       Patellar reflexes are 1+ on the right side and 1+ on the left side.  Presbycusis, peripheral sensory deficits in all 4 extremities worse in the lower extremities with diminished light touch and vibratory sense.  Deep tendon reflexes diminished but symmetric.  Mild distal atrophy in bilateral lower extremities.  Functional strength bilaterally.  Functional ataxia likely due to sensory deficit.   Skin: Skin is warm, dry and intact.   Psychiatric: He has a normal mood and affect. His speech is normal and behavior is normal. Judgment and thought content normal. Cognition and memory are normal.   Nursing note and vitals reviewed.        Assessment/Plan   Shabbir was seen today for stroke and restless legs syndrome.    Diagnoses and all orders for this visit:    Cerebrovascular disease    Pharyngoesophageal dysphagia    Axonal polyneuropathy    RLS (restless legs syndrome)    The patient is neurologically stable.  No changes are  recommended in his present treatment.    Medication instructions and expectations are reviewed.  The patient's questions and concerns are discussed in detail.  10 minutes of a 15-minute outpatient visit was spent in counseling and coordination of care today.    Dictated utilizing Dragon dictation.

## 2020-06-16 DIAGNOSIS — M10.9 GOUT, UNSPECIFIED CAUSE, UNSPECIFIED CHRONICITY, UNSPECIFIED SITE: ICD-10-CM

## 2020-06-16 RX ORDER — PROBENECID AND COLCHICINE 500; .5 MG/1; MG/1
TABLET ORAL
Qty: 20 TABLET | Refills: 1 | Status: SHIPPED | OUTPATIENT
Start: 2020-06-16 | End: 2020-09-08

## 2020-08-18 RX ORDER — ATENOLOL 50 MG/1
TABLET ORAL
Qty: 90 TABLET | Refills: 1 | Status: SHIPPED | OUTPATIENT
Start: 2020-08-18 | End: 2021-02-22

## 2020-08-25 RX ORDER — AMLODIPINE BESYLATE 2.5 MG/1
TABLET ORAL
Qty: 90 TABLET | Refills: 1 | Status: SHIPPED | OUTPATIENT
Start: 2020-08-25 | End: 2021-02-17

## 2020-08-28 ENCOUNTER — OFFICE VISIT (OUTPATIENT)
Dept: FAMILY MEDICINE CLINIC | Facility: CLINIC | Age: 80
End: 2020-08-28

## 2020-08-28 VITALS
BODY MASS INDEX: 19.93 KG/M2 | HEART RATE: 48 BPM | HEIGHT: 67 IN | WEIGHT: 127 LBS | TEMPERATURE: 96.6 F | SYSTOLIC BLOOD PRESSURE: 139 MMHG | DIASTOLIC BLOOD PRESSURE: 67 MMHG | OXYGEN SATURATION: 98 %

## 2020-08-28 DIAGNOSIS — R13.19 ESOPHAGEAL DYSPHAGIA: ICD-10-CM

## 2020-08-28 DIAGNOSIS — I67.9 CEREBROVASCULAR DISEASE: ICD-10-CM

## 2020-08-28 DIAGNOSIS — I10 ESSENTIAL HYPERTENSION: Primary | ICD-10-CM

## 2020-08-28 DIAGNOSIS — I25.10 CORONARY ARTERY DISEASE INVOLVING NATIVE CORONARY ARTERY OF NATIVE HEART, ANGINA PRESENCE UNSPECIFIED: ICD-10-CM

## 2020-08-28 DIAGNOSIS — Z23 ENCOUNTER FOR IMMUNIZATION: ICD-10-CM

## 2020-08-28 DIAGNOSIS — M10.9 GOUT, UNSPECIFIED CAUSE, UNSPECIFIED CHRONICITY, UNSPECIFIED SITE: ICD-10-CM

## 2020-08-28 DIAGNOSIS — G25.81 RLS (RESTLESS LEGS SYNDROME): ICD-10-CM

## 2020-08-28 PROCEDURE — 90653 IIV ADJUVANT VACCINE IM: CPT | Performed by: NURSE PRACTITIONER

## 2020-08-28 PROCEDURE — 99214 OFFICE O/P EST MOD 30 MIN: CPT | Performed by: NURSE PRACTITIONER

## 2020-08-28 PROCEDURE — G0008 ADMIN INFLUENZA VIRUS VAC: HCPCS | Performed by: NURSE PRACTITIONER

## 2020-08-28 NOTE — PROGRESS NOTES
CC: 6 month check - HTN, CVD, dysphagia    History:  Shabbir Gambino is a 80 y.o. male who presents today for evaluation of the above problems.      Patient reports that he is doing well overall.  Denies any chest pain or issues with breathing.  He does continue to occasionally have trouble with dysphagia, especially first thing in the morning.  He reports that he has been seen multiple times in [place] by GI and that they have stretched his esophagus, but this has not helped. He reports that he has, over time, been losing weight as a result of this.  He is interested in a second opinion from [facility].      HPI  ROS:  Review of Systems   Constitutional: Negative for fever and unexpected weight change.   HENT: Positive for trouble swallowing.    Respiratory: Negative for shortness of breath.    Cardiovascular: Negative for chest pain.       Allergies   Allergen Reactions   • Other Anaphylaxis     UNKNOWN TO PATIENT ENVIRONMENTAL/FOOD ALLERGY    • Ezetimibe Unknown (See Comments)     Leg cramps   • Niacin And Related Unknown (See Comments)     Cramping red and flush   • Statins Other (See Comments)     Leg cramps     Past Medical History:   Diagnosis Date   • Atherosclerotic coronary vascular disease    • Axonal polyneuropathy    • Coagulopathy (CMS/HCC) 2/28/2020   • Colon polyp    • Gout    • History of transfusion    • Hyperlipidemia    • Hypertension    • Insomnia    • Irregular heart beat    • Precancerous skin lesion    • RLS (restless legs syndrome)    • TIA (transient ischemic attack)     2014   • Vascular disease     CAROTID     Past Surgical History:   Procedure Laterality Date   • CAROTID ENDARTERECTOMY Bilateral     2007, 2008   • COLON RESECTION N/A 10/24/2017    Procedure: COLON RESECTION  MID TRANSVERSE LAPAROSCOPIC;  Surgeon: Adal Quintero MD;  Location: [facility];  Service:    • COLONOSCOPY      MULTIPLE    • COLONOSCOPY N/A 9/7/2017    Procedure: colonoscopy with anesthesia;  Surgeon:  Randy Gomez MD;  Location: [facility] ENDOSCOPY;  Service:    • COLONOSCOPY N/A 8/7/2018    Procedure: COLONOSCOPY WITH ANESTHESIA;  Surgeon: Randy Gomez MD;  Location: [facility] ENDOSCOPY;  Service: Gastroenterology   • CORONARY ARTERY BYPASS GRAFT  2006   • CORONARY STENT PLACEMENT     • CORONARY STENT PLACEMENT     • ENDOSCOPY N/A 9/7/2017    Procedure: ESOPHAGOGASTRODUODENOSCOPY WITH ANESTHESIA;  Surgeon: Randy Gomez MD;  Location: [facility] ENDOSCOPY;  Service:    • ENDOSCOPY N/A 8/7/2018    Procedure: ESOPHAGOGASTRODUODENOSCOPY WITH ANESTHESIA;  Surgeon: Randy Gomez MD;  Location: [facility] ENDOSCOPY;  Service: Gastroenterology   • EYE SURGERY Bilateral     2014, 2015   • KNEE SURGERY Right     x 2     Family History   Problem Relation Age of Onset   • Stroke Mother    • Diabetes Mother    • No Known Problems Father    • Esophageal cancer Sister    • No Known Problems Maternal Grandmother    • No Known Problems Maternal Grandfather    • No Known Problems Paternal Grandmother    • No Known Problems Paternal Grandfather    • Colon polyps Neg Hx    • Colon cancer Neg Hx       reports that he has never smoked. He has never used smokeless tobacco. He reports that he does not drink alcohol or use drugs.      Current Outpatient Medications:   •  atenolol (TENORMIN) 50 MG tablet, TAKE 1 TABLET BY MOUTH DAILY, Disp: 90 tablet, Rfl: 1  •  carbidopa-levodopa (SINEMET)  MG per tablet, TAKE ONE TO TWO TABLETS BY MOUTH AT BEDTIME, Disp: 180 tablet, Rfl: 1  •  CINNAMON PO, Take 500 mg by mouth Daily., Disp: , Rfl:   •  clopidogrel (PLAVIX) 75 MG tablet, Take 75 mg by mouth Daily., Disp: , Rfl:   •  colchicine-probenecid (COL-BENEMID) 0.5-500 MG per tablet, TAKE ONE TABLET BY MOUTH EVERY DAY, Disp: 20 tablet, Rfl: 1  •  indapamide (LOZOL) 2.5 MG tablet, Take 2.5 mg by mouth Every Morning., Disp: , Rfl:   •  Multiple Vitamins-Minerals (EYE VITAMINS PO), Take 1 tablet by mouth Daily., Disp: , Rfl:   •   "amLODIPine (NORVASC) 2.5 MG tablet, TAKE ONE TABLET BY MOUTH EVERY EVENING, Disp: 90 tablet, Rfl: 1    OBJECTIVE:  /67 (BP Location: Left arm, Patient Position: Sitting, Cuff Size: Adult)   Pulse (!) 48   Temp 96.6 °F (35.9 °C) (Temporal)   Ht 170.2 cm (67\")   Wt 57.6 kg (127 lb)   SpO2 98%   BMI 19.89 kg/m²    Physical Exam   Constitutional: He is oriented to person, place, and time. Vital signs are normal. He appears well-developed and well-nourished.   Cardiovascular: Normal rate and regular rhythm.   bradycardic   Pulmonary/Chest: Effort normal and breath sounds normal.   Neurological: He is alert and oriented to person, place, and time.   Psychiatric: He has a normal mood and affect. His behavior is normal.   Vitals reviewed.      Assessment/Plan    Shabbir was seen today for follow-up.    Diagnoses and all orders for this visit:    Essential hypertension  -     Comprehensive Metabolic Panel    RLS (restless legs syndrome)    Cerebrovascular disease  -     CBC & Differential  -     Comprehensive Metabolic Panel  -     Lipid Panel    Coronary artery disease involving native coronary artery of native heart, angina presence unspecified  -     CBC & Differential  -     Comprehensive Metabolic Panel  -     Lipid Panel    Encounter for immunization  -     Fluad Quad 65+ yrs ([id number])    Gout, unspecified cause, unspecified chronicity, unspecified site  -     Uric Acid    Esophageal dysphagia  -     Ambulatory Referral to Gastroenterology        An After Visit Summary was printed and given to the patient at discharge.  Return in about 6 months (around 2/28/2021) for Annual physical.       VERONIKA Marley 08/28/2020    Electronically signed.  "

## 2020-08-29 LAB
ALBUMIN SERPL-MCNC: 4.4 G/DL (ref 3.5–5.2)
ALBUMIN/GLOB SERPL: 1.7 G/DL
ALP SERPL-CCNC: 100 U/L (ref 39–117)
ALT SERPL-CCNC: 8 U/L (ref 1–41)
AST SERPL-CCNC: 27 U/L (ref 1–40)
BASOPHILS # BLD AUTO: 0.03 10*3/MM3 (ref 0–0.2)
BASOPHILS NFR BLD AUTO: 0.5 % (ref 0–1.5)
BILIRUB SERPL-MCNC: 0.4 MG/DL (ref 0–1.2)
BUN SERPL-MCNC: 27 MG/DL (ref 8–23)
BUN/CREAT SERPL: 19.7 (ref 7–25)
CALCIUM SERPL-MCNC: 9.7 MG/DL (ref 8.6–10.5)
CHLORIDE SERPL-SCNC: 95 MMOL/L (ref 98–107)
CHOLEST SERPL-MCNC: 213 MG/DL (ref 0–200)
CO2 SERPL-SCNC: 26.6 MMOL/L (ref 22–29)
CREAT SERPL-MCNC: 1.37 MG/DL (ref 0.76–1.27)
EOSINOPHIL # BLD AUTO: 0.16 10*3/MM3 (ref 0–0.4)
EOSINOPHIL NFR BLD AUTO: 2.5 % (ref 0.3–6.2)
ERYTHROCYTE [DISTWIDTH] IN BLOOD BY AUTOMATED COUNT: 13.9 % (ref 12.3–15.4)
GLOBULIN SER CALC-MCNC: 2.6 GM/DL
GLUCOSE SERPL-MCNC: 117 MG/DL (ref 65–99)
HCT VFR BLD AUTO: 41.2 % (ref 37.5–51)
HDLC SERPL-MCNC: 47 MG/DL (ref 40–60)
HGB BLD-MCNC: 13.4 G/DL (ref 13–17.7)
IMM GRANULOCYTES # BLD AUTO: 0.15 10*3/MM3 (ref 0–0.05)
IMM GRANULOCYTES NFR BLD AUTO: 2.3 % (ref 0–0.5)
LDLC SERPL CALC-MCNC: 139 MG/DL (ref 0–100)
LYMPHOCYTES # BLD AUTO: 1.14 10*3/MM3 (ref 0.7–3.1)
LYMPHOCYTES NFR BLD AUTO: 17.6 % (ref 19.6–45.3)
MCH RBC QN AUTO: 28.3 PG (ref 26.6–33)
MCHC RBC AUTO-ENTMCNC: 32.5 G/DL (ref 31.5–35.7)
MCV RBC AUTO: 87.1 FL (ref 79–97)
MONOCYTES # BLD AUTO: 0.92 10*3/MM3 (ref 0.1–0.9)
MONOCYTES NFR BLD AUTO: 14.2 % (ref 5–12)
NEUTROPHILS # BLD AUTO: 4.06 10*3/MM3 (ref 1.7–7)
NEUTROPHILS NFR BLD AUTO: 62.9 % (ref 42.7–76)
NRBC BLD AUTO-RTO: 0 /100 WBC (ref 0–0.2)
PLATELET # BLD AUTO: 239 10*3/MM3 (ref 140–450)
POTASSIUM SERPL-SCNC: 5.1 MMOL/L (ref 3.5–5.2)
PROT SERPL-MCNC: 7 G/DL (ref 6–8.5)
RBC # BLD AUTO: 4.73 10*6/MM3 (ref 4.14–5.8)
SODIUM SERPL-SCNC: 133 MMOL/L (ref 136–145)
TRIGL SERPL-MCNC: 133 MG/DL (ref 0–150)
URATE SERPL-MCNC: 7.3 MG/DL (ref 3.4–7)
VLDLC SERPL CALC-MCNC: 26.6 MG/DL
WBC # BLD AUTO: 6.46 10*3/MM3 (ref 3.4–10.8)

## 2020-09-08 DIAGNOSIS — M10.9 GOUT, UNSPECIFIED CAUSE, UNSPECIFIED CHRONICITY, UNSPECIFIED SITE: ICD-10-CM

## 2020-09-08 RX ORDER — PROBENECID AND COLCHICINE 500; .5 MG/1; MG/1
TABLET ORAL
Qty: 20 TABLET | Refills: 1 | Status: SHIPPED | OUTPATIENT
Start: 2020-09-08 | End: 2020-12-03

## 2020-12-03 DIAGNOSIS — M10.9 GOUT, UNSPECIFIED CAUSE, UNSPECIFIED CHRONICITY, UNSPECIFIED SITE: ICD-10-CM

## 2020-12-03 RX ORDER — PROBENECID AND COLCHICINE 500; .5 MG/1; MG/1
TABLET ORAL
Qty: 30 TABLET | Refills: 2 | Status: SHIPPED | OUTPATIENT
Start: 2020-12-03 | End: 2021-06-07

## 2020-12-07 RX ORDER — INDAPAMIDE 2.5 MG/1
TABLET, FILM COATED ORAL
Qty: 30 TABLET | Refills: 0 | Status: SHIPPED | OUTPATIENT
Start: 2020-12-07 | End: 2020-12-15 | Stop reason: SDUPTHER

## 2020-12-07 RX ORDER — INDAPAMIDE 2.5 MG/1
TABLET, FILM COATED ORAL
Qty: 30 TABLET | Refills: 5 | OUTPATIENT
Start: 2020-12-07

## 2020-12-07 NOTE — TELEPHONE ENCOUNTER
Romelia Gaffney is requesting a refill of their   Requested Prescriptions     Pending Prescriptions Disp Refills    indapamide (LOZOL) 2.5 MG tablet 30 tablet 5   . Please advise.       Last Appt:  9/18/2019  Next Appt:   12/7/2020  Preferred pharmacy: [address] [place] [address] [phone] - F [phone]

## 2020-12-15 ENCOUNTER — TELEPHONE (OUTPATIENT)
Dept: FAMILY MEDICINE CLINIC | Facility: CLINIC | Age: 80
End: 2020-12-15

## 2020-12-15 ENCOUNTER — OFFICE VISIT (OUTPATIENT)
Dept: CARDIOLOGY | Age: 80
End: 2020-12-15
Payer: MEDICARE

## 2020-12-15 VITALS
SYSTOLIC BLOOD PRESSURE: 150 MMHG | DIASTOLIC BLOOD PRESSURE: 62 MMHG | HEIGHT: 68 IN | BODY MASS INDEX: 19.55 KG/M2 | HEART RATE: 52 BPM | WEIGHT: 129 LBS

## 2020-12-15 PROCEDURE — 1123F ACP DISCUSS/DSCN MKR DOCD: CPT | Performed by: INTERNAL MEDICINE

## 2020-12-15 PROCEDURE — 4040F PNEUMOC VAC/ADMIN/RCVD: CPT | Performed by: INTERNAL MEDICINE

## 2020-12-15 PROCEDURE — G8427 DOCREV CUR MEDS BY ELIG CLIN: HCPCS | Performed by: INTERNAL MEDICINE

## 2020-12-15 PROCEDURE — 93000 ELECTROCARDIOGRAM COMPLETE: CPT | Performed by: INTERNAL MEDICINE

## 2020-12-15 PROCEDURE — G8420 CALC BMI NORM PARAMETERS: HCPCS | Performed by: INTERNAL MEDICINE

## 2020-12-15 PROCEDURE — 1036F TOBACCO NON-USER: CPT | Performed by: INTERNAL MEDICINE

## 2020-12-15 PROCEDURE — 99213 OFFICE O/P EST LOW 20 MIN: CPT | Performed by: INTERNAL MEDICINE

## 2020-12-15 PROCEDURE — G8484 FLU IMMUNIZE NO ADMIN: HCPCS | Performed by: INTERNAL MEDICINE

## 2020-12-15 RX ORDER — INDAPAMIDE 2.5 MG/1
TABLET, FILM COATED ORAL
Qty: 90 TABLET | Refills: 3 | Status: SHIPPED | OUTPATIENT
Start: 2020-12-15 | End: 2021-12-21

## 2020-12-15 NOTE — TELEPHONE ENCOUNTER
PATIENT CALLED WANTING TO KNOW ABOUT HER HISTORY OF INJECTIONS/VACCINATIONS/SHOTS TO SEE IF SHE IS DO FOR ANY AT ALL.    GOOD CALL BACK:  [phone]

## 2020-12-15 NOTE — PROGRESS NOTES
79-year-old atheropath with history of dyslipidemia, hypertension, cerebrovascular, and coronary disease returns for follow-up visit. Most recent cardiac evaluation occurred in March 2019 at which time he was demonstrated to have a patent LIMA to the LAD with occlusion of his 6 vein bypasses. He underwent percutaneous intervention with stenting of his distal circumflex and first obtuse marginal.  In February 2019 a carotid duplex revealed bilateral 50 to 69% carotid obstructions having undergone previous bilateral carotid endarterectomies [with loss of his left recurrent laryngeal nerve with the  prior intervention. On return today he denies symptoms to suggest angina or transient cerebral ischemia. On exam he carries 129 pounds in a 5 foot 8 inch frame. Pressure is 150/62 with a pulse of 52. Thin elderly gentleman in no distress. EOMs full, sclerae and conjunctiva normal. PERRLA. Mask in place. Trachea midline with no neck masses. Assessment of internal jugular veins reveals no elevation of central venous pressure at 45 degrees. Bilateral CEA scars with medium pitched murmur on the right and a particularly high-pitched murmur heard on the left. Thyroid normal to palpation. Healed midline sternotomy scar. Chest exam reveals normal respiratory effort, no abnormal breath sounds and normal expiratory phase. No skin lesions seen. PMI normal. S1, S2 normal without murmur or codie or click. Normal bowel sounds without palpable mass or bruit. No clubbing or acrocyanosis. No significant lower extremity edema or signs of venous insufficiency. General motor strength appears to be within normal limits. Normal range of motion with normal gait. Alert, oriented x 3, memory and cognition normal as reflected by history and conversation. EKG reveals sinus bradycardia at 44 with a first-degree AV block [228 ms], right ventricular conduction delay, and some prominent U waves. Assessment/plan:  1.   Carotid disease -high-pitched left carotid bruit of concern. Will obtain a carotid duplex and forward to vascular surgery. 2.  Hypertension -out of medical therapy for several days. Refill authorization apparently sent to the wrong place. 3.  Dyslipidemia -most recent blood work from August 2020 with an LDL of 139, HDL 47, triglycerides 133. Statin intolerant. Will investigate reduced cost Repatha program.  4.  Coronary disease -angina free though probably has defective anginal warning system. Intervention most recently performed in March 2019 as above. Medical records reviewed prior to today's clinic visit including visually reviewing recent diagnostic studies such as ECHOs, labs, and angiograms as well as reading previous encounter notes. More than 15 minutes spent face-to-face with patient in evaluating, and carefully explaining problems and the planned approach and the reasons behind the decisions.

## 2020-12-15 NOTE — TELEPHONE ENCOUNTER
I informed patient's wife that he is current on his vaccines except for the shingrix.  I offered to send in a prescription but she turned it down.

## 2020-12-15 NOTE — PATIENT INSTRUCTIONS
[place] at the [facility] and [address] [facility] located on the first floor of [place] [address] through hospital main entrance and turn immediately to your left. Patient's contact number:  [phone] (home)     Date/Time: 12/15/20 9:00    Carotid Ultrasound   -  No prep. Takes approximately 30 minutes. Carotid ultrasound is a safe, painless procedure that uses sound waves to examine the structure and function of the carotid arteries in the neck. You have two carotid arteries, one on each side of the neck, which deliver blood from the heart to the brain. Carotid ultrasound can reveal whether an artery has any blockage and how well blood flows through the artery. Carotid ultrasound is usually used to screen for blockages that indicate an increased risk of stroke. Results from a carotid ultrasound can help your doctor determine what kind of treatment you may need to lower your risk. If you need to change your appointment, please call outpatient scheduling at [phone].

## 2020-12-16 ENCOUNTER — HOSPITAL ENCOUNTER (OUTPATIENT)
Dept: VASCULAR LAB | Age: 80
Discharge: HOME | End: 2020-12-16
Payer: MEDICARE

## 2020-12-16 PROCEDURE — 93880 EXTRACRANIAL BILAT STUDY: CPT

## 2020-12-21 RX ORDER — ALIROCUMAB 75 MG/ML
75 INJECTION, SOLUTION SUBCUTANEOUS
Qty: 1.96 ML | Refills: 11 | Status: SHIPPED | OUTPATIENT
Start: 2020-12-21 | End: 2022-01-25

## 2020-12-28 ENCOUNTER — OFFICE VISIT (OUTPATIENT)
Dept: VASCULAR SURGERY | Age: 80
End: 2020-12-28
Payer: MEDICARE

## 2020-12-28 VITALS
TEMPERATURE: 97.5 F | BODY MASS INDEX: 19.7 KG/M2 | SYSTOLIC BLOOD PRESSURE: 137 MMHG | HEIGHT: 68 IN | WEIGHT: 130 LBS | HEART RATE: 54 BPM | DIASTOLIC BLOOD PRESSURE: 64 MMHG

## 2020-12-28 PROCEDURE — 1036F TOBACCO NON-USER: CPT | Performed by: NURSE PRACTITIONER

## 2020-12-28 PROCEDURE — 99213 OFFICE O/P EST LOW 20 MIN: CPT | Performed by: NURSE PRACTITIONER

## 2020-12-28 PROCEDURE — G8427 DOCREV CUR MEDS BY ELIG CLIN: HCPCS | Performed by: NURSE PRACTITIONER

## 2020-12-28 PROCEDURE — 4040F PNEUMOC VAC/ADMIN/RCVD: CPT | Performed by: NURSE PRACTITIONER

## 2020-12-28 PROCEDURE — G8484 FLU IMMUNIZE NO ADMIN: HCPCS | Performed by: NURSE PRACTITIONER

## 2020-12-28 PROCEDURE — 1123F ACP DISCUSS/DSCN MKR DOCD: CPT | Performed by: NURSE PRACTITIONER

## 2020-12-28 PROCEDURE — G8420 CALC BMI NORM PARAMETERS: HCPCS | Performed by: NURSE PRACTITIONER

## 2020-12-28 NOTE — PROGRESS NOTES
by mouth daily      NONFORMULARY Over the counter \"Zz\"      Multiple Vitamins-Minerals (ICAPS AREDS 2 PO) Take 1 tablet by mouth daily       amLODIPine (NORVASC) 2.5 MG tablet Take 2.5 mg by mouth daily      atenolol (TENORMIN) 50 MG tablet Take 50 mg by mouth daily      carbidopa-levodopa (SINEMET)  MG per tablet Take 1 tablet by mouth nightly       Cinnamon 500 MG CAPS Take 1 capsule by mouth nightly       No current facility-administered medications for this visit. Current Outpatient Medications on File Prior to Visit   Medication Sig Dispense Refill    alirocumab (PRALUENT) 75 MG/ML SOAJ injection pen Inject 1 mL into the skin every 14 days 1.96 mL 11    indapamide (LOZOL) 2.5 MG tablet TAKE ONE TABLET BY MOUTH EVERY MORNING 90 tablet 3    clopidogrel (PLAVIX) 75 MG tablet TAKE 1 TABLET BY MOUTH EVERY DAY 90 tablet 3    colchicine-probenecid 0.5-500 MG per tablet Take 1 tablet by mouth daily      NONFORMULARY Over the counter \"Zz\"      Multiple Vitamins-Minerals (ICAPS AREDS 2 PO) Take 1 tablet by mouth daily       amLODIPine (NORVASC) 2.5 MG tablet Take 2.5 mg by mouth daily      atenolol (TENORMIN) 50 MG tablet Take 50 mg by mouth daily      carbidopa-levodopa (SINEMET)  MG per tablet Take 1 tablet by mouth nightly       Cinnamon 500 MG CAPS Take 1 capsule by mouth nightly       No current facility-administered medications on file prior to visit. Allergies: Other, Niacin and related, Statins, and Zetia [ezetimibe]  Past Medical History:   Diagnosis Date    Arthritis     Skin cancer     On face     Past Surgical History:   Procedure Laterality Date    ABSCESS DRAINAGE Right     knee    CARDIAC CATHETERIZATION  03/19/2019    [facility]placed by [facility]    CARDIAC SURGERY      cabg 2008    CAROTID ENDARTERECTOMY Left     [place]    CAROTID ENDARTERECTOMY Right     Dr. Tiffany Brown CORONARY ARTERY BYPASS GRAFT      ESOPHAGEAL DILATATION      EYE SURGERY      summer cat    JOINT REPLACEMENT rtk    POLYPECTOMY      Colon    SKIN CANCER EXCISION       Family History   Problem Relation Age of Onset    Heart Disease Mother     Heart Disease Maternal Uncle      Social History     Tobacco Use    Smoking status: Never Smoker    Smokeless tobacco: Never Used   Substance Use Topics    Alcohol use: No           Review of Systems    Constitutional - no significant activity change, appetite change, or unexpected weight change. No fever or chills. No diaphoresis or significant fatigue. HENT - no significant rhinorrhea or epistaxis. No tinnitus or significant hearing loss. Eyes - no sudden vision change or amaurosis. Respiratory - no significant shortness of breath, wheezing, or stridor. No apnea, cough, or chest tightness associated with shortness of breath. Cardiovascular - no chest pain, syncope, or significant dizziness. No palpitations or significant leg swelling. No claudication. Gastrointestinal - no abdominal swelling or pain. No blood in stool. No severe constipation, diarrhea, nausea, or vomiting. Genitourinary - No difficulty urinating, dysuria, frequency, or urgency. No flank pain or hematuria. Musculoskeletal - no back pain, gait disturbance, or myalgia. Skin - no color change, rash, pallor, or new wound. Neurologic - no dizziness, facial asymmetry, or light headedness. No seizures. No speech difficulty or lateralizing weakness. Hematologic - no easy bruising or excessive bleeding. Psychiatric - no severe anxiety or nervousness. No confusion. All other review of systems are negative. Physical Exam    Vitals:    12/28/20 1101 12/28/20 1107   BP: 131/64 137/64   Site: Left Upper Arm Right Upper Arm   Position: Sitting Sitting   Cuff Size: Medium Adult Medium Adult   Pulse: 56 54   Temp: 97.5 °F (36.4 °C)    TempSrc: Temporal    Weight: 130 lb (59 kg)    Height: 5' 8\" (1.727 m)          Constitutional - well developed, well nourished.   No diaphoresis or acute distress. HENT - head normocephalic. Right external ear canal appears normal.  Left external ear canal appears normal.  Septum appears midline. Eyes - conjunctiva normal.  EOMS normal.  No exudate. No icterus. Neck- ROM appears normal, no tracheal deviation. Cardiovascular - Regular rate and rhythm. Heart sounds are normal.  No murmur, rub, or gallop. Carotid pulses are 2+ to palpation bilaterally without bruit. Pulmonary - effort appears normal.  No respiratory distress. Lungs - Breath sounds normal. No wheezes or rales. Extremities - Radial and brachial pulses are 2+ to palpation bilaterally. Neurologic - alert and oriented X 3. Physiologic. Face symmetric. Skin - warm, dry, and intact. No rash, erythema, or pallor. Psychiatric - mood, affect, and behavior appear normal.  Judgment and thought processes appear normal.    Risk factors for atherosclerosis of all vascular beds have been reviewed with the patient including:  Family history, tobacco abuse in all forms, elevated cholesterol, hyperlipidemia, and diabetes. Doppler results:    Right CCA/ICA 50-69% stenotic  Left CCA/ICA 50-69% stenotic  Right vertebral artery flow is antegrade  Left vertebral artery flow is antegrade  Individual velocities reviewed: Yes. Test results were reviewed with the patient. Disease process is stable      Options have been discussed with the patient including continued medical management. Patient has opted to proceed with continued medical management. Assessment    1. Bilateral carotid artery stenosis          Plan    Start/Continue ASA EC 81 mg daily  Follow up in  6  Month(s)  Strongly encourage start/continue statin therapy  Recommend no smoking  Patient instructed to call or proceed to the emergency room with any symptoms of lateralizing weakness, loss of vision in one eye, or episodes slurred speech.

## 2021-01-13 ENCOUNTER — VIRTUAL VISIT (OUTPATIENT)
Dept: CARDIOLOGY CLINIC | Age: 81
End: 2021-01-13
Payer: MEDICARE

## 2021-01-13 DIAGNOSIS — I65.23 BILATERAL CAROTID ARTERY STENOSIS: ICD-10-CM

## 2021-01-13 DIAGNOSIS — I25.10 CORONARY ARTERY DISEASE INVOLVING NATIVE CORONARY ARTERY OF NATIVE HEART WITHOUT ANGINA PECTORIS: Primary | ICD-10-CM

## 2021-01-13 DIAGNOSIS — I10 ESSENTIAL HYPERTENSION: ICD-10-CM

## 2021-01-13 DIAGNOSIS — E78.5 HYPERLIPIDEMIA, UNSPECIFIED HYPERLIPIDEMIA TYPE: ICD-10-CM

## 2021-01-13 PROCEDURE — G8420 CALC BMI NORM PARAMETERS: HCPCS | Performed by: NURSE PRACTITIONER

## 2021-01-13 PROCEDURE — 1123F ACP DISCUSS/DSCN MKR DOCD: CPT | Performed by: NURSE PRACTITIONER

## 2021-01-13 PROCEDURE — 4040F PNEUMOC VAC/ADMIN/RCVD: CPT | Performed by: NURSE PRACTITIONER

## 2021-01-13 PROCEDURE — G8427 DOCREV CUR MEDS BY ELIG CLIN: HCPCS | Performed by: NURSE PRACTITIONER

## 2021-01-13 PROCEDURE — G8484 FLU IMMUNIZE NO ADMIN: HCPCS | Performed by: NURSE PRACTITIONER

## 2021-01-13 PROCEDURE — 99441 PR PHYS/QHP TELEPHONE EVALUATION 5-10 MIN: CPT | Performed by: NURSE PRACTITIONER

## 2021-01-13 PROCEDURE — 1036F TOBACCO NON-USER: CPT | Performed by: NURSE PRACTITIONER

## 2021-01-13 NOTE — PATIENT INSTRUCTIONS
Coronary Artery Disease   (CAD; Coronary Atherosclerosis; Silent MI; Coronary Heart Disease; Ischemic Heart Disease; Atherosclerosis of the Coronary Arteries)     Definition   Coronary arteries bring oxygen rich blood to the heart muscle. Coronary artery disease (CAD) is blockage of these arteries. If the blockage is complete, areas of the heart muscle may be damaged. In severe case the heart muscle dies. This can lead to a heart attack, also known as a myocardial infarction (MI). Coronary artery disease is the most common form of heart disease. It is the leading cause of death worldwide. Causes include: Thickening of the walls of the arteries feeding the heart muscle   Accumulation of fatty plaques within the coronary arteries   Sudden spasm of a coronary artery   Narrowing of the coronary arteries   Inflammation within the coronary arteries   Development of a blood clot within the coronary arteries that blocks blood flow      Major risk factors include:   Sex: male (men have a greater risk of heart attack than women)   Age: 39 and older for men, 54 and older for women   Heredity: strong family history of heart disease   Obesity and being overweight   Smoking   High blood pressure   Sedentary lifestylePoor fitness can also increase your risk of CAD and premature death. High cholesterol (specifically, high LDL cholesterol, and low HDL cholesterol)   Diabetes   Metabolic syndrome (combination of high blood pressure, abdominal obesity, and insulin resistance)     Other risk factors may include:   Sleep Apnea  Stress   Excessive alcohol use   Depression   A diet that is high in saturated fat, trans fat, cholesterol, and/or caloriesDrinking sugary beverages on a regular basis may increase your risk of CAD. Symptoms   CAD may progress without any symptoms. Nuclear stress test the heart is observed while exercising and radioactive material highlights impaired blood flow to help locate problem areas   Coronary calcium scoring a type of x-ray called a CAT scan that uses a computer to look for the presence of calcium in the heart arteries   Coronary angiography x-rays taken after a dye is injected into the arteries to allows the doctor to look for abnormalities in the arteries   Treatment   Treatment may include:   Nitroglycerin   This medicine is usually given during an attack of angina. It can be given as a tablet that dissolves under the tongue or as a spray. Longer-lasting types can be used to prevent angina before an activity known to cause it. These may be given as pills or applied as patches or ointments. Blood-Thinning Medications   A small, daily dose of aspirin has been shown to decrease the risk of heart attack. Ask your doctor before taking aspirin daily. Warfarin (Coumadin)   Ticlopidine (Ticlid)   Clopidogrel (Plavix)   Beta-Blockers, Calcium-Channel Blockers, and ACE-Inhibitors   These may help prevent angina. In some cases, they may lower the risk of heart attack. Medications to Lower Cholesterol   Medicines, like statins, are often prescribed to people who have CAD. Statins (eg, atorvastatin [Lipitor]) lower cholesterol levels, which can help to prevent CAD events.    Revascularization   Patients with severe blockages in their coronary arteries may benefit from procedures to immediately improve blood flow to the heart muscle:   Percutaneous coronary interventions (PCI)such as balloon angioplasty , in some cases, a wire mesh stent is placed to hold the artery open   Coronary artery bypass grafting (CABG) segments of vessels are taken from other areas of the body and are sewn into the heart arteries to reroute blood flow around blockages Write down your blood pressure today. The best blood pressure for adults is 120/80 (mmHg) or lower. It is likely that your blood pressure was recorded at least 2 times today. It is important for you to give these numbers to your doctor. If you do not have a doctor, try to get follow-up care at a hospital or community clinic. You may need to start high blood pressure medicine. You may also need to adjust your medicines as told by your doctor. Even mild high blood pressure increases long-term health risks. One high reading does not mean you have hypertension. · Your blood pressure should be taken when you are relaxed. It is also important to sit for about 10 minutes before being tested. · Things that can increase your blood pressure are:  Injury, Illness, Stress, Caffeine, and some medicines (like decongestants). · High blood pressure does not usually need emergency treatment. HOME CARE  · Lifestyle and medicine changes may be needed, including:   · Weight loss. · Exercise. · Limit the use of salt. · Stop smoking. · If using decongestants or birth control pills, talk to your doctor. These medicines might make blood pressure higher. · Do not use street drugs. · Females should not drink more than 1 alcoholic drink per day. Males should not drink more than 2 alcoholic drinks per day. · Take your blood pressure medicine. You will need to take it every day. If you do not get treated, there are risks, including:   · Heart disease. · Stroke. · Kidney failure. · See your doctor as told. GET HELP RIGHT AWAY IF:  · You get a very bad headache. · You get blurred or changing vision. · You feel confused. · You feel weak, numb, or faint. · You get chest or belly (abdominal) pain. · You throw up (vomit). · You cannot breathe very well. If you have a blood pressure reading with a top number of 180 or higher, you need to see your doctor right away. This is especially true if you are having any of the problems listed above. Hyperlipidemia   (Dyslipidemia; High Triglycerides; Triglycerides, High)     Definition   Hyperlipidemia is a high level of fats in the blood. These fats, called lipids, include cholesterol and triglycerides. There are five types of hyperlipidemia. The type depends on which lipid in the blood is high. Causes   Causes may include:   A family history of hyperlipidemia   A diet high in total fat, saturated fat, or cholesterol   Obesity   Excess alcohol intake   Certain conditions, including:   Diabetes   Low thyroid   Kidney problems   Liver disease   Cushing's syndrome   Certain drugs, such as:   Hormones or birth control pills   Beta-blockers   Some diuretics   Cortisone drugs   Isotretinoin (for acne )   Some anti- HIV drugs   Risk Factors   These factors increase your chance of developing this condition. Tell your doctor if you have any of these:   Advancing age   Sex: male   Postmenopause   Lack of exercise   Smoking   Stress   Overuse of alcohol   Symptoms   Hyperlipidemia usually does not cause symptoms. Very high levels of lipids or triglycerides can cause:   Fat deposits in the skin or tendons ( xanthomas )   Pain, enlargement, or swelling of organs such as the liver, spleen, or pancreas ( pancreatitis )   Obstruction of blood vessels in heart and brain   Hyperlipidemia can increase your risk of atherosclerosis . This is a dangerous hardening of the arteries. It can end up blocking blood flow.  In some cases, this may result in:   Angina   Heart attack   Stroke   Other serious complications   Blood Vessel with Atherosclerosis       [address] [place] [address].   Diagnosis This condition is diagnosed with blood tests. These tests measure the levels of lipids in the blood. The National Cholesterol Education Program advises that you have your lipids checked at least once every five years, starting at age 21. Also, the American Academy of Pediatrics recommends lipid screening for children at risk (eg, a family history of hyperlipidemia). Testing may consist of a fasting blood test for:   Total cholesterol   LDL (bad cholesterol)   HDL (good cholesterol)   Triglycerides   Your doctor may recommend more frequent or earlier testing if you have:   Family history of hyperlipidemia   Risk factor or disease that may cause hyperlipidemia   Complication that may result from hyperlipidemia   Treatment   Treatment is not only aimed at correcting your cholesterol levels, but also at lowering your overall risk for heart disease and strokes. Diet Changes   Eat a diet low in total fat, saturated fat, and cholesterol . Reduce or eliminate the amount of alcohol you drink. Eat more high-fiber foods. Lifestyle Changes   If you are overweight, lose weight . If you smoke, quit . Exercise regularly . Talk to you doctor before starting an exercise program. Florian Foy may already have hardening of the arteries or heart disease. These conditions increase your risk of having a heart attack while exercising. Make sure other medical conditions such as high blood pressure and diabetes are being treated and controlled. Medications   There are a number of drugs available, such as statins , to treat this condition and help lower your risk for heart disease. Talk to your doctor. Statins have been shown to reduce mortality (death), heart attacks , and stroke. These medicines are best used as additions to diet and exercise and should not replace healthy lifestyle changes.    Prevention   To help reduce your chance of getting hyperlipidemia, take the following steps: Starting at age 21, get cholesterol tests. Eat a diet low in total fat, saturated fat, and cholesterol. If you smoke, quit. Drink alcohol in moderation (two drinks per day for men, one drink per day for women). If you are overweight, lose weight. Exercise regularly. Talk with your doctor first.   If you have diabetes , control your blood sugar. Talk to your doctor about medications you are taking. They may have side effects that cause hyperlipidemia.      Last Reviewed: September 2010 Chuy Busby DO   Updated: 11/9/2010

## 2021-01-13 NOTE — PROGRESS NOTES
Alex Resendiz is a 79 y.o. male evaluated via telephone on 1/13/2021. Consent:  He and/or health care decision maker is aware that that he may receive a bill for this telephone service, depending on his insurance coverage, and has provided verbal consent to proceed: Yes      Documentation:  I communicated with the patient and/or health care decision maker about Medication adjustment and carotid ultrasound results. Details of this discussion including any medical advice provided:     Mr. Pastor Johnson is a 57-year-old male with a history of hypertension, hyperal lipidemia, carotid artery stenosis, and CAD with chief complaint of follow-up for medication adjustment and carotid ultrasound results. He is a patient of Dr. Tigist Butler. At last visit, his BP was elevated because he had been out of his blood pressure medicine for a few days. He is resume this medication had his BP checked last week while getting his Covid shot and it was normal.  He had his carotid ultrasound done and is followed up with vascular surgery as well. He denies any exertional chest pain or shortness of breath. He denies any fast or slow heart rhythms. He denies any strokelike symptoms. he is sleeping on 1 pillow at night. he is not waking gasping for air. he denies any swelling. he has been compliant with his medications. his BP has been controlled. PCP follows labs and lipids. Mr. Aziza Quinteros was present in his home in [place], [place] during the telephone visit. I was present at the [facility] office in [place], [place] during the telephone visit. Sandra Badillo, medical assistant, assisted me with a telephone visit. Review of Systems  Constitutional: Negative for fever, chills, diaphoresis, activity change, appetite change, fatigue and unexpected weight change. Eyes: Negative for photophobia, pain, redness and visual disturbance. Respiratory: Negative for apnea, cough, chest tightness, shortness of breath, wheezing and stridor. Cardiovascular: Negative for chest pain, palpitations and leg swelling. Gastrointestinal: Negative for abdominal distention. Genitourinary: Negative for dysuria, urgency and frequency. Musculoskeletal: Negative for myalgias, arthralgias and gait problem. Skin: Negative for color change, pallor, rash and wound. Neurological: Negative for dizziness, tremors, speech difficulty, weakness and numbness. Hematological: Does not bruise/bleed easily. Psychiatric/Behavioral: Negative.     Kizzy Fox is a 79 y.o. male with the following history as recorded in [facility]:  Patient Active Problem List    Diagnosis Date Noted    Unstable angina ([facility] [address]      Priority: High    Bilateral carotid artery stenosis 02/28/2019    Skin cancer 02/22/2019    Colonic polyp 07/06/2018    Dysphagia 07/06/2018    GERD (gastroesophageal reflux disease) 07/06/2018    Dyslipidemia 07/03/2018    Acute blood loss anemia 12/12/2015    Cerebrovascular disease 12/12/2015    Osteoarthritis of right knee 12/12/2015    Coronary artery disease involving native coronary artery 12/11/2015    Essential hypertension 12/11/2015     Current Outpatient Medications   Medication Sig Dispense Refill    indapamide (LOZOL) 2.5 MG tablet TAKE ONE TABLET BY MOUTH EVERY MORNING 90 tablet 3    clopidogrel (PLAVIX) 75 MG tablet TAKE 1 TABLET BY MOUTH EVERY DAY 90 tablet 3    colchicine-probenecid 0.5-500 MG per tablet Take 1 tablet by mouth daily      NONFORMULARY Over the counter \"Zz\"      Multiple Vitamins-Minerals (ICAPS AREDS 2 PO) Take 1 tablet by mouth daily       amLODIPine (NORVASC) 2.5 MG tablet Take 2.5 mg by mouth daily      atenolol (TENORMIN) 50 MG tablet Take 50 mg by mouth daily      carbidopa-levodopa (SINEMET)  MG per tablet Take 1 tablet by mouth nightly  Cinnamon 500 MG CAPS Take 1 capsule by mouth nightly      alirocumab (PRALUENT) 75 MG/ML SOAJ injection pen Inject 1 mL into the skin every 14 days (Patient not taking: Reported on 1/13/2021) 1.96 mL 11     No current facility-administered medications for this visit. Allergies: Other, Niacin and related, Statins, and Zetia [ezetimibe]  Past Medical History:   Diagnosis Date    Arthritis     Skin cancer     On face     Past Surgical History:   Procedure Laterality Date    ABSCESS DRAINAGE Right     knee    CARDIAC CATHETERIZATION  03/19/2019    [facility]. [address]    CARDIAC SURGERY      cabg 2008    CAROTID ENDARTERECTOMY Left     [facility]    CAROTID ENDARTERECTOMY Right     Dr. Harleen Llanos CORONARY ARTERY BYPASS GRAFT      ESOPHAGEAL DILATATION      EYE SURGERY      summer cat    JOINT REPLACEMENT      rtk    POLYPECTOMY      Colon    SKIN CANCER EXCISION       Family History   Problem Relation Age of Onset    Heart Disease Mother     Heart Disease Maternal Uncle      Social History     Tobacco Use    Smoking status: Never Smoker    Smokeless tobacco: Never Used   Substance Use Topics    Alcohol use: No      Carotid Ultrasound- 12/16/20  There is mixed plaque visualized in the bilateral internal carotid    arteries.   Yvette Abed is 50-69% stenosis in the right internal carotid artery.    There is 50-69% stenosis of the left internal carotid artery.    There is normal antegrade flow in the right vertebral artery.    The left vertebral artery is not visualized and may be occluded. Clinical    correlation recommended. Assessment/ Plan:  CAD, HTN, HLD, & LEIGH    CAD-on atenolol, Norvasc, & Plavix. We are trying to get Praluent approved. HTN-he has resumed medications. BP was checked last week and within normal limits.   Monitor    HLD-followed by PCP, we are trying to get Praluent approved, no changes    LEIGH-followed by vascular surgery Disposition: Return in about 6 months (around 7/13/2021) for CAD, HTN, HLD, Wellness check, with Dr. Jordan Maradiaga. I affirm this is a Patient Initiated Episode with an Established Patient who has not had a related appointment within my department in the past 7 days or scheduled within the next 24 hours.     Patient identification was verified at the start of the visit: Yes    Total Time: minutes: 5-10 minutes    Note: not billable if this call serves to triage the patient into an appointment for the relevant concern      Ramona Curry

## 2021-02-17 RX ORDER — AMLODIPINE BESYLATE 2.5 MG/1
TABLET ORAL
Qty: 30 TABLET | Refills: 0 | Status: SHIPPED | OUTPATIENT
Start: 2021-02-17 | End: 2021-03-12 | Stop reason: SDUPTHER

## 2021-02-22 RX ORDER — ATENOLOL 50 MG/1
TABLET ORAL
Qty: 90 TABLET | Refills: 0 | Status: SHIPPED | OUTPATIENT
Start: 2021-02-22 | End: 2021-05-27

## 2021-03-02 ENCOUNTER — OFFICE VISIT (OUTPATIENT)
Dept: FAMILY MEDICINE CLINIC | Facility: CLINIC | Age: 81
End: 2021-03-02

## 2021-03-02 VITALS
RESPIRATION RATE: 14 BRPM | HEIGHT: 67 IN | SYSTOLIC BLOOD PRESSURE: 156 MMHG | TEMPERATURE: 97.8 F | OXYGEN SATURATION: 98 % | DIASTOLIC BLOOD PRESSURE: 52 MMHG | HEART RATE: 50 BPM | WEIGHT: 132 LBS | BODY MASS INDEX: 20.72 KG/M2

## 2021-03-02 DIAGNOSIS — R73.9 ELEVATED BLOOD SUGAR: ICD-10-CM

## 2021-03-02 DIAGNOSIS — E78.2 MIXED HYPERLIPIDEMIA: ICD-10-CM

## 2021-03-02 DIAGNOSIS — Z12.5 SPECIAL SCREENING FOR MALIGNANT NEOPLASM OF PROSTATE: ICD-10-CM

## 2021-03-02 DIAGNOSIS — D68.9 COAGULOPATHY (HCC): ICD-10-CM

## 2021-03-02 DIAGNOSIS — Z00.00 ANNUAL PHYSICAL EXAM: Primary | ICD-10-CM

## 2021-03-02 DIAGNOSIS — R53.83 FATIGUE, UNSPECIFIED TYPE: ICD-10-CM

## 2021-03-02 DIAGNOSIS — E79.0 HYPERURICEMIA: ICD-10-CM

## 2021-03-02 DIAGNOSIS — I73.9 PERIPHERAL VASCULAR DISEASE (HCC): ICD-10-CM

## 2021-03-02 DIAGNOSIS — I10 ESSENTIAL HYPERTENSION: ICD-10-CM

## 2021-03-02 LAB
BILIRUB BLD-MCNC: NEGATIVE MG/DL
CLARITY, POC: CLEAR
COLOR UR: YELLOW
GLUCOSE UR STRIP-MCNC: NEGATIVE MG/DL
KETONES UR QL: NEGATIVE
LEUKOCYTE EST, POC: NEGATIVE
NITRITE UR-MCNC: NEGATIVE MG/ML
PH UR: 6 [PH] (ref 5–8)
PROT UR STRIP-MCNC: NEGATIVE MG/DL
RBC # UR STRIP: ABNORMAL /UL
SP GR UR: 1.03 (ref 1–1.03)
UROBILINOGEN UR QL: NORMAL

## 2021-03-02 PROCEDURE — 81003 URINALYSIS AUTO W/O SCOPE: CPT | Performed by: FAMILY MEDICINE

## 2021-03-02 PROCEDURE — G0439 PPPS, SUBSEQ VISIT: HCPCS | Performed by: FAMILY MEDICINE

## 2021-03-02 NOTE — PATIENT INSTRUCTIONS
Fall Prevention in the Home, Adult  Falls can cause injuries and can affect people from all age groups. There are many simple things that you can do to make your home safe and to help prevent falls. Ask for help when making these changes, if needed.  What actions can I take to prevent falls?  General instructions  · Use good lighting in all rooms. Replace any light bulbs that burn out.  · Turn on lights if it is dark. Use night-lights.  · Place frequently used items in easy-to-reach places. Lower the shelves around your home if necessary.  · Set up furniture so that there are clear paths around it. Avoid moving your furniture around.  · Remove throw rugs and other tripping hazards from the floor.  · Avoid walking on wet floors.  · Fix any uneven floor surfaces.  · Add color or contrast paint or tape to grab bars and handrails in your home. Place contrasting color strips on the first and last steps of stairways.  · When you use a stepladder, make sure that it is completely opened and that the sides are firmly locked. Have someone hold the ladder while you are using it. Do not climb a closed stepladder.  · Be aware of any and all pets.  What can I do in the bathroom?         · Keep the floor dry. Immediately clean up any water that spills onto the floor.  · Remove soap buildup in the tub or shower on a regular basis.  · Use non-skid mats or decals on the floor of the tub or shower.  · Attach bath mats securely with double-sided, non-slip rug tape.  · If you need to sit down while you are in the shower, use a plastic, non-slip stool.  · Install grab bars by the toilet and in the tub and shower. Do not use towel bars as grab bars.  What can I do in the bedroom?  · Make sure that a bedside light is easy to reach.  · Do not use oversized bedding that drapes onto the floor.  · Have a firm chair that has side arms to use for getting dressed.  What can I do in the kitchen?  · Clean up any spills right away.  · If you  need to reach for something above you, use a sturdy step stool that has a grab bar.  · Keep electrical cables out of the way.  · Do not use floor polish or wax that makes floors slippery. If you must use wax, make sure that it is non-skid floor wax.  What can I do in the stairways?  · Do not leave any items on the stairs.  · Make sure that you have a light switch at the top of the stairs and the bottom of the stairs. Have them installed if you do not have them.  · Make sure that there are handrails on both sides of the stairs. Fix handrails that are broken or loose. Make sure that handrails are as long as the stairways.  · Install non-slip stair treads on all stairs in your home.  · Avoid having throw rugs at the top or bottom of stairways, or secure the rugs with carpet tape to prevent them from moving.  · Choose a carpet design that does not hide the edge of steps on the stairway.  · Check any carpeting to make sure that it is firmly attached to the stairs. Fix any carpet that is loose or worn.  What can I do on the outside of my home?  · Use bright outdoor lighting.  · Regularly repair the edges of walkways and driveways and fix any cracks.  · Remove high doorway thresholds.  · Trim any shrubbery on the main path into your home.  · Regularly check that handrails are securely fastened and in good repair. Both sides of any steps should have handrails.  · Install guardrails along the edges of any raised decks or porches.  · Clear walkways of debris and clutter, including tools and rocks.  · Have leaves, snow, and ice cleared regularly.  · Use sand or salt on walkways during winter months.  · In the garage, clean up any spills right away, including grease or oil spills.  What other actions can I take?  · Wear closed-toe shoes that fit well and support your feet. Wear shoes that have rubber soles or low heels.  · Use mobility aids as needed, such as canes, walkers, scooters, and crutches.  · Review your medicines with  your health care provider. Some medicines can cause dizziness or changes in blood pressure, which increase your risk of falling.  Talk with your health care provider about other ways that you can decrease your risk of falls. This may include working with a physical therapist or  to improve your strength, balance, and endurance.  Where to find more information  · Centers for Disease Control and Prevention, [place]: https://www.cdc.gov  · National [place] on Aging: https://li4phax.luca.nih.gov  Contact a health care provider if:  · You are afraid of falling at home.  · You feel weak, drowsy, or dizzy at home.  · You fall at home.  Summary  · There are many simple things that you can do to make your home safe and to help prevent falls.  · Ways to make your home safe include removing tripping hazards and installing grab bars in the bathroom.  · Ask for help when making these changes in your home.  This information is not intended to replace advice given to you by your health care provider. Make sure you discuss any questions you have with your health care provider.  Document Revised: 2018 Document Reviewed: 2018  [employer] Patient Education ©  [employer]    Medicare Wellness  Personal Prevention Plan of Service     Date of Office Visit:  2021  Encounter Provider:  Rigoberto Barnes MD  Place of Service:  [facility]  Patient Name: Shabbir Gambino  :  1940    As part of the Medicare Wellness portion of your visit today, we are providing you with this personalized preventive plan of services (PPPS). This plan is based upon recommendations of the [place] Preventive Services Task Force (USPSTF) and the Advisory Committee on Immunization Practices (ACIP).    This lists the preventive care services that should be considered, and provides dates of when you are due. Items listed as completed are up-to-date and do not require any further  intervention.    Health Maintenance   Topic Date Due   • COVID-19 Vaccine (1 of 2) 07/29/1956   • ZOSTER VACCINE (1 of 2) 07/29/1989   • ANNUAL WELLNESS VISIT  02/28/2021   • COLONOSCOPY  08/07/2021   • LIPID PANEL  08/28/2021   • INFLUENZA VACCINE  Completed   • Pneumococcal Vaccine 65+  Completed   • MENINGOCOCCAL VACCINE  Aged Out   • TDAP/TD VACCINES  Discontinued       No orders of the defined types were placed in this encounter.      Return in 6 months (on 9/2/2021).

## 2021-03-02 NOTE — PROGRESS NOTES
The ABCs of the Annual Wellness Visit  Subsequent Medicare Wellness Visit    Chief Complaint   Patient presents with   • Medicare Wellness-subsequent     Fasting       Subjective   History of Present Illness:  Shabbir Gambino is a 80 y.o. male who presents for a Subsequent Medicare Wellness Visit.    HEALTH RISK ASSESSMENT    Recent Hospitalizations:  No hospitalization(s) within the last year.    Current Medical Providers:  Patient Care Team:  Rigoberto Barnes MD as PCP - General (Family Medicine)  Rigoberto Barnes MD as PCP - Family Medicine  Rigoberto Barnes MD Hodges, Christopher Ray, PA as Physician Assistant (Neurology)  Stanton Escalante MD as Consulting Physician (Cardiology)  Randy Gomez MD as Consulting Physician (Gastroenterology)    Smoking Status:  Social History     Tobacco Use   Smoking Status Never Smoker   Smokeless Tobacco Never Used       Alcohol Consumption:  Social History     Substance and Sexual Activity   Alcohol Use No       Depression Screen:   PHQ-2/PHQ-9 Depression Screening 3/2/2021   Little interest or pleasure in doing things 0   Feeling down, depressed, or hopeless 0   Trouble falling or staying asleep, or sleeping too much -   Feeling tired or having little energy -   Poor appetite or overeating -   Feeling bad about yourself - or that you are a failure or have let yourself or your family down -   Trouble concentrating on things, such as reading the newspaper or watching television -   Moving or speaking so slowly that other people could have noticed. Or the opposite - being so fidgety or restless that you have been moving around a lot more than usual -   Thoughts that you would be better off dead, or of hurting yourself in some way -   Total Score 0   If you checked off any problems, how difficult have these problems made it for you to do your work, take care of things at home, or get along with other people? -       Fall Risk Screen:  LIDYA Fall Risk  Assessment was completed, and patient is at LOW risk for falls.Assessment completed on:3/2/2021    Health Habits and Functional and Cognitive Screening:  Functional & Cognitive Status 3/2/2021   Do you have difficulty preparing food and eating? No   Do you have difficulty bathing yourself, getting dressed or grooming yourself? No   Do you have difficulty using the toilet? No   Do you have difficulty moving around from place to place? No   Do you have trouble with steps or getting out of a bed or a chair? No   Current Diet Well Balanced Diet   Dental Exam Up to date   Eye Exam Up to date   Exercise (times per week) 3 times per week   Current Exercise Activities Include Walking   Do you need help using the phone?  No   Are you deaf or do you have serious difficulty hearing?  Yes   Do you need help with transportation? Yes   Do you need help shopping? No   Do you need help preparing meals?  No   Do you need help with housework?  No   Do you need help with laundry? No   Do you need help taking your medications? No   Do you need help managing money? No   Do you ever drive or ride in a car without wearing a seat belt? No   Have you felt unusual stress, anger or loneliness in the last month? No   Who do you live with? Spouse   If you need help, do you have trouble finding someone available to you? No   Have you been bothered in the last four weeks by sexual problems? No   Do you have difficulty concentrating, remembering or making decisions? No         Does the patient have evidence of cognitive impairment? No    Asprin use counseling:Does not need ASA (and currently is not on it)    Age-appropriate Screening Schedule:  Refer to the list below for future screening recommendations based on patient's age, sex and/or medical conditions. Orders for these recommended tests are listed in the plan section. The patient has been provided with a written plan.    Health Maintenance   Topic Date Due   • ZOSTER VACCINE (1 of 2)  07/29/1989   • COLONOSCOPY  08/07/2021   • LIPID PANEL  08/28/2021   • INFLUENZA VACCINE  Completed   • TDAP/TD VACCINES  Discontinued          The following portions of the patient's history were reviewed and updated as appropriate: allergies, current medications, past family history, past medical history, past social history, past surgical history and problem list.    Outpatient Medications Prior to Visit   Medication Sig Dispense Refill   • amLODIPine (NORVASC) 2.5 MG tablet TAKE ONE TABLET BY MOUTH EVERY EVENING 30 tablet 0   • atenolol (TENORMIN) 50 MG tablet TAKE 1 TABLET BY MOUTH DAILY 90 tablet 0   • carbidopa-levodopa (SINEMET)  MG per tablet TAKE ONE TO TWO TABLETS BY MOUTH AT BEDTIME 180 tablet 1   • CINNAMON PO Take 500 mg by mouth Daily.     • clopidogrel (PLAVIX) 75 MG tablet Take 75 mg by mouth Daily.     • colchicine-probenecid (COL-BENEMID) 0.5-500 MG per tablet TAKE ONE TABLET BY MOUTH EVERY DAY 30 tablet 2   • indapamide (LOZOL) 2.5 MG tablet Take 2.5 mg by mouth Every Morning.     • Multiple Vitamins-Minerals (EYE VITAMINS PO) Take 1 tablet by mouth Daily.       No facility-administered medications prior to visit.        Patient Active Problem List   Diagnosis   • Vascular disease   • RLS (restless legs syndrome)   • Insomnia   • Hypertension   • Gout   • Axonal polyneuropathy   • Atherosclerotic coronary vascular disease   • Arthritis   • Esophageal dysphagia   • Dysplasia of colon   • Irregular heart beat   • Cerebrovascular disease   • Leukocytosis   • Weight loss   • Pharyngoesophageal dysphagia   • History of adenomatous polyp of colon   • Skin cancer   • Coronary artery disease involving native coronary artery   • Peripheral vascular disease (CMS/HCC)   • Coagulopathy (CMS/HCC)       Advanced Care Planning:  ACP discussion was declined by the patient. Patient does not have an advance directive, declines further assistance.    Review of Systems   Respiratory: Negative for shortness of  "breath.    Cardiovascular: Positive for palpitations. Negative for chest pain and leg swelling.   Gastrointestinal:        Up-to-date on EGDs and colonoscopies   Genitourinary: Negative for difficulty urinating.   All other systems reviewed and are negative.      Compared to one year ago, the patient feels his physical health is the same.  Compared to one year ago, the patient feels his mental health is the same.    Reviewed chart for potential of high risk medication in the elderly: yes  Reviewed chart for potential of harmful drug interactions in the elderly:yes    Objective         Vitals:    03/02/21 0843   BP: 156/52   BP Location: Left arm   Patient Position: Sitting   Cuff Size: Adult   Pulse: 50   Resp: 14   Temp: 97.8 °F (36.6 °C)   TempSrc: Temporal   SpO2: 98%   Weight: 59.9 kg (132 lb)   Height: 170.2 cm (67\")   PainSc: 0-No pain       Body mass index is 20.67 kg/m².  Discussed the patient's BMI with him. The BMI is in the acceptable range.    Physical Exam  Vitals signs and nursing note reviewed.   Constitutional:       Appearance: Normal appearance. He is normal weight.   HENT:      Right Ear: Tympanic membrane and ear canal normal.      Left Ear: Tympanic membrane and ear canal normal.   Eyes:      Extraocular Movements: Extraocular movements intact.      Pupils: Pupils are equal, round, and reactive to light.   Neck:      Vascular: No carotid bruit.   Cardiovascular:      Rate and Rhythm: Normal rate and regular rhythm.      Pulses: Normal pulses.      Heart sounds: Normal heart sounds.   Pulmonary:      Effort: Pulmonary effort is normal.      Breath sounds: Normal breath sounds.   Abdominal:      General: Abdomen is flat.      Palpations: Abdomen is soft.   Genitourinary:     Comments: Deferred because of age  Musculoskeletal:      Right lower leg: No edema.      Left lower leg: No edema.   Lymphadenopathy:      Cervical: No cervical adenopathy.   Skin:     General: Skin is warm and dry.      " Capillary Refill: Capillary refill takes less than 2 seconds.   Neurological:      General: No focal deficit present.      Mental Status: He is alert and oriented to person, place, and time.   Psychiatric:         Mood and Affect: Mood normal.         Behavior: Behavior normal.         Thought Content: Thought content normal.         Judgment: Judgment normal.               Assessment/Plan   Medicare Risks and Personalized Health Plan  CMS Preventative Services Quick Reference  Fall Risk  Prostate Cancer Screening     The above risks/problems have been discussed with the patient.  Pertinent information has been shared with the patient in the After Visit Summary.  Follow up plans and orders are seen below in the Assessment/Plan Section.    Diagnoses and all orders for this visit:    1. Annual physical exam (Primary)    2. Peripheral vascular disease (CMS/HCC)    3. Coagulopathy (CMS/HCC)    4. Essential hypertension  -     POC Urinalysis Dipstick, Multipro    5. Hyperuricemia  -     Uric Acid    6. Mixed hyperlipidemia  -     Comprehensive Metabolic Panel  -     Lipid Panel    7. Fatigue, unspecified type  -     TSH  -     T4, free  -     CBC & Differential    8. Special screening for malignant neoplasm of prostate  -     PSA Screen    9. Elevated blood sugar  -     Hemoglobin A1c      Follow Up:  Return in 6 months (on 9/2/2021).     An After Visit Summary and PPPS were given to the patient.       Plan above-had Covid 19 vaccinations

## 2021-03-03 LAB
ALBUMIN SERPL-MCNC: 4.6 G/DL (ref 3.5–5.2)
ALBUMIN/GLOB SERPL: 1.5 G/DL
ALP SERPL-CCNC: 112 U/L (ref 39–117)
ALT SERPL-CCNC: 6 U/L (ref 1–41)
AST SERPL-CCNC: 22 U/L (ref 1–40)
BASOPHILS # BLD AUTO: 0.04 10*3/MM3 (ref 0–0.2)
BASOPHILS NFR BLD AUTO: 0.6 % (ref 0–1.5)
BILIRUB SERPL-MCNC: 0.6 MG/DL (ref 0–1.2)
BUN SERPL-MCNC: 23 MG/DL (ref 8–23)
BUN/CREAT SERPL: 16.1 (ref 7–25)
CALCIUM SERPL-MCNC: 10.4 MG/DL (ref 8.6–10.5)
CHLORIDE SERPL-SCNC: 93 MMOL/L (ref 98–107)
CHOLEST SERPL-MCNC: 236 MG/DL (ref 0–200)
CO2 SERPL-SCNC: 29.3 MMOL/L (ref 22–29)
CREAT SERPL-MCNC: 1.43 MG/DL (ref 0.76–1.27)
EOSINOPHIL # BLD AUTO: 0.11 10*3/MM3 (ref 0–0.4)
EOSINOPHIL NFR BLD AUTO: 1.6 % (ref 0.3–6.2)
ERYTHROCYTE [DISTWIDTH] IN BLOOD BY AUTOMATED COUNT: 13.3 % (ref 12.3–15.4)
GLOBULIN SER CALC-MCNC: 3 GM/DL
GLUCOSE SERPL-MCNC: 104 MG/DL (ref 65–99)
HBA1C MFR BLD: 5.9 % (ref 4.8–5.6)
HCT VFR BLD AUTO: 44.4 % (ref 37.5–51)
HDLC SERPL-MCNC: 46 MG/DL (ref 40–60)
HGB BLD-MCNC: 14.7 G/DL (ref 13–17.7)
IMM GRANULOCYTES # BLD AUTO: 0.23 10*3/MM3 (ref 0–0.05)
IMM GRANULOCYTES NFR BLD AUTO: 3.4 % (ref 0–0.5)
LDLC SERPL CALC-MCNC: 155 MG/DL (ref 0–100)
LYMPHOCYTES # BLD AUTO: 1.4 10*3/MM3 (ref 0.7–3.1)
LYMPHOCYTES NFR BLD AUTO: 21 % (ref 19.6–45.3)
MCH RBC QN AUTO: 29.6 PG (ref 26.6–33)
MCHC RBC AUTO-ENTMCNC: 33.1 G/DL (ref 31.5–35.7)
MCV RBC AUTO: 89.3 FL (ref 79–97)
MONOCYTES # BLD AUTO: 0.87 10*3/MM3 (ref 0.1–0.9)
MONOCYTES NFR BLD AUTO: 13 % (ref 5–12)
NEUTROPHILS # BLD AUTO: 4.02 10*3/MM3 (ref 1.7–7)
NEUTROPHILS NFR BLD AUTO: 60.4 % (ref 42.7–76)
NRBC BLD AUTO-RTO: 0 /100 WBC (ref 0–0.2)
PLATELET # BLD AUTO: 204 10*3/MM3 (ref 140–450)
POTASSIUM SERPL-SCNC: 5.3 MMOL/L (ref 3.5–5.2)
PROT SERPL-MCNC: 7.6 G/DL (ref 6–8.5)
PSA SERPL-MCNC: 1.68 NG/ML (ref 0–4)
RBC # BLD AUTO: 4.97 10*6/MM3 (ref 4.14–5.8)
SODIUM SERPL-SCNC: 135 MMOL/L (ref 136–145)
T4 FREE SERPL-MCNC: 0.98 NG/DL (ref 0.93–1.7)
TRIGL SERPL-MCNC: 194 MG/DL (ref 0–150)
TSH SERPL DL<=0.005 MIU/L-ACNC: 2.35 UIU/ML (ref 0.27–4.2)
URATE SERPL-MCNC: 6.6 MG/DL (ref 3.4–7)
VLDLC SERPL CALC-MCNC: 35 MG/DL (ref 5–40)
WBC # BLD AUTO: 6.67 10*3/MM3 (ref 3.4–10.8)

## 2021-03-05 DIAGNOSIS — R53.83 FATIGUE, UNSPECIFIED TYPE: ICD-10-CM

## 2021-03-05 DIAGNOSIS — E87.6 POTASSIUM (K) DEFICIENCY: ICD-10-CM

## 2021-03-05 DIAGNOSIS — I73.9 PERIPHERAL VASCULAR DISEASE (HCC): Primary | ICD-10-CM

## 2021-03-05 DIAGNOSIS — D68.9 COAGULOPATHY (HCC): ICD-10-CM

## 2021-03-05 DIAGNOSIS — E78.2 MIXED HYPERLIPIDEMIA: ICD-10-CM

## 2021-03-08 RX ORDER — CLOPIDOGREL BISULFATE 75 MG/1
TABLET ORAL
Qty: 90 TABLET | Refills: 3 | Status: SHIPPED | OUTPATIENT
Start: 2021-03-08 | End: 2022-04-25 | Stop reason: SDUPTHER

## 2021-03-12 RX ORDER — AMLODIPINE BESYLATE 5 MG/1
5 TABLET ORAL EVERY EVENING
Qty: 90 TABLET | Refills: 3 | Status: SHIPPED | OUTPATIENT
Start: 2021-03-12 | End: 2022-03-08

## 2021-03-12 NOTE — TELEPHONE ENCOUNTER
"Caller: FranklynmekaFlorinda carmen    Relationship: Emergency Contact    Best call back number: [phone]    Medication needed:   Requested Prescriptions     Pending Prescriptions Disp Refills   • amLODIPine (NORVASC) 2.5 MG tablet 30 tablet 0     Sig: Take 1 tablet by mouth Every Evening.       When do you need the refill by: 3/12/21    What details did the patient provide when requesting the medication: PATIENTS WIFE STATES THAT THEY WERE CALLED AND TOLD TO \"DOUBLE UP\" THEY WERE TOLD IT WAS GOING TO INCREASE TO 5MG. THEY ALSO WOULD LIKE A THREE MONTH SUPPLY.    Does the patient have less than a 3 day supply:  [x] Yes  [] No    What is the patient's preferred pharmacy: [facility] [address] [phone] [facility] [phone] FX     "

## 2021-04-05 ENCOUNTER — OFFICE VISIT (OUTPATIENT)
Dept: FAMILY MEDICINE CLINIC | Facility: CLINIC | Age: 81
End: 2021-04-05

## 2021-04-05 VITALS
BODY MASS INDEX: 20.56 KG/M2 | HEART RATE: 53 BPM | HEIGHT: 67 IN | OXYGEN SATURATION: 97 % | WEIGHT: 131 LBS | DIASTOLIC BLOOD PRESSURE: 72 MMHG | SYSTOLIC BLOOD PRESSURE: 150 MMHG | TEMPERATURE: 98.6 F | RESPIRATION RATE: 16 BRPM

## 2021-04-05 DIAGNOSIS — I10 ESSENTIAL HYPERTENSION: ICD-10-CM

## 2021-04-05 DIAGNOSIS — E87.6 POTASSIUM (K) DEFICIENCY: Primary | ICD-10-CM

## 2021-04-05 PROCEDURE — 99213 OFFICE O/P EST LOW 20 MIN: CPT | Performed by: FAMILY MEDICINE

## 2021-04-05 RX ORDER — CLONIDINE HYDROCHLORIDE 0.1 MG/1
TABLET ORAL
Qty: 90 TABLET | Refills: 3 | Status: SHIPPED | OUTPATIENT
Start: 2021-04-05

## 2021-04-05 NOTE — PROGRESS NOTES
Jaycee Gambino is a 80 y.o. male.     80-year-old male with resistant hypertension    Hypertension  This is a chronic problem. The current episode started more than 1 year ago. The problem is unchanged. The problem is resistant. Pertinent negatives include no chest pain, headaches or shortness of breath. There are no associated agents to hypertension. Risk factors for coronary artery disease include dyslipidemia, male gender, sedentary lifestyle and family history. Past treatments include beta blockers, calcium channel blockers and diuretics. Current antihypertension treatment includes diuretics, calcium channel blockers and beta blockers. The current treatment provides moderate improvement. Compliance problems include exercise and diet.  Hypertensive end-organ damage includes PVD. Identifiable causes of hypertension include chronic renal disease.       The following portions of the patient's history were reviewed and updated as appropriate: allergies, current medications, past family history, past medical history, past social history, past surgical history and problem list.    Review of Systems   Respiratory: Negative for shortness of breath.    Cardiovascular: Negative for chest pain and leg swelling.   Neurological: Negative for dizziness and headaches.       Objective   Physical Exam  Vitals and nursing note reviewed.   Constitutional:       Appearance: Normal appearance. He is normal weight.   Eyes:      Extraocular Movements: Extraocular movements intact.      Pupils: Pupils are equal, round, and reactive to light.   Cardiovascular:      Rate and Rhythm: Normal rate and regular rhythm.      Pulses: Normal pulses.      Heart sounds: Normal heart sounds.   Pulmonary:      Effort: Pulmonary effort is normal.      Breath sounds: Normal breath sounds.   Musculoskeletal:      Right lower leg: No edema.      Left lower leg: No edema.   Neurological:      General: No focal deficit present.      Mental  Status: He is oriented to person, place, and time.   Psychiatric:         Mood and Affect: Mood normal.         Behavior: Behavior normal.         Thought Content: Thought content normal.         Judgment: Judgment normal.         Assessment/Plan   Diagnoses and all orders for this visit:    1. Potassium (K) deficiency (Primary)  -     Basic Metabolic Panel    2. Essential hypertension    Other orders  -     cloNIDine (Catapres) 0.1 MG tablet; Take 1 at bedtime for blood pressure  Dispense: 90 tablet; Refill: 3         Plan above-complete Covid shots

## 2021-04-06 LAB
BUN SERPL-MCNC: 26 MG/DL (ref 8–23)
BUN/CREAT SERPL: 16.6 (ref 7–25)
CALCIUM SERPL-MCNC: 9.5 MG/DL (ref 8.6–10.5)
CHLORIDE SERPL-SCNC: 98 MMOL/L (ref 98–107)
CO2 SERPL-SCNC: 28.4 MMOL/L (ref 22–29)
CREAT SERPL-MCNC: 1.57 MG/DL (ref 0.76–1.27)
GLUCOSE SERPL-MCNC: 115 MG/DL (ref 65–99)
POTASSIUM SERPL-SCNC: 4.6 MMOL/L (ref 3.5–5.2)
SODIUM SERPL-SCNC: 137 MMOL/L (ref 136–145)

## 2021-04-21 ENCOUNTER — TELEPHONE (OUTPATIENT)
Dept: FAMILY MEDICINE CLINIC | Facility: CLINIC | Age: 81
End: 2021-04-21

## 2021-04-21 NOTE — TELEPHONE ENCOUNTER
PATIENT WIFE CALLED BACK WANTING TO LET DR ADAIR KNOW THAT THEY WENT TO THE HEALTH DEPARTMENT FOR HIS BLOOD PRESSURE. IT /64    GOOD CALL BACK:  [phone]

## 2021-05-27 RX ORDER — ATENOLOL 50 MG/1
TABLET ORAL
Qty: 90 TABLET | Refills: 3 | Status: SHIPPED | OUTPATIENT
Start: 2021-05-27 | End: 2022-05-24

## 2021-06-07 DIAGNOSIS — M10.9 GOUT, UNSPECIFIED CAUSE, UNSPECIFIED CHRONICITY, UNSPECIFIED SITE: ICD-10-CM

## 2021-06-07 RX ORDER — PROBENECID AND COLCHICINE 500; .5 MG/1; MG/1
TABLET ORAL
Qty: 30 TABLET | Refills: 2 | Status: SHIPPED | OUTPATIENT
Start: 2021-06-07 | End: 2021-12-07

## 2021-07-09 ENCOUNTER — HOSPITAL ENCOUNTER (OUTPATIENT)
Dept: VASCULAR LAB | Age: 81
Discharge: HOME | End: 2021-07-09
Payer: MEDICARE

## 2021-07-09 DIAGNOSIS — I65.23 BILATERAL CAROTID ARTERY STENOSIS: ICD-10-CM

## 2021-07-09 PROCEDURE — 93880 EXTRACRANIAL BILAT STUDY: CPT

## 2021-07-19 ENCOUNTER — OFFICE VISIT (OUTPATIENT)
Dept: GASTROENTEROLOGY | Facility: CLINIC | Age: 81
End: 2021-07-19

## 2021-07-19 VITALS
OXYGEN SATURATION: 98 % | TEMPERATURE: 96.6 F | SYSTOLIC BLOOD PRESSURE: 152 MMHG | HEIGHT: 68 IN | BODY MASS INDEX: 20 KG/M2 | DIASTOLIC BLOOD PRESSURE: 68 MMHG | WEIGHT: 132 LBS | HEART RATE: 45 BPM

## 2021-07-19 DIAGNOSIS — Z86.010 HISTORY OF ADENOMATOUS POLYP OF COLON: Primary | ICD-10-CM

## 2021-07-19 DIAGNOSIS — D68.9 COAGULOPATHY (HCC): ICD-10-CM

## 2021-07-19 PROCEDURE — 99214 OFFICE O/P EST MOD 30 MIN: CPT | Performed by: NURSE PRACTITIONER

## 2021-07-19 NOTE — PROGRESS NOTES
[facility]    Primary Physician Rigoberto Barnes MD    7/19/2021    Shabbir Gambino   1940      Chief Complaint   Patient presents with   • Colon Cancer Screening     last colon 08/2018-- hx of polyps       Subjective     HPI    Shabbir Gambino is a 80 y.o. male who presents as a referral for preventative maintenance. He has no complaints of nausea or vomiting. No change in bowels. No wt loss. No BRBPR. No melena. No abdominal pain.        Last colonoscopy was 8/2018 noting 5 polyps ( path adenomatous) and sigmoid diverticulosis. The patient does not  have history of colon cancer.  There is not a family history of colon polyps/colon cancer.        He is on plavix for history of cardiac stent. Cardiologist is Dr. Stanton Escalante at [facility].     Past Medical History:   Diagnosis Date   • Atherosclerotic coronary vascular disease    • Axonal polyneuropathy    • Coagulopathy (CMS/HCC) 2/28/2020   • Colon polyp    • Gout    • History of transfusion    • Hyperlipidemia    • Hypertension    • Insomnia    • Irregular heart beat    • Precancerous skin lesion    • RLS (restless legs syndrome)    • TIA (transient ischemic attack)     2014   • Vascular disease     CAROTID       Past Surgical History:   Procedure Laterality Date   • CAROTID ENDARTERECTOMY Bilateral     2007, 2008   • COLON RESECTION N/A 10/24/2017    Procedure: COLON RESECTION  MID TRANSVERSE LAPAROSCOPIC;  Surgeon: Adal Quintero MD;  Location: [facility] OR;  Service:    • COLONOSCOPY      MULTIPLE    • COLONOSCOPY N/A 9/7/2017    Procedure: colonoscopy with anesthesia;  Surgeon: Randy Gomez MD;  Location: [facility] ENDOSCOPY;  Service:    • COLONOSCOPY N/A 8/7/2018    Procedure: COLONOSCOPY WITH ANESTHESIA;  Surgeon: Randy Gomez MD;  Location: [facility] ENDOSCOPY;  Service: Gastroenterology   • CORONARY ARTERY BYPASS GRAFT  2006   • CORONARY STENT PLACEMENT     • CORONARY STENT PLACEMENT     • ENDOSCOPY N/A 9/7/2017     Procedure: ESOPHAGOGASTRODUODENOSCOPY WITH ANESTHESIA;  Surgeon: Randy Gomez MD;  Location: [facility] ENDOSCOPY;  Service:    • ENDOSCOPY N/A 8/7/2018    Procedure: ESOPHAGOGASTRODUODENOSCOPY WITH ANESTHESIA;  Surgeon: Randy Gomez MD;  Location: [facility] ENDOSCOPY;  Service: Gastroenterology   • EYE SURGERY Bilateral     2014, 2015   • KNEE SURGERY Right     x 2       Outpatient Medications Marked as Taking for the 7/19/21 encounter (Office Visit) with Stormy Pena APRN   Medication Sig Dispense Refill   • amLODIPine (NORVASC) 5 MG tablet Take 1 tablet by mouth Every Evening. 90 tablet 3   • atenolol (TENORMIN) 50 MG tablet TAKE 1 TABLET BY MOUTH DAILY 90 tablet 3   • carbidopa-levodopa (SINEMET)  MG per tablet TAKE ONE TO TWO TABLETS BY MOUTH AT BEDTIME 180 tablet 1   • CINNAMON PO Take 500 mg by mouth Daily.     • cloNIDine (Catapres) 0.1 MG tablet Take 1 at bedtime for blood pressure 90 tablet 3   • clopidogrel (PLAVIX) 75 MG tablet Take 75 mg by mouth Daily.     • colchicine-probenecid (COL-BENEMID) 0.5-500 MG per tablet TAKE ONE TABLET BY MOUTH EVERY DAY 30 tablet 2   • indapamide (LOZOL) 2.5 MG tablet Take 2.5 mg by mouth Every Morning.     • Multiple Vitamins-Minerals (EYE VITAMINS PO) Take 1 tablet by mouth Daily.         Allergies   Allergen Reactions   • Other Anaphylaxis     UNKNOWN TO PATIENT ENVIRONMENTAL/FOOD ALLERGY    • Ezetimibe Unknown (See Comments)     Leg cramps   • Niacin And Related Unknown (See Comments)     Cramping red and flush   • Statins Other (See Comments)     Leg cramps       Social History     Socioeconomic History   • Marital status:      Spouse name: Not on file   • Number of children: Not on file   • Years of education: Not on file   • Highest education level: Not on file   Tobacco Use   • Smoking status: Never Smoker   • Smokeless tobacco: Never Used   Substance and Sexual Activity   • Alcohol use: No   • Drug use: No   • Sexual activity: Defer        Family History   Problem Relation Age of Onset   • Stroke Mother    • Diabetes Mother    • No Known Problems Father    • Esophageal cancer Sister    • No Known Problems Maternal Grandmother    • No Known Problems Maternal Grandfather    • No Known Problems Paternal Grandmother    • No Known Problems Paternal Grandfather    • Colon polyps Neg Hx    • Colon cancer Neg Hx        Review of Systems   Constitutional: Negative for chills, fever and unexpected weight change.   Respiratory: Negative for cough, shortness of breath and wheezing.    Cardiovascular: Negative for chest pain and palpitations.   Gastrointestinal: Negative for abdominal distention, abdominal pain, anal bleeding, blood in stool, constipation, diarrhea, nausea and vomiting.       Objective     Vitals:    07/19/21 1305   BP: 152/68   Pulse: (!) 45   Temp: 96.6 °F (35.9 °C)   SpO2: 98%         07/19/21  1305   Weight: 59.9 kg (132 lb)     Body mass index is 20.07 kg/m².    Physical Exam  Vitals reviewed.   Constitutional:       General: He is not in acute distress.  Cardiovascular:      Rate and Rhythm: Normal rate and regular rhythm.      Heart sounds: Normal heart sounds.   Pulmonary:      Effort: Pulmonary effort is normal.      Breath sounds: Normal breath sounds.   Abdominal:      General: Bowel sounds are normal. There is no distension.      Palpations: Abdomen is soft.      Tenderness: There is no abdominal tenderness.   Skin:     General: Skin is warm and dry.   Neurological:      Mental Status: He is alert.         Imaging Results (Most Recent)     None          Assessment/Plan     Diagnoses and all orders for this visit:    1. History of adenomatous polyp of colon (Primary)  Comments:  history of large sessile adenomatous polyp with high grade dysplasia.       2. Coagulopathy (CMS/HCC)  Comments:  plavix.          Plan for colonoscopy.  However I will send a letter to Dr. Stanton Escalnate in regards to if the patient can safely hold plavix 7  days prior to procedure. I will contact patient once he has responeded.        Colonoscopy   All risks, benefits, alternatives, and indications of colonoscopy procedure have been discussed with the patient. Risks to include perforation of the colon requiring possible surgery or colostomy, risk of bleeding from biopsies or removal of colon tissue, possibility of missing a colon polyp or cancer, or adverse drug reaction.  Benefits to include the diagnosis and management of disease of the colon and rectum. Alternatives to include barium enema, radiographic evaluation, lab testing or no intervention. Pt verbalizes understanding and agrees.     The patient was advised on the risks of stopping blood thinners for a procedure.  The risks discussed included the risk of developing myocardial infarction, CVA, embolus, clogging of the arteries or stents, etc.  We discussed the potential consequences of the risks discussed.  The benefits of stopping as well as the alternatives were discussed as well.   Patient is to hold their anticoagulation medication per the direction of their prescribing physician, Dr. Stanton Escalante.   A letter will be sent to prescribing This is to prevent any risk or complication from bleeding intra and post procedure. If they develop bleeding post procedure they are to go the emergency department for further evaluation and treatment immediately.           VERONIKA Ryan

## 2021-07-20 ENCOUNTER — OFFICE VISIT (OUTPATIENT)
Dept: CARDIOLOGY CLINIC | Age: 81
End: 2021-07-20
Payer: MEDICARE

## 2021-07-20 VITALS
HEIGHT: 68 IN | SYSTOLIC BLOOD PRESSURE: 132 MMHG | WEIGHT: 133 LBS | OXYGEN SATURATION: 97 % | HEART RATE: 60 BPM | DIASTOLIC BLOOD PRESSURE: 60 MMHG | BODY MASS INDEX: 20.16 KG/M2

## 2021-07-20 DIAGNOSIS — I25.10 CORONARY ARTERY DISEASE INVOLVING NATIVE CORONARY ARTERY OF NATIVE HEART WITHOUT ANGINA PECTORIS: Primary | ICD-10-CM

## 2021-07-20 PROCEDURE — G8420 CALC BMI NORM PARAMETERS: HCPCS | Performed by: INTERNAL MEDICINE

## 2021-07-20 PROCEDURE — 99214 OFFICE O/P EST MOD 30 MIN: CPT | Performed by: INTERNAL MEDICINE

## 2021-07-20 PROCEDURE — 4040F PNEUMOC VAC/ADMIN/RCVD: CPT | Performed by: INTERNAL MEDICINE

## 2021-07-20 PROCEDURE — 1123F ACP DISCUSS/DSCN MKR DOCD: CPT | Performed by: INTERNAL MEDICINE

## 2021-07-20 PROCEDURE — 1036F TOBACCO NON-USER: CPT | Performed by: INTERNAL MEDICINE

## 2021-07-20 PROCEDURE — G8427 DOCREV CUR MEDS BY ELIG CLIN: HCPCS | Performed by: INTERNAL MEDICINE

## 2021-07-20 NOTE — PATIENT INSTRUCTIONS
Please arrive 15 minutes early to each appointment and bring an updated medication list or current bottles.

## 2021-07-20 NOTE — PROGRESS NOTES
HISTORY  43-year-old vasculopath with history of dyslipidemia, hypertension, cerebrovascular and coronary disease returns for routine follow-up visit. With a history of remote bypass grafting he underwent angiographic reassessment most recently in March 2019 demonstrating patent LIMA to the LAD with occlusion of his 6 vein bypasses. At that time he received a stent to his distal circumflex and first obtuse marginal.  As concerns his carotid disease duplex in 2019 revealed bilateral 50 to 69% obstructions with a history of bilateral carotid endarterectomies. On return today he denies symptoms 1 might interpret his TIAs as well as any chest discomfort. He is on no therapy for his lipids because of statin intolerance and inability to pay the $450 co-pay for a newer agent. He has been vaccinated for COVID-19. PHYSICAL EXAM  On exam carries 133 pounds in a 5 foot 8 inch frame. Pressure is 132/60 with pulse of 60. Thin elderly gentleman with a normal male balding pattern. EOMs full, sclerae and conjunctiva normal. PERRLA. Mask in place. Trachea midline with no neck masses. Assessment of internal jugular veins reveals no elevation of central venous pressure at 45 degrees. Bilateral carotid endarterectomy scars. Carotid pulses normal with a low pitched right and an extremely high pitched left carotid bruit. Thyroid normal to palpation. Healed midline sternotomy scar. Chest exam reveals normal respiratory effort, no abnormal breath sounds and normal expiratory phase. No skin lesions seen. PMI normal. S1, S2 normal with 1/6 systolic murmur. Normal bowel sounds without palpable mass or bruit. No clubbing or acrocyanosis. No significant lower extremity edema or signs of venous insufficiency. General motor strength appears to be within normal limits. Normal range of motion with normal gait. Alert, oriented x 3, memory and cognition normal as reflected by history and conversation.   EKG reveals sinus bradycardia at 40 with a first-degree AV block [228 ms], right ventricular conduction delay, and voltage for LVH. ASSESSMENT/PLAN:   1. Carotid disease -worrisome high-pitched left carotid bruit. Awaits phone call tomorrow with the results of her recent duplex. Discussed with him today's physical findings and their significance. 2.  Dyslipidemia -unable to address pharmacologically  3. Hypertension -adequate control. Continue indapamide amlodipine and atenolol. 4.  Coronary disease -clearly a defective anginal warning system. Continue clopidogrel and atenolol  5.   Pandemic response -appropriate/vaccinated [id number]

## 2021-07-21 ENCOUNTER — VIRTUAL VISIT (OUTPATIENT)
Dept: VASCULAR SURGERY | Age: 81
End: 2021-07-21
Payer: MEDICARE

## 2021-07-21 DIAGNOSIS — I65.23 BILATERAL CAROTID ARTERY STENOSIS: Primary | ICD-10-CM

## 2021-07-21 PROCEDURE — 99442 PR PHYS/QHP TELEPHONE EVALUATION 11-20 MIN: CPT | Performed by: NURSE PRACTITIONER

## 2021-07-21 NOTE — PROGRESS NOTES
cough,  Cardiovascular  no chest pain, syncope, or significant dizziness. No significant leg swelling.  has not had claudication. Skin   has not had new wound. Neurologic   No speech difficulty or lateralizing weakness. Psychiatric  no severe anxiety or nervousness. No confusion. All other review of systems are negative. PHYSICAL EXAMINATION:    [ INSTRUCTIONS:  \"[x]\" Indicates a positive item  \"[]\" Indicates a negative item  -- DELETE ALL ITEMS NOT EXAMINED]    [x] Alert  [x] Oriented to person/place/time    [x] No apparent distress  [] Toxic appearing  [x] Normal Mood  [] Anxious appearing    [] Depressed appearing  [] Confused appearing      [] Poor short term memory  [] Poor long term memory   Memory appears to be intact       Doppler results:    Right CCA/ICA 50-69% stenotic  Left CCA/ICA 50-69% stenotic  Right verterbral artery flow is antegrade  Left verterbral artery flow is antegrade  Individual velocities reviewed: Yes. Results were reviewed with the patient. Disease process is stable and chronic        Assessment    1. Bilateral carotid artery stenosis          Plan    1. Bilateral carotid artery stenosis  -     VL DUP CAROTID BILATERAL; Future    Patient instructed to call or proceed to the emergency room with any symptoms of lateralizing weakness, loss of vision in one eye, or episodes slurred speech. Continue plavix/praulent  Patient instructed to call or proceed to the emergency room with any symptoms of lateralizing weakness, loss of vision in one eye, or episodes slurred speech. 6 months with cvls  Strongly encouraged start/continue statin therapy  Recommended no smoking        Documentation:  I communicated with the patient and/or health care decision maker about cvd.    Details of this discussion including any medical advice provided: as above      I affirm this is a Patient Initiated Episode with a Patient who has not had a related appointment within my department in the past 7 days or scheduled within the next 24 hours. Patient identification was verified at the start of the visit: Yes    Total Time: minutes: 11-20 minutes    The visit was conducted pursuant to the emergency declaration under the [address] [place] [address] and the [employer] General Act. Patient identification was verified, and a caregiver was present when appropriate. The patient was located in a state where the provider was credentialed to provide care.     Note: not billable if this call serves to triage the patient into an appointment for the relevant concern      ANTHONY Dangelo

## 2021-07-23 ENCOUNTER — TELEPHONE (OUTPATIENT)
Dept: CARDIOLOGY CLINIC | Age: 81
End: 2021-07-23

## 2021-07-23 NOTE — TELEPHONE ENCOUNTER
Date: TBD    Cardiologist: Braxton Villalta    Procedure: Colonoscoyp    Surgeon: Lauren Cummings    Last Office Visit: 7/20/21  Reason for office visit and medical concerns addressed at this office visit: cad, cva, htn, hyperlipidemia    Testing Performed and Date of Service:  3/19/19 Cath Triple-vessel disease   Patent LIMA to the distal LAD   All vein grafts are occluded   Successful PCI to distal circumflex and OM1 with drug-eluting stents  Does the patient have a stent? If so, what type? DEIRDRE 2019    Current Medications: amlodipine, atenolol, sinemet, cinnamon, plavix, colchicine, indapamide,     Is the patient currently taking an anticoagulant?  If so, what is the diagnosis the patient has been given to warrant the need for the anticoagulant? plavix for DEIRDRE on 3/19/19    Additional Notes: requesting to hold plavix 7 days prior to procedure

## 2021-07-29 NOTE — TELEPHONE ENCOUNTER
Cleared to hold plavix x 7 days HPI:   Johny Broussard is a 31year old female who presents for a complete physical exam. Symptoms: denies discharge, itching, burning or dysuria. Patient has no complains. She has some constipation.   Goes to the bathroom every 2  to 3 days  Patient had el Value   08/24/2016 130 (H)   01/11/2016 14 (L)     ALT (U/L)   Date Value   08/24/2016 62 (H)   01/11/2016 15      No results found for: GLUCOSE     Current Outpatient Medications   Medication Sig Dispense Refill   • Norethin Ace-Eth Estrad-MEL LEAL 1/ are clear  NECK: supple,no adenopathy,  CHEST: no chest tenderness  BREAST: no dominant or suspicious mass  LUNGS: clear to auscultation  CARDIO: RRR without murmur  GI: good BS's,no masses, HSM or tenderness  : deferred.   Kael Horowitz

## 2021-08-09 ENCOUNTER — PREP FOR SURGERY (OUTPATIENT)
Dept: OTHER | Facility: HOSPITAL | Age: 81
End: 2021-08-09

## 2021-08-09 ENCOUNTER — TELEPHONE (OUTPATIENT)
Dept: GASTROENTEROLOGY | Facility: CLINIC | Age: 81
End: 2021-08-09

## 2021-08-09 DIAGNOSIS — Z86.010 HISTORY OF ADENOMATOUS POLYP OF COLON: Primary | ICD-10-CM

## 2021-08-09 NOTE — TELEPHONE ENCOUNTER
Please let patient know that Dr. Stanton Escalante approved him to hold plavix x 7 days prior to procedure. I will put in case request for colonoscopy , miralax prep. Thank you

## 2021-08-10 NOTE — TELEPHONE ENCOUNTER
PT is scheduled for 8-26-21  Prep instructions mailed.  Wife voiced understanding PT needs to be off Plavis 7 days prior to procedure.

## 2021-08-16 ENCOUNTER — OFFICE VISIT (OUTPATIENT)
Dept: FAMILY MEDICINE CLINIC | Facility: CLINIC | Age: 81
End: 2021-08-16

## 2021-08-16 ENCOUNTER — TELEPHONE (OUTPATIENT)
Dept: FAMILY MEDICINE CLINIC | Facility: CLINIC | Age: 81
End: 2021-08-16

## 2021-08-16 ENCOUNTER — TELEPHONE (OUTPATIENT)
Dept: GASTROENTEROLOGY | Facility: CLINIC | Age: 81
End: 2021-08-16

## 2021-08-16 VITALS — TEMPERATURE: 98.6 F

## 2021-08-16 DIAGNOSIS — U07.1 COVID-19: Primary | ICD-10-CM

## 2021-08-16 PROCEDURE — G2025 DIS SITE TELE SVCS RHC/FQHC: HCPCS | Performed by: NURSE PRACTITIONER

## 2021-08-16 RX ORDER — AZITHROMYCIN 250 MG/1
TABLET, FILM COATED ORAL
Qty: 6 TABLET | Refills: 0 | Status: SHIPPED | OUTPATIENT
Start: 2021-08-16 | End: 2021-09-22

## 2021-08-16 RX ORDER — PREDNISONE 10 MG/1
TABLET ORAL
Qty: 21 TABLET | Refills: 0 | Status: SHIPPED | OUTPATIENT
Start: 2021-08-16 | End: 2021-09-22

## 2021-08-16 RX ORDER — CHOLECALCIFEROL (VITAMIN D3) 50 MCG
2000 TABLET ORAL DAILY
Qty: 30 TABLET | Refills: 0 | Status: SHIPPED | OUTPATIENT
Start: 2021-08-16 | End: 2021-09-07

## 2021-08-16 RX ORDER — GUAIFENESIN 600 MG/1
1200 TABLET, EXTENDED RELEASE ORAL 2 TIMES DAILY
Qty: 56 TABLET | Refills: 0 | Status: SHIPPED | OUTPATIENT
Start: 2021-08-16 | End: 2022-03-04

## 2021-08-16 RX ORDER — ZINC GLUCONATE 50 MG
50 TABLET ORAL DAILY
Qty: 30 TABLET | Refills: 0 | Status: SHIPPED | OUTPATIENT
Start: 2021-08-16 | End: 2021-09-22

## 2021-08-16 RX ORDER — FAMOTIDINE 20 MG/1
20 TABLET, FILM COATED ORAL 2 TIMES DAILY
Qty: 30 TABLET | Refills: 0 | Status: SHIPPED | OUTPATIENT
Start: 2021-08-16 | End: 2021-08-23

## 2021-08-16 NOTE — TELEPHONE ENCOUNTER
Spoke with Emory.  States took patient to ED on Friday.  Patient has not run fever in 2 days.  Patient has chest congestion, body aches.  She could not go into ED with patient.  Patient states received injection, did not prescribe any medications.  Was advised to contact PCP.

## 2021-08-16 NOTE — PROGRESS NOTES
CC:  COVID    History:  Shabbir Gambino is a 81 y.o. male who presents today for evaluation of the above problems.      Symptoms started Friday at 2 pm.  Took him to [facility] [place], [place].    Diagnosed with COVID and gave him a shot, not sure what it was, but didn't send any other medications. Told him to contact PCP.  Initially had sore throat, myalgiam, chills and cough. States that now he feels like he has a cold. Has been taking Tylenol and it has helped.     HPI  ROS:  Review of Systems   Constitutional: Positive for chills and fever.   HENT: Positive for congestion, sinus pressure and sore throat.    Respiratory: Positive for cough. Negative for shortness of breath.    Gastrointestinal: Negative for diarrhea and nausea.   Musculoskeletal: Positive for arthralgias and myalgias.       Allergies   Allergen Reactions   • Other Anaphylaxis     UNKNOWN TO PATIENT ENVIRONMENTAL/FOOD ALLERGY    • Ezetimibe Unknown (See Comments)     Leg cramps   • Niacin And Related Unknown (See Comments)     Cramping red and flush   • Statins Other (See Comments)     Leg cramps     Past Medical History:   Diagnosis Date   • Atherosclerotic coronary vascular disease    • Axonal polyneuropathy    • Coagulopathy (CMS/HCC) 2/28/2020   • Colon polyp    • Gout    • History of transfusion    • Hyperlipidemia    • Hypertension    • Insomnia    • Irregular heart beat    • Precancerous skin lesion    • RLS (restless legs syndrome)    • TIA (transient ischemic attack)     2014   • Vascular disease     CAROTID     Past Surgical History:   Procedure Laterality Date   • CAROTID ENDARTERECTOMY Bilateral     2007, 2008   • COLON RESECTION N/A 10/24/2017    Procedure: COLON RESECTION  MID TRANSVERSE LAPAROSCOPIC;  Surgeon: Adal Quintero MD;  Location: [facility] OR;  Service:    • COLONOSCOPY      MULTIPLE    • COLONOSCOPY N/A 9/7/2017    Procedure: colonoscopy with anesthesia;  Surgeon: Randy Gomez MD;  Location: [facility] ENDOSCOPY;  Service:    • COLONOSCOPY  N/A 8/7/2018    Procedure: COLONOSCOPY WITH ANESTHESIA;  Surgeon: Randy Gomez MD;  Location: [facility] ENDOSCOPY;  Service: Gastroenterology   • CORONARY ARTERY BYPASS GRAFT  2006   • CORONARY STENT PLACEMENT     • CORONARY STENT PLACEMENT     • ENDOSCOPY N/A 9/7/2017    Procedure: ESOPHAGOGASTRODUODENOSCOPY WITH ANESTHESIA;  Surgeon: Randy Gomez MD;  Location: [facility] ENDOSCOPY;  Service:    • ENDOSCOPY N/A 8/7/2018    Procedure: ESOPHAGOGASTRODUODENOSCOPY WITH ANESTHESIA;  Surgeon: Randy Gomez MD;  Location: [facility] ENDOSCOPY;  Service: Gastroenterology   • EYE SURGERY Bilateral     2014, 2015   • KNEE SURGERY Right     x 2     Family History   Problem Relation Age of Onset   • Stroke Mother    • Diabetes Mother    • No Known Problems Father    • Esophageal cancer Sister    • No Known Problems Maternal Grandmother    • No Known Problems Maternal Grandfather    • No Known Problems Paternal Grandmother    • No Known Problems Paternal Grandfather    • Colon polyps Neg Hx    • Colon cancer Neg Hx       reports that he has never smoked. He has never used smokeless tobacco. He reports that he does not drink alcohol and does not use drugs.      Current Outpatient Medications:   •  amLODIPine (NORVASC) 5 MG tablet, Take 1 tablet by mouth Every Evening., Disp: 90 tablet, Rfl: 3  •  atenolol (TENORMIN) 50 MG tablet, TAKE 1 TABLET BY MOUTH DAILY, Disp: 90 tablet, Rfl: 3  •  carbidopa-levodopa (SINEMET)  MG per tablet, TAKE ONE TO TWO TABLETS BY MOUTH AT BEDTIME, Disp: 180 tablet, Rfl: 1  •  CINNAMON PO, Take 500 mg by mouth Daily., Disp: , Rfl:   •  cloNIDine (Catapres) 0.1 MG tablet, Take 1 at bedtime for blood pressure, Disp: 90 tablet, Rfl: 3  •  clopidogrel (PLAVIX) 75 MG tablet, Take 75 mg by mouth Daily., Disp: , Rfl:   •  colchicine-probenecid (COL-BENEMID) 0.5-500 MG per tablet, TAKE ONE TABLET BY MOUTH EVERY DAY, Disp: 30 tablet, Rfl: 2  •  indapamide (LOZOL) 2.5 MG tablet, Take 2.5 mg by mouth  Every Morning., Disp: , Rfl:   •  Multiple Vitamins-Minerals (EYE VITAMINS PO), Take 1 tablet by mouth Daily., Disp: , Rfl:   •  azithromycin (Zithromax) 250 MG tablet, Take 2 tablets the first day, then 1 tablet daily for 4 days., Disp: 6 tablet, Rfl: 0  •  Cholecalciferol (Vitamin D) 50 MCG (2000 UT) tablet, Take 2,000 Units by mouth Daily., Disp: 30 tablet, Rfl: 0  •  famotidine (Pepcid) 20 MG tablet, Take 1 tablet by mouth 2 (Two) Times a Day., Disp: 30 tablet, Rfl: 0  •  guaiFENesin (Mucinex) 600 MG 12 hr tablet, Take 2 tablets by mouth 2 (Two) Times a Day., Disp: 56 tablet, Rfl: 0  •  predniSONE (DELTASONE) 10 MG (21) dose pack, Use as directed on package, Disp: 21 tablet, Rfl: 0  •  zinc gluconate 50 MG tablet, Take 1 tablet by mouth Daily., Disp: 30 tablet, Rfl: 0    OBJECTIVE:  Temp 98.6 °F (37 °C) (Oral)    Physical Exam  Psychiatric:         Mood and Affect: Mood and affect normal.         Speech: Speech normal.         Assessment/Plan    Diagnoses and all orders for this visit:    1. COVID-19 (Primary)  -     azithromycin (Zithromax) 250 MG tablet; Take 2 tablets the first day, then 1 tablet daily for 4 days.  Dispense: 6 tablet; Refill: 0  -     predniSONE (DELTASONE) 10 MG (21) dose pack; Use as directed on package  Dispense: 21 tablet; Refill: 0  -     famotidine (Pepcid) 20 MG tablet; Take 1 tablet by mouth 2 (Two) Times a Day.  Dispense: 30 tablet; Refill: 0  -     Cholecalciferol (Vitamin D) 50 MCG (2000 UT) tablet; Take 2,000 Units by mouth Daily.  Dispense: 30 tablet; Refill: 0  -     zinc gluconate 50 MG tablet; Take 1 tablet by mouth Daily.  Dispense: 30 tablet; Refill: 0  -     guaiFENesin (Mucinex) 600 MG 12 hr tablet; Take 2 tablets by mouth 2 (Two) Times a Day.  Dispense: 56 tablet; Refill: 0    Meds sent to pharmacy and order sent to [facility] for monoclonal antibody infusion.    This visit has been rescheduled as a phone visit to comply with patient safety concerns in accordance with CDC  recommendations. Total time of discussion was 5 minutes.        An After Visit Summary was printed and given to the patient at discharge.  Return if symptoms worsen or fail to improve, for Next scheduled follow up.       VERONIKA Marley 8/16/21    Electronically signed.

## 2021-08-16 NOTE — TELEPHONE ENCOUNTER
Caller: Florinda Gambino    Relationship to patient: Emergency Contact    Best call back number: [phone] (H)  Date of exposure:     [facility]     Date if possible COVID19 exposure:   COVID19 symptoms:DID NOT STATE    Date of initial quarantine: Friday AUG 13     Additional information or concerns: WANTS TO SPEAK WITH CLINICAL TEAM ABOUT HIS SYMPTOMS OF PAIN IN HIS SHOULDERS AND IS REQUESTING A Z PACK OR SOMETHING THAT DR OCHOA ADVISES FOR  HIM TO TAKE  FOR HIS DEEP COLD SYMPTOMS WITH  COUGH     What is the patients preferred pharmacy:  [facility]/pharmacy #[id number] - [place], [place] - [address] [place] [address] - [phone] [facility] [phone]   [phone]    ADD NOTES   REQUESTING CALL BACK

## 2021-08-23 DIAGNOSIS — U07.1 COVID-19: ICD-10-CM

## 2021-08-23 RX ORDER — FAMOTIDINE 20 MG/1
TABLET, FILM COATED ORAL
Qty: 30 TABLET | Refills: 0 | Status: SHIPPED | OUTPATIENT
Start: 2021-08-23 | End: 2021-09-22

## 2021-08-23 NOTE — TELEPHONE ENCOUNTER
Rx Refill Note  Requested Prescriptions     Pending Prescriptions Disp Refills   • famotidine (PEPCID) 20 MG tablet [Pharmacy Med Name: FAMOTIDINE 20 MG TABLET] 30 tablet 0     Sig: TAKE 1 TABLET BY MOUTH TWICE A DAY      Last office visit with prescribing clinician: 8/16/2021      Next office visit with prescribing clinician: Visit date not found            Abena Yu MA  08/23/21, 10:36 CDT

## 2021-08-24 NOTE — TELEPHONE ENCOUNTER
Rx Refill Note  Requested Prescriptions     Pending Prescriptions Disp Refills   • carbidopa-levodopa (SINEMET)  MG per tablet [Pharmacy Med Name: CARBIDOPA-LEVODOPA 25-100MG TABS] 180 tablet 1     Sig: TAKE ONE TO TWO TABLETS BY MOUTH AT BEDTIME      Last office visit with prescribing clinician: 4/5/2021      Next office visit with prescribing clinician: 9/2/2021            Abena Yu MA  08/24/21, 10:23 CDT

## 2021-09-07 DIAGNOSIS — U07.1 COVID-19: ICD-10-CM

## 2021-09-07 RX ORDER — CHOLECALCIFEROL (VITAMIN D3) 10 MCG
TABLET ORAL
Qty: 30 TABLET | Refills: 6 | Status: ON HOLD | OUTPATIENT
Start: 2021-09-07 | End: 2022-05-09

## 2021-09-07 NOTE — TELEPHONE ENCOUNTER
Rx Refill Note  Requested Prescriptions     Pending Prescriptions Disp Refills   • D 2000 50 MCG (2000 UT) tablet [Pharmacy Med Name: VITAMIN D3 2,000 UNIT TABLET] 30 tablet 0     Sig: TAKE 1 TABLET BY MOUTH EVERY DAY      Last office visit with prescribing clinician: 8/16/2021      Next office visit with prescribing clinician: 9/22/21           Abena Yu MA  09/07/21, 12:16 CDT

## 2021-09-22 ENCOUNTER — OFFICE VISIT (OUTPATIENT)
Dept: FAMILY MEDICINE CLINIC | Facility: CLINIC | Age: 81
End: 2021-09-22

## 2021-09-22 VITALS
SYSTOLIC BLOOD PRESSURE: 130 MMHG | BODY MASS INDEX: 19.76 KG/M2 | HEART RATE: 50 BPM | HEIGHT: 68 IN | DIASTOLIC BLOOD PRESSURE: 62 MMHG | TEMPERATURE: 98 F | RESPIRATION RATE: 18 BRPM | OXYGEN SATURATION: 97 % | WEIGHT: 130.4 LBS

## 2021-09-22 DIAGNOSIS — E78.2 MIXED HYPERLIPIDEMIA: Primary | ICD-10-CM

## 2021-09-22 DIAGNOSIS — R73.9 ELEVATED BLOOD SUGAR: ICD-10-CM

## 2021-09-22 DIAGNOSIS — R53.83 FATIGUE, UNSPECIFIED TYPE: ICD-10-CM

## 2021-09-22 PROCEDURE — 99213 OFFICE O/P EST LOW 20 MIN: CPT | Performed by: FAMILY MEDICINE

## 2021-09-22 NOTE — PROGRESS NOTES
Jaycee Gambino is a 81 y.o. male.     81-year-old male history of hypertension hyperlipidemia-survive Covid etc. after monoclonal antibodies--has had 2  Materna shots      The following portions of the patient's history were reviewed and updated as appropriate: allergies, current medications, past family history, past medical history, past social history, past surgical history and problem list.    Review of Systems   Respiratory: Negative for shortness of breath.    Cardiovascular: Negative for chest pain and leg swelling.   Musculoskeletal: Positive for arthralgias.       Objective   Physical Exam  Vitals and nursing note reviewed.   Constitutional:       Appearance: Normal appearance.   Cardiovascular:      Rate and Rhythm: Normal rate and regular rhythm.      Pulses: Normal pulses.      Heart sounds: Normal heart sounds.   Pulmonary:      Effort: Pulmonary effort is normal.      Breath sounds: Normal breath sounds.   Musculoskeletal:      Right lower leg: No edema.      Left lower leg: No edema.   Skin:     General: Skin is warm and dry.   Neurological:      General: No focal deficit present.      Mental Status: He is alert and oriented to person, place, and time.   Psychiatric:         Mood and Affect: Mood normal.         Behavior: Behavior normal.         Thought Content: Thought content normal.         Judgment: Judgment normal.         Assessment/Plan   Diagnoses and all orders for this visit:    1. Mixed hyperlipidemia (Primary)  -     Comprehensive Metabolic Panel  -     Lipid Panel    2. Fatigue, unspecified type  -     CBC & Differential    3. Elevated blood sugar  -     Hemoglobin A1c       Plan above-ask about Covid booster-wait for the FDA to approve

## 2021-09-23 DIAGNOSIS — E87.5 HYPERKALEMIA: Primary | ICD-10-CM

## 2021-09-23 LAB
ALBUMIN SERPL-MCNC: 4.5 G/DL (ref 3.5–5.2)
ALBUMIN/GLOB SERPL: 1.8 G/DL
ALP SERPL-CCNC: 97 U/L (ref 39–117)
ALT SERPL-CCNC: 7 U/L (ref 1–41)
AST SERPL-CCNC: 19 U/L (ref 1–40)
BASOPHILS # BLD AUTO: 0.04 10*3/MM3 (ref 0–0.2)
BASOPHILS NFR BLD AUTO: 0.5 % (ref 0–1.5)
BILIRUB SERPL-MCNC: 0.5 MG/DL (ref 0–1.2)
BUN SERPL-MCNC: 31 MG/DL (ref 8–23)
BUN/CREAT SERPL: 18.7 (ref 7–25)
CALCIUM SERPL-MCNC: 10.3 MG/DL (ref 8.6–10.5)
CHLORIDE SERPL-SCNC: 98 MMOL/L (ref 98–107)
CHOLEST SERPL-MCNC: 264 MG/DL (ref 0–200)
CO2 SERPL-SCNC: 27.1 MMOL/L (ref 22–29)
CREAT SERPL-MCNC: 1.66 MG/DL (ref 0.76–1.27)
EOSINOPHIL # BLD AUTO: 0.09 10*3/MM3 (ref 0–0.4)
EOSINOPHIL NFR BLD AUTO: 1.2 % (ref 0.3–6.2)
ERYTHROCYTE [DISTWIDTH] IN BLOOD BY AUTOMATED COUNT: 13.8 % (ref 12.3–15.4)
GLOBULIN SER CALC-MCNC: 2.5 GM/DL
GLUCOSE SERPL-MCNC: 112 MG/DL (ref 65–99)
HBA1C MFR BLD: 6.4 % (ref 4.8–5.6)
HCT VFR BLD AUTO: 41.4 % (ref 37.5–51)
HDLC SERPL-MCNC: 41 MG/DL (ref 40–60)
HGB BLD-MCNC: 13.5 G/DL (ref 13–17.7)
IMM GRANULOCYTES # BLD AUTO: 0.27 10*3/MM3 (ref 0–0.05)
IMM GRANULOCYTES NFR BLD AUTO: 3.7 % (ref 0–0.5)
LDLC SERPL CALC-MCNC: 176 MG/DL (ref 0–100)
LYMPHOCYTES # BLD AUTO: 1.46 10*3/MM3 (ref 0.7–3.1)
LYMPHOCYTES NFR BLD AUTO: 19.9 % (ref 19.6–45.3)
MCH RBC QN AUTO: 29.6 PG (ref 26.6–33)
MCHC RBC AUTO-ENTMCNC: 32.6 G/DL (ref 31.5–35.7)
MCV RBC AUTO: 90.8 FL (ref 79–97)
MONOCYTES # BLD AUTO: 0.94 10*3/MM3 (ref 0.1–0.9)
MONOCYTES NFR BLD AUTO: 12.8 % (ref 5–12)
NEUTROPHILS # BLD AUTO: 4.52 10*3/MM3 (ref 1.7–7)
NEUTROPHILS NFR BLD AUTO: 61.9 % (ref 42.7–76)
NRBC BLD AUTO-RTO: 0 /100 WBC (ref 0–0.2)
PLATELET # BLD AUTO: 246 10*3/MM3 (ref 140–450)
POTASSIUM SERPL-SCNC: 5.3 MMOL/L (ref 3.5–5.2)
PROT SERPL-MCNC: 7 G/DL (ref 6–8.5)
RBC # BLD AUTO: 4.56 10*6/MM3 (ref 4.14–5.8)
SODIUM SERPL-SCNC: 134 MMOL/L (ref 136–145)
TRIGL SERPL-MCNC: 249 MG/DL (ref 0–150)
VLDLC SERPL CALC-MCNC: 47 MG/DL (ref 5–40)
WBC # BLD AUTO: 7.32 10*3/MM3 (ref 3.4–10.8)

## 2021-10-06 ENCOUNTER — FLU SHOT (OUTPATIENT)
Dept: FAMILY MEDICINE CLINIC | Facility: CLINIC | Age: 81
End: 2021-10-06

## 2021-10-06 DIAGNOSIS — Z23 NEED FOR INFLUENZA VACCINATION: Primary | ICD-10-CM

## 2021-10-06 PROCEDURE — 90662 IIV NO PRSV INCREASED AG IM: CPT | Performed by: FAMILY MEDICINE

## 2021-10-06 PROCEDURE — G0008 ADMIN INFLUENZA VIRUS VAC: HCPCS | Performed by: FAMILY MEDICINE

## 2021-10-25 ENCOUNTER — TELEPHONE (OUTPATIENT)
Dept: FAMILY MEDICINE CLINIC | Facility: CLINIC | Age: 81
End: 2021-10-25

## 2021-10-25 NOTE — TELEPHONE ENCOUNTER
Caller: Florinda Gambino    Relationship: Emergency Contact    Best call back number: [phone]    What is the best time to reach you: ANYTIME     Who are you requesting to speak with (clinical staff, provider,  specific staff member): CLINICAL STAFF     Do you know the name of the person who called:  FLORINDA GAMBINO     What was the call regarding: WOULD LIKE TO KNOW IF PATIENT CAN HAVE COVID-19 BOOSTER SHOT SINCE HE LAST HAD COVID-19 BACK IN AUGUST. PLEASE ADVISE.     Do you require a callback: YES

## 2021-10-25 NOTE — TELEPHONE ENCOUNTER
Has to be 3 months after his infusion, so not yet.  I saw him on 8/16 and ordered it, so I would assume he had it that day or the next. So he needs to wait until around November 18 to get it.

## 2021-10-26 NOTE — TELEPHONE ENCOUNTER
Spoke with patient's wife.  She states they had appt yesterday and had not heard back from office by appt time.  She states patient did receive booster yesterday.

## 2021-12-07 DIAGNOSIS — M10.9 GOUT, UNSPECIFIED CAUSE, UNSPECIFIED CHRONICITY, UNSPECIFIED SITE: ICD-10-CM

## 2021-12-07 RX ORDER — PROBENECID AND COLCHICINE 500; .5 MG/1; MG/1
TABLET ORAL
Qty: 30 TABLET | Refills: 2 | Status: SHIPPED | OUTPATIENT
Start: 2021-12-07 | End: 2023-01-26 | Stop reason: ALTCHOICE

## 2021-12-07 NOTE — TELEPHONE ENCOUNTER
Rx Refill Note  Requested Prescriptions     Pending Prescriptions Disp Refills   • colchicine-probenecid (COL-BENEMID) 0.5-500 MG per tablet [Pharmacy Med Name: COLCHICINE-PROBENECID 0.5-500 TABS] 30 tablet 2     Sig: TAKE ONE TABLET BY MOUTH EVERY DAY      Last office visit with prescribing clinician: 9/22/21     Next office visit with prescribing clinician: 3/4/22           Abena Yu MA  12/07/21, 11:16 CST

## 2021-12-21 DIAGNOSIS — I10 ESSENTIAL HYPERTENSION: ICD-10-CM

## 2021-12-21 RX ORDER — INDAPAMIDE 2.5 MG/1
TABLET, FILM COATED ORAL
Qty: 90 TABLET | Refills: 3 | Status: SHIPPED | OUTPATIENT
Start: 2021-12-21

## 2022-01-24 ENCOUNTER — TELEPHONE (OUTPATIENT)
Dept: CARDIOLOGY CLINIC | Age: 82
End: 2022-01-24

## 2022-01-25 ENCOUNTER — OFFICE VISIT (OUTPATIENT)
Dept: CARDIOLOGY CLINIC | Age: 82
End: 2022-01-25
Payer: MEDICARE

## 2022-01-25 VITALS
HEIGHT: 68 IN | HEART RATE: 48 BPM | SYSTOLIC BLOOD PRESSURE: 122 MMHG | BODY MASS INDEX: 20.16 KG/M2 | DIASTOLIC BLOOD PRESSURE: 76 MMHG | WEIGHT: 133 LBS | OXYGEN SATURATION: 98 %

## 2022-01-25 DIAGNOSIS — I10 ESSENTIAL HYPERTENSION: ICD-10-CM

## 2022-01-25 DIAGNOSIS — I25.10 CORONARY ARTERY DISEASE INVOLVING NATIVE CORONARY ARTERY OF NATIVE HEART WITHOUT ANGINA PECTORIS: Primary | ICD-10-CM

## 2022-01-25 DIAGNOSIS — E78.5 HYPERLIPIDEMIA, UNSPECIFIED HYPERLIPIDEMIA TYPE: ICD-10-CM

## 2022-01-25 PROCEDURE — G8420 CALC BMI NORM PARAMETERS: HCPCS | Performed by: NURSE PRACTITIONER

## 2022-01-25 PROCEDURE — G8484 FLU IMMUNIZE NO ADMIN: HCPCS | Performed by: NURSE PRACTITIONER

## 2022-01-25 PROCEDURE — 1036F TOBACCO NON-USER: CPT | Performed by: NURSE PRACTITIONER

## 2022-01-25 PROCEDURE — 99214 OFFICE O/P EST MOD 30 MIN: CPT | Performed by: NURSE PRACTITIONER

## 2022-01-25 PROCEDURE — 4040F PNEUMOC VAC/ADMIN/RCVD: CPT | Performed by: NURSE PRACTITIONER

## 2022-01-25 PROCEDURE — G8428 CUR MEDS NOT DOCUMENT: HCPCS | Performed by: NURSE PRACTITIONER

## 2022-01-25 PROCEDURE — 93000 ELECTROCARDIOGRAM COMPLETE: CPT | Performed by: NURSE PRACTITIONER

## 2022-01-25 PROCEDURE — 1123F ACP DISCUSS/DSCN MKR DOCD: CPT | Performed by: NURSE PRACTITIONER

## 2022-01-25 NOTE — PROGRESS NOTES
Dear Dr. Moreno Gates MD,    Thank you for allowing me to participate in the care of Mr. Serafin Villavicencio. He presents today at the [address] [place] [address]. As you know, Mr. Jessica Ferrera is a 79 y.o. male with history of hypertension, hyperlipidemia, carotid artery disease, and CAD who presents with the chief complaint of follow-up for chronic cardiac conditions. He is a patient of Dr. Sana Gómez. Since last seen, he has been stable from a cardiac standpoint. He denies any exertional chest pain or shortness of breath. He denies any fast or slow heart rhythms. He denies any dizziness or syncope. he is sleeping on 1 pillow at night. he is not waking gasping for air. he denies any swelling. he has been compliant with his medications. his BP has been controlled. PCP follows labs and lipids. He otherwise denies chest pain, SOA, EDMONDS, PND, orthopnea, syncope or near syncope. He has no other complaints. Review of Systems   Constitutional: Negative for fever, chills, diaphoresis, activity change, appetite change, fatigue and unexpected weight change. Eyes: Negative for photophobia, pain, redness and visual disturbance. Respiratory: Negative for apnea, cough, chest tightness, shortness of breath, wheezing and stridor. Cardiovascular: Negative for chest pain, palpitations and leg swelling. Gastrointestinal: Negative for abdominal distention. Genitourinary: Negative for dysuria, urgency and frequency. Musculoskeletal: Negative for myalgias, arthralgias and gait problem. Skin: Negative for color change, pallor, rash and wound. Neurological: Negative for dizziness, tremors, speech difficulty, weakness and numbness. Hematological: Does not bruise/bleed easily. Psychiatric/Behavioral: Negative.         Past Medical History:   Diagnosis Date    Arthritis     COVID-19 08/2021    Skin cancer     On face       Past Surgical History:   Procedure Laterality Date    ABSCESS DRAINAGE Right     knee    [facility]    CARDIAC SURGERY      cabg 2008    CAROTID ENDARTERECTOMY Left     [place]    CAROTID ENDARTERECTOMY Right     Dr. Sivla Orf CORONARY ARTERY BYPASS GRAFT      ESOPHAGEAL DILATATION      EYE SURGERY      summer cat    JOINT REPLACEMENT      rtk    POLYPECTOMY      Colon    SKIN CANCER EXCISION         Family History   Problem Relation Age of Onset    Heart Disease Mother     Heart Disease Maternal Uncle        Social History     Socioeconomic History    Marital status:      Spouse name: Not on file    Number of children: Not on file    Years of education: Not on file    Highest education level: Not on file   Occupational History    Not on file   Tobacco Use    Smoking status: Never Smoker    Smokeless tobacco: Never Used   Vaping Use    Vaping Use: Never used   Substance and Sexual Activity    Alcohol use: No    Drug use: No    Sexual activity: Not on file   Other Topics Concern    Not on file   Social History Narrative    Not on file     Social Determinants of Health     Financial Resource Strain:     Difficulty of Paying Living Expenses: Not on file   Food Insecurity:     Worried About Running Out of Food in the Last Year: Not on file    Justen of Food in the Last Year: Not on file   Transportation Needs:     Lack of Transportation (Medical): Not on file    Lack of Transportation (Non-Medical):  Not on file   Physical Activity:     Days of Exercise per Week: Not on file    Minutes of Exercise per Session: Not on file   Stress:     Feeling of Stress : Not on file   Social Connections:     Frequency of Communication with Friends and Family: Not on file    Frequency of Social Gatherings with Friends and Family: Not on file    Attends Muslim Services: Not on file    Active Member of Clubs or Organizations: Not on file    Attends Club or Organization Meetings: Not on file    Marital Status: Not on file   Intimate Partner Violence:     Fear of Current or Ex-Partner: Not on file    Emotionally Abused: Not on file    Physically Abused: Not on file    Sexually Abused: Not on file   Housing Stability:     Unable to Pay for Housing in the Last Year: Not on file    Number of Places Lived in the Last Year: Not on file    Unstable Housing in the Last Year: Not on file       Allergies   Allergen Reactions    Other Anaphylaxis     UNKNOWN TO PATIENT ENVIRONMENTAL/FOOD ALLERGY     Niacin And Related     Statins Other (See Comments)     Leg cramps    Zetia [Ezetimibe]          Current Outpatient Medications:     indapamide (LOZOL) 2.5 MG tablet, TAKE ONE TABLET BY MOUTH EVERY MORNING, Disp: 90 tablet, Rfl: 3    clopidogrel (PLAVIX) 75 MG tablet, TAKE 1 TABLET BY MOUTH EVERY DAY, Disp: 90 tablet, Rfl: 3    alirocumab (PRALUENT) 75 MG/ML SOAJ injection pen, Inject 1 mL into the skin every 14 days (Patient not taking: Reported on 1/13/2021), Disp: 1.96 mL, Rfl: 11    colchicine-probenecid 0.5-500 MG per tablet, Take 1 tablet by mouth daily, Disp: , Rfl:     NONFORMULARY, Over the counter \"Zz\", Disp: , Rfl:     Multiple Vitamins-Minerals (ICAPS AREDS 2 PO), Take 1 tablet by mouth daily , Disp: , Rfl:     amLODIPine (NORVASC) 2.5 MG tablet, Take 2.5 mg by mouth daily, Disp: , Rfl:     atenolol (TENORMIN) 50 MG tablet, Take 50 mg by mouth daily, Disp: , Rfl:     carbidopa-levodopa (SINEMET)  MG per tablet, Take 1 tablet by mouth nightly , Disp: , Rfl:     Cinnamon 500 MG CAPS, Take 1 capsule by mouth nightly, Disp: , Rfl:     PE:  Vitals:    01/25/22 1253   BP: 122/76   Pulse: (!) 48   SpO2: 98%       Estimated body mass index is 20.22 kg/m² as calculated from the following:    Height as of this encounter: 5' 8\" (1.727 m). Weight as of this encounter: 133 lb (60.3 kg). Constitutional: He is oriented to person, place, and time. He appears well-developed and well-nourished in no acute distress.   Neck:  Neck supple without JVD present. No trachea deviation present. No thyromegaly present. Eyes:Conjunctivae and EOM are normal. Pupils equal and reactive to light. ENT:Hearing appears normal.conjunctiva and lids are normal, ears and nose appear normal.  Cardiovascular: Normal rate, Normal rhythm, normal heart sounds. No murmur ascultated. No gallop and no friction rub. Left carotid bruit. No peripheral edema. Pulmonary/Chest:  Lungs clear to auscultation bilaterally without evidence of respiratory distress. He without wheezes. He without rales or ronchi. Musculoskeletal: Normal range of motion. Gait is normal. Head is normocephalic and atraumatic. Skin: Skin is warm and dry without rash or pallor. Psychiatric:He is alert and oriented to person, place, and time. He has a normal mood and affect. His behavior is normal. Thought content normal.       Lab Results   Component Value Date    CREATININE 1.4 03/19/2019    CREATININE 1.7 12/11/2015    CREATININE 1.5 12/08/2015    HGB 14.7 03/19/2019    HGB 10.7 12/12/2015    HGB 12.2 12/11/2015         ECG 01/25/22  Sinus Bradycardia, first-degree AV block, HR 48 bpm       Assessment, Recommendations, & Plan:  79 y.o. male with CAD, HTN, & HLD    CAD-controlled on atenolol, Norvasc, & Plavix. Intolerant to statins. Continue current regimen    HTN-controlled on Norvasc, atenolol, & Lozol. Continue current regimen    HLD-unable to tolerate statins. Unable to afford Praluent or Repatha. Last lipid panel from 9/22/2021 reviewed  Total cholesterol-264  Triglycerides-249  HDL-41  LDL-176      Disposition - RTC in 6 months with Dr. Zoran Pablo or sooner if needed      Please do not hesitate to contact me for any questions or concerns.     Sincerely yours,    ANTHONY Golud

## 2022-01-25 NOTE — PATIENT INSTRUCTIONS
Angina usually lasts for about 2-10 minutes. It is often relieved with rest. Angina can be triggered by:   Exercise or exertion   Emotional stress   Cold weather   A large meal   Chest pain may indicate more serious unstable angina or a heart attack if: It is unrelieved by rest or nitroglycerin   Severe angina   Angina that begins at rest (with no activity)   Angina that lasts more than 15 minutes   Accompanying symptoms may include:   Shortness of breath   Sweating   Nausea   Weakness   Immediate medical attention is needed for unstable angina. CAD in women may cause less classic chest pain. It is likely to start with shortness of breath and fatigue. Diagnosis   If you go to the emergency room with chest pain, some tests will be done right away. The tests will attempt to see if you are having angina or a heart attack. If you have a stable pattern of angina, other tests may be done to determine the severity of your disease. The doctor will ask about your symptoms and medical history. A physical exam will be done.    Tests may include:   Blood tests to look for certain substances in the blood called troponins which help the doctor determine if you are having a heart attack   Electrocardiogram (ECG, EKG) records the heart's activity by measuring electrical currents through the heart muscle, and can reveal evidence of past heart attacks, acute heart attacks, and heart rhythm problems   Echocardiogram uses high-frequency sound waves (ultrasound) to examine the size, shape, and motion of the heart, giving information about the structure and function of the heart   Exercise stress test records the heart's electrical activity during increased physical activity   Nuclear stress test the heart is observed while exercising and radioactive material highlights impaired blood flow to help locate problem areas   Coronary calcium scoring a type of x-ray called a CAT scan that uses a computer to look for the presence of calcium in the heart arteries   Coronary angiography x-rays taken after a dye is injected into the arteries to allows the doctor to look for abnormalities in the arteries   Treatment   Treatment may include:   Nitroglycerin   This medicine is usually given during an attack of angina. It can be given as a tablet that dissolves under the tongue or as a spray. Longer-lasting types can be used to prevent angina before an activity known to cause it. These may be given as pills or applied as patches or ointments. Blood-Thinning Medications   A small, daily dose of aspirin has been shown to decrease the risk of heart attack. Ask your doctor before taking aspirin daily. Warfarin (Coumadin)   Ticlopidine (Ticlid)   Clopidogrel (Plavix)   Beta-Blockers, Calcium-Channel Blockers, and ACE-Inhibitors   These may help prevent angina. In some cases, they may lower the risk of heart attack. Medications to Lower Cholesterol   Medicines, like statins, are often prescribed to people who have CAD. Statins (eg, atorvastatin [Lipitor]) lower cholesterol levels, which can help to prevent CAD events. Revascularization   Patients with severe blockages in their coronary arteries may benefit from procedures to immediately improve blood flow to the heart muscle:   Percutaneous coronary interventions (PCI)such as balloon angioplasty , in some cases, a wire mesh stent is placed to hold the artery open   Coronary artery bypass grafting (CABG) segments of vessels are taken from other areas of the body and are sewn into the heart arteries to reroute blood flow around blockages   Some studies have shown that CABG may be more effective than PCI. Lifestyle changes and intensive medicine may also be just as effective as PCI.    Options for Refractory Angina   For patients who are not candidates for revascularization procedures but have continued angina despite medicine, options include:   Enhanced external counterpulsation (EECP) large air bags are inflated around the legs in tune with the heart beat. The patient receives 5 one-hour treatments per week for seven weeks. This has been shown to reduce angina and may improve symptom-free exercise duration. Transmyocardial revascularization (TMR) surgical procedure done with laser to reduce chest pain. Researchers are also studying gene therapy as a possible treatment. Prevention   To reduce your risk of getting coronary artery disease:   Maintain a healthy weight. Eat a heart healthy diet that is low in saturated fat , red meat and processed meats, and rich in whole grain , fruits, and vegetables . Begin a safe exercise program with the advice of your doctor. If you smoke, quit . Treat your high blood pressure and/or diabetes. Treat high cholesterol or triglycerides. Ask your doctor about taking a low-dose aspirin every day. In certain patients, taking rosuvastatin (Crestor) may be another option. Talk to your doctor. Hypertension  (High Blood Pressure)  Most people with high blood pressure (hypertension) have no symptoms. High blood pressure can be a dangerous problem. Hypertension is dangerous because you may have it and not know it. High blood pressure may mean that your heart needs to work harder to pump blood. Your blood pressure is measured with 2 numbers. The first number is when your heart flexes (contracts), and the second number is when your heart relaxes. The higher the numbers are, the more you are at risk for problems. Write down your blood pressure today. The best blood pressure for adults is 120/80 (mmHg) or lower. It is likely that your blood pressure was recorded at least 2 times today. It is important for you to give these numbers to your doctor. If you do not have a doctor, try to get follow-up care at a hospital or community clinic. You may need to start high blood pressure medicine. You may also need to adjust your medicines as told by your doctor.  Even mild high blood pressure increases long-term health risks. One high reading does not mean you have hypertension. · Your blood pressure should be taken when you are relaxed. It is also important to sit for about 10 minutes before being tested. · Things that can increase your blood pressure are:  Injury, Illness, Stress, Caffeine, and some medicines (like decongestants). · High blood pressure does not usually need emergency treatment. HOME CARE  · Lifestyle and medicine changes may be needed, including:   · Weight loss. · Exercise. · Limit the use of salt. · Stop smoking. · If using decongestants or birth control pills, talk to your doctor. These medicines might make blood pressure higher. · Do not use street drugs. · Females should not drink more than 1 alcoholic drink per day. Males should not drink more than 2 alcoholic drinks per day. · Take your blood pressure medicine. You will need to take it every day. If you do not get treated, there are risks, including:   · Heart disease. · Stroke. · Kidney failure. · See your doctor as told. GET HELP RIGHT AWAY IF:  · You get a very bad headache. · You get blurred or changing vision. · You feel confused. · You feel weak, numb, or faint. · You get chest or belly (abdominal) pain. · You throw up (vomit). · You cannot breathe very well. If you have a blood pressure reading with a top number of 180 or higher, you need to see your doctor right away. This is especially true if you are having any of the problems listed above. Hyperlipidemia   (Dyslipidemia; High Triglycerides; Triglycerides, High)     Definition   Hyperlipidemia is a high level of fats in the blood. These fats, called lipids, include cholesterol and triglycerides. There are five types of hyperlipidemia. The type depends on which lipid in the blood is high.    Causes   Causes may include:   A family history of hyperlipidemia   A diet high in total fat, saturated fat, or cholesterol   Obesity   Excess alcohol intake   Certain conditions, including:   Diabetes   Low thyroid   Kidney problems   Liver disease   Cushing's syndrome   Certain drugs, such as:   Hormones or birth control pills   Beta-blockers   Some diuretics   Cortisone drugs   Isotretinoin (for acne )   Some anti- HIV drugs   Risk Factors   These factors increase your chance of developing this condition. Tell your doctor if you have any of these:   Advancing age   Sex: male   Postmenopause   Lack of exercise   Smoking   Stress   Overuse of alcohol   Symptoms   Hyperlipidemia usually does not cause symptoms. Very high levels of lipids or triglycerides can cause:   Fat deposits in the skin or tendons ( xanthomas )   Pain, enlargement, or swelling of organs such as the liver, spleen, or pancreas ( pancreatitis )   Obstruction of blood vessels in heart and brain   Hyperlipidemia can increase your risk of atherosclerosis . This is a dangerous hardening of the arteries. It can end up blocking blood flow. In some cases, this may result in:   Angina   Heart attack   Stroke   Other serious complications   Blood Vessel with Atherosclerosis       [address] [place] [address].   Diagnosis   This condition is diagnosed with blood tests. These tests measure the levels of lipids in the blood. The National Cholesterol Education Program advises that you have your lipids checked at least once every five years, starting at age 21. Also, the American Academy of Pediatrics recommends lipid screening for children at risk (eg, a family history of hyperlipidemia).    Testing may consist of a fasting blood test for:   Total cholesterol   LDL (bad cholesterol)   HDL (good cholesterol)   Triglycerides   Your doctor may recommend more frequent or earlier testing if you have:   Family history of hyperlipidemia   Risk factor or disease that may cause hyperlipidemia   Complication that may result from hyperlipidemia   Treatment   Treatment is not only aimed at correcting your cholesterol levels, but also at lowering your overall risk for heart disease and strokes. Diet Changes   Eat a diet low in total fat, saturated fat, and cholesterol . Reduce or eliminate the amount of alcohol you drink. Eat more high-fiber foods. Lifestyle Changes   If you are overweight, lose weight . If you smoke, quit . Exercise regularly . Talk to you doctor before starting an exercise program. Ni [place] may already have hardening of the arteries or heart disease. These conditions increase your risk of having a heart attack while exercising. Make sure other medical conditions such as high blood pressure and diabetes are being treated and controlled. Medications   There are a number of drugs available, such as statins , to treat this condition and help lower your risk for heart disease. Talk to your doctor. Statins have been shown to reduce mortality (death), heart attacks , and stroke. These medicines are best used as additions to diet and exercise and should not replace healthy lifestyle changes. Prevention   To help reduce your chance of getting hyperlipidemia, take the following steps:   Starting at age 21, get cholesterol tests. Eat a diet low in total fat, saturated fat, and cholesterol. If you smoke, quit. Drink alcohol in moderation (two drinks per day for men, one drink per day for women). If you are overweight, lose weight. Exercise regularly. Talk with your doctor first.   If you have diabetes , control your blood sugar. Talk to your doctor about medications you are taking. They may have side effects that cause hyperlipidemia.      Last Reviewed: September 2010 Sandoval Driver DO   Updated: 11/9/2010

## 2022-01-26 ENCOUNTER — VIRTUAL VISIT (OUTPATIENT)
Dept: VASCULAR SURGERY | Age: 82
End: 2022-01-26
Payer: MEDICARE

## 2022-01-26 ENCOUNTER — HOSPITAL ENCOUNTER (OUTPATIENT)
Dept: VASCULAR LAB | Age: 82
Discharge: HOME | End: 2022-01-26
Payer: MEDICARE

## 2022-01-26 DIAGNOSIS — I65.23 BILATERAL CAROTID ARTERY STENOSIS: ICD-10-CM

## 2022-01-26 DIAGNOSIS — I65.23 BILATERAL CAROTID ARTERY STENOSIS: Primary | ICD-10-CM

## 2022-01-26 PROCEDURE — 93880 EXTRACRANIAL BILAT STUDY: CPT

## 2022-01-26 PROCEDURE — 99441 PR PHYS/QHP TELEPHONE EVALUATION 5-10 MIN: CPT | Performed by: NURSE PRACTITIONER

## 2022-01-26 NOTE — PROGRESS NOTES
Clarita Kirby is a 79 y.o. male evaluated via telephone on 2022. Clarita Kirby (:  1940) is a 79 y.o. male,Established patient, here for evaluation of the following chief complaint(s):     Consent:  He and/or health care decision maker is aware that that he may receive a bill for this telephone service, depending on his insurance coverage, and has provided verbal consent to proceed: Yes    Patient is located at home  Provider is located at [facility]   Also present during call is no one    He presents for follow up of carotid artery stenosis. He has a known history of carotid artery stenosis for 1 - 5 years. His current treatment includes ASA EC daily. He denies a history of CVA. He reports no TIA's, episodes of lateralizing weakness and episodes of amaurosis fugax.           Clarita Kirby is a 79 y.o. male with the following history reviewed and recorded in [facility]:  Patient Active Problem List    Diagnosis Date Noted    Unstable angina ([facility] [address]      Priority: High    Bilateral carotid artery stenosis 2019    Skin cancer 2019     S/P excision lesion right lateral canthus  Remaining lesion left malar area      Colonic polyp 2018 S/P open removal      Dysphagia 2018    GERD (gastroesophageal reflux disease) 2018    Dyslipidemia 2018    Acute blood loss anemia 2015    Cerebrovascular disease 2015     S/P Bilateral endarterectomies   S/P CVA       Osteoarthritis of right knee 2015    Coronary artery disease involving native coronary artery 2015 CABG x 7  19 Tigist inferior ischemia EF 67% 8% ischemic burden at risk 0% at rest  3/19/19 Cath TVD patent LIMA-LAD, all VG occluded, successful PCI to distal cx & OM1 with DEIRDRE        Essential hypertension 2015     Current Outpatient Medications   Medication Sig Dispense Refill    indapamide (LOZOL) 2.5 MG tablet TAKE ONE TABLET BY MOUTH EVERY MORNING 90 tablet 3    clopidogrel (PLAVIX) 75 MG tablet TAKE 1 TABLET BY MOUTH EVERY DAY 90 tablet 3    colchicine-probenecid 0.5-500 MG per tablet Take 1 tablet by mouth daily      NONFORMULARY Over the counter \"Zz\"      Multiple Vitamins-Minerals (ICAPS AREDS 2 PO) Take 1 tablet by mouth daily       amLODIPine (NORVASC) 2.5 MG tablet Take 2.5 mg by mouth daily      atenolol (TENORMIN) 50 MG tablet Take 50 mg by mouth daily      carbidopa-levodopa (SINEMET)  MG per tablet Take 1 tablet by mouth nightly       Cinnamon 500 MG CAPS Take 1 capsule by mouth nightly       No current facility-administered medications for this visit. Allergies: Other, Niacin and related, Statins, and Zetia [ezetimibe]  Past Medical History:   Diagnosis Date    Arthritis     COVID-19 08/2021    Skin cancer     On face     Past Surgical History:   Procedure Laterality Date    ABSCESS DRAINAGE Right     knee    CARDIAC [facility]TERIZ[facility]    CARDIAC SURGERY      cabg 2008    CAROTID ENDARTERECTOMY Left     Powers    CAROTID ENDARTERECTOMY Right     Dr. Yared Story CORONARY ARTERY BYPASS GRAFT      ESOPHAGEAL DILATATION      EYE SURGERY      summer cat    JOINT REPLACEMENT      rtk    POLYPECTOMY      Colon    SKIN CANCER EXCISION       Family History   Problem Relation Age of Onset    Heart Disease Mother     Heart Disease Maternal Uncle      Social History     Tobacco Use    Smoking status: Never Smoker    Smokeless tobacco: Never Used   Substance Use Topics    Alcohol use: No       Patient-Reported Vitals 1/13/2021   Patient-Reported Weight 130 lb   Patient-Reported Height 5'8       Review of Systems      Eyes  no sudden vision change or amaurosis. Respiratory  no significant shortness of breath, wheezing, or stridor. No cough,  Cardiovascular  no chest pain, syncope, or significant dizziness. No significant leg swelling.  has not had claudication.   Skin   has not had new wound.  Neurologic   No speech difficulty or lateralizing weakness. Psychiatric  no severe anxiety or nervousness. No confusion. All other review of systems are negative. PHYSICAL EXAMINATION:    [ INSTRUCTIONS:  \"[x]\" Indicates a positive item  \"[]\" Indicates a negative item  -- DELETE ALL ITEMS NOT EXAMINED]    [x] Alert  [x] Oriented to person/place/time    [x] No apparent distress  [] Toxic appearing  [x] Normal Mood  [] Anxious appearing    [] Depressed appearing  [] Confused appearing      [] Poor short term memory  [] Poor long term memory   Memory appears to be intact       Doppler results:    Right CCA/ICA 50-69% stenotic  Left CCA/ICA 50-69% stenotic  Right verterbral artery flow is antegrade  Left verterbral artery flow is antegrade  Individual velocities reviewed: Yes. Results were reviewed with the patient. Disease process is chronic illness with exacerbation or progression      Assessment    1. Bilateral carotid artery stenosis          Plan    1. Bilateral carotid artery stenosis  -     VL DUP CAROTID BILATERAL; Future  -     VL DUP CAROTID BILATERAL; Future    6 months with clvs  Strongly encouraged start/continue statin therapy  Recommended no smoking  Patient instructed to call or proceed to the emergency room with any symptoms of lateralizing weakness, loss of vision in one eye, or episodes slurred speech. Asa ec daily      Documentation:  I communicated with the patient and/or health care decision maker about cvd. Details of this discussion including any medical advice provided: as above      I affirm this is a Patient Initiated Episode with a Patient who has not had a related appointment within my department in the past 7 days or scheduled within the next 24 hours. Patient identification was verified at the start of the visit: Yes    Total Time: minutes: 5-10 minutes    Calvin Morris, was evaluated through a synchronous (real-time) audio-video  encounter.  The patient (or guardian if applicable) is aware that this is a billable  service, which includes applicable co-pays. This Virtual Visit was conducted with  patient's (and/or legal guardian's) consent. The visit was conducted pursuant to  the emergency declaration under the [facility], [address] [place] [address] authority and the [employer] and  [employer] General Act. Patient identification was verified,  and a caregiver was present when appropriate. The patient was located in a  state where the provider was licensed to provide care.     Note: not billable if this call serves to triage the patient into an appointment for the relevant concern      Marcelina Jones, APRN

## 2022-02-17 ENCOUNTER — TELEPHONE (OUTPATIENT)
Dept: CARDIOLOGY CLINIC | Age: 82
End: 2022-02-17

## 2022-02-17 NOTE — TELEPHONE ENCOUNTER
LEFT VM TO RESCHEDULE PT'S APT WITH DR. BENNETT OK TO SCHEDULE APT WITH NP (MARCO ANTONIO) [facility] 02/17/2022 KD

## 2022-03-04 ENCOUNTER — OFFICE VISIT (OUTPATIENT)
Dept: FAMILY MEDICINE CLINIC | Facility: CLINIC | Age: 82
End: 2022-03-04

## 2022-03-04 VITALS
DIASTOLIC BLOOD PRESSURE: 74 MMHG | OXYGEN SATURATION: 99 % | HEART RATE: 44 BPM | BODY MASS INDEX: 20.31 KG/M2 | WEIGHT: 134 LBS | SYSTOLIC BLOOD PRESSURE: 142 MMHG | TEMPERATURE: 97.7 F | RESPIRATION RATE: 14 BRPM | HEIGHT: 68 IN

## 2022-03-04 DIAGNOSIS — Z86.010 HX OF ADENOMATOUS COLONIC POLYPS: ICD-10-CM

## 2022-03-04 DIAGNOSIS — R53.83 FATIGUE, UNSPECIFIED TYPE: ICD-10-CM

## 2022-03-04 DIAGNOSIS — E78.2 MIXED HYPERLIPIDEMIA: Primary | ICD-10-CM

## 2022-03-04 DIAGNOSIS — E79.0 HYPERURICEMIA: ICD-10-CM

## 2022-03-04 DIAGNOSIS — E55.9 VITAMIN D DEFICIENCY: ICD-10-CM

## 2022-03-04 DIAGNOSIS — R73.9 ELEVATED BLOOD SUGAR: ICD-10-CM

## 2022-03-04 DIAGNOSIS — R41.3 MEMORY LOSS: ICD-10-CM

## 2022-03-04 DIAGNOSIS — Z00.00 MEDICARE ANNUAL WELLNESS VISIT, SUBSEQUENT: ICD-10-CM

## 2022-03-04 DIAGNOSIS — R00.1 BRADYCARDIA: ICD-10-CM

## 2022-03-04 LAB
BILIRUB BLD-MCNC: NEGATIVE MG/DL
CLARITY, POC: CLEAR
COLOR UR: YELLOW
GLUCOSE UR STRIP-MCNC: NEGATIVE MG/DL
KETONES UR QL: NEGATIVE
LEUKOCYTE EST, POC: NEGATIVE
NITRITE UR-MCNC: NEGATIVE MG/ML
PH UR: 6 [PH] (ref 5–8)
PROT UR STRIP-MCNC: NEGATIVE MG/DL
RBC # UR STRIP: ABNORMAL /UL
SP GR UR: 1.02 (ref 1–1.03)
UROBILINOGEN UR QL: NORMAL

## 2022-03-04 PROCEDURE — G0439 PPPS, SUBSEQ VISIT: HCPCS | Performed by: FAMILY MEDICINE

## 2022-03-04 PROCEDURE — 1160F RVW MEDS BY RX/DR IN RCRD: CPT | Performed by: FAMILY MEDICINE

## 2022-03-04 PROCEDURE — 1170F FXNL STATUS ASSESSED: CPT | Performed by: FAMILY MEDICINE

## 2022-03-04 PROCEDURE — 1126F AMNT PAIN NOTED NONE PRSNT: CPT | Performed by: FAMILY MEDICINE

## 2022-03-04 PROCEDURE — 81003 URINALYSIS AUTO W/O SCOPE: CPT | Performed by: FAMILY MEDICINE

## 2022-03-04 PROCEDURE — 93005 ELECTROCARDIOGRAM TRACING: CPT | Performed by: FAMILY MEDICINE

## 2022-03-04 NOTE — PATIENT INSTRUCTIONS
Medicare Wellness  Personal Prevention Plan of Service     Date of Office Visit:    Encounter Provider:  Rigoberto Barnes MD  Place of Service:  [facility]  Patient Name: Shabbir Gambino  :  1940    As part of the Medicare Wellness portion of your visit today, we are providing you with this personalized preventive plan of services (PPPS). This plan is based upon recommendations of the [place] Preventive Services Task Force (USPSTF) and the Advisory Committee on Immunization Practices (ACIP).    This lists the preventive care services that should be considered, and provides dates of when you are due. Items listed as completed are up-to-date and do not require any further intervention.    Health Maintenance   Topic Date Due   • COVID-19 Vaccine (3 - Booster) 2021   • COLORECTAL CANCER SCREENING  2021   • ZOSTER VACCINE (1 of 2) 2022 (Originally 1989)   • LIPID PANEL  2022   • ANNUAL WELLNESS VISIT  2023   • INFLUENZA VACCINE  Completed   • Pneumococcal Vaccine 65+  Completed   • TDAP/TD VACCINES  Discontinued       No orders of the defined types were placed in this encounter.      No follow-ups on file.        Fall Prevention in the Home, Adult  Falls can cause injuries. They can happen to people of all ages. There are many things you can do to make your home safe and to help prevent falls. Ask for help when making these changes, if needed.  What actions can I take to prevent falls?  General Instructions  · Use good lighting in all rooms. Replace any light bulbs that burn out.  · Turn on the lights when you go into a dark area. Use night-lights.  · Keep items that you use often in easy-to-reach places. Lower the shelves around your home if necessary.  · Set up your furniture so you have a clear path. Avoid moving your furniture around.  · Do not have throw rugs and other things on the floor that can make you trip.  · Avoid  walking on wet floors.  · If any of your floors are uneven, fix them.  · Add color or contrast paint or tape to clearly zach and help you see:  ? Any grab bars or handrails.  ? First and last steps of stairways.  ? Where the edge of each step is.  · If you use a stepladder:  ? Make sure that it is fully opened. Do not climb a closed stepladder.  ? Make sure that both sides of the stepladder are locked into place.  ? Ask someone to hold the stepladder for you while you use it.  · If there are any pets around you, be aware of where they are.  What can I do in the bathroom?         · Keep the floor dry. Clean up any water that spills onto the floor as soon as it happens.  · Remove soap buildup in the tub or shower regularly.  · Use non-skid mats or decals on the floor of the tub or shower.  · Attach bath mats securely with double-sided, non-slip rug tape.  · If you need to sit down in the shower, use a plastic, non-slip stool.  · Install grab bars by the toilet and in the tub and shower. Do not use towel bars as grab bars.  What can I do in the bedroom?  · Make sure that you have a light by your bed that is easy to reach.  · Do not use any sheets or blankets that are too big for your bed. They should not hang down onto the floor.  · Have a firm chair that has side arms. You can use this for support while you get dressed.  What can I do in the kitchen?  · Clean up any spills right away.  · If you need to reach something above you, use a strong step stool that has a grab bar.  · Keep electrical cords out of the way.  · Do not use floor polish or wax that makes floors slippery. If you must use wax, use non-skid floor wax.  What can I do with my stairs?  · Do not leave any items on the stairs.  · Make sure that you have a light switch at the top of the stairs and the bottom of the stairs. If you do not have them, ask someone to add them for you.  · Make sure that there are handrails on both sides of the stairs, and use  them. Fix handrails that are broken or loose. Make sure that handrails are as long as the stairways.  · Install non-slip stair treads on all stairs in your home.  · Avoid having throw rugs at the top or bottom of the stairs. If you do have throw rugs, attach them to the floor with carpet tape.  · Choose a carpet that does not hide the edge of the steps on the stairway.  · Check any carpeting to make sure that it is firmly attached to the stairs. Fix any carpet that is loose or worn.  What can I do on the outside of my home?  · Use bright outdoor lighting.  · Regularly fix the edges of walkways and driveways and fix any cracks.  · Remove anything that might make you trip as you walk through a door, such as a raised step or threshold.  · Trim any bushes or trees on the path to your home.  · Regularly check to see if handrails are loose or broken. Make sure that both sides of any steps have handrails.  · Install guardrails along the edges of any raised decks and porches.  · Clear walking paths of anything that might make someone trip, such as tools or rocks.  · Have any leaves, snow, or ice cleared regularly.  · Use sand or salt on walking paths during winter.  · Clean up any spills in your garage right away. This includes grease or oil spills.  What other actions can I take?  · Wear shoes that:  ? Have a low heel. Do not wear high heels.  ? Have rubber bottoms.  ? Are comfortable and fit you well.  ? Are closed at the toe. Do not wear open-toe sandals.  · Use tools that help you move around (mobility aids) if they are needed. These include:  ? Canes.  ? Walkers.  ? Scooters.  ? Crutches.  · Review your medicines with your doctor. Some medicines can make you feel dizzy. This can increase your chance of falling.  Ask your doctor what other things you can do to help prevent falls.  Where to find more information  · Centers for Disease Control and PreventionLIDYA: https://cdc.gov  · National [place] on Aging:  https://yt0bbwk.luca.nih.gov  Contact a doctor if:  · You are afraid of falling at home.  · You feel weak, drowsy, or dizzy at home.  · You fall at home.  Summary  · There are many simple things that you can do to make your home safe and to help prevent falls.  · Ways to make your home safe include removing tripping hazards and installing grab bars in the bathroom.  · Ask for help when making these changes in your home.  This information is not intended to replace advice given to you by your health care provider. Make sure you discuss any questions you have with your health care provider.  Document Revised: 04/09/2020 Document Reviewed: 08/02/2018  Elsevier Patient Education © 2021 Elsevier Inc.

## 2022-03-04 NOTE — PROGRESS NOTES
The ABCs of the Annual Wellness Visit  Subsequent Medicare Wellness Visit    Chief Complaint   Patient presents with   • Medicare Wellness-subsequent     Fasting      Subjective    History of Present Illness:  Shabbir Gambino is a 81 y.o. male who presents for a Subsequent Medicare Wellness Visit.    The following portions of the patient's history were reviewed and   updated as appropriate: allergies, current medications, past family history, past medical history, past social history, past surgical history and problem list.    Compared to one year ago, the patient feels his physical   health is the same.    Compared to one year ago, the patient feels his mental   health is the same.    Recent Hospitalizations:  He was not admitted to the hospital during the last year.       Current Medical Providers:  Patient Care Team:  Rigoberto Barnes MD as PCP - General (Family Medicine)  Rigoberto Barnes MD as PCP - Family Medicine  Rigoberto Barnes MD Hodges, Christopher Ray, PA as Physician Assistant (Neurology)  Stanton Escalante MD as Consulting Physician (Cardiology)  Randy Gomez MD as Consulting Physician (Gastroenterology)    Outpatient Medications Prior to Visit   Medication Sig Dispense Refill   • amLODIPine (NORVASC) 5 MG tablet Take 1 tablet by mouth Every Evening. 90 tablet 3   • atenolol (TENORMIN) 50 MG tablet TAKE 1 TABLET BY MOUTH DAILY 90 tablet 3   • carbidopa-levodopa (SINEMET)  MG per tablet TAKE ONE TO TWO TABLETS BY MOUTH AT BEDTIME 180 tablet 1   • CINNAMON PO Take 500 mg by mouth Daily.     • cloNIDine (Catapres) 0.1 MG tablet Take 1 at bedtime for blood pressure 90 tablet 3   • clopidogrel (PLAVIX) 75 MG tablet Take 75 mg by mouth Daily.     • colchicine-probenecid (COL-BENEMID) 0.5-500 MG per tablet TAKE ONE TABLET BY MOUTH EVERY DAY 30 tablet 2   • D 2000 50 MCG (2000 UT) tablet TAKE 1 TABLET BY MOUTH EVERY DAY 30 tablet 6   • indapamide (LOZOL) 2.5 MG tablet Take  2.5 mg by mouth Every Morning.     • Multiple Vitamins-Minerals (EYE VITAMINS PO) Take 1 tablet by mouth Daily.     • guaiFENesin (Mucinex) 600 MG 12 hr tablet Take 2 tablets by mouth 2 (Two) Times a Day. 56 tablet 0     No facility-administered medications prior to visit.       No opioid medication identified on active medication list. I have reviewed chart for other potential  high risk medication/s and harmful drug interactions in the elderly.          Aspirin is not on active medication list.  Aspirin use is not indicated based on review of current medical condition/s. Risk of harm outweighs potential benefits.  .    Patient Active Problem List   Diagnosis   • Vascular disease   • RLS (restless legs syndrome)   • Insomnia   • Hypertension   • Gout   • Axonal polyneuropathy   • Atherosclerotic coronary vascular disease   • Arthritis   • Esophageal dysphagia   • Dysplasia of colon   • Irregular heart beat   • Cerebrovascular disease   • Leukocytosis   • Weight loss   • Pharyngoesophageal dysphagia   • History of adenomatous polyp of colon   • Skin cancer   • Coronary artery disease involving native coronary artery   • Peripheral vascular disease (HCC)   • Coagulopathy (HCC)     Advance Care Planning  Advance Directive is not on file.  ACP discussion was declined by the patient. Patient does not have an advance directive, declines further assistance.    Review of Systems   Constitutional:        Weight loss is stabilized for the last few months   Respiratory: Negative for shortness of breath.    Cardiovascular: Negative for chest pain and leg swelling.   Gastrointestinal:        Colonoscopy requested by GI-high risk adenomatous polyps   Genitourinary: Negative for difficulty urinating.   All other systems reviewed and are negative.       Objective    Vitals:    03/04/22 0826   BP: 142/74   BP Location: Left arm   Patient Position: Sitting   Cuff Size: Adult   Pulse: (!) 44   Resp: 14   Temp: 97.7 °F (36.5 °C)  "  TempSrc: Temporal   SpO2: 99%   Weight: 60.8 kg (134 lb)   Height: 172.7 cm (68\")   PainSc: 0-No pain     BMI Readings from Last 1 Encounters:   03/04/22 20.37 kg/m²   BMI is within normal parameters. No follow-up required.    Does the patient have evidence of cognitive impairment? No    Physical Exam  Vitals and nursing note reviewed.   Constitutional:       Appearance: Normal appearance. He is normal weight.   HENT:      Right Ear: Tympanic membrane and ear canal normal.      Left Ear: Tympanic membrane and ear canal normal.   Eyes:      Extraocular Movements: Extraocular movements intact.      Pupils: Pupils are equal, round, and reactive to light.   Neck:      Vascular: No carotid bruit.   Cardiovascular:      Rate and Rhythm: Normal rate and regular rhythm.      Pulses: Normal pulses.      Heart sounds: Normal heart sounds.   Pulmonary:      Effort: Pulmonary effort is normal.      Breath sounds: Normal breath sounds.   Abdominal:      General: Abdomen is flat. There is no distension.      Palpations: Abdomen is soft. There is no mass.   Genitourinary:     Comments: Deferred because of age  Musculoskeletal:      Right lower leg: No edema.      Left lower leg: No edema.   Lymphadenopathy:      Cervical: No cervical adenopathy.   Skin:     General: Skin is warm and dry.      Capillary Refill: Capillary refill takes less than 2 seconds.   Neurological:      General: No focal deficit present.      Mental Status: He is alert and oriented to person, place, and time.   Psychiatric:         Mood and Affect: Mood normal.         Behavior: Behavior normal.         Thought Content: Thought content normal.         Judgment: Judgment normal.                 HEALTH RISK ASSESSMENT    Smoking Status:  Social History     Tobacco Use   Smoking Status Never Smoker   Smokeless Tobacco Never Used     Alcohol Consumption:  Social History     Substance and Sexual Activity   Alcohol Use No     Fall Risk Screen:    STEADI Fall Risk " Assessment was completed, and patient is at LOW risk for falls.Assessment completed on:3/4/2022    Depression Screening:  PHQ-2/PHQ-9 Depression Screening 3/4/2022   Little interest or pleasure in doing things 0   Feeling down, depressed, or hopeless 0   Trouble falling or staying asleep, or sleeping too much -   Feeling tired or having little energy -   Poor appetite or overeating -   Feeling bad about yourself - or that you are a failure or have let yourself or your family down -   Trouble concentrating on things, such as reading the newspaper or watching television -   Moving or speaking so slowly that other people could have noticed. Or the opposite - being so fidgety or restless that you have been moving around a lot more than usual -   Thoughts that you would be better off dead, or of hurting yourself in some way -   Total Score 0   If you checked off any problems, how difficult have these problems made it for you to do your work, take care of things at home, or get along with other people? -       Health Habits and Functional and Cognitive Screening:  Functional & Cognitive Status 3/4/2022   Do you have difficulty preparing food and eating? No   Do you have difficulty bathing yourself, getting dressed or grooming yourself? No   Do you have difficulty using the toilet? No   Do you have difficulty moving around from place to place? Yes   Do you have trouble with steps or getting out of a bed or a chair? Yes   Current Diet Well Balanced Diet   Dental Exam Up to date   Eye Exam Up to date   Exercise (times per week) 2 times per week   Current Exercises Include Walking   Current Exercise Activities Include -   Do you need help using the phone?  No   Are you deaf or do you have serious difficulty hearing?  Yes   Do you need help with transportation? Yes   Do you need help shopping? No   Do you need help preparing meals?  No   Do you need help with housework?  No   Do you need help with laundry? No   Do you need  help taking your medications? No   Do you need help managing money? No   Do you ever drive or ride in a car without wearing a seat belt? No   Have you felt unusual stress, anger or loneliness in the last month? No   Who do you live with? Spouse   If you need help, do you have trouble finding someone available to you? No   Have you been bothered in the last four weeks by sexual problems? No   Do you have difficulty concentrating, remembering or making decisions? No       Age-appropriate Screening Schedule:  Refer to the list below for future screening recommendations based on patient's age, sex and/or medical conditions. Orders for these recommended tests are listed in the plan section. The patient has been provided with a written plan.    Health Maintenance   Topic Date Due   • ZOSTER VACCINE (1 of 2) 03/29/2022 (Originally 7/29/1990)   • LIPID PANEL  09/22/2022   • INFLUENZA VACCINE  Completed   • TDAP/TD VACCINES  Discontinued              Assessment/Plan   CMS Preventative Services Quick Reference  Risk Factors Identified During Encounter  Fall Risk-High or Moderate  The above risks/problems have been discussed with the patient.  Follow up actions/plans if indicated are seen below in the Assessment/Plan Section.  Pertinent information has been shared with the patient in the After Visit Summary.    Diagnoses and all orders for this visit:    1. Mixed hyperlipidemia (Primary)  -     Comprehensive Metabolic Panel  -     Lipid Panel    2. Fatigue, unspecified type  -     TSH  -     T4, free  -     CBC & Differential    3. Elevated blood sugar  -     Hemoglobin A1c  -     POC Urinalysis Dipstick, Multipro    4. Hyperuricemia  -     Uric Acid    5. Memory loss  -     Vitamin B12  -     Folate    6. Vitamin D deficiency  -     Vitamin D 25 Hydroxy    7. Medicare annual wellness visit, subsequent    8. Bradycardia  -     ECG 12 Lead    9. Hx of adenomatous colonic polyps  -     Ambulatory Referral to  Gastroenterology        Follow Up:   Return in about 6 months (around 9/4/2022).     An After Visit Summary and PPPS were made available to the patient.        I spent 30 minutes caring for Shabbir on this date of service. This time includes time spent by me in the following activities:preparing for the visit, reviewing tests, obtaining and/or reviewing a separately obtained history, performing a medically appropriate examination and/or evaluation , counseling and educating the patient/family/caregiver, ordering medications, tests, or procedures, referring and communicating with other health care professionals  and documenting information in the medical record       Plan-above-EKG shows sinus bradycardia related to his beta-blocker-asymptomatic we will not make any changes

## 2022-03-05 LAB
25(OH)D3+25(OH)D2 SERPL-MCNC: 48.9 NG/ML (ref 30–100)
ALBUMIN SERPL-MCNC: 4.4 G/DL (ref 3.5–5.2)
ALBUMIN/GLOB SERPL: 1.7 G/DL
ALP SERPL-CCNC: 111 U/L (ref 39–117)
ALT SERPL-CCNC: 5 U/L (ref 1–41)
AST SERPL-CCNC: 19 U/L (ref 1–40)
BASOPHILS # BLD AUTO: 0.03 10*3/MM3 (ref 0–0.2)
BASOPHILS NFR BLD AUTO: 0.5 % (ref 0–1.5)
BILIRUB SERPL-MCNC: 0.6 MG/DL (ref 0–1.2)
BUN SERPL-MCNC: 29 MG/DL (ref 8–23)
BUN/CREAT SERPL: 17.3 (ref 7–25)
CALCIUM SERPL-MCNC: 10.2 MG/DL (ref 8.6–10.5)
CHLORIDE SERPL-SCNC: 96 MMOL/L (ref 98–107)
CHOLEST SERPL-MCNC: 151 MG/DL (ref 0–200)
CO2 SERPL-SCNC: 27.5 MMOL/L (ref 22–29)
CREAT SERPL-MCNC: 1.68 MG/DL (ref 0.76–1.27)
EGFR GENE MUT ANL BLD/T: 40.3 ML/MIN/1.73
EOSINOPHIL # BLD AUTO: 0.11 10*3/MM3 (ref 0–0.4)
EOSINOPHIL NFR BLD AUTO: 1.9 % (ref 0.3–6.2)
ERYTHROCYTE [DISTWIDTH] IN BLOOD BY AUTOMATED COUNT: 13.2 % (ref 12.3–15.4)
FOLATE SERPL-MCNC: 10.1 NG/ML (ref 4.78–24.2)
GLOBULIN SER CALC-MCNC: 2.6 GM/DL
GLUCOSE SERPL-MCNC: 113 MG/DL (ref 65–99)
HBA1C MFR BLD: 6 % (ref 4.8–5.6)
HCT VFR BLD AUTO: 42.5 % (ref 37.5–51)
HDLC SERPL-MCNC: 45 MG/DL (ref 40–60)
HGB BLD-MCNC: 14 G/DL (ref 13–17.7)
IMM GRANULOCYTES # BLD AUTO: 0.11 10*3/MM3 (ref 0–0.05)
IMM GRANULOCYTES NFR BLD AUTO: 1.9 % (ref 0–0.5)
LDLC SERPL CALC-MCNC: 82 MG/DL (ref 0–100)
LYMPHOCYTES # BLD AUTO: 1.11 10*3/MM3 (ref 0.7–3.1)
LYMPHOCYTES NFR BLD AUTO: 18.9 % (ref 19.6–45.3)
MCH RBC QN AUTO: 29.6 PG (ref 26.6–33)
MCHC RBC AUTO-ENTMCNC: 32.9 G/DL (ref 31.5–35.7)
MCV RBC AUTO: 89.9 FL (ref 79–97)
MONOCYTES # BLD AUTO: 0.73 10*3/MM3 (ref 0.1–0.9)
MONOCYTES NFR BLD AUTO: 12.4 % (ref 5–12)
NEUTROPHILS # BLD AUTO: 3.78 10*3/MM3 (ref 1.7–7)
NEUTROPHILS NFR BLD AUTO: 64.4 % (ref 42.7–76)
NRBC BLD AUTO-RTO: 0 /100 WBC (ref 0–0.2)
PLATELET # BLD AUTO: 227 10*3/MM3 (ref 140–450)
POTASSIUM SERPL-SCNC: 4.9 MMOL/L (ref 3.5–5.2)
PROT SERPL-MCNC: 7 G/DL (ref 6–8.5)
RBC # BLD AUTO: 4.73 10*6/MM3 (ref 4.14–5.8)
SODIUM SERPL-SCNC: 136 MMOL/L (ref 136–145)
T4 FREE SERPL-MCNC: 1 NG/DL (ref 0.93–1.7)
TRIGL SERPL-MCNC: 134 MG/DL (ref 0–150)
TSH SERPL DL<=0.005 MIU/L-ACNC: 1.5 UIU/ML (ref 0.27–4.2)
URATE SERPL-MCNC: 6.9 MG/DL (ref 3.4–7)
VIT B12 SERPL-MCNC: 1689 PG/ML (ref 211–946)
VLDLC SERPL CALC-MCNC: 24 MG/DL (ref 5–40)
WBC # BLD AUTO: 5.87 10*3/MM3 (ref 3.4–10.8)

## 2022-03-07 DIAGNOSIS — N28.9 FUNCTION KIDNEY DECREASED: Primary | ICD-10-CM

## 2022-03-08 RX ORDER — AMLODIPINE BESYLATE 5 MG/1
TABLET ORAL
Qty: 90 TABLET | Refills: 3 | Status: SHIPPED | OUTPATIENT
Start: 2022-03-08 | End: 2022-12-20

## 2022-03-08 NOTE — TELEPHONE ENCOUNTER
Rx Refill Note  Requested Prescriptions     Pending Prescriptions Disp Refills   • amLODIPine (NORVASC) 5 MG tablet [Pharmacy Med Name: AMLODIPINE BESYLATE 5MG TABS] 90 tablet 3     Sig: TAKE ONE TABLET BY MOUTH EVERY EVENING      Last office visit with prescribing clinician: 3/4/2022      Next office visit with prescribing clinician: 9/6/2022            Abena Yu MA  03/08/22, 10:35 CST

## 2022-04-06 ENCOUNTER — OFFICE VISIT (OUTPATIENT)
Dept: GASTROENTEROLOGY | Facility: CLINIC | Age: 82
End: 2022-04-06

## 2022-04-06 ENCOUNTER — TELEPHONE (OUTPATIENT)
Dept: CARDIOLOGY CLINIC | Age: 82
End: 2022-04-06

## 2022-04-06 VITALS
BODY MASS INDEX: 20.46 KG/M2 | SYSTOLIC BLOOD PRESSURE: 136 MMHG | DIASTOLIC BLOOD PRESSURE: 60 MMHG | WEIGHT: 135 LBS | HEIGHT: 68 IN | TEMPERATURE: 96.8 F | OXYGEN SATURATION: 98 % | HEART RATE: 48 BPM

## 2022-04-06 DIAGNOSIS — D68.9 COAGULOPATHY: ICD-10-CM

## 2022-04-06 DIAGNOSIS — R19.5 POSITIVE COLORECTAL CANCER SCREENING USING COLOGUARD TEST: ICD-10-CM

## 2022-04-06 DIAGNOSIS — Z86.010 HISTORY OF ADENOMATOUS POLYP OF COLON: Primary | ICD-10-CM

## 2022-04-06 PROCEDURE — 99214 OFFICE O/P EST MOD 30 MIN: CPT | Performed by: NURSE PRACTITIONER

## 2022-04-06 NOTE — PROGRESS NOTES
[facility]astroenterology    Primary Physician Rigoberto Barnes MD    4/6/2022    Shabbir Gambino   1940      Chief Complaint   Patient presents with   • Colonoscopy       Subjective     HPI    Shabbir Gambino is a 81 y.o. male who presents as a referral for preventative maintenance. He has no complaints of nausea or vomiting. No change in bowels. No wt loss. No BRBPR. No melena. No abdominal pain.     COLONOSCOPY (08/07/2018 08:27) path adenomatous.  Tissue Pathology Exam (08/07/2018 08:46)  He does have history of surgical removal of polyp with path high-grade dysplasia.    He has history of positive cologuard 2019.     He is on plavix.  Cardiologist is Dr. Escalante.    Past Medical History:   Diagnosis Date   • Atherosclerotic coronary vascular disease    • Axonal polyneuropathy    • Coagulopathy (HCC) 2/28/2020   • Colon polyp    • COVID toes     Had infusion   • Gout    • History of transfusion    • Hyperlipidemia    • Hypertension    • Insomnia    • Irregular heart beat    • Precancerous skin lesion    • RLS (restless legs syndrome)    • TIA (transient ischemic attack)     2014   • Vascular disease     CAROTID       Past Surgical History:   Procedure Laterality Date   • CAROTID ENDARTERECTOMY Bilateral     2007, 2008   • COLON RESECTION N/A 10/24/2017    Procedure: COLON RESECTION  MID TRANSVERSE LAPAROSCOPIC;  Surgeon: Adal Quintero MD;  Location: [facility] OR;  Service:    • COLONOSCOPY      MULTIPLE    • COLONOSCOPY N/A 9/7/2017    Procedure: colonoscopy with anesthesia;  Surgeon: Randy Gomez MD;  Location: [facility] ENDOSCOPY;  Service:    • COLONOSCOPY N/A 8/7/2018    Procedure: COLONOSCOPY WITH ANESTHESIA;  Surgeon: Randy Gomez MD;  Location: [facility] ENDOSCOPY;  Service: Gastroenterology   • CORONARY ARTERY BYPASS GRAFT  2006   • CORONARY STENT PLACEMENT     • CORONARY STENT PLACEMENT     • ENDOSCOPY N/A 9/7/2017    Procedure: ESOPHAGOGASTRODUODENOSCOPY WITH ANESTHESIA;   Surgeon: Randy Gomez MD;  Location: [facility] ENDOSCOPY;  Service:    • ENDOSCOPY N/A 8/7/2018    Procedure: ESOPHAGOGASTRODUODENOSCOPY WITH ANESTHESIA;  Surgeon: Randy Gomez MD;  Location: [facility] ENDOSCOPY;  Service: Gastroenterology   • EYE SURGERY Bilateral     2014, 2015   • KNEE SURGERY Right     x 2       Outpatient Medications Marked as Taking for the 4/6/22 encounter (Office Visit) with Stormy Pena APRN   Medication Sig Dispense Refill   • amLODIPine (NORVASC) 5 MG tablet TAKE ONE TABLET BY MOUTH EVERY EVENING 90 tablet 3   • atenolol (TENORMIN) 50 MG tablet TAKE 1 TABLET BY MOUTH DAILY 90 tablet 3   • carbidopa-levodopa (SINEMET)  MG per tablet TAKE ONE TO TWO TABLETS BY MOUTH AT BEDTIME 180 tablet 1   • CINNAMON PO Take 500 mg by mouth Daily.     • cloNIDine (Catapres) 0.1 MG tablet Take 1 at bedtime for blood pressure 90 tablet 3   • clopidogrel (PLAVIX) 75 MG tablet Take 75 mg by mouth Daily.     • colchicine-probenecid (COL-BENEMID) 0.5-500 MG per tablet TAKE ONE TABLET BY MOUTH EVERY DAY 30 tablet 2   • D 2000 50 MCG (2000 UT) tablet TAKE 1 TABLET BY MOUTH EVERY DAY 30 tablet 6   • indapamide (LOZOL) 2.5 MG tablet Take 2.5 mg by mouth Every Morning.     • Multiple Vitamins-Minerals (EYE VITAMINS PO) Take 1 tablet by mouth Daily.         Allergies   Allergen Reactions   • Other Anaphylaxis     UNKNOWN TO PATIENT ENVIRONMENTAL/FOOD ALLERGY    • Ezetimibe Unknown (See Comments)     Leg cramps   • Niacin And Related Unknown (See Comments)     Cramping red and flush   • Statins Other (See Comments)     Leg cramps       Social History     Socioeconomic History   • Marital status:    Tobacco Use   • Smoking status: Never Smoker   • Smokeless tobacco: Never Used   Substance and Sexual Activity   • Alcohol use: No   • Drug use: No   • Sexual activity: Defer       Family History   Problem Relation Age of Onset   • Stroke Mother    • Diabetes Mother    • No Known Problems Father    •  Pancreatic cancer Sister    • No Known Problems Maternal Grandmother    • No Known Problems Maternal Grandfather    • No Known Problems Paternal Grandmother    • No Known Problems Paternal Grandfather    • Colon polyps Neg Hx    • Colon cancer Neg Hx        Review of Systems   Constitutional: Negative for chills, fever and unexpected weight change.   Respiratory: Negative for cough, shortness of breath and wheezing.    Cardiovascular: Negative for chest pain and palpitations.   Gastrointestinal: Negative for abdominal distention, abdominal pain, anal bleeding, blood in stool, constipation, diarrhea, nausea and vomiting.       Objective     Vitals:    04/06/22 0825   BP: 136/60   Pulse: (!) 48   Temp: 96.8 °F (36 °C)   SpO2: 98%         04/06/22 0825   Weight: 61.2 kg (135 lb)     Body mass index is 20.53 kg/m².    Physical Exam  Vitals reviewed.   Constitutional:       General: He is not in acute distress.  Cardiovascular:      Rate and Rhythm: Normal rate and regular rhythm.      Heart sounds: Normal heart sounds.   Pulmonary:      Effort: Pulmonary effort is normal.      Breath sounds: Normal breath sounds.   Abdominal:      General: Bowel sounds are normal. There is no distension.      Palpations: Abdomen is soft.      Tenderness: There is no abdominal tenderness.   Skin:     General: Skin is warm and dry.   Neurological:      Mental Status: He is alert.         Imaging Results (Most Recent)     None          Assessment/Plan     Diagnoses and all orders for this visit:    1. History of adenomatous polyp of colon (Primary)    2. Positive colorectal cancer screening using Cologuard test  Comments:  2019    3. Coagulopathy (HCC)           However prior to scheduling I will send a letter to Dr Stanton Escalante in regards to if the patient can safely hold plavix  7 days prior to procedure. I will contact patient once he has responded.  I have instructed the patient to contact our office if he has not heard from us within 2  weeks.       The patient was advised on the risks of stopping blood thinners for a procedure.  The risks discussed included the risk of developing myocardial infarction, CVA, embolus, clogging of the arteries or stents, etc.  We discussed the potential consequences of the risks discussed.  The benefits of stopping as well as the alternatives were discussed as well.   Patient is to hold their anticoagulation medication per the direction of their prescribing physician, Dr. Stanton Escalante. A letter will be sent to prescribing This is to prevent any risk or complication from bleeding intra and post procedure. If they develop bleeding post procedure they are to go the emergency department for further evaluation and treatment immediately.         Colonoscopy   All risks, benefits, alternatives, and indications of colonoscopy procedure have been discussed with the patient. Risks to include perforation of the colon requiring possible surgery or colostomy, risk of bleeding from biopsies or removal of colon tissue, possibility of missing a colon polyp or cancer, or adverse drug reaction.  Benefits to include the diagnosis and management of disease of the colon and rectum. Alternatives to include barium enema, radiographic evaluation, lab testing or no intervention. Pt verbalizes understanding and agrees.         VERONIKA Ryan

## 2022-04-06 NOTE — TELEPHONE ENCOUNTER
Date: TBD     Cardiologist: Evelyn Merlos     Procedure: Colonoscopy     Surgeon: Vanessa Gómez     Last Office Visit: 1/25/22  Reason for office visit and medical concerns addressed at this office visit: cad, cva, htn, hyperlipidemia     Testing Performed and Date of Service:  3/19/19 Cath Triple-vessel disease   Patent LIMA to the distal LAD   All vein grafts are occluded   Successful PCI to distal circumflex and OM1 with drug-eluting stents  Does the patient have a stent? If so, what type? DEIRDRE 2019     Current Medications: amlodipine, atenolol, sinemet, cinnamon, plavix, colchicine, indapamide,      Is the patient currently taking an anticoagulant?  If so, what is the diagnosis the patient has been given to warrant the need for the anticoagulant? plavix for DEIRDRE on 3/19/19     Additional Notes: requesting to hold plavix 7 days prior to procedure

## 2022-04-13 ENCOUNTER — PREP FOR SURGERY (OUTPATIENT)
Dept: OTHER | Facility: HOSPITAL | Age: 82
End: 2022-04-13

## 2022-04-13 ENCOUNTER — TELEPHONE (OUTPATIENT)
Dept: GASTROENTEROLOGY | Facility: CLINIC | Age: 82
End: 2022-04-13

## 2022-04-13 DIAGNOSIS — R19.5 POSITIVE COLORECTAL CANCER SCREENING USING COLOGUARD TEST: ICD-10-CM

## 2022-04-13 DIAGNOSIS — Z86.010 HISTORY OF ADENOMATOUS POLYP OF COLON: Primary | ICD-10-CM

## 2022-04-13 NOTE — TELEPHONE ENCOUNTER
Please let patient know Ousmane BANDA approved him to be off of Plavix 7 days prior to procedure.  I will put in case request for colonoscopy.  MiraLAX prep is fine.

## 2022-04-15 NOTE — TELEPHONE ENCOUNTER
Spoke with PTs wife.  PT is scheduled for 5-9-22.  Wife verbalized understanding stopping the Plavix seven days prior to.    PT is having some gout issues and went to the dr today.  He had some labs drawn.  Wife said they will call back if there is any reason he can't keep his appointment,

## 2022-04-18 DIAGNOSIS — R79.89 AZOTEMIA: Primary | ICD-10-CM

## 2022-04-25 RX ORDER — CLOPIDOGREL BISULFATE 75 MG/1
TABLET ORAL
Qty: 90 TABLET | Refills: 3 | Status: SHIPPED | OUTPATIENT
Start: 2022-04-25

## 2022-04-28 ENCOUNTER — TELEPHONE (OUTPATIENT)
Dept: CARDIOLOGY CLINIC | Age: 82
End: 2022-04-28

## 2022-04-28 NOTE — TELEPHONE ENCOUNTER
Called and left VM for patient to reschedule due to [facility] out, Can either see APRN or [facility] next available, BW 4/28

## 2022-05-09 ENCOUNTER — ANESTHESIA EVENT (OUTPATIENT)
Dept: GASTROENTEROLOGY | Facility: HOSPITAL | Age: 82
End: 2022-05-09

## 2022-05-09 ENCOUNTER — HOSPITAL ENCOUNTER (OUTPATIENT)
Facility: HOSPITAL | Age: 82
Setting detail: HOSPITAL OUTPATIENT SURGERY
Discharge: HOME | End: 2022-05-09
Attending: INTERNAL MEDICINE | Admitting: INTERNAL MEDICINE

## 2022-05-09 ENCOUNTER — ANESTHESIA (OUTPATIENT)
Dept: GASTROENTEROLOGY | Facility: HOSPITAL | Age: 82
End: 2022-05-09

## 2022-05-09 VITALS
OXYGEN SATURATION: 100 % | WEIGHT: 132 LBS | HEIGHT: 66 IN | TEMPERATURE: 97.2 F | SYSTOLIC BLOOD PRESSURE: 109 MMHG | HEART RATE: 72 BPM | RESPIRATION RATE: 16 BRPM | DIASTOLIC BLOOD PRESSURE: 77 MMHG | BODY MASS INDEX: 21.21 KG/M2

## 2022-05-09 DIAGNOSIS — Z86.010 HISTORY OF ADENOMATOUS POLYP OF COLON: ICD-10-CM

## 2022-05-09 PROCEDURE — 88305 TISSUE EXAM BY PATHOLOGIST: CPT | Performed by: INTERNAL MEDICINE

## 2022-05-09 PROCEDURE — 45385 COLONOSCOPY W/LESION REMOVAL: CPT | Performed by: INTERNAL MEDICINE

## 2022-05-09 PROCEDURE — 25010000002 PROPOFOL 10 MG/ML EMULSION

## 2022-05-09 RX ORDER — ONDANSETRON 2 MG/ML
4 INJECTION INTRAMUSCULAR; INTRAVENOUS ONCE AS NEEDED
Status: DISCONTINUED | OUTPATIENT
Start: 2022-05-09 | End: 2022-05-09 | Stop reason: HOSPADM

## 2022-05-09 RX ORDER — SODIUM CHLORIDE 9 MG/ML
500 INJECTION, SOLUTION INTRAVENOUS CONTINUOUS PRN
Status: DISCONTINUED | OUTPATIENT
Start: 2022-05-09 | End: 2022-05-09 | Stop reason: HOSPADM

## 2022-05-09 RX ORDER — SODIUM CHLORIDE 0.9 % (FLUSH) 0.9 %
10 SYRINGE (ML) INJECTION AS NEEDED
Status: DISCONTINUED | OUTPATIENT
Start: 2022-05-09 | End: 2022-05-09 | Stop reason: HOSPADM

## 2022-05-09 RX ORDER — PROPOFOL 10 MG/ML
VIAL (ML) INTRAVENOUS AS NEEDED
Status: DISCONTINUED | OUTPATIENT
Start: 2022-05-09 | End: 2022-05-09 | Stop reason: SURG

## 2022-05-09 RX ORDER — LIDOCAINE HYDROCHLORIDE 10 MG/ML
0.5 INJECTION, SOLUTION EPIDURAL; INFILTRATION; INTRACAUDAL; PERINEURAL ONCE AS NEEDED
Status: DISCONTINUED | OUTPATIENT
Start: 2022-05-09 | End: 2022-05-09 | Stop reason: HOSPADM

## 2022-05-09 RX ORDER — LIDOCAINE HYDROCHLORIDE 20 MG/ML
INJECTION, SOLUTION EPIDURAL; INFILTRATION; INTRACAUDAL; PERINEURAL AS NEEDED
Status: DISCONTINUED | OUTPATIENT
Start: 2022-05-09 | End: 2022-05-09 | Stop reason: SURG

## 2022-05-09 RX ADMIN — LIDOCAINE HYDROCHLORIDE 80 MG: 20 INJECTION, SOLUTION EPIDURAL; INFILTRATION; INTRACAUDAL; PERINEURAL at 09:13

## 2022-05-09 RX ADMIN — SODIUM CHLORIDE 500 ML: 9 INJECTION, SOLUTION INTRAVENOUS at 07:51

## 2022-05-09 RX ADMIN — PROPOFOL 150 MG: 10 INJECTION, EMULSION INTRAVENOUS at 09:13

## 2022-05-09 NOTE — ANESTHESIA POSTPROCEDURE EVALUATION
"Patient: Shabbir Gambino    Procedure Summary     Date: 05/09/22 Room / Location: [facility] ENDOSCOPY 6 / [facility] ENDOSCOPY    Anesthesia Start: 0907 Anesthesia Stop: 0936    Procedure: COLONOSCOPY WITH ANESTHESIA (N/A ) Diagnosis:       History of adenomatous polyp of colon      (History of adenomatous polyp of colon [Z86.010])    Surgeons: Randy Gomez MD Provider: Reny Cantrell CRNA    Anesthesia Type: MAC ASA Status: 3          Anesthesia Type: MAC    Vitals  Vitals Value Taken Time   /77 05/09/22 0956   Temp     Pulse 72 05/09/22 0955   Resp 16 05/09/22 0955   SpO2 100 % 05/09/22 0955           Post Anesthesia Care and Evaluation    Patient location during evaluation: PHASE II  Patient participation: complete - patient participated  Level of consciousness: awake  Pain management: adequate  Airway patency: patent  Anesthetic complications: No anesthetic complications    Cardiovascular status: acceptable  Respiratory status: acceptable  Hydration status: acceptable    Comments: Blood pressure 109/77, pulse 72, temperature 97.2 °F (36.2 °C), temperature source Temporal, resp. rate 16, height 167.6 cm (66\"), weight 59.9 kg (132 lb), SpO2 100 %.    Pt discharged from PACU based on tad score >8      "

## 2022-05-09 NOTE — ANESTHESIA PREPROCEDURE EVALUATION
Anesthesia Evaluation     Patient summary reviewed   no history of anesthetic complications:  NPO Solid Status: > 8 hours             Airway   Mallampati: II  TM distance: >3 FB  Neck ROM: full  no difficulty expected  Dental      Comment: Partial upper    Pulmonary    (-) COPD, asthma, sleep apnea, not a smoker  Cardiovascular   Exercise tolerance: good (4-7 METS) (Limited by balance, denies chest pain and shortness of breath)    (+) hypertension, CAD, CABG, cardiac stents PVD (bilateral CEA), hyperlipidemia,  carotid artery disease  (-) angina    ROS comment: Off aggrenox since 8/1    Neuro/Psych  (+) TIA, CVA,    (-) seizures  GI/Hepatic/Renal/Endo    (-) liver disease, no renal disease, diabetes    Musculoskeletal     Abdominal    Substance History      OB/GYN          Other                          Anesthesia Plan    ASA 3     MAC     intravenous induction     Anesthetic plan, all risks, benefits, and alternatives have been provided, discussed and informed consent has been obtained with: patient.

## 2022-05-09 NOTE — H&P
[facility] Gastroenterology  Pre Procedure History & Physical    Chief Complaint:   Colon polyps    Subjective     HPI:   The patient has a history of colon polyps who presents for exam.    Past Medical History:   Past Medical History:   Diagnosis Date   • Atherosclerotic coronary vascular disease    • Axonal polyneuropathy    • Coagulopathy (HCC) 02/28/2020   • Colon polyp    • COVID toes     Had infusion   • Elevated cholesterol    • Gout    • History of transfusion    • Hyperlipidemia    • Hypertension    • Insomnia    • Irregular heart beat    • Precancerous skin lesion    • RLS (restless legs syndrome)    • TIA (transient ischemic attack)     2014   • Vascular disease     CAROTID       Past Surgical History:  Past Surgical History:   Procedure Laterality Date   • CAROTID ENDARTERECTOMY Bilateral     2007, 2008   • COLON RESECTION N/A 10/24/2017    Procedure: COLON RESECTION  MID TRANSVERSE LAPAROSCOPIC;  Surgeon: Adal Quintero MD;  Location: [facility] OR;  Service:    • COLONOSCOPY      MULTIPLE    • COLONOSCOPY N/A 9/7/2017    Procedure: colonoscopy with anesthesia;  Surgeon: Randy Gomez MD;  Location: [facility] ENDOSCOPY;  Service:    • COLONOSCOPY N/A 8/7/2018    Procedure: COLONOSCOPY WITH ANESTHESIA;  Surgeon: Randy Gomez MD;  Location: [facility] ENDOSCOPY;  Service: Gastroenterology   • CORONARY ARTERY BYPASS GRAFT  2006   • CORONARY STENT PLACEMENT     • CORONARY STENT PLACEMENT     • ENDOSCOPY N/A 9/7/2017    Procedure: ESOPHAGOGASTRODUODENOSCOPY WITH ANESTHESIA;  Surgeon: Randy Gomez MD;  Location: [facility] ENDOSCOPY;  Service:    • ENDOSCOPY N/A 8/7/2018    Procedure: ESOPHAGOGASTRODUODENOSCOPY WITH ANESTHESIA;  Surgeon: Randy Gomez MD;  Location: [facility] ENDOSCOPY;  Service: Gastroenterology   • EYE SURGERY Bilateral     2014, 2015   • KNEE SURGERY Right     x 2       Family History:  Family History   Problem Relation Age of Onset   • Heart disease Mother    • Stroke Mother    •  "Diabetes Mother    • No Known Problems Father    • Pancreatic cancer Sister    • No Known Problems Maternal Grandmother    • No Known Problems Maternal Grandfather    • No Known Problems Paternal Grandmother    • No Known Problems Paternal Grandfather    • Colon polyps Neg Hx    • Colon cancer Neg Hx        Social History:   reports that he has never smoked. He has never used smokeless tobacco. He reports that he does not drink alcohol and does not use drugs.    Medications:   Prior to Admission medications    Medication Sig Start Date End Date Taking? Authorizing Provider   amLODIPine (NORVASC) 5 MG tablet TAKE ONE TABLET BY MOUTH EVERY EVENING 3/8/22  Yes Na Salas APRN   atenolol (TENORMIN) 50 MG tablet TAKE 1 TABLET BY MOUTH DAILY 5/27/21  Yes Na Salas APRN   carbidopa-levodopa (SINEMET)  MG per tablet TAKE ONE TO TWO TABLETS BY MOUTH AT BEDTIME 8/24/21  Yes Na Salas APRN   CINNAMON PO Take 500 mg by mouth Daily.   Yes Ramonita Orellana MD   cloNIDine (Catapres) 0.1 MG tablet Take 1 at bedtime for blood pressure 4/5/21  Yes Rigoberto Barnes MD   colchicine-probenecid (COL-BENEMID) 0.5-500 MG per tablet TAKE ONE TABLET BY MOUTH EVERY DAY 12/7/21  Yes Na Salas APRN   indapamide (LOZOL) 2.5 MG tablet Take 2.5 mg by mouth Every Morning.   Yes Ramonita Orellana MD   Multiple Vitamins-Minerals (EYE VITAMINS PO) Take 1 tablet by mouth Daily.   Yes Ramonita Orellana MD   clopidogrel (PLAVIX) 75 MG tablet Take 75 mg by mouth Daily.    Ramonita Orellana MD   D 2000 50 MCG (2000 UT) tablet TAKE 1 TABLET BY MOUTH EVERY DAY 9/7/21 5/9/22  Rigoberto Barnes MD       Allergies:  Other, Ezetimibe, Niacin and related, and Statins    ROS:    General: Weight stable  Resp: No SOA  Cardiovascular: No CP    Objective     Blood pressure 135/58, pulse (!) 48, temperature 97.2 °F (36.2 °C), temperature source Temporal, resp. rate 20, height 167.6 cm (66\"), weight 59.9 " kg (132 lb), SpO2 98 %.    Physical Exam   Constitutional: Pt is oriented to person, place, and in no distress.   Cardiovascular: Normal rate, regular rhythm.    Pulmonary/Chest: Effort normal. No respiratory distress.   Abdominal:  Non-distended.  Psychiatric: Mood, memory, affect and judgment appear normal.     Assessment/Plan     Diagnosis:  Colon polyps    Anticipated Surgical Procedure:  Colonoscopy    The risks, benefits, and alternatives of this procedure have been discussed with the patient or the responsible party- the patient understands and agrees to proceed.      EMR Dragon/transcription disclaimer:  Much of this encounter note is electronic transcription/translation of spoken language to printed text.  The electronic translation of spoken language may be erroneous, or at times, nonsensical words or phrases may be inadvertently transcribed.  Although I have reviewed the note for such errors, some may still exist.

## 2022-05-10 ENCOUNTER — HOSPITAL ENCOUNTER (OUTPATIENT)
Dept: GENERAL RADIOLOGY | Age: 82
Discharge: HOME | End: 2022-05-10
Payer: MEDICARE

## 2022-05-10 DIAGNOSIS — N18.32 STAGE 3B CHRONIC KIDNEY DISEASE (HCC): ICD-10-CM

## 2022-05-10 PROCEDURE — 93975 VASCULAR STUDY: CPT

## 2022-05-24 RX ORDER — ATENOLOL 50 MG/1
TABLET ORAL
Qty: 90 TABLET | Refills: 3 | Status: SHIPPED | OUTPATIENT
Start: 2022-05-24

## 2022-05-24 NOTE — TELEPHONE ENCOUNTER
Patient is Brazilian speaking, Yeny, RN at bedside to interpret. Patient presents to ER after checking CBG at home, reading was >600. Pt  upon arrival to ER. Patient denies pain, denies any other complaints at this time. Vitals stable, no distress noted.    Rx Refill Note  Requested Prescriptions     Pending Prescriptions Disp Refills   • atenolol (TENORMIN) 50 MG tablet [Pharmacy Med Name: ATENOLOL 50MG] 90 tablet 2     Sig: TAKE 1 TABLET BY MOUTH DAILY      Last office visit with prescribing clinician: 8/16/2021      Next office visit with prescribing clinician: Visit date not found   CPE done 03/04/2022    {TIP  Please add Last Relevant Lab Date if appropriate: 03/04/2022  {TIP  Is Refill Pharmacy correct?: yes    Abena Spain MA  05/24/22, 15:21 CDT

## 2022-06-13 ENCOUNTER — TELEPHONE (OUTPATIENT)
Dept: CARDIOLOGY CLINIC | Age: 82
End: 2022-06-13

## 2022-06-14 ENCOUNTER — OFFICE VISIT (OUTPATIENT)
Dept: CARDIOLOGY CLINIC | Age: 82
End: 2022-06-14
Payer: MEDICARE

## 2022-06-14 VITALS
HEIGHT: 68 IN | WEIGHT: 139 LBS | SYSTOLIC BLOOD PRESSURE: 124 MMHG | OXYGEN SATURATION: 98 % | DIASTOLIC BLOOD PRESSURE: 64 MMHG | HEART RATE: 55 BPM | BODY MASS INDEX: 21.07 KG/M2

## 2022-06-14 DIAGNOSIS — I25.10 CORONARY ARTERY DISEASE INVOLVING NATIVE CORONARY ARTERY OF NATIVE HEART WITHOUT ANGINA PECTORIS: Primary | ICD-10-CM

## 2022-06-14 DIAGNOSIS — I10 ESSENTIAL HYPERTENSION: ICD-10-CM

## 2022-06-14 DIAGNOSIS — E78.5 HYPERLIPIDEMIA, UNSPECIFIED HYPERLIPIDEMIA TYPE: ICD-10-CM

## 2022-06-14 PROCEDURE — 99214 OFFICE O/P EST MOD 30 MIN: CPT | Performed by: NURSE PRACTITIONER

## 2022-06-14 PROCEDURE — G8427 DOCREV CUR MEDS BY ELIG CLIN: HCPCS | Performed by: NURSE PRACTITIONER

## 2022-06-14 PROCEDURE — G8420 CALC BMI NORM PARAMETERS: HCPCS | Performed by: NURSE PRACTITIONER

## 2022-06-14 PROCEDURE — 1123F ACP DISCUSS/DSCN MKR DOCD: CPT | Performed by: NURSE PRACTITIONER

## 2022-06-14 PROCEDURE — 1036F TOBACCO NON-USER: CPT | Performed by: NURSE PRACTITIONER

## 2022-06-14 NOTE — PROGRESS NOTES
Dear Dr. Rachid Grant MD,    Thank you for allowing me to participate in the care of Mr. Amanda Sahni. He presents today at the [address] [place] [address]. As you know, Mr. Ruthy St is a 79 y.o. male with history of hypertension, hyperlipidemia, carotid artery disease, and CAD who presents with the chief complaint of follow-up for chronic cardiac conditions. He is a patient of Dr. Michel Humphries. Last seen, he has been stable from a cardiac standpoint. He is sedentary at home. He denies any chest pain or shortness of breath. He denies any fast or slow heart rhythms. He denies any strokelike symptoms. he is sleeping on 1 pillow at night. he is not waking gasping for air. he denies any swelling. he has been compliant with his medications. his BP has been controlled. PCP follows labs and lipids. He otherwise denies chest pain, SOA, EDMONDS, PND, orthopnea, syncope or near syncope. He has no other complaints. Review of Systems   Constitutional: Negative for fever, chills, diaphoresis, activity change, appetite change, fatigue and unexpected weight change. Eyes: Negative for photophobia, pain, redness and visual disturbance. Respiratory: Negative for apnea, cough, chest tightness, shortness of breath, wheezing and stridor. Cardiovascular: Negative for chest pain, palpitations and leg swelling. Gastrointestinal: Negative for abdominal distention. Genitourinary: Negative for dysuria, urgency and frequency. Musculoskeletal: Negative for myalgias, arthralgias and gait problem. Skin: Negative for color change, pallor, rash and wound. Neurological: Negative for dizziness, tremors, speech difficulty, weakness and numbness. Hematological: Does not bruise/bleed easily. Psychiatric/Behavioral: Negative.         Past Medical History:   Diagnosis Date    Arthritis     COVID-19 08/2021    Skin cancer     On face       Past Surgical History:   Procedure Laterality Date    ABSCESS DRAINAGE Right     knee  CARDIAC CATHETERIZATION  03/19/2019    [place] [address] [facility]    CARDIAC SURGERY      cabg 2008    CAROTID ENDARTERECTOMY Left     [place]    CAROTID ENDARTERECTOMY Right     Dr. Shayy Romero CORONARY ARTERY BYPASS GRAFT      ESOPHAGEAL DILATATION      EYE SURGERY      summer cat    JOINT REPLACEMENT      rtk    POLYPECTOMY      Colon    SKIN CANCER EXCISION         Family History   Problem Relation Age of Onset    Heart Disease Mother     Heart Disease Maternal Uncle        Social History     Socioeconomic History    Marital status:      Spouse name: Not on file    Number of children: Not on file    Years of education: Not on file    Highest education level: Not on file   Occupational History    Not on file   Tobacco Use    Smoking status: Never Smoker    Smokeless tobacco: Never Used   Vaping Use    Vaping Use: Never used   Substance and Sexual Activity    Alcohol use: No    Drug use: No    Sexual activity: Not on file   Other Topics Concern    Not on file   Social History Narrative    Not on file     Social Determinants of Health     Financial Resource Strain:     Difficulty of Paying Living Expenses: Not on file   Food Insecurity:     Worried About Running Out of Food in the Last Year: Not on file    Justen of Food in the Last Year: Not on file   Transportation Needs:     Lack of Transportation (Medical): Not on file    Lack of Transportation (Non-Medical):  Not on file   Physical Activity:     Days of Exercise per Week: Not on file    Minutes of Exercise per Session: Not on file   Stress:     Feeling of Stress : Not on file   Social Connections:     Frequency of Communication with Friends and Family: Not on file    Frequency of Social Gatherings with Friends and Family: Not on file    Attends Jehovah's witness Services: Not on file    Active Member of Clubs or Organizations: Not on file    Attends Club or Organization Meetings: Not on file    Marital Status: Not on file   Intimate Partner Violence:     Fear of Current or Ex-Partner: Not on file    Emotionally Abused: Not on file    Physically Abused: Not on file    Sexually Abused: Not on file   Housing Stability:     Unable to Pay for Housing in the Last Year: Not on file    Number of Places Lived in the Last Year: Not on file    Unstable Housing in the Last Year: Not on file       Allergies   Allergen Reactions    Other Anaphylaxis     UNKNOWN TO PATIENT ENVIRONMENTAL/FOOD ALLERGY     Niacin And Related     Statins Other (See Comments)     Leg cramps    Zetia [Ezetimibe]          Current Outpatient Medications:     clopidogrel (PLAVIX) 75 MG tablet, TAKE 1 TABLET BY MOUTH EVERY DAY, Disp: 90 tablet, Rfl: 3    indapamide (LOZOL) 2.5 MG tablet, TAKE ONE TABLET BY MOUTH EVERY MORNING, Disp: 90 tablet, Rfl: 3    colchicine-probenecid 0.5-500 MG per tablet, Take 1 tablet by mouth daily, Disp: , Rfl:     NONFORMULARY, Over the counter \"Zz\", Disp: , Rfl:     Multiple Vitamins-Minerals (ICAPS AREDS 2 PO), Take 1 tablet by mouth daily , Disp: , Rfl:     amLODIPine (NORVASC) 2.5 MG tablet, Take 2.5 mg by mouth daily, Disp: , Rfl:     atenolol (TENORMIN) 50 MG tablet, Take 50 mg by mouth daily, Disp: , Rfl:     carbidopa-levodopa (SINEMET)  MG per tablet, Take 1 tablet by mouth nightly , Disp: , Rfl:     Cinnamon 500 MG CAPS, Take 1 capsule by mouth nightly, Disp: , Rfl:     PE:  Vitals:    06/14/22 0840   BP: 124/64   Pulse: 55   SpO2: 98%       Estimated body mass index is 21.13 kg/m² as calculated from the following:    Height as of this encounter: 5' 8\" (1.727 m). Weight as of this encounter: 139 lb (63 kg). Constitutional: He is oriented to person, place, and time. He appears well-developed and well-nourished in no acute distress. Neck:  Neck supple without JVD present. No trachea deviation present. No thyromegaly present. Eyes:Conjunctivae and EOM are normal. Pupils equal and reactive to light.    ENT:Hearing appears normal.conjunctiva and lids are normal, ears and nose appear normal.  Cardiovascular: Normal rate, Normal rhythm, normal heart sounds. No murmur ascultated. No gallop and no friction rub. Left  And right carotid bruit. No peripheral edema. Pulmonary/Chest:  Lungs clear to auscultation bilaterally without evidence of respiratory distress. He without wheezes. He without rales or ronchi. Musculoskeletal: Normal range of motion. Gait is normal. Head is normocephalic and atraumatic. Skin: Skin is warm and dry without rash or pallor. Psychiatric:He is alert and oriented to person, place, and time. He has a normal mood and affect. His behavior is normal. Thought content normal.       Lab Results   Component Value Date    CREATININE 1.4 03/19/2019    CREATININE 1.7 12/11/2015    CREATININE 1.5 12/08/2015    HGB 14.7 03/19/2019    HGB 10.7 12/12/2015    HGB 12.2 12/11/2015       Assessment, Recommendations, & Plan:  79 y.o. male with CAD, HTN, & HLD    CAD-controlled on atenolol, Norvasc, & Plavix. Intolerant to statins. Continue current regimen    HTN-controlled on Norvasc, atenolol, & Lozol. Continue current regimen    HLD-unable to tolerate statins. Unable to afford Praluent or Repatha. Last lipid panel from 3/4/22 reviewed  Total cholesterol-151  Triglycerides-134  HDL-45  LDL-82      Disposition - RTC in 6 months with Dr. Cortez Ao or sooner if needed      Please do not hesitate to contact me for any questions or concerns.     Sincerely yours,    Romayne Erps, APRN

## 2022-06-14 NOTE — PATIENT INSTRUCTIONS
Coronary Artery Disease   (CAD; Coronary Atherosclerosis; Silent MI; Coronary Heart Disease; Ischemic Heart Disease; Atherosclerosis of the Coronary Arteries)     Definition   Coronary arteries bring oxygen rich blood to the heart muscle. Coronary artery disease (CAD) is blockage of these arteries. If the blockage is complete, areas of the heart muscle may be damaged. In severe case the heart muscle dies. This can lead to a heart attack, also known as a myocardial infarction (MI). Coronary artery disease is the most common form of heart disease. It is the leading cause of death worldwide. Causes include: Thickening of the walls of the arteries feeding the heart muscle   Accumulation of fatty plaques within the coronary arteries   Sudden spasm of a coronary artery   Narrowing of the coronary arteries   Inflammation within the coronary arteries   Development of a blood clot within the coronary arteries that blocks blood flow      Major risk factors include:   Sex: male (men have a greater risk of heart attack than women)   Age: 39 and older for men, 54 and older for women   Heredity: strong family history of heart disease   Obesity and being overweight   Smoking   High blood pressure   Sedentary lifestylePoor fitness can also increase your risk of CAD and premature death. High cholesterol (specifically, high LDL cholesterol, and low HDL cholesterol)   Diabetes   Metabolic syndrome (combination of high blood pressure, abdominal obesity, and insulin resistance)     Other risk factors may include:   Sleep Apnea  Stress   Excessive alcohol use   Depression   A diet that is high in saturated fat, trans fat, cholesterol, and/or caloriesDrinking sugary beverages on a regular basis may increase your risk of CAD. Symptoms   CAD may progress without any symptoms. Angina is chest pain that comes and goes. It often has a squeezing or pressure-like quality. It may radiate into the shoulder(s), arm(s), or jaw. Angina usually lasts for about 2-10 minutes. It is often relieved with rest. Angina can be triggered by:   Exercise or exertion   Emotional stress   Cold weather   A large meal   Chest pain may indicate more serious unstable angina or a heart attack if: It is unrelieved by rest or nitroglycerin   Severe angina   Angina that begins at rest (with no activity)   Angina that lasts more than 15 minutes   Accompanying symptoms may include:   Shortness of breath   Sweating   Nausea   Weakness   Immediate medical attention is needed for unstable angina. CAD in women may cause less classic chest pain. It is likely to start with shortness of breath and fatigue. Diagnosis   If you go to the emergency room with chest pain, some tests will be done right away. The tests will attempt to see if you are having angina or a heart attack. If you have a stable pattern of angina, other tests may be done to determine the severity of your disease. The doctor will ask about your symptoms and medical history. A physical exam will be done.    Tests may include:   Blood tests to look for certain substances in the blood called troponins which help the doctor determine if you are having a heart attack   Electrocardiogram (ECG, EKG) records the heart's activity by measuring electrical currents through the heart muscle, and can reveal evidence of past heart attacks, acute heart attacks, and heart rhythm problems   Echocardiogram uses high-frequency sound waves (ultrasound) to examine the size, shape, and motion of the heart, giving information about the structure and function of the heart   Exercise stress test records the heart's electrical activity during increased physical activity   Nuclear stress test the heart is observed while exercising and radioactive material highlights impaired blood flow to help locate problem areas   Coronary calcium scoring a type of x-ray called a CAT scan that uses a computer to look for the presence of calcium in the heart arteries   Coronary angiography x-rays taken after a dye is injected into the arteries to allows the doctor to look for abnormalities in the arteries   Treatment   Treatment may include:   Nitroglycerin   This medicine is usually given during an attack of angina. It can be given as a tablet that dissolves under the tongue or as a spray. Longer-lasting types can be used to prevent angina before an activity known to cause it. These may be given as pills or applied as patches or ointments. Blood-Thinning Medications   A small, daily dose of aspirin has been shown to decrease the risk of heart attack. Ask your doctor before taking aspirin daily. Warfarin (Coumadin)   Ticlopidine (Ticlid)   Clopidogrel (Plavix)   Beta-Blockers, Calcium-Channel Blockers, and ACE-Inhibitors   These may help prevent angina. In some cases, they may lower the risk of heart attack. Medications to Lower Cholesterol   Medicines, like statins, are often prescribed to people who have CAD. Statins (eg, atorvastatin [Lipitor]) lower cholesterol levels, which can help to prevent CAD events. Revascularization   Patients with severe blockages in their coronary arteries may benefit from procedures to immediately improve blood flow to the heart muscle:   Percutaneous coronary interventions (PCI)such as balloon angioplasty , in some cases, a wire mesh stent is placed to hold the artery open   Coronary artery bypass grafting (CABG) segments of vessels are taken from other areas of the body and are sewn into the heart arteries to reroute blood flow around blockages   Some studies have shown that CABG may be more effective than PCI. Lifestyle changes and intensive medicine may also be just as effective as PCI.    Options for Refractory Angina   For patients who are not candidates for revascularization procedures but have continued angina despite medicine, options include:   Enhanced external counterpulsation (EECP) large air bags are inflated around the legs in tune with the heart beat. The patient receives 5 one-hour treatments per week for seven weeks. This has been shown to reduce angina and may improve symptom-free exercise duration. Transmyocardial revascularization (TMR) surgical procedure done with laser to reduce chest pain. Researchers are also studying gene therapy as a possible treatment. Prevention   To reduce your risk of getting coronary artery disease:   Maintain a healthy weight. Eat a heart healthy diet that is low in saturated fat , red meat and processed meats, and rich in whole grain , fruits, and vegetables . Begin a safe exercise program with the advice of your doctor. If you smoke, quit . Treat your high blood pressure and/or diabetes. Treat high cholesterol or triglycerides. Ask your doctor about taking a low-dose aspirin every day. In certain patients, taking rosuvastatin (Crestor) may be another option. Talk to your doctor. Hyperlipidemia   (Dyslipidemia; High Triglycerides; Triglycerides, High)     Definition   Hyperlipidemia is a high level of fats in the blood. These fats, called lipids, include cholesterol and triglycerides. There are five types of hyperlipidemia. The type depends on which lipid in the blood is high. Causes   Causes may include:   A family history of hyperlipidemia   A diet high in total fat, saturated fat, or cholesterol   Obesity   Excess alcohol intake   Certain conditions, including:   Diabetes   Low thyroid   Kidney problems   Liver disease   Cushing's syndrome   Certain drugs, such as:   Hormones or birth control pills   Beta-blockers   Some diuretics   Cortisone drugs   Isotretinoin (for acne )   Some anti- HIV drugs   Risk Factors   These factors increase your chance of developing this condition.  Tell your doctor if you have any of these:   Advancing age   Sex: male   Postmenopause   Lack of exercise   Smoking   Stress   Overuse of alcohol   Symptoms Hyperlipidemia usually does not cause symptoms. Very high levels of lipids or triglycerides can cause:   Fat deposits in the skin or tendons ( xanthomas )   Pain, enlargement, or swelling of organs such as the liver, spleen, or pancreas ( pancreatitis )   Obstruction of blood vessels in heart and brain   Hyperlipidemia can increase your risk of atherosclerosis . This is a dangerous hardening of the arteries. It can end up blocking blood flow. In some cases, this may result in:   Angina   Heart attack   Stroke   Other serious complications   Blood Vessel with Atherosclerosis       [address] [place] [address].   Diagnosis   This condition is diagnosed with blood tests. These tests measure the levels of lipids in the blood. The National Cholesterol Education Program advises that you have your lipids checked at least once every five years, starting at age 21. Also, the American Academy of Pediatrics recommends lipid screening for children at risk (eg, a family history of hyperlipidemia). Testing may consist of a fasting blood test for:   Total cholesterol   LDL (bad cholesterol)   HDL (good cholesterol)   Triglycerides   Your doctor may recommend more frequent or earlier testing if you have:   Family history of hyperlipidemia   Risk factor or disease that may cause hyperlipidemia   Complication that may result from hyperlipidemia   Treatment   Treatment is not only aimed at correcting your cholesterol levels, but also at lowering your overall risk for heart disease and strokes. Diet Changes   Eat a diet low in total fat, saturated fat, and cholesterol . Reduce or eliminate the amount of alcohol you drink. Eat more high-fiber foods. Lifestyle Changes   If you are overweight, lose weight . If you smoke, quit . Exercise regularly . Talk to you doctor before starting an exercise program. Kerry Leon may already have hardening of the arteries or heart disease.  These conditions increase your risk of having a heart

## 2022-08-16 ENCOUNTER — IMMUNIZATION (OUTPATIENT)
Dept: FAMILY MEDICINE CLINIC | Facility: CLINIC | Age: 82
End: 2022-08-16

## 2022-08-16 DIAGNOSIS — Z23 NEED FOR COVID-19 VACCINE: Primary | ICD-10-CM

## 2022-08-16 PROCEDURE — 0054A COVID-19 (PFIZER) 12+ YRS: CPT | Performed by: FAMILY MEDICINE

## 2022-08-16 PROCEDURE — 91305 COVID-19 (PFIZER) 12+ YRS: CPT | Performed by: FAMILY MEDICINE

## 2022-08-18 ENCOUNTER — OFFICE VISIT (OUTPATIENT)
Dept: VASCULAR SURGERY | Age: 82
End: 2022-08-18
Payer: MEDICARE

## 2022-08-18 ENCOUNTER — HOSPITAL ENCOUNTER (OUTPATIENT)
Dept: VASCULAR LAB | Age: 82
Discharge: HOME | End: 2022-08-18
Payer: MEDICARE

## 2022-08-18 VITALS
SYSTOLIC BLOOD PRESSURE: 158 MMHG | TEMPERATURE: 97.2 F | HEIGHT: 68 IN | WEIGHT: 132 LBS | DIASTOLIC BLOOD PRESSURE: 65 MMHG | OXYGEN SATURATION: 99 % | BODY MASS INDEX: 20 KG/M2 | HEART RATE: 52 BPM

## 2022-08-18 DIAGNOSIS — I65.23 BILATERAL CAROTID ARTERY STENOSIS: Primary | ICD-10-CM

## 2022-08-18 DIAGNOSIS — I65.23 BILATERAL CAROTID ARTERY STENOSIS: ICD-10-CM

## 2022-08-18 PROCEDURE — 99213 OFFICE O/P EST LOW 20 MIN: CPT | Performed by: NURSE PRACTITIONER

## 2022-08-18 PROCEDURE — 1036F TOBACCO NON-USER: CPT | Performed by: NURSE PRACTITIONER

## 2022-08-18 PROCEDURE — 93880 EXTRACRANIAL BILAT STUDY: CPT

## 2022-08-18 PROCEDURE — G8420 CALC BMI NORM PARAMETERS: HCPCS | Performed by: NURSE PRACTITIONER

## 2022-08-18 PROCEDURE — 1123F ACP DISCUSS/DSCN MKR DOCD: CPT | Performed by: NURSE PRACTITIONER

## 2022-08-18 PROCEDURE — G8427 DOCREV CUR MEDS BY ELIG CLIN: HCPCS | Performed by: NURSE PRACTITIONER

## 2022-08-18 RX ORDER — MULTIVIT-MIN/IRON/FOLIC ACID/K 18-600-40
CAPSULE ORAL
COMMUNITY

## 2022-08-19 NOTE — PROGRESS NOTES
Charissa Hutson (:  1940) is a 79 y.o. male,Established patient, here for evaluation of the following chief complaint(s):  Follow-up (Carotid)            SUBJECTIVE/OBJECTIVE:  He presents for follow up of carotid artery stenosis. He has a known history of carotid artery stenosis for 1 - 5 years. His current treatment includes ASA EC daily. He denies a history of CVA. He reports has not had TIA's, episodes of lateralizing weakness and episodes of amaurosis fugax.     Charissa Hutson is a 79 y.o. male with the following history as recorded in [facility]:  Patient Active Problem List    Diagnosis Date Noted    Unstable angina ([facility] [address]     Bilateral carotid artery stenosis 2019    Skin cancer 2019    Colonic polyp 2018    Dysphagia 2018    GERD (gastroesophageal reflux disease) 2018    Dyslipidemia 2018    Acute blood loss anemia 2015    Cerebrovascular disease 2015    Osteoarthritis of right knee 2015    Coronary artery disease involving native coronary artery 2015    Essential hypertension 2015     Current Outpatient Medications   Medication Sig Dispense Refill    Cholecalciferol (VITAMIN D) 50 MCG ( UT) CAPS capsule Take by mouth      clopidogrel (PLAVIX) 75 MG tablet TAKE 1 TABLET BY MOUTH EVERY DAY 90 tablet 3    indapamide (LOZOL) 2.5 MG tablet TAKE ONE TABLET BY MOUTH EVERY MORNING 90 tablet 3    NONFORMULARY Over the counter \"Zz\"      Multiple Vitamins-Minerals (ICAPS AREDS 2 PO) Take 1 tablet by mouth daily       amLODIPine (NORVASC) 2.5 MG tablet Take 2.5 mg by mouth daily      atenolol (TENORMIN) 50 MG tablet Take 50 mg by mouth daily      carbidopa-levodopa (SINEMET)  MG per tablet Take 1 tablet by mouth nightly       Cinnamon 500 MG CAPS Take 1 capsule by mouth nightly      colchicine-probenecid 0.5-500 MG per tablet Take 1 tablet by mouth daily (Patient not taking: Reported on 2022)       No current facility-administered medications for this visit. Allergies: Other, Niacin and related, Statins, and Zetia [ezetimibe]  Past Medical History:   Diagnosis Date    Arthritis     COVID-19 08/2021    Skin cancer     On face     Past Surgical History:   Procedure Laterality Date    ABSCESS DRAINAGE Right     knee    CARDIAC CATHETERIZATION  03/19/2019    [facility] [address] [facility]    CARDIAC SURGERY      cabg 2008    CAROTID ENDARTERECTOMY Left     [place]    CAROTID ENDARTERECTOMY Right     Dr. Dowell Organ      summer cat    JOINT REPLACEMENT      rtk    POLYPECTOMY      Colon    SKIN CANCER EXCISION       Family History   Problem Relation Age of Onset    Heart Disease Mother     Heart Disease Maternal Uncle      Social History     Tobacco Use    Smoking status: Never    Smokeless tobacco: Never   Substance Use Topics    Alcohol use: No         Eyes - no sudden vision change or amaurosis. Respiratory - no significant shortness of breath,  Cardiovascular - no chest pain or syncope. No  significant leg swelling. no claudication. Musculoskeletal - no gait disturbance  Skin - no new wound. Neurologic -  No speech difficulty or lateralizing weakness. All other review of systems are negative. Physical Exam    BP (!) 158/65 (Site: Right Upper Arm, Position: Sitting)   Pulse 52   Temp 97.2 °F (36.2 °C) (Temporal)   Ht 5' 8\" (1.727 m)   Wt 132 lb (59.9 kg)   SpO2 99%   BMI 20.07 kg/m²       Neck- carotid pulses 2+ to palpation with no bruit  Cardiovascular - Regular rate and rhythm. Pulmonary - effort appears normal.  No respiratory distress. Lungs - Breath sounds normal. No wheezes or rales. Extremities - without edema   Neurologic - alert and oriented X 3. Physiologic. Face symmetric. Skin - warm, dry, and intact.   no wound  Psychiatric - mood, affect, and behavior appear normal.  Judgment and thought processes appear normal.    Risk factors for atherosclerosis of all vascular beds have been reviewed with the patient including:  Family history, tobacco abuse in all forms, elevated cholesterol, hyperlipidemia, and diabetes. Doppler results:    Right CCA/ICA 50-69% stenotic  Left CCA/ICA 50-69% stenotic  Right verterbral artery flow is antegrade  Left verterbral artery flow is antegrade  Individual velocities reviewed: Yes. Results were reviewed with the patient. Disease process is stable and chronic  by velocity criteria, I see no significant change          Reviewed previous studies including: carotid u/s  Individual images were reviewed. I agree with the findings  Results were discussed with the patient. ASSESSMENT/PLAN:  1. Bilateral carotid artery stenosis  -     VL DUP CAROTID BILATERAL; Future        Discussed management of carotid u/s which includes:  continue asa to reduce risk of TIA/CVA, to reduce risk of arterial thrombosis, and to decrease rate of plaque buildup  Strongly encourage start/continue statin therapy -   Recommend no smoking - discussed the effect tobacco has on illness;   Proceed with 6 months with cvls         Patient instructed to call or proceed to the emergency room with any symptoms of lateralizing weakness, loss of vision in one eye, or episodes slurred speech. An electronic signature was used to authenticate this note.     --ANTHONY Burkett

## 2022-08-19 NOTE — TELEPHONE ENCOUNTER
Rx Refill Note  Requested Prescriptions     Pending Prescriptions Disp Refills   • carbidopa-levodopa (SINEMET)  MG per tablet [Pharmacy Med Name: CARBIDOPA-LEVODOPA 25-100MG TABS] 180 tablet 1     Sig: TAKE ONE TO TWO TABLETS BY MOUTH AT BEDTIME      Last office visit with prescribing clinician: 3/4/22  Next office visit with prescribing clinician: 9/6/22           Abena Yu MA  08/19/22, 15:35 CDT

## 2022-09-06 ENCOUNTER — OFFICE VISIT (OUTPATIENT)
Dept: FAMILY MEDICINE CLINIC | Facility: CLINIC | Age: 82
End: 2022-09-06

## 2022-09-06 VITALS
WEIGHT: 134.2 LBS | RESPIRATION RATE: 16 BRPM | SYSTOLIC BLOOD PRESSURE: 130 MMHG | DIASTOLIC BLOOD PRESSURE: 60 MMHG | HEART RATE: 50 BPM | TEMPERATURE: 98 F | OXYGEN SATURATION: 97 % | HEIGHT: 66 IN | BODY MASS INDEX: 21.57 KG/M2

## 2022-09-06 DIAGNOSIS — E78.2 MIXED HYPERLIPIDEMIA: Primary | ICD-10-CM

## 2022-09-06 DIAGNOSIS — R73.9 ELEVATED BLOOD SUGAR: ICD-10-CM

## 2022-09-06 DIAGNOSIS — E79.0 HYPERURICEMIA: ICD-10-CM

## 2022-09-06 PROCEDURE — 99213 OFFICE O/P EST LOW 20 MIN: CPT | Performed by: FAMILY MEDICINE

## 2022-09-06 NOTE — PROGRESS NOTES
"Shabbir CARLOS Franklynmekaar    1940    Chief Complaint   Patient presents with   • Follow-up     6 month fasting       Vitals:    09/06/22 0816   BP: 130/60   BP Location: Left arm   Patient Position: Sitting   Cuff Size: Adult   Pulse: 50   Resp: 16   Temp: 98 °F (36.7 °C)   TempSrc: Temporal   SpO2: 97%   Weight: 60.9 kg (134 lb 3.2 oz)   Height: 167.6 cm (66\")   PainSc: 0-No pain       82-year-old male with history of hypertension hyperlipidemia      Review of Systems   Respiratory: Negative for shortness of breath.    Cardiovascular: Negative for chest pain and leg swelling.   Musculoskeletal: Positive for arthralgias.       Past Medical History:   Diagnosis Date   • Atherosclerotic coronary vascular disease    • Axonal polyneuropathy    • Cancer of skin of neck    • Coagulopathy (HCC) 02/28/2020   • Colon polyp    • COVID toes     Had infusion   • Elevated cholesterol    • Gout    • History of transfusion    • Hyperlipidemia    • Hypertension    • Insomnia    • Irregular heart beat    • Precancerous skin lesion    • RLS (restless legs syndrome)    • TIA (transient ischemic attack)     2014   • Vascular disease     CAROTID         Current Outpatient Medications:   •  amLODIPine (NORVASC) 5 MG tablet, TAKE ONE TABLET BY MOUTH EVERY EVENING, Disp: 90 tablet, Rfl: 3  •  atenolol (TENORMIN) 50 MG tablet, TAKE 1 TABLET BY MOUTH DAILY, Disp: 90 tablet, Rfl: 3  •  carbidopa-levodopa (SINEMET)  MG per tablet, TAKE ONE TO TWO TABLETS BY MOUTH AT BEDTIME, Disp: 180 tablet, Rfl: 2  •  CINNAMON PO, Take 500 mg by mouth Daily., Disp: , Rfl:   •  cloNIDine (Catapres) 0.1 MG tablet, Take 1 at bedtime for blood pressure, Disp: 90 tablet, Rfl: 3  •  clopidogrel (PLAVIX) 75 MG tablet, Take 75 mg by mouth Daily., Disp: , Rfl:   •  colchicine-probenecid (COL-BENEMID) 0.5-500 MG per tablet, TAKE ONE TABLET BY MOUTH EVERY DAY, Disp: 30 tablet, Rfl: 2  •  indapamide (LOZOL) 2.5 MG tablet, Take 2.5 mg by mouth Every Morning., Disp: , " Rfl:   •  Multiple Vitamins-Minerals (EYE VITAMINS PO), Take 1 tablet by mouth Daily., Disp: , Rfl:     Allergies   Allergen Reactions   • Other Anaphylaxis     UNKNOWN TO PATIENT ENVIRONMENTAL/FOOD ALLERGY    • Ezetimibe Unknown (See Comments)     Leg cramps   • Niacin And Related Unknown (See Comments)     Cramping red and flush   • Statins Other (See Comments)     Leg cramps       Patient Active Problem List   Diagnosis   • Vascular disease   • RLS (restless legs syndrome)   • Insomnia   • Hypertension   • Gout   • Axonal polyneuropathy   • Atherosclerotic coronary vascular disease   • Arthritis   • Esophageal dysphagia   • Dysplasia of colon   • Irregular heart beat   • Cerebrovascular disease   • Leukocytosis   • Weight loss   • Pharyngoesophageal dysphagia   • History of adenomatous polyp of colon   • Skin cancer   • Coronary artery disease involving native coronary artery   • Peripheral vascular disease (HCC)   • Coagulopathy (HCC)       Social History     Socioeconomic History   • Marital status:    Tobacco Use   • Smoking status: Never Smoker   • Smokeless tobacco: Never Used   Vaping Use   • Vaping Use: Never used   Substance and Sexual Activity   • Alcohol use: No   • Drug use: No   • Sexual activity: Defer       Past Surgical History:   Procedure Laterality Date   • CAROTID ENDARTERECTOMY Bilateral     2007, 2008   • COLON RESECTION N/A 10/24/2017    Procedure: COLON RESECTION  MID TRANSVERSE LAPAROSCOPIC;  Surgeon: Adal Quintero MD;  Location: [facility] OR;  Service:    • COLONOSCOPY      MULTIPLE    • COLONOSCOPY N/A 09/07/2017    Procedure: colonoscopy with anesthesia;  Surgeon: Randy Gomez MD;  Location: [facility] ENDOSCOPY;  Service:    • COLONOSCOPY N/A 08/07/2018    Procedure: COLONOSCOPY WITH ANESTHESIA;  Surgeon: Randy Gomez MD;  Location: [facility] ENDOSCOPY;  Service: Gastroenterology   • COLONOSCOPY N/A 05/09/2022    Procedure: COLONOSCOPY WITH ANESTHESIA;  Surgeon: Jason  "Randy CRYSTAL MD;  Location: [facility] ENDOSCOPY;  Service: Gastroenterology;  Laterality: N/A;  pre polyp hx  post polyps;diverticulosis  Dr. Barnes   • CORONARY ARTERY BYPASS GRAFT  2006   • CORONARY STENT PLACEMENT     • CORONARY STENT PLACEMENT     • ENDOSCOPY N/A 09/07/2017    Procedure: ESOPHAGOGASTRODUODENOSCOPY WITH ANESTHESIA;  Surgeon: Randy Gomez MD;  Location: [facility] ENDOSCOPY;  Service:    • ENDOSCOPY N/A 08/07/2018    Procedure: ESOPHAGOGASTRODUODENOSCOPY WITH ANESTHESIA;  Surgeon: Randy Gomez MD;  Location: [facility] ENDOSCOPY;  Service: Gastroenterology   • EYE SURGERY Bilateral     2014, 2015   • KNEE SURGERY Right     x 2   • SKIN CANCER DESTRUCTION  2022    Neck  DrMalini Case       Physical Exam  Vitals and nursing note reviewed.   Constitutional:       Appearance: Normal appearance.   Eyes:      Extraocular Movements: Extraocular movements intact.      Pupils: Pupils are equal, round, and reactive to light.   Cardiovascular:      Rate and Rhythm: Normal rate and regular rhythm.   Pulmonary:      Effort: Pulmonary effort is normal.      Breath sounds: Normal breath sounds.   Musculoskeletal:      Right lower leg: No edema.      Left lower leg: No edema.   Skin:     General: Skin is warm and dry.   Neurological:      General: No focal deficit present.      Mental Status: He is alert and oriented to person, place, and time.   Psychiatric:         Mood and Affect: Mood normal.         Behavior: Behavior normal.         Thought Content: Thought content normal.         Judgment: Judgment normal.         Vitals:    09/06/22 0816   BP: 130/60   BP Location: Left arm   Patient Position: Sitting   Cuff Size: Adult   Pulse: 50   Resp: 16   Temp: 98 °F (36.7 °C)   TempSrc: Temporal   SpO2: 97%   Weight: 60.9 kg (134 lb 3.2 oz)   Height: 167.6 cm (66\")     BMI is within normal parameters. No other follow-up for BMI required.      Diagnoses and all orders for this visit:    1. Mixed hyperlipidemia (Primary)  - "     Comprehensive Metabolic Panel  -     Lipid Panel    2. Elevated blood sugar  -     Hemoglobin A1c    3. Hyperuricemia  -     Uric Acid        Problems Addressed this Visit    None     Visit Diagnoses     Mixed hyperlipidemia    -  Primary    Relevant Orders    Comprehensive Metabolic Panel    Lipid Panel    Elevated blood sugar        Relevant Orders    Hemoglobin A1c    Hyperuricemia        Relevant Orders    Uric Acid      Diagnoses       Codes Comments    Mixed hyperlipidemia    -  Primary ICD-10-CM: E78.2  ICD-9-CM: 272.2     Elevated blood sugar     ICD-10-CM: R73.9  ICD-9-CM: 790.29     Hyperuricemia     ICD-10-CM: E79.0  ICD-9-CM: 790.6           Health Maintenance:  Immunization History   Administered Date(s) Administered   • COVID-19 (MODERNA) 1st, 2nd, 3rd Dose Only 01/06/2021, 02/02/2021, 10/25/2021   • Covid-19 (Pfizer) Gray Cap 08/16/2022   • Fluad Quad 65+ 10/11/2019, 08/28/2020   • Fluzone High Dose =>65 Years (Vaxcare ONLY) 10/05/2017, 09/27/2018   • Fluzone High-Dose 65+yrs 10/06/2021   • Pneumococcal Conjugate 13-Valent (PCV13) 02/06/2017   • Pneumococcal Polysaccharide (PPSV23) 08/28/2019   • Td 10/29/2007          Plan-above-get new vaccine in 2 months late October                        Electronically signed by Rigoberto Barnes MD 09/06/2022

## 2022-09-07 LAB
ALBUMIN SERPL-MCNC: 4.2 G/DL (ref 3.5–5.2)
ALBUMIN/GLOB SERPL: 1.8 G/DL
ALP SERPL-CCNC: 124 U/L (ref 39–117)
ALT SERPL-CCNC: 7 U/L (ref 1–41)
AST SERPL-CCNC: 19 U/L (ref 1–40)
BILIRUB SERPL-MCNC: 0.4 MG/DL (ref 0–1.2)
BUN SERPL-MCNC: 31 MG/DL (ref 8–23)
BUN/CREAT SERPL: 20.7 (ref 7–25)
CALCIUM SERPL-MCNC: 9.7 MG/DL (ref 8.6–10.5)
CHLORIDE SERPL-SCNC: 99 MMOL/L (ref 98–107)
CHOLEST SERPL-MCNC: 219 MG/DL (ref 0–200)
CO2 SERPL-SCNC: 30.6 MMOL/L (ref 22–29)
CREAT SERPL-MCNC: 1.5 MG/DL (ref 0.76–1.27)
EGFRCR-CYS SERPLBLD CKD-EPI 2021: 45.9 ML/MIN/1.73
GLOBULIN SER CALC-MCNC: 2.3 GM/DL
GLUCOSE SERPL-MCNC: 120 MG/DL (ref 65–99)
HBA1C MFR BLD: 6.1 % (ref 4.8–5.6)
HDLC SERPL-MCNC: 42 MG/DL (ref 40–60)
LDLC SERPL CALC-MCNC: 149 MG/DL (ref 0–100)
POTASSIUM SERPL-SCNC: 4.8 MMOL/L (ref 3.5–5.2)
PROT SERPL-MCNC: 6.5 G/DL (ref 6–8.5)
SODIUM SERPL-SCNC: 137 MMOL/L (ref 136–145)
TRIGL SERPL-MCNC: 155 MG/DL (ref 0–150)
URATE SERPL-MCNC: 6.4 MG/DL (ref 3.4–7)
VLDLC SERPL CALC-MCNC: 28 MG/DL (ref 5–40)

## 2022-09-19 ENCOUNTER — TELEPHONE (OUTPATIENT)
Dept: FAMILY MEDICINE CLINIC | Facility: CLINIC | Age: 82
End: 2022-09-19

## 2022-09-19 ENCOUNTER — OFFICE VISIT (OUTPATIENT)
Dept: FAMILY MEDICINE CLINIC | Facility: CLINIC | Age: 82
End: 2022-09-19

## 2022-09-19 VITALS
HEIGHT: 66 IN | SYSTOLIC BLOOD PRESSURE: 102 MMHG | DIASTOLIC BLOOD PRESSURE: 69 MMHG | HEART RATE: 64 BPM | BODY MASS INDEX: 21.53 KG/M2 | WEIGHT: 134 LBS | TEMPERATURE: 99.8 F | OXYGEN SATURATION: 93 %

## 2022-09-19 DIAGNOSIS — U07.1 COVID: Primary | ICD-10-CM

## 2022-09-19 PROCEDURE — 99213 OFFICE O/P EST LOW 20 MIN: CPT | Performed by: NURSE PRACTITIONER

## 2022-09-19 RX ORDER — GUAIFENESIN 600 MG/1
1200 TABLET, EXTENDED RELEASE ORAL 2 TIMES DAILY
Qty: 28 TABLET | Refills: 0 | Status: SHIPPED | OUTPATIENT
Start: 2022-09-19 | End: 2023-03-06

## 2022-09-19 RX ORDER — NIRMATRELVIR AND RITONAVIR 150-100 MG
2 KIT ORAL 2 TIMES DAILY
Qty: 20 TABLET | Refills: 0 | Status: SHIPPED | OUTPATIENT
Start: 2022-09-19 | End: 2022-09-24

## 2022-09-19 RX ORDER — ALLOPURINOL 100 MG/1
TABLET ORAL
COMMUNITY
Start: 2022-09-12 | End: 2023-01-26 | Stop reason: SDUPTHER

## 2022-09-19 RX ORDER — ASPIRIN 81 MG/1
TABLET ORAL
Qty: 15 TABLET | Refills: 0 | Status: SHIPPED | OUTPATIENT
Start: 2022-09-19 | End: 2023-03-06

## 2022-09-19 NOTE — PROGRESS NOTES
CC: covid    History:  Shabbir Gambino is a 82 y.o. male who presents today for evaluation of the above problems.        Patient complains of fever, cough, chills, sore throat. Had a positive COVID home test on Sunday. Has had two covid vaccines and two boosters.       HPI  ROS:  Review of Systems   Constitutional: Positive for chills and fever.   HENT: Positive for sore throat.    Respiratory: Positive for cough.        Allergies   Allergen Reactions   • Other Anaphylaxis     UNKNOWN TO PATIENT ENVIRONMENTAL/FOOD ALLERGY    • Ezetimibe Unknown (See Comments)     Leg cramps   • Niacin And Related Unknown (See Comments)     Cramping red and flush   • Statins Other (See Comments)     Leg cramps     Past Medical History:   Diagnosis Date   • Atherosclerotic coronary vascular disease    • Axonal polyneuropathy    • Cancer of skin of neck    • Coagulopathy (HCC) 02/28/2020   • Colon polyp    • COVID toes     Had infusion   • Elevated cholesterol    • Gout    • History of transfusion    • Hyperlipidemia    • Hypertension    • Insomnia    • Irregular heart beat    • Precancerous skin lesion    • RLS (restless legs syndrome)    • TIA (transient ischemic attack)     2014   • Vascular disease     CAROTID     Past Surgical History:   Procedure Laterality Date   • CAROTID ENDARTERECTOMY Bilateral     2007, 2008   • COLON RESECTION N/A 10/24/2017    Procedure: COLON RESECTION  MID TRANSVERSE LAPAROSCOPIC;  Surgeon: Adal Quintero MD;  Location: [facility] OR;  Service:    • COLONOSCOPY      MULTIPLE    • COLONOSCOPY N/A 09/07/2017    Procedure: colonoscopy with anesthesia;  Surgeon: Randy Gomez MD;  Location: [facility] ENDOSCOPY;  Service:    • COLONOSCOPY N/A 08/07/2018    Procedure: COLONOSCOPY WITH ANESTHESIA;  Surgeon: Randy Gomez MD;  Location: [facility] ENDOSCOPY;  Service: Gastroenterology   • COLONOSCOPY N/A 05/09/2022    Procedure: COLONOSCOPY WITH ANESTHESIA;  Surgeon: Randy Gmoez MD;  Location: [facility]  ENDOSCOPY;  Service: Gastroenterology;  Laterality: N/A;  pre polyp hx  post polyps;diverticulosis  Dr. Barnes   • CORONARY ARTERY BYPASS GRAFT  2006   • CORONARY STENT PLACEMENT     • CORONARY STENT PLACEMENT     • ENDOSCOPY N/A 09/07/2017    Procedure: ESOPHAGOGASTRODUODENOSCOPY WITH ANESTHESIA;  Surgeon: Randy Gomez MD;  Location: [facility] ENDOSCOPY;  Service:    • ENDOSCOPY N/A 08/07/2018    Procedure: ESOPHAGOGASTRODUODENOSCOPY WITH ANESTHESIA;  Surgeon: Randy Gomez MD;  Location: [facility] ENDOSCOPY;  Service: Gastroenterology   • EYE SURGERY Bilateral     2014, 2015   • KNEE SURGERY Right     x 2   • SKIN CANCER DESTRUCTION  2022    Neck  DrMalini Case     Family History   Problem Relation Age of Onset   • Heart disease Mother    • Stroke Mother    • Diabetes Mother    • No Known Problems Father    • Pancreatic cancer Sister    • No Known Problems Maternal Grandmother    • No Known Problems Maternal Grandfather    • No Known Problems Paternal Grandmother    • No Known Problems Paternal Grandfather    • Colon polyps Neg Hx    • Colon cancer Neg Hx       reports that he has never smoked. He has never used smokeless tobacco. He reports that he does not drink alcohol and does not use drugs.      Current Outpatient Medications:   •  amLODIPine (NORVASC) 5 MG tablet, TAKE ONE TABLET BY MOUTH EVERY EVENING, Disp: 90 tablet, Rfl: 3  •  atenolol (TENORMIN) 50 MG tablet, TAKE 1 TABLET BY MOUTH DAILY, Disp: 90 tablet, Rfl: 3  •  carbidopa-levodopa (SINEMET)  MG per tablet, TAKE ONE TO TWO TABLETS BY MOUTH AT BEDTIME, Disp: 180 tablet, Rfl: 2  •  CINNAMON PO, Take 500 mg by mouth Daily., Disp: , Rfl:   •  cloNIDine (Catapres) 0.1 MG tablet, Take 1 at bedtime for blood pressure, Disp: 90 tablet, Rfl: 3  •  clopidogrel (PLAVIX) 75 MG tablet, Take 75 mg by mouth Daily., Disp: , Rfl:   •  indapamide (LOZOL) 2.5 MG tablet, Take 2.5 mg by mouth Every Morning., Disp: , Rfl:   •  Multiple Vitamins-Minerals (EYE VITAMINS  "PO), Take 1 tablet by mouth Daily., Disp: , Rfl:   •  allopurinol (ZYLOPRIM) 100 MG tablet, , Disp: , Rfl:   •  aspirin (aspirin) 81 MG EC tablet, Take 3 tablets daily until gone., Disp: 15 tablet, Rfl: 0  •  colchicine-probenecid (COL-BENEMID) 0.5-500 MG per tablet, TAKE ONE TABLET BY MOUTH EVERY DAY, Disp: 30 tablet, Rfl: 2  •  guaiFENesin (Mucinex) 600 MG 12 hr tablet, Take 2 tablets by mouth 2 (Two) Times a Day., Disp: 28 tablet, Rfl: 0  •  Nirmatrelvir&Ritonavir 150/100 (Paxlovid, 150/100,) 10 x 150 MG & 10 x 100MG tablet therapy pack tablet (for renal adjustment), Take 2 tablets by mouth 2 (Two) Times a Day for 5 days., Disp: 20 tablet, Rfl: 0    OBJECTIVE:  /69 (BP Location: Left arm, Patient Position: Sitting, Cuff Size: Adult)   Pulse 64   Temp 99.8 °F (37.7 °C) (Temporal)   Ht 167.6 cm (66\") Comment: pt reported  Wt 60.8 kg (134 lb) Comment: pt reported  SpO2 93%   BMI 21.63 kg/m²    Physical Exam  Vitals reviewed.   Constitutional:       Appearance: He is well-developed.   Cardiovascular:      Rate and Rhythm: Normal rate and regular rhythm.      Heart sounds: Normal heart sounds.   Pulmonary:      Effort: Pulmonary effort is normal.      Breath sounds: Normal breath sounds.   Neurological:      Mental Status: He is alert and oriented to person, place, and time.   Psychiatric:         Behavior: Behavior normal.         Assessment/Plan    Diagnoses and all orders for this visit:    1. COVID (Primary)  -     Nirmatrelvir&Ritonavir 150/100 (Paxlovid, 150/100,) 10 x 150 MG & 10 x 100MG tablet therapy pack tablet (for renal adjustment); Take 2 tablets by mouth 2 (Two) Times a Day for 5 days.  Dispense: 20 tablet; Refill: 0  -     guaiFENesin (Mucinex) 600 MG 12 hr tablet; Take 2 tablets by mouth 2 (Two) Times a Day.  Dispense: 28 tablet; Refill: 0  -     aspirin (aspirin) 81 MG EC tablet; Take 3 tablets daily until gone.  Dispense: 15 tablet; Refill: 0    Patient advised on medication interactions. " Hold plavix and colchicine.   Quarantine guidelines discussed.    An After Visit Summary was printed and given to the patient at discharge.  Return if symptoms worsen or fail to improve, for Next scheduled follow up.       VERONIKA Marley 9/19/22    Electronically signed.

## 2022-09-19 NOTE — TELEPHONE ENCOUNTER
Caller: Florinda Gambino    Relationship: Emergency Contact    Best call back number: [phone]    What medication are you requesting:     COVID    What are your current symptoms:     DEEP GURGLING IN CHEST, NO TROUBLE BREATHING, LOW GRADE TEMPERATURE, CHILLS, SORE THROAT    How long have you been experiencing symptoms:     POSITIVE COVID    Saturday 09.17.22    Have you had these symptoms before:    [] Yes  [x] No    Have you been treated for these symptoms before:   [] Yes  [x] No    If a prescription is needed, what is your preferred pharmacy and phone number:      [id number] - [address] [phone] [facility] [phone] [place]       PLEASE ADVISE

## 2022-10-10 ENCOUNTER — FLU SHOT (OUTPATIENT)
Dept: FAMILY MEDICINE CLINIC | Facility: CLINIC | Age: 82
End: 2022-10-10

## 2022-10-10 DIAGNOSIS — Z23 NEED FOR INFLUENZA VACCINATION: Primary | ICD-10-CM

## 2022-10-10 PROCEDURE — G0008 ADMIN INFLUENZA VIRUS VAC: HCPCS | Performed by: FAMILY MEDICINE

## 2022-10-10 PROCEDURE — 90662 IIV NO PRSV INCREASED AG IM: CPT | Performed by: FAMILY MEDICINE

## 2022-12-20 RX ORDER — AMLODIPINE BESYLATE 5 MG/1
TABLET ORAL
Qty: 90 TABLET | Refills: 3 | Status: SHIPPED | OUTPATIENT
Start: 2022-12-20

## 2022-12-20 NOTE — TELEPHONE ENCOUNTER
Rx Refill Note  Requested Prescriptions     Pending Prescriptions Disp Refills   • amLODIPine (NORVASC) 5 MG tablet [Pharmacy Med Name: AMLODIPINE BESYLATE 5MG TABS] 90 tablet 3     Sig: TAKE ONE TABLET BY MOUTH EVERY EVENING      Last office visit with prescribing clinician: 9/19/2022   Last telemedicine visit with prescribing clinician: 3/6/2023   Next office visit with prescribing clinician: Visit date not found       {TIP  Please add Last Relevant Lab 9/6/22                 Would you like a call back once the refill request has been completed: [] Yes [] No    If the office needs to give you a call back, can they leave a voicemail: [] Yes [] No    Abena Yu MA  12/20/22, 09:36 CST

## 2022-12-27 DIAGNOSIS — I10 ESSENTIAL HYPERTENSION: ICD-10-CM

## 2022-12-27 RX ORDER — INDAPAMIDE 2.5 MG/1
TABLET, FILM COATED ORAL
Qty: 90 TABLET | Refills: 3 | Status: SHIPPED | OUTPATIENT
Start: 2022-12-27

## 2023-01-11 ENCOUNTER — OFFICE VISIT (OUTPATIENT)
Dept: CARDIOLOGY CLINIC | Age: 83
End: 2023-01-11
Payer: MEDICARE

## 2023-01-11 VITALS
BODY MASS INDEX: 20.16 KG/M2 | SYSTOLIC BLOOD PRESSURE: 110 MMHG | DIASTOLIC BLOOD PRESSURE: 54 MMHG | WEIGHT: 133 LBS | HEART RATE: 53 BPM | HEIGHT: 68 IN

## 2023-01-11 DIAGNOSIS — I10 ESSENTIAL HYPERTENSION: ICD-10-CM

## 2023-01-11 DIAGNOSIS — I25.10 CORONARY ARTERY DISEASE INVOLVING NATIVE CORONARY ARTERY OF NATIVE HEART WITHOUT ANGINA PECTORIS: ICD-10-CM

## 2023-01-11 PROCEDURE — G8484 FLU IMMUNIZE NO ADMIN: HCPCS | Performed by: INTERNAL MEDICINE

## 2023-01-11 PROCEDURE — 3074F SYST BP LT 130 MM HG: CPT | Performed by: INTERNAL MEDICINE

## 2023-01-11 PROCEDURE — G8427 DOCREV CUR MEDS BY ELIG CLIN: HCPCS | Performed by: INTERNAL MEDICINE

## 2023-01-11 PROCEDURE — 93000 ELECTROCARDIOGRAM COMPLETE: CPT | Performed by: INTERNAL MEDICINE

## 2023-01-11 PROCEDURE — 1036F TOBACCO NON-USER: CPT | Performed by: INTERNAL MEDICINE

## 2023-01-11 PROCEDURE — 99214 OFFICE O/P EST MOD 30 MIN: CPT | Performed by: INTERNAL MEDICINE

## 2023-01-11 PROCEDURE — 1123F ACP DISCUSS/DSCN MKR DOCD: CPT | Performed by: INTERNAL MEDICINE

## 2023-01-11 PROCEDURE — 3078F DIAST BP <80 MM HG: CPT | Performed by: INTERNAL MEDICINE

## 2023-01-11 PROCEDURE — G8420 CALC BMI NORM PARAMETERS: HCPCS | Performed by: INTERNAL MEDICINE

## 2023-01-11 NOTE — PROGRESS NOTES
HISTORY  24-year-old gentleman with history of dyslipidemia, hypertension, cerebrovascular, and coronary disease returns for follow-up. Underwent remote bypass grafting with most recent assessment in March 2019 at which time the LIMA to the LAD was shown to be patent with occlusion of his 6 vein bypasses. At that point he received stents to his distal circumflex and first obtuse marginal.  He has known carotid disease with an apparent CVA resulting in left lower extremity paresthesias. He is followed by vascular surgery on a regular basis with the most recent duplex apparently showing no interim change in his bilateral 50 to 69% obstructions. On return today he denies chest discomfort, worsening shortness of breath, and TIA symptoms. He does have some difficulty walking because of the left lower extremity paresthesias. Blood pressure has been controlled but his lipids remain out of range because of statin intolerance and inability to financially afford Repatha. September values , HDL 42, triglycerides 155. He has been vaccinated and boosted x2 for COVID-19 and has had 2 COVID infections. PHYSICAL EXAM  On exam he carries 133 pounds on a 5 foot 8 inch frame. Blood pressure is 110/54 with a pulse of 53. Normal male balding pattern. EOMs full, sclerae and conjunctiva normal. PERRLA. Mask in place. Trachea midline with no neck masses. Assessment of internal jugular veins reveals no elevation of central venous pressure at 45 degrees. Bilateral CEA surgical scars with bilateral carotid bruits. Thyroid normal to palpation. Healed midline sternotomy scar. Chest exam reveals normal respiratory effort, no abnormal breath sounds and normal expiratory phase. No skin lesions seen. PMI normal. S1, S2 normal without murmur or codie or click. Normal bowel sounds without palpable mass or bruit. No clubbing or acrocyanosis. No significant lower extremity edema or signs of venous insufficiency.   General motor strength appears to be within normal limits. Normal range of motion with normal gait. Alert, oriented x 3, memory and cognition normal as reflected by history and conversation. EKG reveals sinus bradycardia with first-degree AV block 242 ms. ASSESSMENT/PLAN:   Coronary disease -clinically stable without recurrent angina. Continue Plavix and atenolol  Hypertension -controlled. Continue indapamide, amlodipine, and atenolol. Dyslipidemia -deserves Repatha coverage  Cerebrovascular disease -followed by vascular surgery  Pandemic response -vaccinated and boosted. Deserves latest booster.

## 2023-01-17 ENCOUNTER — IMMUNIZATION (OUTPATIENT)
Dept: FAMILY MEDICINE CLINIC | Facility: CLINIC | Age: 83
End: 2023-01-17
Payer: MEDICARE

## 2023-01-17 DIAGNOSIS — Z23 NEED FOR VACCINATION: Primary | ICD-10-CM

## 2023-01-17 PROCEDURE — 0124A COVID-19 (PFIZER) BIVALENT BOOSTER 12+YRS: CPT | Performed by: FAMILY MEDICINE

## 2023-01-17 PROCEDURE — 91312 COVID-19 (PFIZER) BIVALENT BOOSTER 12+YRS: CPT | Performed by: FAMILY MEDICINE

## 2023-01-26 ENCOUNTER — OFFICE VISIT (OUTPATIENT)
Dept: FAMILY MEDICINE CLINIC | Facility: CLINIC | Age: 83
End: 2023-01-26
Payer: MEDICARE

## 2023-01-26 VITALS
DIASTOLIC BLOOD PRESSURE: 66 MMHG | HEIGHT: 66 IN | TEMPERATURE: 97.3 F | BODY MASS INDEX: 22.02 KG/M2 | SYSTOLIC BLOOD PRESSURE: 139 MMHG | OXYGEN SATURATION: 99 % | WEIGHT: 137 LBS | HEART RATE: 50 BPM

## 2023-01-26 DIAGNOSIS — M10.9 ACUTE GOUT INVOLVING TOE OF LEFT FOOT, UNSPECIFIED CAUSE: Primary | ICD-10-CM

## 2023-01-26 PROCEDURE — 99214 OFFICE O/P EST MOD 30 MIN: CPT | Performed by: NURSE PRACTITIONER

## 2023-01-26 PROCEDURE — 96372 THER/PROPH/DIAG INJ SC/IM: CPT | Performed by: NURSE PRACTITIONER

## 2023-01-26 RX ORDER — PREDNISONE 10 MG/1
TABLET ORAL
Qty: 21 TABLET | Refills: 0 | Status: SHIPPED | OUTPATIENT
Start: 2023-01-26 | End: 2023-03-06

## 2023-01-26 RX ORDER — ALLOPURINOL 200 MG/1
200 TABLET ORAL DAILY
Start: 2023-01-26

## 2023-01-26 RX ORDER — TRIAMCINOLONE ACETONIDE 40 MG/ML
40 INJECTION, SUSPENSION INTRA-ARTICULAR; INTRAMUSCULAR ONCE
Status: COMPLETED | OUTPATIENT
Start: 2023-01-26 | End: 2023-01-26

## 2023-01-26 RX ADMIN — TRIAMCINOLONE ACETONIDE 40 MG: 40 INJECTION, SUSPENSION INTRA-ARTICULAR; INTRAMUSCULAR at 09:22

## 2023-01-26 NOTE — PROGRESS NOTES
CC: gout    History:  Shabbir Gambino is a 82 y.o. male who presents today for evaluation of the above problems.      Patient states that he developed a flare of his gout in his left great toe yesterday.  Was unable to sleep much last night due to pain.  States that the toe is red, swollen, and painful.  He states that his nephrologist changed him from daily colchicine probenecid to allopurinol.  Notes that he has had multiple flares since making this medication change.      HPI  ROS:  Review of Systems   Musculoskeletal:        Left great toe pain       Allergies   Allergen Reactions   • Other Anaphylaxis     UNKNOWN TO PATIENT ENVIRONMENTAL/FOOD ALLERGY    • Ezetimibe Unknown (See Comments)     Leg cramps   • Niacin And Related Unknown (See Comments)     Cramping red and flush   • Statins Other (See Comments)     Leg cramps     Past Medical History:   Diagnosis Date   • Atherosclerotic coronary vascular disease    • Axonal polyneuropathy    • Cancer of skin of neck    • Coagulopathy (HCC) 02/28/2020   • Colon polyp    • COVID toes     Had infusion   • Elevated cholesterol    • Gout    • History of transfusion    • Hyperlipidemia    • Hypertension    • Insomnia    • Irregular heart beat    • Precancerous skin lesion    • RLS (restless legs syndrome)    • TIA (transient ischemic attack)     2014   • Vascular disease     CAROTID     Past Surgical History:   Procedure Laterality Date   • CAROTID ENDARTERECTOMY Bilateral     2007, 2008   • COLON RESECTION N/A 10/24/2017    Procedure: COLON RESECTION  MID TRANSVERSE LAPAROSCOPIC;  Surgeon: Adal Quintero MD;  Location: [facility] OR;  Service:    • COLONOSCOPY      MULTIPLE    • COLONOSCOPY N/A 09/07/2017    Procedure: colonoscopy with anesthesia;  Surgeon: Randy Gomez MD;  Location: [facility] ENDOSCOPY;  Service:    • COLONOSCOPY N/A 08/07/2018    Procedure: COLONOSCOPY WITH ANESTHESIA;  Surgeon: Randy Gomez MD;  Location: [facility] ENDOSCOPY;  Service:  Gastroenterology   • COLONOSCOPY N/A 05/09/2022    Procedure: COLONOSCOPY WITH ANESTHESIA;  Surgeon: Randy Gomez MD;  Location: [facility] ENDOSCOPY;  Service: Gastroenterology;  Laterality: N/A;  pre polyp hx  post polyps;diverticulosis  Dr. Barnes   • CORONARY ARTERY BYPASS GRAFT  2006   • CORONARY STENT PLACEMENT     • CORONARY STENT PLACEMENT     • ENDOSCOPY N/A 09/07/2017    Procedure: ESOPHAGOGASTRODUODENOSCOPY WITH ANESTHESIA;  Surgeon: Randy Gomez MD;  Location: [facility] ENDOSCOPY;  Service:    • ENDOSCOPY N/A 08/07/2018    Procedure: ESOPHAGOGASTRODUODENOSCOPY WITH ANESTHESIA;  Surgeon: Randy Gomez MD;  Location: [facility] ENDOSCOPY;  Service: Gastroenterology   • EYE SURGERY Bilateral     2014, 2015   • KNEE SURGERY Right     x 2   • SKIN CANCER DESTRUCTION  2022    Neck  DrMalini Case     Family History   Problem Relation Age of Onset   • Heart disease Mother    • Stroke Mother    • Diabetes Mother    • No Known Problems Father    • Pancreatic cancer Sister    • No Known Problems Maternal Grandmother    • No Known Problems Maternal Grandfather    • No Known Problems Paternal Grandmother    • No Known Problems Paternal Grandfather    • Colon polyps Neg Hx    • Colon cancer Neg Hx       reports that he has never smoked. He has never used smokeless tobacco. He reports that he does not drink alcohol and does not use drugs.      Current Outpatient Medications:   •  allopurinol 200 MG tablet, Take 200 mg by mouth Daily., Disp: , Rfl:   •  amLODIPine (NORVASC) 5 MG tablet, TAKE ONE TABLET BY MOUTH EVERY EVENING, Disp: 90 tablet, Rfl: 3  •  aspirin (aspirin) 81 MG EC tablet, Take 3 tablets daily until gone., Disp: 15 tablet, Rfl: 0  •  atenolol (TENORMIN) 50 MG tablet, TAKE 1 TABLET BY MOUTH DAILY, Disp: 90 tablet, Rfl: 3  •  carbidopa-levodopa (SINEMET)  MG per tablet, TAKE ONE TO TWO TABLETS BY MOUTH AT BEDTIME, Disp: 180 tablet, Rfl: 2  •  CINNAMON PO, Take 500 mg by mouth Daily., Disp: , Rfl:   •   "cloNIDine (Catapres) 0.1 MG tablet, Take 1 at bedtime for blood pressure, Disp: 90 tablet, Rfl: 3  •  clopidogrel (PLAVIX) 75 MG tablet, Take 75 mg by mouth Daily., Disp: , Rfl:   •  guaiFENesin (Mucinex) 600 MG 12 hr tablet, Take 2 tablets by mouth 2 (Two) Times a Day., Disp: 28 tablet, Rfl: 0  •  indapamide (LOZOL) 2.5 MG tablet, Take 2.5 mg by mouth Every Morning., Disp: , Rfl:   •  Multiple Vitamins-Minerals (EYE VITAMINS PO), Take 1 tablet by mouth Daily., Disp: , Rfl:   •  predniSONE (DELTASONE) 10 MG (21) dose pack, Use as directed on package. Start on 1/26/23, Disp: 21 tablet, Rfl: 0  No current facility-administered medications for this visit.    OBJECTIVE:  /66 (BP Location: Left arm, Patient Position: Sitting, Cuff Size: Adult)   Pulse 50   Temp 97.3 °F (36.3 °C) (Temporal)   Ht 167.6 cm (66\")   Wt 62.1 kg (137 lb)   SpO2 99%   BMI 22.11 kg/m²    Physical Exam  Vitals reviewed.   Constitutional:       Appearance: He is well-developed.   Cardiovascular:      Rate and Rhythm: Normal rate.   Pulmonary:      Effort: Pulmonary effort is normal.   Musculoskeletal:        Feet:    Neurological:      Mental Status: He is alert and oriented to person, place, and time.   Psychiatric:         Behavior: Behavior normal.         Assessment/Plan    Diagnoses and all orders for this visit:    1. Acute gout involving toe of left foot, unspecified cause (Primary)  -     triamcinolone acetonide (KENALOG-40) injection 40 mg  -     predniSONE (DELTASONE) 10 MG (21) dose pack; Use as directed on package. Start on 1/26/23  Dispense: 21 tablet; Refill: 0    Other orders  -     allopurinol 200 MG tablet; Take 200 mg by mouth Daily.    Advised to increase his allopurinol to 200 mg daily instead of 100 mg daily since he has had multiple flares since changing to allopurinol from colchicine.  Will treat acute flare with prednisone.  Do not want to use NSAIDs due to coronary artery disease..    An After Visit Summary was " printed and given to the patient at discharge.  Return if symptoms worsen or fail to improve, for Next scheduled follow up.       Na Salas, APRN 1/26/2023    Electronically signed.

## 2023-02-24 ENCOUNTER — HOSPITAL ENCOUNTER (OUTPATIENT)
Dept: VASCULAR LAB | Age: 83
Discharge: HOME | End: 2023-02-24
Payer: MEDICARE

## 2023-02-24 DIAGNOSIS — I65.23 BILATERAL CAROTID ARTERY STENOSIS: ICD-10-CM

## 2023-02-24 PROCEDURE — 93880 EXTRACRANIAL BILAT STUDY: CPT

## 2023-03-01 ENCOUNTER — OFFICE VISIT (OUTPATIENT)
Dept: VASCULAR SURGERY | Age: 83
End: 2023-03-01
Payer: MEDICARE

## 2023-03-01 VITALS
BODY MASS INDEX: 20.16 KG/M2 | WEIGHT: 133 LBS | TEMPERATURE: 97.6 F | HEART RATE: 49 BPM | HEIGHT: 68 IN | DIASTOLIC BLOOD PRESSURE: 58 MMHG | SYSTOLIC BLOOD PRESSURE: 80 MMHG | OXYGEN SATURATION: 99 %

## 2023-03-01 DIAGNOSIS — I65.23 BILATERAL CAROTID ARTERY STENOSIS: Primary | ICD-10-CM

## 2023-03-01 PROCEDURE — 3078F DIAST BP <80 MM HG: CPT | Performed by: NURSE PRACTITIONER

## 2023-03-01 PROCEDURE — G8427 DOCREV CUR MEDS BY ELIG CLIN: HCPCS | Performed by: NURSE PRACTITIONER

## 2023-03-01 PROCEDURE — G8484 FLU IMMUNIZE NO ADMIN: HCPCS | Performed by: NURSE PRACTITIONER

## 2023-03-01 PROCEDURE — 1123F ACP DISCUSS/DSCN MKR DOCD: CPT | Performed by: NURSE PRACTITIONER

## 2023-03-01 PROCEDURE — 99214 OFFICE O/P EST MOD 30 MIN: CPT | Performed by: NURSE PRACTITIONER

## 2023-03-01 PROCEDURE — 3074F SYST BP LT 130 MM HG: CPT | Performed by: NURSE PRACTITIONER

## 2023-03-01 PROCEDURE — G8420 CALC BMI NORM PARAMETERS: HCPCS | Performed by: NURSE PRACTITIONER

## 2023-03-01 PROCEDURE — 1036F TOBACCO NON-USER: CPT | Performed by: NURSE PRACTITIONER

## 2023-03-01 NOTE — PROGRESS NOTES
Tianna Brand (:  1940) is a 79 y.o. male,Established patient, here for evaluation of the following chief complaint(s):  Follow-up (Follow up Carotid. )            SUBJECTIVE/OBJECTIVE:  He presents for follow up of carotid artery stenosis. He has a known history of carotid artery stenosis for 1 - 5 years. His current treatment includes clopidogrel 75 mg po qd, ASA EC daily. He reports  a history of CVA. He reports has not had TIA's, episodes of lateralizing weakness and episodes of amaurosis fugax. Tianna Brand is a 79 y.o. male with the following history as recorded in [facility]:  Patient Active Problem List    Diagnosis Date Noted    Unstable angina ([facility] [address]     Bilateral carotid artery stenosis 2019    Skin cancer 2019    Colonic polyp 2018    Dysphagia 2018    GERD (gastroesophageal reflux disease) 2018    Dyslipidemia 2018    Acute blood loss anemia 2015    Cerebrovascular disease 2015    Osteoarthritis of right knee 2015    Coronary artery disease involving native coronary artery 2015    Essential hypertension 2015     Current Outpatient Medications   Medication Sig Dispense Refill    clopidogrel (PLAVIX) 75 MG tablet TAKE 1 TABLET BY MOUTH EVERY DAY 90 tablet 3    Multiple Vitamins-Minerals (ICAPS AREDS 2 PO) Take 1 tablet by mouth daily       amLODIPine (NORVASC) 5 MG tablet Take 5 mg by mouth daily      atenolol (TENORMIN) 50 MG tablet Take 50 mg by mouth daily      carbidopa-levodopa (SINEMET)  MG per tablet Take 1 tablet by mouth nightly       Cinnamon 500 MG CAPS Take 1 capsule by mouth nightly       No current facility-administered medications for this visit. Allergies:  Other, Niacin and related, Statins, and Zetia [ezetimibe]  Past Medical History:   Diagnosis Date    Arthritis     COVID-19 2021    Skin cancer     On face     Past Surgical History:   Procedure Laterality Date    ABSCESS DRAINAGE Right knee    CARDIAC CATHETERIZATION  03/19/2019    [facility] [address] [facility]    CARDIAC SURGERY      cabg 2008    CAROTID ENDARTERECTOMY Left     [place]    CAROTID ENDARTERECTOMY Right     Dr. Bhavesh Chen      [facility]    JOINT REPLACEMENT      rtk    POLYPECTOMY      Colon    SKIN CANCER EXCISION       Family History   Problem Relation Age of Onset    Heart Disease Mother     Heart Disease Maternal Uncle      Social History     Tobacco Use    Smoking status: Never    Smokeless tobacco: Never   Substance Use Topics    Alcohol use: No         Eyes - no sudden vision change or amaurosis. Respiratory - no significant shortness of breath,  Cardiovascular - no chest pain or syncope. No  significant leg swelling. no claudication. Musculoskeletal - no gait disturbance  Skin - no new wound. Neurologic -  No speech difficulty or lateralizing weakness. All other review of systems are negative. Physical Exam    BP (!) 80/58 (Site: Right Upper Arm, Position: Sitting, Cuff Size: Medium Adult)   Pulse (!) 49   Temp 97.6 °F (36.4 °C)   Ht 5' 8\" (1.727 m)   Wt 133 lb (60.3 kg)   SpO2 99%   BMI 20.22 kg/m²       Neck- carotid pulses 2+ to palpation with no bruit  Cardiovascular - Regular rate and rhythm. Pulmonary - effort appears normal.  No respiratory distress. Lungs - Breath sounds normal. No wheezes or rales. Extremities - without edema   Neurologic - alert and oriented X 3. Physiologic. Face symmetric. Skin - warm, dry, and intact. no wound  Psychiatric - mood, affect, and behavior appear normal.  Judgment and thought processes appear normal.    Risk factors for atherosclerosis of all vascular beds have been reviewed with the patient including:  Family history, tobacco abuse in all forms, elevated cholesterol, hyperlipidemia, and diabetes.       Doppler results:    Right CCA/ICA 50-69% stenotic  Left CCA/ICA 50-69% stenotic  Right verterbral artery flow is antegrade  Left verterbral artery flow is antegrade  Individual velocities reviewed: Yes. Results were reviewed with the patient. Disease process is stable and chronic  by velocity criteria, I see no significant change          Reviewed previous studies including: carotid u/s  Individual images were reviewed. I agree with the findings  Results were discussed with the patient. ASSESSMENT/PLAN:  1. Bilateral carotid artery stenosis        Discussed management of carotid u/s which includes:  continue plavix to reduce risk of TIA/CVA, to reduce risk of arterial thrombosis, and to decrease rate of plaque buildup  Strongly encourage start/continue statin therapy -   Recommend no smoking - discussed the effect tobacco has on illness;   Proceed with 6 months with cvls         Patient instructed to call or proceed to the emergency room with any symptoms of lateralizing weakness, loss of vision in one eye, or episodes slurred speech. An electronic signature was used to authenticate this note.     --ANTHONY Degroot

## 2023-03-06 ENCOUNTER — OFFICE VISIT (OUTPATIENT)
Dept: FAMILY MEDICINE CLINIC | Facility: CLINIC | Age: 83
End: 2023-03-06
Payer: MEDICARE

## 2023-03-06 VITALS
HEIGHT: 66 IN | TEMPERATURE: 97.5 F | BODY MASS INDEX: 21.24 KG/M2 | WEIGHT: 132.2 LBS | OXYGEN SATURATION: 99 % | DIASTOLIC BLOOD PRESSURE: 66 MMHG | RESPIRATION RATE: 16 BRPM | SYSTOLIC BLOOD PRESSURE: 126 MMHG | HEART RATE: 50 BPM

## 2023-03-06 DIAGNOSIS — E78.2 MIXED HYPERLIPIDEMIA: Primary | ICD-10-CM

## 2023-03-06 DIAGNOSIS — R41.3 MEMORY LOSS: ICD-10-CM

## 2023-03-06 DIAGNOSIS — Z00.00 MEDICARE ANNUAL WELLNESS VISIT, SUBSEQUENT: ICD-10-CM

## 2023-03-06 DIAGNOSIS — R73.9 ELEVATED BLOOD SUGAR: ICD-10-CM

## 2023-03-06 DIAGNOSIS — R53.83 FATIGUE, UNSPECIFIED TYPE: ICD-10-CM

## 2023-03-06 DIAGNOSIS — E55.9 VITAMIN D DEFICIENCY: ICD-10-CM

## 2023-03-06 DIAGNOSIS — E79.0 HYPERURICEMIA: ICD-10-CM

## 2023-03-06 PROCEDURE — 81003 URINALYSIS AUTO W/O SCOPE: CPT | Performed by: FAMILY MEDICINE

## 2023-03-06 PROCEDURE — 1126F AMNT PAIN NOTED NONE PRSNT: CPT | Performed by: FAMILY MEDICINE

## 2023-03-06 PROCEDURE — 1170F FXNL STATUS ASSESSED: CPT | Performed by: FAMILY MEDICINE

## 2023-03-06 PROCEDURE — 1159F MED LIST DOCD IN RCRD: CPT | Performed by: FAMILY MEDICINE

## 2023-03-06 PROCEDURE — G0439 PPPS, SUBSEQ VISIT: HCPCS | Performed by: FAMILY MEDICINE

## 2023-03-06 RX ORDER — MULTIVIT-MIN/IRON/FOLIC ACID/K 18-600-40
CAPSULE ORAL 3 TIMES WEEKLY
COMMUNITY

## 2023-03-06 NOTE — PROGRESS NOTES
The ABCs of the Annual Wellness Visit  Subsequent Medicare Wellness Visit    Chief Complaint   Patient presents with   • Medicare Wellness-subsequent     Fasting      Subjective    History of Present Illness:  Shabbir Gambino is a 82 y.o. male who presents for a Subsequent Medicare Wellness Visit.    The following portions of the patient's history were reviewed and   updated as appropriate: allergies, current medications, past family history, past medical history, past social history, past surgical history and problem list.    Compared to one year ago, the patient feels his physical   health is the same.    Compared to one year ago, the patient feels his mental   health is the same.    Recent Hospitalizations:  He was not admitted to the hospital during the last year.       Current Medical Providers:  Patient Care Team:  Rigoberto Barnes MD as PCP - General (Family Medicine)  Rigoberto Barnes MD as PCP - Family Medicine  Rigoberto Barnes MD Hodges, Christopher Ray, PA as Physician Assistant (Neurology)  Stanton Escalante MD as Consulting Physician (Cardiology)  Randy Gomez MD as Consulting Physician (Gastroenterology)    Outpatient Medications Prior to Visit   Medication Sig Dispense Refill   • allopurinol 200 MG tablet Take 200 mg by mouth Daily.     • amLODIPine (NORVASC) 5 MG tablet TAKE ONE TABLET BY MOUTH EVERY EVENING 90 tablet 3   • atenolol (TENORMIN) 50 MG tablet TAKE 1 TABLET BY MOUTH DAILY 90 tablet 3   • carbidopa-levodopa (SINEMET)  MG per tablet TAKE ONE TO TWO TABLETS BY MOUTH AT BEDTIME 180 tablet 2   • Cholecalciferol (Vitamin D) 50 MCG (2000 UT) capsule Take  by mouth Daily.     • CINNAMON PO Take 500 mg by mouth Daily.     • cloNIDine (Catapres) 0.1 MG tablet Take 1 at bedtime for blood pressure 90 tablet 3   • clopidogrel (PLAVIX) 75 MG tablet Take 1 tablet by mouth Daily.     • indapamide (LOZOL) 2.5 MG tablet Take 1 tablet by mouth Every Morning.     •  Multiple Vitamins-Minerals (EYE VITAMINS PO) Take 1 tablet by mouth Daily.     • aspirin (aspirin) 81 MG EC tablet Take 3 tablets daily until gone. 15 tablet 0   • guaiFENesin (Mucinex) 600 MG 12 hr tablet Take 2 tablets by mouth 2 (Two) Times a Day. 28 tablet 0   • predniSONE (DELTASONE) 10 MG (21) dose pack Use as directed on package. Start on 1/26/23 21 tablet 0     No facility-administered medications prior to visit.       No opioid medication identified on active medication list. I have reviewed chart for other potential  high risk medication/s and harmful drug interactions in the elderly.          Aspirin is not on active medication list.  Aspirin use is not indicated based on review of current medical condition/s. Risk of harm outweighs potential benefits.  .    Patient Active Problem List   Diagnosis   • Vascular disease   • RLS (restless legs syndrome)   • Insomnia   • Hypertension   • Gout   • Axonal polyneuropathy   • Atherosclerotic coronary vascular disease   • Arthritis   • Esophageal dysphagia   • Dysplasia of colon   • Irregular heart beat   • Cerebrovascular disease   • Leukocytosis   • Weight loss   • Pharyngoesophageal dysphagia   • History of adenomatous polyp of colon   • Skin cancer   • Coronary artery disease involving native coronary artery   • Peripheral vascular disease (HCC)   • Coagulopathy (HCC)     Advance Care Planning  Advance Directive is not on file.  ACP discussion was declined by the patient. Patient does not have an advance directive, declines further assistance.    Review of Systems   Respiratory: Negative for shortness of breath.    Cardiovascular: Negative for chest pain and leg swelling.   Genitourinary:        Sees nephrologist for chronic renal disease   All other systems reviewed and are negative.       Objective    Vitals:    03/06/23 0818   BP: 126/66   BP Location: Left arm   Patient Position: Sitting   Cuff Size: Adult   Pulse: 50   Resp: 16   Temp: 97.5 °F (36.4 °C)  "  TempSrc: Temporal   SpO2: 99%   Weight: 60 kg (132 lb 3.2 oz)   Height: 167.6 cm (66\")   PainSc: 0-No pain     Estimated body mass index is 21.34 kg/m² as calculated from the following:    Height as of this encounter: 167.6 cm (66\").    Weight as of this encounter: 60 kg (132 lb 3.2 oz).    BMI is within normal parameters. No other follow-up for BMI required.      Does the patient have evidence of cognitive impairment? No    Physical Exam  Vitals and nursing note reviewed.   Constitutional:       Appearance: Normal appearance.   HENT:      Right Ear: Tympanic membrane and ear canal normal.      Left Ear: Tympanic membrane and ear canal normal.   Eyes:      Extraocular Movements: Extraocular movements intact.      Pupils: Pupils are equal, round, and reactive to light.   Neck:      Comments: Bilateral carotid scars-recent Doppler okay  Cardiovascular:      Rate and Rhythm: Normal rate and regular rhythm.      Pulses: Normal pulses.      Heart sounds: Normal heart sounds.   Pulmonary:      Effort: Pulmonary effort is normal.      Breath sounds: Normal breath sounds.   Abdominal:      General: Abdomen is flat.      Palpations: Abdomen is soft. There is no mass.   Genitourinary:     Comments: Deferred because of age  Musculoskeletal:      Cervical back: Normal range of motion and neck supple.      Right lower leg: No edema.      Left lower leg: No edema.   Skin:     General: Skin is warm and dry.   Neurological:      General: No focal deficit present.      Mental Status: He is alert and oriented to person, place, and time.   Psychiatric:         Mood and Affect: Mood normal.         Behavior: Behavior normal.         Thought Content: Thought content normal.         Judgment: Judgment normal.                 HEALTH RISK ASSESSMENT    Smoking Status:  Social History     Tobacco Use   Smoking Status Never   Smokeless Tobacco Never     Alcohol Consumption:  Social History     Substance and Sexual Activity   Alcohol Use No "     Fall Risk Screen:    [facility], and patient is at LOW risk for falls.Assessment completed on:3/6/2023    Depression Screening:  PHQ-2/PHQ-9 Depression Screening 3/6/2023   Retired PHQ-9 Total Score -   Retired Total Score -   Little Interest or Pleasure in Doing Things 0-->not at all   Feeling Down, Depressed or Hopeless 0-->not at all   PHQ-9: Brief Depression Severity Measure Score 0       Health Habits and Functional and Cognitive Screening:  Functional & Cognitive Status 3/6/2023   Do you have difficulty preparing food and eating? No   Do you have difficulty bathing yourself, getting dressed or grooming yourself? No   Do you have difficulty using the toilet? No   Do you have difficulty moving around from place to place? No   Do you have trouble with steps or getting out of a bed or a chair? Yes   Current Diet Well Balanced Diet   Dental Exam Up to date   Eye Exam Up to date   Exercise (times per week) 0 times per week   Current Exercises Include No Regular Exercise;Walking;Yard Work   Current Exercise Activities Include -   Do you need help using the phone?  No   Are you deaf or do you have serious difficulty hearing?  Yes   Do you need help with transportation? No   Do you need help shopping? No   Do you need help preparing meals?  No   Do you need help with housework?  No   Do you need help with laundry? No   Do you need help taking your medications? No   Do you need help managing money? No   Do you ever drive or ride in a car without wearing a seat belt? No   Have you felt unusual stress, anger or loneliness in the last month? No   Who do you live with? Spouse   If you need help, do you have trouble finding someone available to you? No   Have you been bothered in the last four weeks by sexual problems? No   Do you have difficulty concentrating, remembering or making decisions? No       Age-appropriate Screening Schedule:  Refer to the list below for future screening  recommendations based on patient's age, sex and/or medical conditions. Orders for these recommended tests are listed in the plan section. The patient has been provided with a written plan.    Health Maintenance   Topic Date Due   • ZOSTER VACCINE (1 of 2) Never done   • LIPID PANEL  09/06/2023   • ANNUAL WELLNESS VISIT  03/06/2024   • COLORECTAL CANCER SCREENING  05/09/2025   • COVID-19 Vaccine  Completed   • INFLUENZA VACCINE  Completed   • Pneumococcal Vaccine 65+  Completed   • TDAP/TD VACCINES  Discontinued              Assessment & Plan   CMS Preventative Services Quick Reference  Risk Factors Identified During Encounter  Fall Risk-High or Moderate: Information on Fall Prevention Shared in After Visit Summary  The above risks/problems have been discussed with the patient.  Follow up actions/plans if indicated are seen below in the Assessment/Plan Section.  Pertinent information has been shared with the patient in the After Visit Summary.    Diagnoses and all orders for this visit:    1. Mixed hyperlipidemia (Primary)  -     Comprehensive Metabolic Panel  -     Lipid Panel    2. Hyperuricemia  -     Uric Acid    3. Elevated blood sugar  -     Hemoglobin A1c  -     POC Urinalysis Dipstick, Multipro    4. Fatigue, unspecified type  -     CBC & Differential  -     Comprehensive Metabolic Panel  -     TSH  -     T4, free    5. Memory loss  -     Vitamin B12  -     Folate    6. Vitamin D deficiency  -     Vitamin D,25-Hydroxy    7. Medicare annual wellness visit, subsequent        Follow Up:   Return in about 6 months (around 9/6/2023).     An After Visit Summary and PPPS were made available to the patient.          I spent 25 minutes caring for Shabbir on this date of service. This time includes time spent by me in the following activities:preparing for the visit, reviewing tests, obtaining and/or reviewing a separately obtained history, performing a medically appropriate examination and/or evaluation , counseling  and educating the patient/family/caregiver, ordering medications, tests, or procedures and documenting information in the medical record     Plan-continue seeing specialist- stable

## 2023-03-06 NOTE — PATIENT INSTRUCTIONS
Advance Care Planning and Advance Directives     You make decisions on a daily basis - decisions about where you want to live, your career, your home, your life. Perhaps one of the most important decisions you face is your choice for future medical care. Take time to talk with your family and your healthcare team and start planning today.  Advance Care Planning is a process that can help you:  · Understand possible future healthcare decisions in light of your own experiences  · Reflect on those decision in light of your goals and values  · Discuss your decisions with those closest to you and the healthcare professionals that care for you  · Make a plan by creating a document that reflects your wishes    Surrogate Decision Maker  In the event of a medical emergency, which has left you unable to communicate or to make your own decisions, you would need someone to make decisions for you.  It is important to discuss your preferences for medical treatment with this person while you are in good health.     Qualities of a surrogate decision maker:  • Willing to take on this role and responsibility  • Knows what you want for future medical care  • Willing to follow your wishes even if they don't agree with them  • Able to make difficult medical decisions under stressful circumstances    Advance Directives  These are legal documents you can create that will guide your healthcare team and decision maker(s) when needed. These documents can be stored in the electronic medical record.    · Living Will - a legal document to guide your care if you have a terminal condition or a serious illness and are unable to communicate. States vary by statute in document names/types, but most forms may include one or more of the following:        -  Directions regarding life-prolonging treatments        -  Directions regarding artificially provided nutrition/hydration        -  Choosing a healthcare decision maker        -  Direction  regarding organ/tissue donation    · Durable Power of  for Healthcare - this document names an -in-fact to make medical decisions for you, but it may also allow this person to make personal and financial decisions for you. Please seek the advice of an  if you need this type of document.    **Advance Directives are not required and no one may discriminate against you if you do not sign one.    Medical Orders  Many states allow specific forms/orders signed by your physician to record your wishes for medical treatment in your current state of health. This form, signed in personal communication with your physician, addresses resuscitation and other medical interventions that you may or may not want.      For more information or to schedule a time with a [facility] Advance Care Planning Facilitator contact: [facility] or call [phone] and someone will contact you directly.  You are due for Shingrix vaccination series ( the newest shingles vaccine).  It is a two shot series spaced 2-6 months apart. Please get this vaccine series started at your earliest convenience at your local pharmacy to help avoid shingles outbreak. It is more effective than the old Zostavax vaccine and is recommended even if you have had the Zostavax vaccine in the past.  Once the Shingrix series is completed, it does not need to be repeated.   For more information, please look at the website below:  [facility] Shingrix Vaccine Information      Medicare Wellness  Personal Prevention Plan of Service     Date of Office Visit:    Encounter Provider:  Rigoberto Barnes MD  Place of Service:  [facility]  Patient Name: Shabbir Gambino  :  1940    As part of the Medicare Wellness portion of your visit today, we are providing you with this personalized preventive plan of services (PPPS). This plan is based upon recommendations of the [place] Preventive Services Task  Force (USPSTF) and the Advisory Committee on Immunization Practices (ACIP).    This lists the preventive care services that should be considered, and provides dates of when you are due. Items listed as completed are up-to-date and do not require any further intervention.    Health Maintenance   Topic Date Due   • ZOSTER VACCINE (1 of 2) Never done   • ANNUAL WELLNESS VISIT  03/04/2023   • LIPID PANEL  09/06/2023   • COLORECTAL CANCER SCREENING  05/09/2025   • COVID-19 Vaccine  Completed   • INFLUENZA VACCINE  Completed   • Pneumococcal Vaccine 65+  Completed   • TDAP/TD VACCINES  Discontinued       No orders of the defined types were placed in this encounter.      No follow-ups on file.

## 2023-03-07 LAB
25(OH)D3+25(OH)D2 SERPL-MCNC: 93.3 NG/ML (ref 30–100)
ALBUMIN SERPL-MCNC: 4.5 G/DL (ref 3.5–5.2)
ALBUMIN/GLOB SERPL: 2 G/DL
ALP SERPL-CCNC: 92 U/L (ref 39–117)
ALT SERPL-CCNC: 12 U/L (ref 1–41)
AST SERPL-CCNC: 16 U/L (ref 1–40)
BASOPHILS # BLD AUTO: 0.06 10*3/MM3 (ref 0–0.2)
BASOPHILS NFR BLD AUTO: 0.8 % (ref 0–1.5)
BILIRUB SERPL-MCNC: 0.5 MG/DL (ref 0–1.2)
BUN SERPL-MCNC: 29 MG/DL (ref 8–23)
BUN/CREAT SERPL: 17.9 (ref 7–25)
CALCIUM SERPL-MCNC: 10.1 MG/DL (ref 8.6–10.5)
CHLORIDE SERPL-SCNC: 98 MMOL/L (ref 98–107)
CHOLEST SERPL-MCNC: 250 MG/DL (ref 0–200)
CO2 SERPL-SCNC: 28.6 MMOL/L (ref 22–29)
CREAT SERPL-MCNC: 1.62 MG/DL (ref 0.76–1.27)
EGFRCR SERPLBLD CKD-EPI 2021: 41.9 ML/MIN/1.73
EOSINOPHIL # BLD AUTO: 0.07 10*3/MM3 (ref 0–0.4)
EOSINOPHIL NFR BLD AUTO: 1 % (ref 0.3–6.2)
ERYTHROCYTE [DISTWIDTH] IN BLOOD BY AUTOMATED COUNT: 14 % (ref 12.3–15.4)
FOLATE SERPL-MCNC: 11.9 NG/ML (ref 4.78–24.2)
GLOBULIN SER CALC-MCNC: 2.3 GM/DL
GLUCOSE SERPL-MCNC: 114 MG/DL (ref 65–99)
HBA1C MFR BLD: 6.5 % (ref 4.8–5.6)
HCT VFR BLD AUTO: 42.6 % (ref 37.5–51)
HDLC SERPL-MCNC: 52 MG/DL (ref 40–60)
HGB BLD-MCNC: 14.1 G/DL (ref 13–17.7)
IMM GRANULOCYTES # BLD AUTO: 0.33 10*3/MM3 (ref 0–0.05)
IMM GRANULOCYTES NFR BLD AUTO: 4.6 % (ref 0–0.5)
LDLC SERPL CALC-MCNC: 170 MG/DL (ref 0–100)
LYMPHOCYTES # BLD AUTO: 1.35 10*3/MM3 (ref 0.7–3.1)
LYMPHOCYTES NFR BLD AUTO: 18.7 % (ref 19.6–45.3)
MCH RBC QN AUTO: 29.3 PG (ref 26.6–33)
MCHC RBC AUTO-ENTMCNC: 33.1 G/DL (ref 31.5–35.7)
MCV RBC AUTO: 88.6 FL (ref 79–97)
MONOCYTES # BLD AUTO: 0.87 10*3/MM3 (ref 0.1–0.9)
MONOCYTES NFR BLD AUTO: 12 % (ref 5–12)
NEUTROPHILS # BLD AUTO: 4.54 10*3/MM3 (ref 1.7–7)
NEUTROPHILS NFR BLD AUTO: 62.9 % (ref 42.7–76)
NRBC BLD AUTO-RTO: 0 /100 WBC (ref 0–0.2)
PLATELET # BLD AUTO: 218 10*3/MM3 (ref 140–450)
POTASSIUM SERPL-SCNC: 4.7 MMOL/L (ref 3.5–5.2)
PROT SERPL-MCNC: 6.8 G/DL (ref 6–8.5)
RBC # BLD AUTO: 4.81 10*6/MM3 (ref 4.14–5.8)
SODIUM SERPL-SCNC: 138 MMOL/L (ref 136–145)
T4 FREE SERPL-MCNC: 1.32 NG/DL (ref 0.93–1.7)
TRIGL SERPL-MCNC: 154 MG/DL (ref 0–150)
TSH SERPL DL<=0.005 MIU/L-ACNC: 1.74 UIU/ML (ref 0.27–4.2)
URATE SERPL-MCNC: 6 MG/DL (ref 3.4–7)
VIT B12 SERPL-MCNC: 667 PG/ML (ref 211–946)
VLDLC SERPL CALC-MCNC: 28 MG/DL (ref 5–40)
WBC # BLD AUTO: 7.22 10*3/MM3 (ref 3.4–10.8)

## 2023-05-04 RX ORDER — CLOPIDOGREL BISULFATE 75 MG/1
75 TABLET ORAL DAILY
Qty: 90 TABLET | Refills: 3 | Status: SHIPPED | OUTPATIENT
Start: 2023-05-04

## 2023-05-04 NOTE — TELEPHONE ENCOUNTER
Caller: Mulu Gambinochele Cat    Relationship: Emergency Contact    Best call back number: [phone]    Requested Prescriptions:   Requested Prescriptions     Pending Prescriptions Disp Refills   • clopidogrel (PLAVIX) 75 MG tablet 30 tablet      Sig: Take 1 tablet by mouth Daily.        Pharmacy where request should be sent: [facility]/PHARMACY #[id number] - [address] [phone] [facility] [phone]      Last office visit with prescribing clinician: 3/6/2023   Last telemedicine visit with prescribing clinician: 9/6/2023   Next office visit with prescribing clinician: 9/6/2023     Does the patient have less than a 3 day supply:  [x] Yes  [] No    Would you like a call back once the refill request has been completed: [x] Yes [] No    If the office needs to give you a call back, can they leave a voicemail: [x] Yes [] No    Daiana Figueredo Rep   05/04/23 10:05 CDT

## 2023-05-04 NOTE — TELEPHONE ENCOUNTER
Rx Refill Note  Requested Prescriptions     Pending Prescriptions Disp Refills   • clopidogrel (PLAVIX) 75 MG tablet 30 tablet      Sig: Take 1 tablet by mouth Daily.      Last office visit with prescribing clinician: 3/6/2023   Last telemedicine visit with prescribing clinician: 9/6/2023   Next office visit with prescribing clinician: 9/6/2023   Abena Yu MA  05/04/23, 10:36 CDT

## 2023-05-11 DIAGNOSIS — I10 PRIMARY HYPERTENSION: Primary | ICD-10-CM

## 2023-05-11 RX ORDER — ATENOLOL 50 MG/1
50 TABLET ORAL DAILY
Qty: 90 TABLET | Refills: 3 | Status: SHIPPED | OUTPATIENT
Start: 2023-05-11

## 2023-05-11 NOTE — TELEPHONE ENCOUNTER
Rx Refill Note  Requested Prescriptions     Pending Prescriptions Disp Refills   • atenolol (TENORMIN) 50 MG tablet 90 tablet 3     Sig: Take 1 tablet by mouth Daily.      Last office visit with prescribing clinician: 3/6/2023   Last telemedicine visit with prescribing clinician: 5/4/2023   Next office visit with prescribing clinician: 9/6/2023                         Would you like a call back once the refill request has been completed: [] Yes [] No    If the office needs to give you a call back, can they leave a voicemail: [] Yes [] No    Daiana Amaro Rep  05/11/23, 09:30 CDT

## 2023-07-11 ENCOUNTER — OFFICE VISIT (OUTPATIENT)
Dept: CARDIOLOGY CLINIC | Age: 83
End: 2023-07-11
Payer: MEDICARE

## 2023-07-11 VITALS
WEIGHT: 138 LBS | OXYGEN SATURATION: 99 % | BODY MASS INDEX: 20.92 KG/M2 | HEART RATE: 50 BPM | DIASTOLIC BLOOD PRESSURE: 82 MMHG | SYSTOLIC BLOOD PRESSURE: 132 MMHG | HEIGHT: 68 IN

## 2023-07-11 DIAGNOSIS — I10 ESSENTIAL HYPERTENSION: Primary | ICD-10-CM

## 2023-07-11 DIAGNOSIS — I25.10 CORONARY ARTERY DISEASE INVOLVING NATIVE CORONARY ARTERY OF NATIVE HEART WITHOUT ANGINA PECTORIS: ICD-10-CM

## 2023-07-11 DIAGNOSIS — E78.5 HYPERLIPIDEMIA, UNSPECIFIED HYPERLIPIDEMIA TYPE: ICD-10-CM

## 2023-07-11 PROCEDURE — G8427 DOCREV CUR MEDS BY ELIG CLIN: HCPCS | Performed by: NURSE PRACTITIONER

## 2023-07-11 PROCEDURE — 1036F TOBACCO NON-USER: CPT | Performed by: NURSE PRACTITIONER

## 2023-07-11 PROCEDURE — 3075F SYST BP GE 130 - 139MM HG: CPT | Performed by: NURSE PRACTITIONER

## 2023-07-11 PROCEDURE — G8420 CALC BMI NORM PARAMETERS: HCPCS | Performed by: NURSE PRACTITIONER

## 2023-07-11 PROCEDURE — 1123F ACP DISCUSS/DSCN MKR DOCD: CPT | Performed by: NURSE PRACTITIONER

## 2023-07-11 PROCEDURE — 3079F DIAST BP 80-89 MM HG: CPT | Performed by: NURSE PRACTITIONER

## 2023-07-11 PROCEDURE — 99214 OFFICE O/P EST MOD 30 MIN: CPT | Performed by: NURSE PRACTITIONER

## 2023-07-11 NOTE — PATIENT INSTRUCTIONS
Coronary Artery Disease   (CAD; Coronary Atherosclerosis; Silent MI; Coronary Heart Disease; Ischemic Heart Disease; Atherosclerosis of the Coronary Arteries)     Definition   Coronary arteries bring oxygen rich blood to the heart muscle. Coronary artery disease (CAD) is blockage of these arteries. If the blockage is complete, areas of the heart muscle may be damaged. In severe case the heart muscle dies. This can lead to a heart attack, also known as a myocardial infarction (MI). Coronary artery disease is the most common form of heart disease. It is the leading cause of death worldwide. Causes include: Thickening of the walls of the arteries feeding the heart muscle   Accumulation of fatty plaques within the coronary arteries   Sudden spasm of a coronary artery   Narrowing of the coronary arteries   Inflammation within the coronary arteries   Development of a blood clot within the coronary arteries that blocks blood flow      Major risk factors include:   Sex: male (men have a greater risk of heart attack than women)   Age: 39 and older for men, 54 and older for women   Heredity: strong family history of heart disease   Obesity and being overweight   Smoking   High blood pressure   Sedentary lifestylePoor fitness can also increase your risk of CAD and premature death. High cholesterol (specifically, high LDL cholesterol, and low HDL cholesterol)   Diabetes   Metabolic syndrome (combination of high blood pressure, abdominal obesity, and insulin resistance)     Other risk factors may include:   Sleep Apnea  Stress   Excessive alcohol use   Depression   A diet that is high in saturated fat, trans fat, cholesterol, and/or caloriesDrinking sugary beverages on a regular basis may increase your risk of CAD. Symptoms   CAD may progress without any symptoms. Angina is chest pain that comes and goes. It often has a squeezing or pressure-like quality. It may radiate into the shoulder(s), arm(s), or jaw.

## 2023-07-28 ENCOUNTER — OFFICE VISIT (OUTPATIENT)
Dept: FAMILY MEDICINE CLINIC | Facility: CLINIC | Age: 83
End: 2023-07-28
Payer: MEDICARE

## 2023-07-28 VITALS
OXYGEN SATURATION: 97 % | TEMPERATURE: 97.7 F | DIASTOLIC BLOOD PRESSURE: 64 MMHG | WEIGHT: 135 LBS | SYSTOLIC BLOOD PRESSURE: 145 MMHG | HEART RATE: 75 BPM | BODY MASS INDEX: 20.46 KG/M2 | HEIGHT: 68 IN

## 2023-07-28 DIAGNOSIS — S59.911A INJURY OF RIGHT LOWER ARM, INITIAL ENCOUNTER: Primary | ICD-10-CM

## 2023-07-28 RX ORDER — AMOXICILLIN 500 MG/1
1000 CAPSULE ORAL 2 TIMES DAILY
Qty: 28 CAPSULE | Refills: 0 | Status: SHIPPED | OUTPATIENT
Start: 2023-07-28 | End: 2023-08-04

## 2023-07-28 NOTE — PROGRESS NOTES
CC: injury of right arm    History:  Shabbir Gambino is a 82 y.o. male who presents today for evaluation of the above problems.      Patient fell 2 days ago cleaning up brush. Cut his arm. Went to [facility] ER and had sutures placed and was given tetanus shot. Advised to follow up with PCP in 1-2 days.       HPI  ROS:  Review of Systems   Skin:  Positive for wound (right forearm).     Allergies   Allergen Reactions    Other Anaphylaxis     UNKNOWN TO PATIENT ENVIRONMENTAL/FOOD ALLERGY     Ezetimibe Unknown (See Comments)     Leg cramps    Niacin And Related Unknown (See Comments)     Cramping red and flush    Statins Other (See Comments)     Leg cramps     Past Medical History:   Diagnosis Date    Atherosclerotic coronary vascular disease     Axonal polyneuropathy     Cancer of skin of neck     Coagulopathy 02/28/2020    Colon polyp     COVID toes     Had infusion    Elevated cholesterol     Gout     History of transfusion     Hyperlipidemia     Hypertension     Insomnia     Irregular heart beat     Precancerous skin lesion     RLS (restless legs syndrome)     TIA (transient ischemic attack)     2014    Vascular disease     CAROTID     Past Surgical History:   Procedure Laterality Date    CAROTID ENDARTERECTOMY Bilateral     2007, 2008    COLON RESECTION N/A 10/24/2017    Procedure: COLON RESECTION  MID TRANSVERSE LAPAROSCOPIC;  Surgeon: Adal Quintero MD;  Location: [facility] OR;  Service:     COLONOSCOPY      MULTIPLE     COLONOSCOPY N/A 09/07/2017    Procedure: colonoscopy with anesthesia;  Surgeon: Randy Gomez MD;  Location: [facility] ENDOSCOPY;  Service:     COLONOSCOPY N/A 08/07/2018    Procedure: COLONOSCOPY WITH ANESTHESIA;  Surgeon: Randy Gomez MD;  Location: [facility] ENDOSCOPY;  Service: Gastroenterology    COLONOSCOPY N/A 05/09/2022    Procedure: COLONOSCOPY WITH ANESTHESIA;  Surgeon: Randy Gomez MD;  Location: [facility] ENDOSCOPY;  Service: Gastroenterology;  Laterality: N/A;  pre polyp hx  post  polyps;diverticulosis  Dr. Barnes    CORONARY ARTERY BYPASS GRAFT  2006    CORONARY STENT PLACEMENT      CORONARY STENT PLACEMENT      ENDOSCOPY N/A 09/07/2017    Procedure: ESOPHAGOGASTRODUODENOSCOPY WITH ANESTHESIA;  Surgeon: Randy Gomez MD;  Location: [facility] ENDOSCOPY;  Service:     ENDOSCOPY N/A 08/07/2018    Procedure: ESOPHAGOGASTRODUODENOSCOPY WITH ANESTHESIA;  Surgeon: Randy Gomez MD;  Location: [facility] ENDOSCOPY;  Service: Gastroenterology    EYE SURGERY Bilateral     2014, 2015    KNEE SURGERY Right     x 2    SKIN CANCER DESTRUCTION  2022    Neck  DrMalini Case     Family History   Problem Relation Age of Onset    Heart disease Mother     Stroke Mother     Diabetes Mother     No Known Problems Father     Pancreatic cancer Sister     No Known Problems Maternal Grandmother     No Known Problems Maternal Grandfather     No Known Problems Paternal Grandmother     No Known Problems Paternal Grandfather     Colon polyps Neg Hx     Colon cancer Neg Hx       reports that he has never smoked. He has never used smokeless tobacco. He reports that he does not drink alcohol and does not use drugs.      Current Outpatient Medications:     allopurinol 200 MG tablet, Take 200 mg by mouth Daily., Disp: , Rfl:     amLODIPine (NORVASC) 5 MG tablet, TAKE ONE TABLET BY MOUTH EVERY EVENING, Disp: 90 tablet, Rfl: 3    atenolol (TENORMIN) 50 MG tablet, Take 1 tablet by mouth Daily., Disp: 90 tablet, Rfl: 3    carbidopa-levodopa (SINEMET)  MG per tablet, TAKE ONE TO TWO TABLETS BY MOUTH AT BEDTIME, Disp: 180 tablet, Rfl: 2    Cholecalciferol (Vitamin D) 50 MCG (2000 UT) capsule, Take  by mouth 3 (Three) Times a Week. Monday, Wednesday, Friday, Disp: , Rfl:     CINNAMON PO, Take 500 mg by mouth Daily., Disp: , Rfl:     cloNIDine (Catapres) 0.1 MG tablet, Take 1 at bedtime for blood pressure, Disp: 90 tablet, Rfl: 3    clopidogrel (PLAVIX) 75 MG tablet, Take 1 tablet by mouth Daily., Disp: 90 tablet, Rfl: 3     "indapamide (LOZOL) 2.5 MG tablet, Take 1 tablet by mouth Every Morning., Disp: , Rfl:     Multiple Vitamins-Minerals (EYE VITAMINS PO), Take 1 tablet by mouth Daily., Disp: , Rfl:     amoxicillin (AMOXIL) 500 MG capsule, Take 2 capsules by mouth 2 (Two) Times a Day for 7 days., Disp: 28 capsule, Rfl: 0    OBJECTIVE:  /64 (BP Location: Left arm, Patient Position: Sitting, Cuff Size: Adult)   Pulse 75   Temp 97.7 °F (36.5 °C)   Ht 172.7 cm (68\")   Wt 61.2 kg (135 lb)   SpO2 97%   BMI 20.53 kg/m²    Physical Exam  Vitals reviewed.   Constitutional:       Appearance: He is well-developed.   Cardiovascular:      Rate and Rhythm: Normal rate.   Pulmonary:      Effort: Pulmonary effort is normal.   Musculoskeletal:        Arms:    Neurological:      Mental Status: He is alert and oriented to person, place, and time.   Psychiatric:         Behavior: Behavior normal.       Assessment/Plan    Diagnoses and all orders for this visit:    1. Injury of right lower arm, initial encounter (Primary)  -     amoxicillin (AMOXIL) 500 MG capsule; Take 2 capsules by mouth 2 (Two) Times a Day for 7 days.  Dispense: 28 capsule; Refill: 0    Started on antibiotic due to high risk for infection based on the nature of the injury. Injury redressed with vaseline gauze, gauze pads, and coflex wrap. Advised to remove dressing the day after tomorrow.     An After Visit Summary was printed and given to the patient at discharge.  Return in about 1 week (around 8/4/2023) for Recheck.       VERONIKA Marley 7/28/23    Electronically signed.  "

## 2023-08-04 ENCOUNTER — OFFICE VISIT (OUTPATIENT)
Dept: FAMILY MEDICINE CLINIC | Facility: CLINIC | Age: 83
End: 2023-08-04
Payer: MEDICARE

## 2023-08-04 VITALS
DIASTOLIC BLOOD PRESSURE: 68 MMHG | TEMPERATURE: 98.6 F | BODY MASS INDEX: 20.55 KG/M2 | WEIGHT: 135.6 LBS | HEART RATE: 56 BPM | SYSTOLIC BLOOD PRESSURE: 137 MMHG | HEIGHT: 68 IN | OXYGEN SATURATION: 97 %

## 2023-08-04 DIAGNOSIS — M75.22 BICEPS TENDINITIS OF LEFT UPPER EXTREMITY: Primary | ICD-10-CM

## 2023-08-04 DIAGNOSIS — S51.811A LACERATION OF RIGHT FOREARM, INITIAL ENCOUNTER: ICD-10-CM

## 2023-08-04 RX ORDER — TRIAMCINOLONE ACETONIDE 40 MG/ML
40 INJECTION, SUSPENSION INTRA-ARTICULAR; INTRAMUSCULAR ONCE
Status: COMPLETED | OUTPATIENT
Start: 2023-08-04 | End: 2023-08-04

## 2023-08-04 RX ADMIN — TRIAMCINOLONE ACETONIDE 40 MG: 40 INJECTION, SUSPENSION INTRA-ARTICULAR; INTRAMUSCULAR at 14:12

## 2023-09-11 ENCOUNTER — OFFICE VISIT (OUTPATIENT)
Dept: FAMILY MEDICINE CLINIC | Facility: CLINIC | Age: 83
End: 2023-09-11
Payer: MEDICARE

## 2023-09-11 VITALS
HEART RATE: 50 BPM | WEIGHT: 130.8 LBS | OXYGEN SATURATION: 97 % | HEIGHT: 68 IN | SYSTOLIC BLOOD PRESSURE: 126 MMHG | RESPIRATION RATE: 16 BRPM | TEMPERATURE: 98 F | BODY MASS INDEX: 19.82 KG/M2 | DIASTOLIC BLOOD PRESSURE: 70 MMHG

## 2023-09-11 DIAGNOSIS — R73.9 ELEVATED BLOOD SUGAR: ICD-10-CM

## 2023-09-11 DIAGNOSIS — E78.2 MIXED HYPERLIPIDEMIA: Primary | ICD-10-CM

## 2023-09-11 DIAGNOSIS — R53.83 FATIGUE, UNSPECIFIED TYPE: ICD-10-CM

## 2023-09-11 NOTE — PROGRESS NOTES
Jaycee Gambino is a 83 y.o. male.     History of Present Illness  83-year-old male follow-up on hypertension and hyperlipidemia    The following portions of the patient's history were reviewed and updated as appropriate: allergies, current medications, past family history, past medical history, past social history, past surgical history, and problem list.    Review of Systems   Respiratory:  Negative for shortness of breath.         Longstanding decreased breath sounds left base related to old scarring going on for years   Cardiovascular:  Negative for chest pain and leg swelling.   Musculoskeletal:  Positive for arthralgias.     Objective   Physical Exam  Vitals and nursing note reviewed.   Constitutional:       Appearance: Normal appearance.   Eyes:      Extraocular Movements: Extraocular movements intact.      Pupils: Pupils are equal, round, and reactive to light.   Neck:      Vascular: No carotid bruit.   Cardiovascular:      Rate and Rhythm: Normal rate and regular rhythm.      Pulses: Normal pulses.      Heart sounds: Normal heart sounds.   Pulmonary:      Effort: Pulmonary effort is normal.      Breath sounds: Normal breath sounds.   Abdominal:      General: Abdomen is flat.      Palpations: Abdomen is soft.   Musculoskeletal:      Right lower leg: No edema.      Left lower leg: No edema.   Lymphadenopathy:      Cervical: No cervical adenopathy.   Skin:     General: Skin is warm and dry.   Neurological:      General: No focal deficit present.      Mental Status: He is alert and oriented to person, place, and time.   Psychiatric:         Mood and Affect: Mood normal.         Behavior: Behavior normal.         Thought Content: Thought content normal.         Judgment: Judgment normal.       Assessment & Plan   Diagnoses and all orders for this visit:    1. Mixed hyperlipidemia (Primary)  -     Comprehensive Metabolic Panel  -     Lipid Panel    2. Elevated blood sugar  -     Hemoglobin  A1c    3. Fatigue, unspecified type  -     CBC & Differential       Plan above-advised to get flu shot , COVID booster this fall

## 2023-09-12 LAB
ALBUMIN SERPL-MCNC: 4.4 G/DL (ref 3.5–5.2)
ALBUMIN/GLOB SERPL: 2.3 G/DL
ALP SERPL-CCNC: 103 U/L (ref 39–117)
ALT SERPL-CCNC: 11 U/L (ref 1–41)
AST SERPL-CCNC: 17 U/L (ref 1–40)
BASOPHILS # BLD AUTO: 0.05 10*3/MM3 (ref 0–0.2)
BASOPHILS NFR BLD AUTO: 0.7 % (ref 0–1.5)
BILIRUB SERPL-MCNC: 0.4 MG/DL (ref 0–1.2)
BUN SERPL-MCNC: 34 MG/DL (ref 8–23)
BUN/CREAT SERPL: 24.6 (ref 7–25)
CALCIUM SERPL-MCNC: 10.1 MG/DL (ref 8.6–10.5)
CHLORIDE SERPL-SCNC: 100 MMOL/L (ref 98–107)
CHOLEST SERPL-MCNC: 247 MG/DL (ref 0–200)
CO2 SERPL-SCNC: 28.9 MMOL/L (ref 22–29)
CREAT SERPL-MCNC: 1.38 MG/DL (ref 0.76–1.27)
EGFRCR SERPLBLD CKD-EPI 2021: 50.4 ML/MIN/1.73
EOSINOPHIL # BLD AUTO: 0.08 10*3/MM3 (ref 0–0.4)
EOSINOPHIL NFR BLD AUTO: 1.1 % (ref 0.3–6.2)
ERYTHROCYTE [DISTWIDTH] IN BLOOD BY AUTOMATED COUNT: 14.4 % (ref 12.3–15.4)
GLOBULIN SER CALC-MCNC: 1.9 GM/DL
GLUCOSE SERPL-MCNC: 115 MG/DL (ref 65–99)
HBA1C MFR BLD: 6.5 % (ref 4.8–5.6)
HCT VFR BLD AUTO: 42.9 % (ref 37.5–51)
HDLC SERPL-MCNC: 48 MG/DL (ref 40–60)
HGB BLD-MCNC: 14.1 G/DL (ref 13–17.7)
IMM GRANULOCYTES # BLD AUTO: 0.34 10*3/MM3 (ref 0–0.05)
IMM GRANULOCYTES NFR BLD AUTO: 4.8 % (ref 0–0.5)
LDLC SERPL CALC-MCNC: 171 MG/DL (ref 0–100)
LYMPHOCYTES # BLD AUTO: 1.25 10*3/MM3 (ref 0.7–3.1)
LYMPHOCYTES NFR BLD AUTO: 17.8 % (ref 19.6–45.3)
MCH RBC QN AUTO: 29 PG (ref 26.6–33)
MCHC RBC AUTO-ENTMCNC: 32.9 G/DL (ref 31.5–35.7)
MCV RBC AUTO: 88.3 FL (ref 79–97)
MONOCYTES # BLD AUTO: 0.77 10*3/MM3 (ref 0.1–0.9)
MONOCYTES NFR BLD AUTO: 10.9 % (ref 5–12)
NEUTROPHILS # BLD AUTO: 4.55 10*3/MM3 (ref 1.7–7)
NEUTROPHILS NFR BLD AUTO: 64.7 % (ref 42.7–76)
NRBC BLD AUTO-RTO: 0 /100 WBC (ref 0–0.2)
PLATELET # BLD AUTO: 222 10*3/MM3 (ref 140–450)
POTASSIUM SERPL-SCNC: 5 MMOL/L (ref 3.5–5.2)
PROT SERPL-MCNC: 6.3 G/DL (ref 6–8.5)
RBC # BLD AUTO: 4.86 10*6/MM3 (ref 4.14–5.8)
SODIUM SERPL-SCNC: 137 MMOL/L (ref 136–145)
TRIGL SERPL-MCNC: 154 MG/DL (ref 0–150)
VLDLC SERPL CALC-MCNC: 28 MG/DL (ref 5–40)
WBC # BLD AUTO: 7.04 10*3/MM3 (ref 3.4–10.8)

## 2024-01-04 DIAGNOSIS — I65.23 BILATERAL CAROTID ARTERY STENOSIS: Primary | ICD-10-CM

## 2024-01-11 NOTE — TELEPHONE ENCOUNTER
Rx Refill Note  Requested Prescriptions     Pending Prescriptions Disp Refills    carbidopa-levodopa (SINEMET)  MG per tablet [Pharmacy Med Name: CARBIDOPA-LEVODOPA 25-100MG TABS] 180 tablet 2     Sig: TAKE ONE TO TWO TABLETS BY MOUTH EVERY DAY AT BEDTIME      Last office visit with prescribing clinician: 9/11/2023   Last telemedicine visit with prescribing clinician: Visit date not found   Next office visit with prescribing clinician:     Abena Yu MA  01/11/24, 10:49 CST

## 2024-01-24 ENCOUNTER — OFFICE VISIT (OUTPATIENT)
Dept: CARDIOLOGY CLINIC | Age: 84
End: 2024-01-24
Payer: MEDICARE

## 2024-01-24 VITALS
SYSTOLIC BLOOD PRESSURE: 128 MMHG | OXYGEN SATURATION: 98 % | HEIGHT: 68 IN | HEART RATE: 57 BPM | WEIGHT: 134 LBS | BODY MASS INDEX: 20.31 KG/M2 | DIASTOLIC BLOOD PRESSURE: 70 MMHG

## 2024-01-24 DIAGNOSIS — E78.5 HYPERLIPIDEMIA, UNSPECIFIED HYPERLIPIDEMIA TYPE: ICD-10-CM

## 2024-01-24 DIAGNOSIS — I10 ESSENTIAL HYPERTENSION: ICD-10-CM

## 2024-01-24 DIAGNOSIS — I25.10 CORONARY ARTERY DISEASE INVOLVING NATIVE CORONARY ARTERY OF NATIVE HEART WITHOUT ANGINA PECTORIS: Primary | ICD-10-CM

## 2024-01-24 PROCEDURE — 3078F DIAST BP <80 MM HG: CPT | Performed by: NURSE PRACTITIONER

## 2024-01-24 PROCEDURE — 1036F TOBACCO NON-USER: CPT | Performed by: NURSE PRACTITIONER

## 2024-01-24 PROCEDURE — 1123F ACP DISCUSS/DSCN MKR DOCD: CPT | Performed by: NURSE PRACTITIONER

## 2024-01-24 PROCEDURE — 93000 ELECTROCARDIOGRAM COMPLETE: CPT | Performed by: NURSE PRACTITIONER

## 2024-01-24 PROCEDURE — 3074F SYST BP LT 130 MM HG: CPT | Performed by: NURSE PRACTITIONER

## 2024-01-24 PROCEDURE — 99214 OFFICE O/P EST MOD 30 MIN: CPT | Performed by: NURSE PRACTITIONER

## 2024-01-24 PROCEDURE — G8420 CALC BMI NORM PARAMETERS: HCPCS | Performed by: NURSE PRACTITIONER

## 2024-01-24 PROCEDURE — G8427 DOCREV CUR MEDS BY ELIG CLIN: HCPCS | Performed by: NURSE PRACTITIONER

## 2024-01-24 PROCEDURE — G8484 FLU IMMUNIZE NO ADMIN: HCPCS | Performed by: NURSE PRACTITIONER

## 2024-01-24 NOTE — PROGRESS NOTES
Dear Dr. Rajan Ed, MD,    Thank you for allowing me to participate in the care of Mr. Calvin Morris. He presents today at the [facility]. As you know, Mr. Morris is a 83 y.o. male with history of hypertension, hyperlipidemia, carotid artery disease, and CAD who presents with the chief complaint of follow-up for chronic cardiac conditions. He is a former patient of Dr. Pacheco.  He has an appointment with Dr. Garduno in August.  Since last seen, he has been stable from a cardiac standpoint.  He denies any exertional chest pain or shortness of breath.  He denies any fast or slow heart rhythms.he is sleeping on 1 pillow at night. he is not waking gasping for air. he denies any swelling. he has been compliant with his medications. his BP has been controlled. PCP follows labs and lipids.      He otherwise denies chest pain, SOA, EDMONDS, PND, orthopnea, syncope or near syncope. He has no other complaints.    Review of Systems   Constitutional: Negative for fever, chills, diaphoresis, activity change, appetite change, fatigue and unexpected weight change.   Eyes: Negative for photophobia, pain, redness and visual disturbance.   Respiratory: Negative for apnea, cough, chest tightness, shortness of breath, wheezing and stridor.    Cardiovascular: Negative for chest pain, palpitations and leg swelling.   Gastrointestinal: Negative for abdominal distention.   Genitourinary: Negative for dysuria, urgency and frequency.   Musculoskeletal: Negative for myalgias, arthralgias and gait problem.   Skin: Negative for color change, pallor, rash and wound.   Neurological: Negative for dizziness, tremors, speech difficulty, weakness and numbness.   Hematological: Does not bruise/bleed easily.   Psychiatric/Behavioral: Negative.        Past Medical History:   Diagnosis Date    Arthritis     COVID-19 08/2021    Skin cancer     On face       Past Surgical History:   Procedure Laterality Date    ABSCESS DRAINAGE Right

## 2024-01-24 NOTE — PATIENT INSTRUCTIONS
Coronary Artery Disease   (CAD; Coronary Atherosclerosis; Silent MI; Coronary Heart Disease; Ischemic Heart Disease; Atherosclerosis of the Coronary Arteries)     Definition   Coronary arteries bring oxygen rich blood to the heart muscle. Coronary artery disease (CAD) is blockage of these arteries. If the blockage is complete, areas of the heart muscle may be damaged. In severe case the heart muscle dies. This can lead to a heart attack, also known as a myocardial infarction (MI).     Coronary artery disease is the most common form of heart disease. It is the leading cause of death worldwide.     Causes include:   Thickening of the walls of the arteries feeding the heart muscle   Accumulation of fatty plaques within the coronary arteries   Sudden spasm of a coronary artery   Narrowing of the coronary arteries   Inflammation within the coronary arteries   Development of a blood clot within the coronary arteries that blocks blood flow      Major risk factors include:   Sex: male (men have a greater risk of heart attack than women)   Age: 45 and older for men, 55 and older for women   Heredity: strong family history of heart disease   Obesity and being overweight   Smoking   High blood pressure   Sedentary lifestylePoor fitness can also increase your risk of CAD and premature death.   High cholesterol (specifically, high LDL cholesterol, and low HDL cholesterol)   Diabetes   Metabolic syndrome (combination of high blood pressure, abdominal obesity, and insulin resistance)     Other risk factors may include:   Sleep Apnea  Stress   Excessive alcohol use   Depression   A diet that is high in saturated fat, trans fat, cholesterol, and/or caloriesDrinking sugary beverages on a regular basis may increase your risk of CAD.     Symptoms   CAD may progress without any symptoms.   Angina is chest pain that comes and goes. It often has a squeezing or pressure-like quality. It may radiate into the shoulder(s), arm(s), or jaw.

## 2024-02-19 NOTE — PROGRESS NOTES
COVID-19 08/2021    Skin cancer     On face     Past Surgical History:   Procedure Laterality Date    ABSCESS DRAINAGE Right     knee    CARDIAC CATHETERIZATION  03/19/2019    [facility]ERT[facility]    CARDIAC SURGERY      [facility] 2008    CAROTID ENDARTERECTOMY Left     [place]    CAROTID ENDARTERECTOMY Right     Dr. Shaw    CORONARY ARTERY BYPASS GRAFT      ESOPHAGEAL DILATATION      EYE SURGERY      [facility]    JOINT REPLACEMENT      rtk    POLYPECTOMY      Colon    SKIN CANCER EXCISION       Family History   Problem Relation Age of Onset    Heart Disease Mother     Heart Disease Maternal Uncle      Social History     Tobacco Use    Smoking status: Never    Smokeless tobacco: Never   Substance Use Topics    Alcohol use: No         Eyes - no sudden vision change or amaurosis.  Respiratory - no significant shortness of breath,  Cardiovascular - no chest pain or syncope.  No  significant leg swelling. no claudication.  Musculoskeletal - no gait disturbance  Skin - no new wound.  Neurologic -  No speech difficulty or lateralizing weakness.  All other review of systems are negative.    Physical Exam    BP (!) 151/64 (Site: Right Upper Arm, Position: Sitting, Cuff Size: Medium Adult)   Pulse 58   Temp 97.1 °F (36.2 °C)   SpO2 98%       Neck- carotid pulses 2+ to palpation with no bruit  Cardiovascular - Regular rate and rhythm.    Pulmonary - effort appears normal.  No respiratory distress.    Lungs - Breath sounds normal. No wheezes or rales.    Extremities - without edema   Neurologic - alert and oriented X 3.  Physiologic.   Face symmetric.  Skin - warm, dry, and intact.  no wound  Psychiatric - mood, affect, and behavior appear normal.  Judgment and thought processes appear normal.    Risk factors for atherosclerosis of all vascular beds have been reviewed with the patient including:  Family history, tobacco abuse in all forms, elevated cholesterol, hyperlipidemia, and diabetes.      Doppler results:    Right CCA/ICA 50-69%

## 2024-02-26 ENCOUNTER — OFFICE VISIT (OUTPATIENT)
Dept: VASCULAR SURGERY | Age: 84
End: 2024-02-26
Payer: MEDICARE

## 2024-02-26 ENCOUNTER — HOSPITAL ENCOUNTER (OUTPATIENT)
Dept: NON INVASIVE DIAGNOSTICS | Age: 84
Discharge: HOME | End: 2024-02-26
Payer: MEDICARE

## 2024-02-26 VITALS
OXYGEN SATURATION: 98 % | HEART RATE: 58 BPM | DIASTOLIC BLOOD PRESSURE: 64 MMHG | TEMPERATURE: 97.1 F | SYSTOLIC BLOOD PRESSURE: 151 MMHG

## 2024-02-26 DIAGNOSIS — I65.23 BILATERAL CAROTID ARTERY STENOSIS: Primary | ICD-10-CM

## 2024-02-26 DIAGNOSIS — I65.23 BILATERAL CAROTID ARTERY STENOSIS: ICD-10-CM

## 2024-02-26 PROCEDURE — G8420 CALC BMI NORM PARAMETERS: HCPCS | Performed by: NURSE PRACTITIONER

## 2024-02-26 PROCEDURE — 99214 OFFICE O/P EST MOD 30 MIN: CPT | Performed by: NURSE PRACTITIONER

## 2024-02-26 PROCEDURE — 93880 EXTRACRANIAL BILAT STUDY: CPT

## 2024-02-26 PROCEDURE — 1036F TOBACCO NON-USER: CPT | Performed by: NURSE PRACTITIONER

## 2024-02-26 PROCEDURE — 1123F ACP DISCUSS/DSCN MKR DOCD: CPT | Performed by: NURSE PRACTITIONER

## 2024-02-26 PROCEDURE — 3078F DIAST BP <80 MM HG: CPT | Performed by: NURSE PRACTITIONER

## 2024-02-26 PROCEDURE — 3075F SYST BP GE 130 - 139MM HG: CPT | Performed by: NURSE PRACTITIONER

## 2024-02-26 PROCEDURE — G8484 FLU IMMUNIZE NO ADMIN: HCPCS | Performed by: NURSE PRACTITIONER

## 2024-02-26 PROCEDURE — G8427 DOCREV CUR MEDS BY ELIG CLIN: HCPCS | Performed by: NURSE PRACTITIONER

## 2024-02-26 RX ORDER — TOBRAMYCIN 3 MG/ML
SOLUTION/ DROPS OPHTHALMIC
COMMUNITY
Start: 2024-02-12

## 2024-03-01 DIAGNOSIS — I10 PRIMARY HYPERTENSION: Primary | ICD-10-CM

## 2024-03-01 RX ORDER — AMLODIPINE BESYLATE 5 MG/1
TABLET ORAL
Qty: 90 TABLET | Refills: 3 | Status: SHIPPED | OUTPATIENT
Start: 2024-03-01

## 2024-03-01 NOTE — TELEPHONE ENCOUNTER
Rx Refill Note  Requested Prescriptions     Pending Prescriptions Disp Refills    amLODIPine (NORVASC) 5 MG tablet [Pharmacy Med Name: AMLODIPINE BESYLATE 5MG TABS] 90 tablet 3     Sig: TAKE ONE TABLET BY MOUTH EVERY EVENING      Last office visit with prescribing clinician: 9/11/23   Last telemedicine visit with prescribing clinician: Visit date not found   Next office visit with prescribing clinician: 3/12/24    {TIP  Please add Last Relevant Lab 9/11/23    Abena Yu MA  03/01/24, 13:27 CST

## 2024-03-12 ENCOUNTER — OFFICE VISIT (OUTPATIENT)
Dept: FAMILY MEDICINE CLINIC | Facility: CLINIC | Age: 84
End: 2024-03-12
Payer: MEDICARE

## 2024-03-12 VITALS
DIASTOLIC BLOOD PRESSURE: 68 MMHG | RESPIRATION RATE: 16 BRPM | HEART RATE: 54 BPM | TEMPERATURE: 97.5 F | OXYGEN SATURATION: 96 % | SYSTOLIC BLOOD PRESSURE: 122 MMHG | WEIGHT: 132.4 LBS | BODY MASS INDEX: 20.07 KG/M2 | HEIGHT: 68 IN

## 2024-03-12 DIAGNOSIS — E79.0 HYPERURICEMIA: ICD-10-CM

## 2024-03-12 DIAGNOSIS — R05.1 ACUTE COUGH: ICD-10-CM

## 2024-03-12 DIAGNOSIS — E55.9 VITAMIN D DEFICIENCY: ICD-10-CM

## 2024-03-12 DIAGNOSIS — R41.3 MEMORY LOSS: ICD-10-CM

## 2024-03-12 DIAGNOSIS — R53.83 FATIGUE, UNSPECIFIED TYPE: ICD-10-CM

## 2024-03-12 DIAGNOSIS — R73.9 ELEVATED BLOOD SUGAR: Primary | ICD-10-CM

## 2024-03-12 DIAGNOSIS — Z00.00 MEDICARE ANNUAL WELLNESS VISIT, SUBSEQUENT: ICD-10-CM

## 2024-03-12 DIAGNOSIS — E78.2 MIXED HYPERLIPIDEMIA: ICD-10-CM

## 2024-03-12 NOTE — PATIENT INSTRUCTIONS
Advance Care Planning and Advance Directives     You make decisions on a daily basis - decisions about where you want to live, your career, your home, your life. Perhaps one of the most important decisions you face is your choice for future medical care. Take time to talk with your family and your healthcare team and start planning today.  Advance Care Planning is a process that can help you:  Understand possible future healthcare decisions in light of your own experiences  Reflect on those decision in light of your goals and values  Discuss your decisions with those closest to you and the healthcare professionals that care for you  Make a plan by creating a document that reflects your wishes    Surrogate Decision Maker  In the event of a medical emergency, which has left you unable to communicate or to make your own decisions, you would need someone to make decisions for you.  It is important to discuss your preferences for medical treatment with this person while you are in good health.     Qualities of a surrogate decision maker:  Willing to take on this role and responsibility  Knows what you want for future medical care  Willing to follow your wishes even if they don't agree with them  Able to make difficult medical decisions under stressful circumstances    Advance Directives  These are legal documents you can create that will guide your healthcare team and decision maker(s) when needed. These documents can be stored in the electronic medical record.    Living Will - a legal document to guide your care if you have a terminal condition or a serious illness and are unable to communicate. States vary by statute in document names/types, but most forms may include one or more of the following:        -  Directions regarding life-prolonging treatments        -  Directions regarding artificially provided nutrition/hydration        -  Choosing a healthcare decision maker        -  Direction regarding organ/tissue  donation    Durable Power of  for Healthcare - this document names an -in-fact to make medical decisions for you, but it may also allow this person to make personal and financial decisions for you. Please seek the advice of an  if you need this type of document.    **Advance Directives are not required and no one may discriminate against you if you do not sign one.    Medical Orders  Many states allow specific forms/orders signed by your physician to record your wishes for medical treatment in your current state of health. This form, signed in personal communication with your physician, addresses resuscitation and other medical interventions that you may or may not want.      For more information or to schedule a time with a [facility] Advance Care Planning Facilitator contact: [employer] or call [phone] and someone will contact you directly.    Medicare Wellness  Personal Prevention Plan of Service     Date of Office Visit:    Encounter Provider:  Rigoberto Barnes MD  Place of Service:  [facility]  Patient Name: Shabbir Gambino  :  1940    As part of the Medicare Wellness portion of your visit today, we are providing you with this personalized preventive plan of services (PPPS). This plan is based upon recommendations of the [place] Preventive Services Task Force (USPSTF) and the Advisory Committee on Immunization Practices (ACIP).    This lists the preventive care services that should be considered, and provides dates of when you are due. Items listed as completed are up-to-date and do not require any further intervention.    Health Maintenance   Topic Date Due   • RSV Vaccine - Adults (1 - 1-dose 60+ series) Never done   • COVID-19 Vaccine ( - -24 season) 2023   • ANNUAL WELLNESS VISIT  2024   • ZOSTER VACCINE (1 of 2) 2024 (Originally 1989)   • LIPID PANEL  2024   • COLORECTAL  CANCER SCREENING  05/09/2025   • INFLUENZA VACCINE  Completed   • Pneumococcal Vaccine 65+  Completed   • TDAP/TD VACCINES  Discontinued       No orders of the defined types were placed in this encounter.      No follow-ups on file.

## 2024-03-12 NOTE — PROGRESS NOTES
The ABCs of the Annual Wellness Visit  Subsequent Medicare Wellness Visit    Subjective        Shabbir Gambino is a 83 y.o. male who presents for a Subsequent Medicare Wellness Visit.    The following portions of the patient's history were reviewed and   updated as appropriate: allergies, current medications, past family history, past medical history, past social history, past surgical history, and problem list.    Review of Systems   Respiratory:  Positive for cough. Negative for shortness of breath.         Cough after eating   Cardiovascular:  Negative for chest pain and leg swelling.   Gastrointestinal:         Colonoscopy is up-to-date-does have remote history of esophageal dilatation   Musculoskeletal:  Positive for arthralgias.   All other systems reviewed and are negative.       Compared to one year ago, the patient feels his physical   health is the same.    Compared to one year ago, the patient feels his mental   health is the same.    Recent Hospitalizations:  He was not admitted to the hospital during the last year.       Current Medical Providers:  Patient Care Team:  Rigoberto Barnes MD as PCP - General (Family Medicine)  Rigoberto Barnes MD as PCP - Family Medicine  Rigoberto Barnes MD Hodges, Christopher Ray, PA as Physician Assistant (Neurology)  Stanton Escalante MD as Consulting Physician (Cardiology)  Randy Gomez MD as Consulting Physician (Gastroenterology)    Outpatient Medications Prior to Visit   Medication Sig Dispense Refill    amLODIPine (NORVASC) 5 MG tablet TAKE ONE TABLET BY MOUTH EVERY EVENING 90 tablet 3    atenolol (TENORMIN) 50 MG tablet Take 1 tablet by mouth Daily. 90 tablet 3    carbidopa-levodopa (SINEMET)  MG per tablet TAKE ONE TO TWO TABLETS BY MOUTH EVERY DAY AT BEDTIME 180 tablet 0    Cholecalciferol (Vitamin D) 50 MCG (2000 UT) capsule Take  by mouth 3 (Three) Times a Week. Monday, Wednesday, Friday      CINNAMON PO Take 500 mg by mouth  "Daily.      cloNIDine (Catapres) 0.1 MG tablet Take 1 at bedtime for blood pressure 90 tablet 3    clopidogrel (PLAVIX) 75 MG tablet Take 1 tablet by mouth Daily. 90 tablet 3    indapamide (LOZOL) 2.5 MG tablet Take 1 tablet by mouth Every Morning.      Multiple Vitamins-Minerals (EYE VITAMINS PO) Take 1 tablet by mouth Daily.      allopurinol 200 MG tablet Take 200 mg by mouth Daily.       No facility-administered medications prior to visit.       No opioid medication identified on active medication list. I have reviewed chart for other potential  high risk medication/s and harmful drug interactions in the elderly.        Aspirin is not on active medication list.  Aspirin use is not indicated based on review of current medical condition/s. Risk of harm outweighs potential benefits.  .    Patient Active Problem List   Diagnosis    Vascular disease    RLS (restless legs syndrome)    Insomnia    Hypertension    Gout    Axonal polyneuropathy    Atherosclerotic coronary vascular disease    Arthritis    Esophageal dysphagia    Dysplasia of colon    Irregular heart beat    Cerebrovascular disease    Leukocytosis    Weight loss    Pharyngoesophageal dysphagia    History of adenomatous polyp of colon    Skin cancer    Coronary artery disease involving native coronary artery    Peripheral vascular disease    Coagulopathy     Advance Care Planning  Advance Directive is not on file.  ACP discussion was declined by the patient. Patient does not have an advance directive, declines further assistance.     Objective    Vitals:    03/12/24 0803   BP: 122/68   BP Location: Left arm   Patient Position: Sitting   Cuff Size: Adult   Pulse: 54   Resp: 16   Temp: 97.5 °F (36.4 °C)   TempSrc: Temporal   SpO2: 96%   Weight: 60.1 kg (132 lb 6.4 oz)   Height: 172.7 cm (68\")   PainSc: 0-No pain     Estimated body mass index is 20.13 kg/m² as calculated from the following:    Height as of this encounter: 172.7 cm (68\").    Weight as of this " encounter: 60.1 kg (132 lb 6.4 oz).    BMI is within normal parameters. No other follow-up for BMI required.      Physical Exam  Vitals and nursing note reviewed.   Constitutional:       Appearance: Normal appearance.   HENT:      Right Ear: Tympanic membrane and ear canal normal.      Left Ear: Tympanic membrane and ear canal normal.      Mouth/Throat:      Mouth: Mucous membranes are moist.      Pharynx: Oropharynx is clear.   Eyes:      Extraocular Movements: Extraocular movements intact.      Pupils: Pupils are equal, round, and reactive to light.   Neck:      Vascular: No carotid bruit.   Cardiovascular:      Rate and Rhythm: Normal rate and regular rhythm.      Pulses: Normal pulses.      Heart sounds: Normal heart sounds.   Pulmonary:      Effort: Pulmonary effort is normal.      Breath sounds: Normal breath sounds.   Abdominal:      General: Abdomen is flat.      Palpations: Abdomen is soft. There is no mass.      Tenderness: There is no abdominal tenderness.   Genitourinary:     Comments: Deferred because of age  Musculoskeletal:      Right lower leg: No edema.      Left lower leg: No edema.   Lymphadenopathy:      Cervical: No cervical adenopathy.   Skin:     General: Skin is warm and dry.   Neurological:      General: No focal deficit present.      Mental Status: He is alert and oriented to person, place, and time.   Psychiatric:         Mood and Affect: Mood normal.         Thought Content: Thought content normal.         Judgment: Judgment normal.         Does the patient have evidence of cognitive impairment?   No            HEALTH RISK ASSESSMENT    Smoking Status:  Social History     Tobacco Use   Smoking Status Never   Smokeless Tobacco Never       Alcohol Consumption:  Social History     Substance and Sexual Activity   Alcohol Use No       Fall Risk Screen:    STEADI Fall Risk Assessment was completed, and patient is at MODERATE risk for falls. Assessment completed on:3/12/2024    Depression  Screening:      3/12/2024     8:09 AM   PHQ-2/PHQ-9 Depression Screening   Little Interest or Pleasure in Doing Things 0-->not at all   Feeling Down, Depressed or Hopeless 0-->not at all   PHQ-9: Brief Depression Severity Measure Score 0       Health Habits and Functional and Cognitive Screening:      3/12/2024     8:09 AM   Functional & Cognitive Status   Do you have difficulty preparing food and eating? No   Do you have difficulty bathing yourself, getting dressed or grooming yourself? No   Do you have difficulty using the toilet? No   Do you have difficulty moving around from place to place? Yes   Do you have trouble with steps or getting out of a bed or a chair? Yes   Current Diet Well Balanced Diet   Dental Exam Up to date   Eye Exam Up to date   Exercise (times per week) 0 times per week   Current Exercises Include No Regular Exercise   Do you need help using the phone?  No   Are you deaf or do you have serious difficulty hearing?  No   Do you need help to go to places out of walking distance? No   Do you need help shopping? No   Do you need help preparing meals?  No   Do you need help with housework?  No   Do you need help with laundry? No   Do you need help taking your medications? No   Do you need help managing money? No   Do you ever drive or ride in a car without wearing a seat belt? No   Have you felt unusual stress, anger or loneliness in the last month? No   Who do you live with? Spouse   If you need help, do you have trouble finding someone available to you? No   Have you been bothered in the last four weeks by sexual problems? No   Do you have difficulty concentrating, remembering or making decisions? No       Age-appropriate Screening Schedule:  Refer to the list below for future screening recommendations based on patient's age, sex and/or medical conditions. Orders for these recommended tests are listed in the plan section. The patient has been provided with a written plan.    Health Maintenance    Topic Date Due    ZOSTER VACCINE (1 of 2) 09/11/2024 (Originally 7[birth year]/1989)    COVID-19 Vaccine (6 - 2023-24 season) 03/12/2025 (Originally 9/1/2023)    RSV Vaccine - Adults (1 - 1-dose 60+ series) 03/12/2025 (Originally 7/29/1999)    LIPID PANEL  09/11/2024    ANNUAL WELLNESS VISIT  03/12/2025    COLORECTAL CANCER SCREENING  05/09/2025    INFLUENZA VACCINE  Completed    Pneumococcal Vaccine 65+  Completed    TDAP/TD VACCINES  Discontinued            CMS Preventative Services Quick Reference  Risk Factors Identified During Encounter:    Fall Risk-High or Moderate: Information on Fall Prevention Shared in After Visit Summary    The above risks/problems have been discussed with the patient.  Pertinent information has been shared with the patient in the After Visit Summary.    Diagnoses and all orders for this visit:    1. Elevated blood sugar (Primary)  -     Hemoglobin A1c  -     POC Urinalysis Dipstick, Multipro    2. Mixed hyperlipidemia  -     Comprehensive Metabolic Panel  -     Lipid Panel    3. Fatigue, unspecified type  -     TSH  -     T4, free  -     CBC & Differential    4. Hyperuricemia  -     Uric Acid    5. Memory loss  -     Vitamin B12  -     Folate    6. Vitamin D deficiency  -     Vitamin D,25-Hydroxy    7. Medicare annual wellness visit, subsequent    8. Acute cough  -     XR Chest PA & Lateral; Future    Plan above-RSV vaccination recommended-chest x-ray for cough    Follow Up:   Next Medicare Wellness visit to be scheduled in 1 year.      An After Visit Summary and PPPS were made available to the patient.    Cassius Barnes MD

## 2024-03-13 LAB
25(OH)D3+25(OH)D2 SERPL-MCNC: 75.6 NG/ML (ref 30–100)
ALBUMIN SERPL-MCNC: 4.1 G/DL (ref 3.5–5.2)
ALBUMIN/GLOB SERPL: 1.6 G/DL
ALP SERPL-CCNC: 144 U/L (ref 39–117)
ALT SERPL-CCNC: 5 U/L (ref 1–41)
AST SERPL-CCNC: 16 U/L (ref 1–40)
BASOPHILS # BLD AUTO: 0.04 10*3/MM3 (ref 0–0.2)
BASOPHILS NFR BLD AUTO: 0.5 % (ref 0–1.5)
BILIRUB SERPL-MCNC: 0.5 MG/DL (ref 0–1.2)
BUN SERPL-MCNC: 30 MG/DL (ref 8–23)
BUN/CREAT SERPL: 19.7 (ref 7–25)
CALCIUM SERPL-MCNC: 9.8 MG/DL (ref 8.6–10.5)
CHLORIDE SERPL-SCNC: 96 MMOL/L (ref 98–107)
CHOLEST SERPL-MCNC: 183 MG/DL (ref 0–200)
CO2 SERPL-SCNC: 26.8 MMOL/L (ref 22–29)
CREAT SERPL-MCNC: 1.52 MG/DL (ref 0.76–1.27)
EGFRCR SERPLBLD CKD-EPI 2021: 44.9 ML/MIN/1.73
EOSINOPHIL # BLD AUTO: 0.11 10*3/MM3 (ref 0–0.4)
EOSINOPHIL NFR BLD AUTO: 1.3 % (ref 0.3–6.2)
ERYTHROCYTE [DISTWIDTH] IN BLOOD BY AUTOMATED COUNT: 13.2 % (ref 12.3–15.4)
FOLATE SERPL-MCNC: 8.82 NG/ML (ref 4.78–24.2)
GLOBULIN SER CALC-MCNC: 2.6 GM/DL
GLUCOSE SERPL-MCNC: 111 MG/DL (ref 65–99)
HBA1C MFR BLD: 6.3 % (ref 4.8–5.6)
HCT VFR BLD AUTO: 42.3 % (ref 37.5–51)
HDLC SERPL-MCNC: 49 MG/DL (ref 40–60)
HGB BLD-MCNC: 13.7 G/DL (ref 13–17.7)
IMM GRANULOCYTES # BLD AUTO: 0.24 10*3/MM3 (ref 0–0.05)
IMM GRANULOCYTES NFR BLD AUTO: 2.8 % (ref 0–0.5)
LDLC SERPL CALC-MCNC: 116 MG/DL (ref 0–100)
LYMPHOCYTES # BLD AUTO: 1.12 10*3/MM3 (ref 0.7–3.1)
LYMPHOCYTES NFR BLD AUTO: 13.2 % (ref 19.6–45.3)
MCH RBC QN AUTO: 28.4 PG (ref 26.6–33)
MCHC RBC AUTO-ENTMCNC: 32.4 G/DL (ref 31.5–35.7)
MCV RBC AUTO: 87.8 FL (ref 79–97)
MONOCYTES # BLD AUTO: 0.99 10*3/MM3 (ref 0.1–0.9)
MONOCYTES NFR BLD AUTO: 11.6 % (ref 5–12)
NEUTROPHILS # BLD AUTO: 6.01 10*3/MM3 (ref 1.7–7)
NEUTROPHILS NFR BLD AUTO: 70.6 % (ref 42.7–76)
NRBC BLD AUTO-RTO: 0 /100 WBC (ref 0–0.2)
PLATELET # BLD AUTO: 336 10*3/MM3 (ref 140–450)
POTASSIUM SERPL-SCNC: 5 MMOL/L (ref 3.5–5.2)
PROT SERPL-MCNC: 6.7 G/DL (ref 6–8.5)
RBC # BLD AUTO: 4.82 10*6/MM3 (ref 4.14–5.8)
SODIUM SERPL-SCNC: 136 MMOL/L (ref 136–145)
T4 FREE SERPL-MCNC: 1.09 NG/DL (ref 0.93–1.7)
TRIGL SERPL-MCNC: 96 MG/DL (ref 0–150)
TSH SERPL DL<=0.005 MIU/L-ACNC: 2.1 UIU/ML (ref 0.27–4.2)
URATE SERPL-MCNC: 5.7 MG/DL (ref 3.4–7)
VIT B12 SERPL-MCNC: 478 PG/ML (ref 211–946)
VLDLC SERPL CALC-MCNC: 18 MG/DL (ref 5–40)
WBC # BLD AUTO: 8.51 10*3/MM3 (ref 3.4–10.8)

## 2024-04-25 RX ORDER — CLOPIDOGREL BISULFATE 75 MG/1
75 TABLET ORAL DAILY
Qty: 90 TABLET | Refills: 3 | Status: SHIPPED | OUTPATIENT
Start: 2024-04-25

## 2024-04-25 NOTE — TELEPHONE ENCOUNTER
Rx Refill Note  Requested Prescriptions     Pending Prescriptions Disp Refills    clopidogrel (PLAVIX) 75 MG tablet [Pharmacy Med Name: CLOPIDOGREL 75 MG TABLET] 90 tablet 3     Sig: TAKE 1 TABLET BY MOUTH EVERY DAY      Last office visit with prescribing clinician: 3/12/2024   Last telemedicine visit with prescribing clinician: Visit date not found   Next office visit with prescribing clinician: 9/17/2024       {TIP  Please add Last Relevant Lab 3/12/24    Abena Yu MA  04/25/24, 07:30 CDT

## 2024-05-13 ENCOUNTER — OFFICE VISIT (OUTPATIENT)
Dept: FAMILY MEDICINE CLINIC | Facility: CLINIC | Age: 84
End: 2024-05-13
Payer: MEDICARE

## 2024-05-13 VITALS
WEIGHT: 127.2 LBS | TEMPERATURE: 97.7 F | BODY MASS INDEX: 19.28 KG/M2 | DIASTOLIC BLOOD PRESSURE: 76 MMHG | SYSTOLIC BLOOD PRESSURE: 138 MMHG | HEIGHT: 68 IN | HEART RATE: 58 BPM | OXYGEN SATURATION: 96 %

## 2024-05-13 DIAGNOSIS — J40 BRONCHITIS: Primary | ICD-10-CM

## 2024-05-13 LAB
EXPIRATION DATE: NORMAL
FLUAV AG UPPER RESP QL IA.RAPID: NOT DETECTED
FLUBV AG UPPER RESP QL IA.RAPID: NOT DETECTED
INTERNAL CONTROL: NORMAL
Lab: NORMAL
SARS-COV-2 AG UPPER RESP QL IA.RAPID: NOT DETECTED

## 2024-05-13 PROCEDURE — 99213 OFFICE O/P EST LOW 20 MIN: CPT | Performed by: NURSE PRACTITIONER

## 2024-05-13 PROCEDURE — 1160F RVW MEDS BY RX/DR IN RCRD: CPT | Performed by: NURSE PRACTITIONER

## 2024-05-13 PROCEDURE — 96372 THER/PROPH/DIAG INJ SC/IM: CPT | Performed by: NURSE PRACTITIONER

## 2024-05-13 PROCEDURE — 87428 SARSCOV & INF VIR A&B AG IA: CPT | Performed by: NURSE PRACTITIONER

## 2024-05-13 PROCEDURE — 3078F DIAST BP <80 MM HG: CPT | Performed by: NURSE PRACTITIONER

## 2024-05-13 PROCEDURE — 1126F AMNT PAIN NOTED NONE PRSNT: CPT | Performed by: NURSE PRACTITIONER

## 2024-05-13 PROCEDURE — 3075F SYST BP GE 130 - 139MM HG: CPT | Performed by: NURSE PRACTITIONER

## 2024-05-13 PROCEDURE — 1159F MED LIST DOCD IN RCRD: CPT | Performed by: NURSE PRACTITIONER

## 2024-05-13 RX ORDER — PREDNISONE 20 MG/1
TABLET ORAL
Qty: 7 TABLET | Refills: 0 | Status: SHIPPED | OUTPATIENT
Start: 2024-05-13

## 2024-05-13 RX ORDER — TRIAMCINOLONE ACETONIDE 40 MG/ML
40 INJECTION, SUSPENSION INTRA-ARTICULAR; INTRAMUSCULAR ONCE
Status: COMPLETED | OUTPATIENT
Start: 2024-05-13 | End: 2024-05-13

## 2024-05-13 RX ORDER — AZITHROMYCIN 250 MG/1
TABLET, FILM COATED ORAL
Qty: 6 TABLET | Refills: 0 | Status: SHIPPED | OUTPATIENT
Start: 2024-05-13

## 2024-05-13 RX ADMIN — TRIAMCINOLONE ACETONIDE 40 MG: 40 INJECTION, SUSPENSION INTRA-ARTICULAR; INTRAMUSCULAR at 09:50

## 2024-05-13 NOTE — PROGRESS NOTES
CC: cough and congestion    History:  Shabbir Gambino is a 83 y.o. male who presents today for evaluation of the above problems.      Patient states that symptoms started with runny nose and cough and have quickly gotten worse. Started on Saturday. Was up all night Saturday night and Sunday night coughing. [place] like he had fever also.     HPI  ROS:  Review of Systems   Constitutional:  Positive for fever.   HENT:  Positive for rhinorrhea.    Respiratory:  Positive for cough and wheezing.        Allergies   Allergen Reactions    Other Anaphylaxis     UNKNOWN TO PATIENT ENVIRONMENTAL/FOOD ALLERGY     Ezetimibe Unknown (See Comments)     Leg cramps    Niacin And Related Unknown (See Comments)     Cramping red and flush    Statins Other (See Comments)     Leg cramps     Past Medical History:   Diagnosis Date    Atherosclerotic coronary vascular disease     Axonal polyneuropathy     Cancer of skin of neck     Coagulopathy 02/28/2020    Colon polyp     COVID toes     Had infusion    Elevated cholesterol     Gout     History of transfusion     Hyperlipidemia     Hypertension     Insomnia     Irregular heart beat     Precancerous skin lesion     RLS (restless legs syndrome)     TIA (transient ischemic attack)     2014    Vascular disease     CAROTID     Past Surgical History:   Procedure Laterality Date    CAROTID ENDARTERECTOMY Bilateral     2007, 2008    COLON RESECTION N/A 10/24/2017    Procedure: COLON RESECTION  MID TRANSVERSE LAPAROSCOPIC;  Surgeon: Adal Quintero MD;  Location: [facility] OR;  Service:     COLONOSCOPY      MULTIPLE     COLONOSCOPY N/A 09/07/2017    Procedure: colonoscopy with anesthesia;  Surgeon: Randy Gomez MD;  Location: [facility] ENDOSCOPY;  Service:     COLONOSCOPY N/A 08/07/2018    Procedure: COLONOSCOPY WITH ANESTHESIA;  Surgeon: Randy Gomez MD;  Location: [facility] ENDOSCOPY;  Service: Gastroenterology    COLONOSCOPY N/A 05/09/2022    Procedure: COLONOSCOPY WITH ANESTHESIA;  Surgeon:  Randy Gomez MD;  Location: [facility] ENDOSCOPY;  Service: Gastroenterology;  Laterality: N/A;  pre polyp hx  post polyps;diverticulosis  Dr. Barnes    CORONARY ARTERY BYPASS GRAFT  2006    CORONARY STENT PLACEMENT      CORONARY STENT PLACEMENT      ENDOSCOPY N/A 09/07/2017    Procedure: ESOPHAGOGASTRODUODENOSCOPY WITH ANESTHESIA;  Surgeon: Randy Gomez MD;  Location: [facility] ENDOSCOPY;  Service:     ENDOSCOPY N/A 08/07/2018    Procedure: ESOPHAGOGASTRODUODENOSCOPY WITH ANESTHESIA;  Surgeon: Randy Gomez MD;  Location: [facility] ENDOSCOPY;  Service: Gastroenterology    EYE SURGERY Bilateral     2014, 2015    KNEE SURGERY Right     x 2    SKIN CANCER DESTRUCTION  2022    Neck   Case     Family History   Problem Relation Age of Onset    Heart disease Mother     Stroke Mother     Diabetes Mother     No Known Problems Father     Pancreatic cancer Sister     No Known Problems Maternal Grandmother     No Known Problems Maternal Grandfather     No Known Problems Paternal Grandmother     No Known Problems Paternal Grandfather     Colon polyps Neg Hx     Colon cancer Neg Hx       reports that he has never smoked. He has never used smokeless tobacco. He reports that he does not drink alcohol and does not use drugs.      Current Outpatient Medications:     amLODIPine (NORVASC) 5 MG tablet, TAKE ONE TABLET BY MOUTH EVERY EVENING, Disp: 90 tablet, Rfl: 3    atenolol (TENORMIN) 50 MG tablet, Take 1 tablet by mouth Daily., Disp: 90 tablet, Rfl: 3    carbidopa-levodopa (SINEMET)  MG per tablet, TAKE ONE TO TWO TABLETS BY MOUTH EVERY DAY AT BEDTIME, Disp: 180 tablet, Rfl: 0    Cholecalciferol (Vitamin D) 50 MCG (2000 UT) capsule, Take  by mouth 3 (Three) Times a Week. Monday, Wednesday, Friday, Disp: , Rfl:     CINNAMON PO, Take 500 mg by mouth Daily., Disp: , Rfl:     cloNIDine (Catapres) 0.1 MG tablet, Take 1 at bedtime for blood pressure, Disp: 90 tablet, Rfl: 3    clopidogrel (PLAVIX) 75 MG tablet, TAKE 1  "TABLET BY MOUTH EVERY DAY, Disp: 90 tablet, Rfl: 3    indapamide (LOZOL) 2.5 MG tablet, Take 1 tablet by mouth Every Morning., Disp: , Rfl:     Multiple Vitamins-Minerals (EYE VITAMINS PO), Take 1 tablet by mouth Daily., Disp: , Rfl:     azithromycin (Zithromax) 250 MG tablet, Take 2 tablets the first day, then 1 tablet daily for 4 days., Disp: 6 tablet, Rfl: 0    predniSONE (DELTASONE) 20 MG tablet, Take two tablets daily for two days and then one tablet daily until gone. Start on 5/14., Disp: 7 tablet, Rfl: 0  No current facility-administered medications for this visit.    OBJECTIVE:  /76 (BP Location: Left arm, Patient Position: Sitting, Cuff Size: Adult)   Pulse 58   Temp 97.7 °F (36.5 °C) (Oral)   Ht 172.7 cm (68\")   Wt 57.7 kg (127 lb 3.2 oz)   SpO2 96%   BMI 19.34 kg/m²    Physical Exam  Vitals reviewed.   Constitutional:       Appearance: He is well-developed.   HENT:      Mouth/Throat:      Mouth: Mucous membranes are moist.      Pharynx: Oropharynx is clear.   Cardiovascular:      Rate and Rhythm: Normal rate and regular rhythm.      Heart sounds: Normal heart sounds.   Pulmonary:      Effort: Pulmonary effort is normal.      Breath sounds: Wheezing present.   Neurological:      Mental Status: He is alert and oriented to person, place, and time.   Psychiatric:         Behavior: Behavior normal.         Assessment/Plan    Diagnoses and all orders for this visit:    1. Bronchitis (Primary)  -     POCT SARS-CoV-2 Antigen PANCHO + Flu  -     azithromycin (Zithromax) 250 MG tablet; Take 2 tablets the first day, then 1 tablet daily for 4 days.  Dispense: 6 tablet; Refill: 0  -     triamcinolone acetonide (KENALOG-40) injection 40 mg  -     predniSONE (DELTASONE) 20 MG tablet; Take two tablets daily for two days and then one tablet daily until gone. Start on 5/14.  Dispense: 7 tablet; Refill: 0    Mucinex advised in addition to RX med.  Swab negative.     An After Visit Summary was printed and given to " the patient at discharge.  Return if symptoms worsen or fail to improve, for Next scheduled follow up.       Na Salas, VERONIKA 5/13/24    Electronically signed.

## 2024-05-28 DIAGNOSIS — I10 PRIMARY HYPERTENSION: ICD-10-CM

## 2024-05-28 RX ORDER — ATENOLOL 50 MG/1
50 TABLET ORAL DAILY
Qty: 90 TABLET | Refills: 3 | Status: SHIPPED | OUTPATIENT
Start: 2024-05-28

## 2024-05-28 NOTE — TELEPHONE ENCOUNTER
Rx Refill Note  Requested Prescriptions     Pending Prescriptions Disp Refills    atenolol (TENORMIN) 50 MG tablet 90 tablet 3     Sig: Take 1 tablet by mouth Daily.      Last office visit with prescribing clinician: 3/12/2024   Last telemedicine visit with prescribing clinician: Visit date not found   Next office visit with prescribing clinician: 9/17/2024                         Would you like a call back once the refill request has been completed: [] Yes [] No    If the office needs to give you a call back, can they leave a voicemail: [] Yes [] No    Daiana Amaro Rep  05/28/24, 10:47 CDT

## 2024-06-20 ENCOUNTER — TELEPHONE (OUTPATIENT)
Dept: CARDIOLOGY CLINIC | Age: 84
End: 2024-06-20

## 2024-06-20 NOTE — TELEPHONE ENCOUNTER
Date: 8/5/24    Cardiologist: court Mazariegos since [place]    [facility]    Surgeon: Case    Last Office Visit: 1/24/24  Reason for office visit and medical concerns addressed at this office visit: cva, carotid artery stenosis, cad, htn, hyperlipidemia,     Testing Performed and Date of Service:  3/19/19 Cath  Triple-vessel disease   Patent LIMA to the distal LAD   All vein grafts are occluded   Successful PCI to distal circumflex and OM1 with drug-eluting stents    Does the patient have a stent? If so, what type?  DEIRDRE 2019    Current Medications: plavix, cabidopa/levodopa, atenolol, amlodipine    Is the patient currently taking an anticoagulant? If so, what is the diagnosis the patient has been given to warrant the need for the anticoagulant? Plavix for DEIRDRE 2019    Additional Notes: requesting to hold plavix prior

## 2024-06-24 NOTE — TELEPHONE ENCOUNTER
Okay to hold Plavix from a cardiac standpoint.  It looks like he is on Plavix as well for carotid artery stenosis.  I would have him check with vascular as well.

## 2024-07-11 NOTE — TELEPHONE ENCOUNTER
Rx Refill Note  Requested Prescriptions     Pending Prescriptions Disp Refills    carbidopa-levodopa (SINEMET)  MG per tablet [Pharmacy Med Name: CARBIDOPA-LEVODOPA 25-100MG TABS] 180 tablet 0     Sig: TAKE ONE TO TWO TABLETS BY MOUTH EVERY DAY AT BEDTIME      Last office visit with prescribing clinician: 5/13/2024   Last telemedicine visit with prescribing clinician: Visit date not found   Next office visit with prescribing clinician: Visit date not found                         Would you like a call back once the refill request has been completed: [] Yes [] No    If the office needs to give you a call back, can they leave a voicemail: [] Yes [] No    Marilyn Estevez, PCT  07/11/24, 13:29 CDT

## 2024-08-09 ENCOUNTER — OFFICE VISIT (OUTPATIENT)
Dept: CARDIOLOGY CLINIC | Age: 84
End: 2024-08-09
Payer: MEDICARE

## 2024-08-09 VITALS
WEIGHT: 132 LBS | HEART RATE: 48 BPM | DIASTOLIC BLOOD PRESSURE: 72 MMHG | BODY MASS INDEX: 20.07 KG/M2 | SYSTOLIC BLOOD PRESSURE: 128 MMHG | OXYGEN SATURATION: 99 %

## 2024-08-09 DIAGNOSIS — I10 ESSENTIAL HYPERTENSION: ICD-10-CM

## 2024-08-09 DIAGNOSIS — I25.10 CORONARY ARTERY DISEASE INVOLVING NATIVE CORONARY ARTERY OF NATIVE HEART WITHOUT ANGINA PECTORIS: Primary | ICD-10-CM

## 2024-08-09 PROCEDURE — G8427 DOCREV CUR MEDS BY ELIG CLIN: HCPCS | Performed by: INTERNAL MEDICINE

## 2024-08-09 PROCEDURE — 3074F SYST BP LT 130 MM HG: CPT | Performed by: INTERNAL MEDICINE

## 2024-08-09 PROCEDURE — 1123F ACP DISCUSS/DSCN MKR DOCD: CPT | Performed by: INTERNAL MEDICINE

## 2024-08-09 PROCEDURE — 1036F TOBACCO NON-USER: CPT | Performed by: INTERNAL MEDICINE

## 2024-08-09 PROCEDURE — 3078F DIAST BP <80 MM HG: CPT | Performed by: INTERNAL MEDICINE

## 2024-08-09 PROCEDURE — G8420 CALC BMI NORM PARAMETERS: HCPCS | Performed by: INTERNAL MEDICINE

## 2024-08-09 PROCEDURE — 99214 OFFICE O/P EST MOD 30 MIN: CPT | Performed by: INTERNAL MEDICINE

## 2024-08-09 RX ORDER — ALLOPURINOL 100 MG/1
TABLET ORAL
COMMUNITY
Start: 2024-06-24

## 2024-08-09 ASSESSMENT — ENCOUNTER SYMPTOMS
WHEEZING: 0
BLOOD IN STOOL: 0
ABDOMINAL DISTENTION: 0
COUGH: 0
BACK PAIN: 0
DIARRHEA: 0
VOMITING: 0
SHORTNESS OF BREATH: 0
ABDOMINAL PAIN: 0

## 2024-08-09 NOTE — PROGRESS NOTES
[facility]  Cardiology Office Note  [address], [place]  [address]  Phone: [phone]  Fax: [phone]                            Date:  8/9/2024  Patient: Calvin Morris  Age:  84 y.o., 1940    Referral: No ref. provider found      PROBLEM LIST:    Patient Active Problem List    Diagnosis Date Noted    Unstable angina (HCC)      Priority: High    Bilateral carotid artery stenosis 02/28/2019     Priority: Low    Skin cancer 02/22/2019     Priority: Low     Overview Note:     S/P excision lesion right lateral canthus  Remaining lesion left malar area      Colonic polyp 07/06/2018     Priority: Low     Overview Note:     2017 S/P open removal      Dysphagia 07/06/2018     Priority: Low    GERD (gastroesophageal reflux disease) 07/06/2018     Priority: Low    Dyslipidemia 07/03/2018     Priority: Low    Acute blood loss anemia 12/12/2015     Priority: Low    Cerebrovascular disease 12/12/2015     Priority: Low     Overview Note:     S/P Bilateral endarterectomies   S/P CVA 6/11      Osteoarthritis of right knee 12/12/2015     Priority: Low    Coronary artery disease involving native coronary artery 12/11/2015     Priority: Low     Overview Note:     2006 CABG x 7  2/20/19 Tigist inferior ischemia EF 67% 8% ischemic burden at risk 0% at rest  3/19/19 Cath TVD patent LIMA-LAD, all VG occluded, successful PCI to distal cx & OM1 with DEIRDRE        Essential hypertension 12/11/2015     Priority: Low     1.  Coronary artery disease, prior remote CABG, catheterization 3/2019, patent LIMA to LAD with all occluded vein grafts to RCA, diagonal, OM1 and distal circumflex, occluded LAD with patent LIMA to LAD, PCI to distal circumflex (2.5 x 26 mm resolute), OM1 (2.25 x 30 mm resolute), occluded small RCA with collateral, normal LV ejection fraction.  2.  Hypertension with possible right renal artery stenosis (renal duplex 5/10/2022).  3.  Bilateral carotid endarterectomy

## 2024-09-03 ENCOUNTER — TELEPHONE (OUTPATIENT)
Dept: VASCULAR SURGERY | Age: 84
End: 2024-09-03

## 2024-09-03 ENCOUNTER — OFFICE VISIT (OUTPATIENT)
Dept: VASCULAR SURGERY | Age: 84
End: 2024-09-03
Payer: MEDICARE

## 2024-09-03 ENCOUNTER — HOSPITAL ENCOUNTER (OUTPATIENT)
Dept: VASCULAR LAB | Age: 84
Discharge: HOME | End: 2024-09-05
Payer: MEDICARE

## 2024-09-03 VITALS
OXYGEN SATURATION: 99 % | TEMPERATURE: 97.8 F | DIASTOLIC BLOOD PRESSURE: 64 MMHG | SYSTOLIC BLOOD PRESSURE: 146 MMHG | HEART RATE: 72 BPM

## 2024-09-03 DIAGNOSIS — I65.23 BILATERAL CAROTID ARTERY STENOSIS: Primary | ICD-10-CM

## 2024-09-03 DIAGNOSIS — I65.23 BILATERAL CAROTID ARTERY STENOSIS: ICD-10-CM

## 2024-09-03 PROCEDURE — G8427 DOCREV CUR MEDS BY ELIG CLIN: HCPCS | Performed by: NURSE PRACTITIONER

## 2024-09-03 PROCEDURE — 1036F TOBACCO NON-USER: CPT | Performed by: NURSE PRACTITIONER

## 2024-09-03 PROCEDURE — 1123F ACP DISCUSS/DSCN MKR DOCD: CPT | Performed by: NURSE PRACTITIONER

## 2024-09-03 PROCEDURE — 3075F SYST BP GE 130 - 139MM HG: CPT | Performed by: NURSE PRACTITIONER

## 2024-09-03 PROCEDURE — G8420 CALC BMI NORM PARAMETERS: HCPCS | Performed by: NURSE PRACTITIONER

## 2024-09-03 PROCEDURE — 3078F DIAST BP <80 MM HG: CPT | Performed by: NURSE PRACTITIONER

## 2024-09-03 PROCEDURE — 99214 OFFICE O/P EST MOD 30 MIN: CPT | Performed by: NURSE PRACTITIONER

## 2024-09-03 PROCEDURE — 93880 EXTRACRANIAL BILAT STUDY: CPT

## 2024-09-03 NOTE — PROGRESS NOTES
Calvin Morris (:  1940) is a 84 y.o. male,Established patient, here for evaluation of the following chief complaint(s):  Follow-up            SUBJECTIVE/OBJECTIVE:  He presents for follow up of carotid artery stenosis.  He has a known history of carotid artery stenosis for 1 - 5 years. His current treatment includes asa and plavix.He reports  a history of CVA.  He reports has not had TIA's, episodes of lateralizing weakness and episodes of amaurosis fugax.    I have personally reviewed the following: problem list, current meds, allergies, PMH, PSH, family hx, and social hx  Calvin Morris is a 84 y.o. male with the following history as recorded in [facility]:  Patient Active Problem List    Diagnosis Date Noted    Unstable angina (HCC)     Bilateral carotid artery stenosis 2019    Skin cancer 2019    Colonic polyp 2018    Dysphagia 2018    GERD (gastroesophageal reflux disease) 2018    Dyslipidemia 2018    Acute blood loss anemia 2015    Cerebrovascular disease 2015    Osteoarthritis of right knee 2015    Coronary artery disease involving native coronary artery 2015    Essential hypertension 2015     Current Outpatient Medications   Medication Sig Dispense Refill    allopurinol (ZYLOPRIM) 100 MG tablet       tobramycin (TOBREX) 0.3 % ophthalmic solution       clopidogrel (PLAVIX) 75 MG tablet TAKE 1 TABLET BY MOUTH EVERY DAY 90 tablet 3    Multiple Vitamins-Minerals (ICAPS AREDS 2 PO) Take 1 tablet by mouth daily       amLODIPine (NORVASC) 5 MG tablet Take 1 tablet by mouth daily      atenolol (TENORMIN) 50 MG tablet Take 1 tablet by mouth daily      carbidopa-levodopa (SINEMET)  MG per tablet Take 1 tablet by mouth nightly      Cinnamon 500 MG CAPS Take 1 capsule by mouth nightly       No current facility-administered medications for this visit.     Allergies: Other, Niacin and related, Statins, and Zetia [ezetimibe]  Past

## 2024-09-03 NOTE — TELEPHONE ENCOUNTER
Calvin wife, Lon, called to advise when they got home she found his medicare supplement card  the contact number is [phone] for [employer] medicare supplement   [facility]   Plan # [id number]  plan F   She was trying to give you the information, to keep from the payment from getting delayed.  I could not get it to go through so if you need to call her back, please call Lon at [phone]      Thank you

## 2024-09-05 LAB
VAS LEFT CCA MID EDV: 14.7 CM/S
VAS LEFT CCA MID PSV: 194 CM/S
VAS LEFT CCA PROX EDV: 8.2 CM/S
VAS LEFT CCA PROX PSV: 102 CM/S
VAS LEFT ECA PSV: 154 CM/S
VAS LEFT ICA DIST EDV: 11.8 CM/S
VAS LEFT ICA DIST PSV: 57 CM/S
VAS LEFT ICA MID EDV: 14.1 CM/S
VAS LEFT ICA MID PSV: 104 CM/S
VAS LEFT ICA PROX EDV: 24.7 CM/S
VAS LEFT ICA PROX PSV: 348 CM/S
VAS RIGHT CCA MID EDV: 17.7 CM/S
VAS RIGHT CCA MID PSV: 132 CM/S
VAS RIGHT CCA PROX PSV: 85.6 CM/S
VAS RIGHT ECA PSV: 247 CM/S
VAS RIGHT ICA DIST EDV: 14.7 CM/S
VAS RIGHT ICA DIST PSV: 76.2 CM/S
VAS RIGHT ICA MID EDV: 18.1 CM/S
VAS RIGHT ICA MID PSV: 117 CM/S
VAS RIGHT ICA PROX EDV: 14.4 CM/S
VAS RIGHT ICA PROX PSV: 187 CM/S
VAS RIGHT VERTEBRAL EDV: 12.6 CM/S
VAS RIGHT VERTEBRAL PSV: 60.9 CM/S

## 2024-09-05 PROCEDURE — 93880 EXTRACRANIAL BILAT STUDY: CPT | Performed by: SURGERY

## 2024-09-17 ENCOUNTER — OFFICE VISIT (OUTPATIENT)
Dept: FAMILY MEDICINE CLINIC | Facility: CLINIC | Age: 84
End: 2024-09-17
Payer: MEDICARE

## 2024-09-17 VITALS
WEIGHT: 131 LBS | BODY MASS INDEX: 21.05 KG/M2 | TEMPERATURE: 98 F | HEART RATE: 50 BPM | RESPIRATION RATE: 16 BRPM | SYSTOLIC BLOOD PRESSURE: 134 MMHG | HEIGHT: 66 IN | OXYGEN SATURATION: 96 % | DIASTOLIC BLOOD PRESSURE: 62 MMHG

## 2024-09-17 DIAGNOSIS — E78.2 MIXED HYPERLIPIDEMIA: ICD-10-CM

## 2024-09-17 DIAGNOSIS — Z23 NEED FOR COVID-19 VACCINE: ICD-10-CM

## 2024-09-17 DIAGNOSIS — R73.9 ELEVATED BLOOD SUGAR: Primary | ICD-10-CM

## 2024-09-17 DIAGNOSIS — E79.0 HYPERURICEMIA: ICD-10-CM

## 2024-09-17 DIAGNOSIS — R53.83 FATIGUE, UNSPECIFIED TYPE: ICD-10-CM

## 2024-09-17 PROCEDURE — 90480 ADMN SARSCOV2 VAC 1/ONLY CMP: CPT | Performed by: FAMILY MEDICINE

## 2024-09-17 PROCEDURE — 1160F RVW MEDS BY RX/DR IN RCRD: CPT | Performed by: FAMILY MEDICINE

## 2024-09-17 PROCEDURE — 99213 OFFICE O/P EST LOW 20 MIN: CPT | Performed by: FAMILY MEDICINE

## 2024-09-17 PROCEDURE — 3078F DIAST BP <80 MM HG: CPT | Performed by: FAMILY MEDICINE

## 2024-09-17 PROCEDURE — 91320 SARSCV2 VAC 30MCG TRS-SUC IM: CPT | Performed by: FAMILY MEDICINE

## 2024-09-17 PROCEDURE — 1126F AMNT PAIN NOTED NONE PRSNT: CPT | Performed by: FAMILY MEDICINE

## 2024-09-17 PROCEDURE — 1159F MED LIST DOCD IN RCRD: CPT | Performed by: FAMILY MEDICINE

## 2024-09-17 PROCEDURE — 3075F SYST BP GE 130 - 139MM HG: CPT | Performed by: FAMILY MEDICINE

## 2024-09-18 LAB
ALBUMIN SERPL-MCNC: 4.2 G/DL (ref 3.5–5.2)
ALBUMIN/GLOB SERPL: 1.7 G/DL
ALP SERPL-CCNC: 114 U/L (ref 39–117)
ALT SERPL-CCNC: 5 U/L (ref 1–41)
AST SERPL-CCNC: 16 U/L (ref 1–40)
BASOPHILS # BLD AUTO: 0.04 10*3/MM3 (ref 0–0.2)
BASOPHILS NFR BLD AUTO: 0.6 % (ref 0–1.5)
BILIRUB SERPL-MCNC: 0.4 MG/DL (ref 0–1.2)
BUN SERPL-MCNC: 25 MG/DL (ref 8–23)
BUN/CREAT SERPL: 17.7 (ref 7–25)
CALCIUM SERPL-MCNC: 9.9 MG/DL (ref 8.6–10.5)
CHLORIDE SERPL-SCNC: 97 MMOL/L (ref 98–107)
CHOLEST SERPL-MCNC: 215 MG/DL (ref 0–200)
CO2 SERPL-SCNC: 27.3 MMOL/L (ref 22–29)
CREAT SERPL-MCNC: 1.41 MG/DL (ref 0.76–1.27)
EGFRCR SERPLBLD CKD-EPI 2021: 48.8 ML/MIN/1.73
EOSINOPHIL # BLD AUTO: 0.14 10*3/MM3 (ref 0–0.4)
EOSINOPHIL NFR BLD AUTO: 2.2 % (ref 0.3–6.2)
ERYTHROCYTE [DISTWIDTH] IN BLOOD BY AUTOMATED COUNT: 14.4 % (ref 12.3–15.4)
GLOBULIN SER CALC-MCNC: 2.5 GM/DL
GLUCOSE SERPL-MCNC: 113 MG/DL (ref 65–99)
HBA1C MFR BLD: 5.9 % (ref 4.8–5.6)
HCT VFR BLD AUTO: 43.5 % (ref 37.5–51)
HDLC SERPL-MCNC: 43 MG/DL (ref 40–60)
HGB BLD-MCNC: 13.8 G/DL (ref 13–17.7)
IMM GRANULOCYTES # BLD AUTO: 0.24 10*3/MM3 (ref 0–0.05)
IMM GRANULOCYTES NFR BLD AUTO: 3.7 % (ref 0–0.5)
LDLC SERPL CALC-MCNC: 146 MG/DL (ref 0–100)
LYMPHOCYTES # BLD AUTO: 1.16 10*3/MM3 (ref 0.7–3.1)
LYMPHOCYTES NFR BLD AUTO: 17.9 % (ref 19.6–45.3)
MCH RBC QN AUTO: 29.1 PG (ref 26.6–33)
MCHC RBC AUTO-ENTMCNC: 31.7 G/DL (ref 31.5–35.7)
MCV RBC AUTO: 91.6 FL (ref 79–97)
MONOCYTES # BLD AUTO: 0.75 10*3/MM3 (ref 0.1–0.9)
MONOCYTES NFR BLD AUTO: 11.6 % (ref 5–12)
NEUTROPHILS # BLD AUTO: 4.16 10*3/MM3 (ref 1.7–7)
NEUTROPHILS NFR BLD AUTO: 64 % (ref 42.7–76)
NRBC BLD AUTO-RTO: 0 /100 WBC (ref 0–0.2)
PLATELET # BLD AUTO: 234 10*3/MM3 (ref 140–450)
POTASSIUM SERPL-SCNC: 5.1 MMOL/L (ref 3.5–5.2)
PROT SERPL-MCNC: 6.7 G/DL (ref 6–8.5)
RBC # BLD AUTO: 4.75 10*6/MM3 (ref 4.14–5.8)
SODIUM SERPL-SCNC: 136 MMOL/L (ref 136–145)
TRIGL SERPL-MCNC: 145 MG/DL (ref 0–150)
URATE SERPL-MCNC: 5.5 MG/DL (ref 3.4–7)
VLDLC SERPL CALC-MCNC: 26 MG/DL (ref 5–40)
WBC # BLD AUTO: 6.49 10*3/MM3 (ref 3.4–10.8)

## 2024-10-18 ENCOUNTER — APPOINTMENT (OUTPATIENT)
Dept: CT IMAGING | Age: 84
End: 2024-10-18
Payer: MEDICARE

## 2024-10-18 ENCOUNTER — APPOINTMENT (OUTPATIENT)
Dept: GENERAL RADIOLOGY | Age: 84
End: 2024-10-18
Payer: MEDICARE

## 2024-10-18 ENCOUNTER — HOSPITAL ENCOUNTER (INPATIENT)
Age: 84
LOS: 6 days | Discharge: HOME | End: 2024-10-24
Attending: EMERGENCY MEDICINE | Admitting: INTERNAL MEDICINE
Payer: MEDICARE

## 2024-10-18 ENCOUNTER — TELEPHONE (OUTPATIENT)
Dept: FAMILY MEDICINE CLINIC | Facility: CLINIC | Age: 84
End: 2024-10-18

## 2024-10-18 DIAGNOSIS — R00.1 BRADYCARDIA, UNSPECIFIED: ICD-10-CM

## 2024-10-18 DIAGNOSIS — I48.91 NEW ONSET ATRIAL FIBRILLATION (HCC): ICD-10-CM

## 2024-10-18 DIAGNOSIS — Z95.0 PACEMAKER: ICD-10-CM

## 2024-10-18 DIAGNOSIS — R06.02 SHORTNESS OF BREATH: ICD-10-CM

## 2024-10-18 DIAGNOSIS — I50.9 ACUTE CONGESTIVE HEART FAILURE, UNSPECIFIED HEART FAILURE TYPE (HCC): Primary | ICD-10-CM

## 2024-10-18 PROBLEM — G25.81 RESTLESS LEG SYNDROME: Status: ACTIVE | Noted: 2024-10-18

## 2024-10-18 PROBLEM — M10.9 GOUT: Status: ACTIVE | Noted: 2024-10-18

## 2024-10-18 PROBLEM — R06.09 DYSPNEA ON EXERTION: Status: ACTIVE | Noted: 2024-10-18

## 2024-10-18 PROBLEM — I49.9 IRREGULAR HEART BEAT: Status: ACTIVE | Noted: 2017-10-24

## 2024-10-18 LAB
ALBUMIN SERPL-MCNC: 3.6 G/DL (ref 3.5–5.2)
ALP SERPL-CCNC: 140 U/L (ref 40–129)
ALT SERPL-CCNC: 5 U/L (ref 5–41)
ANION GAP SERPL CALCULATED.3IONS-SCNC: 14 MMOL/L (ref 7–19)
AST SERPL-CCNC: 16 U/L (ref 5–40)
B PARAP IS1001 DNA NPH QL NAA+NON-PROBE: NOT DETECTED
B PERT.PT PRMT NPH QL NAA+NON-PROBE: NOT DETECTED
BASOPHILS # BLD: 0 K/UL (ref 0–0.2)
BASOPHILS NFR BLD: 0.2 % (ref 0–1)
BILIRUB SERPL-MCNC: 0.9 MG/DL (ref 0.2–1.2)
BNP BLD-MCNC: 7905 PG/ML (ref 0–449)
BUN SERPL-MCNC: 27 MG/DL (ref 8–23)
C PNEUM DNA NPH QL NAA+NON-PROBE: NOT DETECTED
CALCIUM SERPL-MCNC: 9.4 MG/DL (ref 8.8–10.2)
CHLORIDE SERPL-SCNC: 97 MMOL/L (ref 98–111)
CO2 SERPL-SCNC: 22 MMOL/L (ref 22–29)
CREAT SERPL-MCNC: 1.4 MG/DL (ref 0.7–1.2)
D DIMER PPP FEU-MCNC: 0.96 UG/ML FEU (ref 0–0.48)
EOSINOPHIL # BLD: 0 K/UL (ref 0–0.6)
EOSINOPHIL NFR BLD: 0.1 % (ref 0–5)
ERYTHROCYTE [DISTWIDTH] IN BLOOD BY AUTOMATED COUNT: 14.5 % (ref 11.5–14.5)
FLUAV RNA NPH QL NAA+NON-PROBE: NOT DETECTED
FLUBV RNA NPH QL NAA+NON-PROBE: NOT DETECTED
GLUCOSE SERPL-MCNC: 142 MG/DL (ref 70–99)
HADV DNA NPH QL NAA+NON-PROBE: NOT DETECTED
HCOV 229E RNA NPH QL NAA+NON-PROBE: NOT DETECTED
HCOV HKU1 RNA NPH QL NAA+NON-PROBE: NOT DETECTED
HCOV NL63 RNA NPH QL NAA+NON-PROBE: NOT DETECTED
HCOV OC43 RNA NPH QL NAA+NON-PROBE: NOT DETECTED
HCT VFR BLD AUTO: 40 % (ref 42–52)
HGB BLD-MCNC: 12.4 G/DL (ref 14–18)
HMPV RNA NPH QL NAA+NON-PROBE: NOT DETECTED
HPIV1 RNA NPH QL NAA+NON-PROBE: NOT DETECTED
HPIV2 RNA NPH QL NAA+NON-PROBE: NOT DETECTED
HPIV3 RNA NPH QL NAA+NON-PROBE: NOT DETECTED
HPIV4 RNA NPH QL NAA+NON-PROBE: NOT DETECTED
IMM GRANULOCYTES # BLD: 0.2 K/UL
LYMPHOCYTES # BLD: 0.5 K/UL (ref 1.1–4.5)
LYMPHOCYTES NFR BLD: 4 % (ref 20–40)
M PNEUMO DNA NPH QL NAA+NON-PROBE: NOT DETECTED
MCH RBC QN AUTO: 28.4 PG (ref 27–31)
MCHC RBC AUTO-ENTMCNC: 31 G/DL (ref 33–37)
MCV RBC AUTO: 91.7 FL (ref 80–94)
MONOCYTES # BLD: 1.6 K/UL (ref 0–0.9)
MONOCYTES NFR BLD: 12.3 % (ref 0–10)
NEUTROPHILS # BLD: 10.4 K/UL (ref 1.5–7.5)
NEUTS SEG NFR BLD: 82.2 % (ref 50–65)
PLATELET # BLD AUTO: 218 K/UL (ref 130–400)
PMV BLD AUTO: 9.9 FL (ref 9.4–12.4)
POTASSIUM SERPL-SCNC: 4.8 MMOL/L (ref 3.5–5)
PROCALCITONIN: 0.18 NG/ML (ref 0–0.09)
PROT SERPL-MCNC: 7.2 G/DL (ref 6.4–8.3)
RBC # BLD AUTO: 4.36 M/UL (ref 4.7–6.1)
RSV RNA NPH QL NAA+NON-PROBE: NOT DETECTED
RV+EV RNA NPH QL NAA+NON-PROBE: NOT DETECTED
SARS-COV-2 RNA NPH QL NAA+NON-PROBE: NOT DETECTED
SODIUM SERPL-SCNC: 133 MMOL/L (ref 136–145)
T4 FREE SERPL-MCNC: 1.15 NG/DL (ref 0.93–1.7)
TROPONIN, HIGH SENSITIVITY: 50 NG/L (ref 0–22)
TROPONIN, HIGH SENSITIVITY: 53 NG/L (ref 0–22)
TSH SERPL DL<=0.005 MIU/L-ACNC: 1.5 UIU/ML (ref 0.27–4.2)
WBC # BLD AUTO: 12.6 K/UL (ref 4.8–10.8)

## 2024-10-18 PROCEDURE — 85025 COMPLETE CBC W/AUTO DIFF WBC: CPT

## 2024-10-18 PROCEDURE — 71250 CT THORAX DX C-: CPT

## 2024-10-18 PROCEDURE — 84484 ASSAY OF TROPONIN QUANT: CPT

## 2024-10-18 PROCEDURE — 83880 ASSAY OF NATRIURETIC PEPTIDE: CPT

## 2024-10-18 PROCEDURE — 1200000000 HC SEMI PRIVATE

## 2024-10-18 PROCEDURE — 6370000000 HC RX 637 (ALT 250 FOR IP)

## 2024-10-18 PROCEDURE — 84443 ASSAY THYROID STIM HORMONE: CPT

## 2024-10-18 PROCEDURE — 84439 ASSAY OF FREE THYROXINE: CPT

## 2024-10-18 PROCEDURE — 6360000002 HC RX W HCPCS: Performed by: EMERGENCY MEDICINE

## 2024-10-18 PROCEDURE — 99285 EMERGENCY DEPT VISIT HI MDM: CPT

## 2024-10-18 PROCEDURE — 93005 ELECTROCARDIOGRAM TRACING: CPT | Performed by: EMERGENCY MEDICINE

## 2024-10-18 PROCEDURE — 80053 COMPREHEN METABOLIC PANEL: CPT

## 2024-10-18 PROCEDURE — 84145 PROCALCITONIN (PCT): CPT

## 2024-10-18 PROCEDURE — 71045 X-RAY EXAM CHEST 1 VIEW: CPT

## 2024-10-18 PROCEDURE — 85379 FIBRIN DEGRADATION QUANT: CPT

## 2024-10-18 PROCEDURE — 6360000002 HC RX W HCPCS

## 2024-10-18 PROCEDURE — 36415 COLL VENOUS BLD VENIPUNCTURE: CPT

## 2024-10-18 PROCEDURE — 96374 THER/PROPH/DIAG INJ IV PUSH: CPT

## 2024-10-18 PROCEDURE — 0202U NFCT DS 22 TRGT SARS-COV-2: CPT

## 2024-10-18 RX ORDER — MAGNESIUM SULFATE IN WATER 40 MG/ML
2000 INJECTION, SOLUTION INTRAVENOUS PRN
Status: DISCONTINUED | OUTPATIENT
Start: 2024-10-18 | End: 2024-10-24 | Stop reason: HOSPADM

## 2024-10-18 RX ORDER — FUROSEMIDE 10 MG/ML
40 INJECTION INTRAMUSCULAR; INTRAVENOUS ONCE
Status: COMPLETED | OUTPATIENT
Start: 2024-10-18 | End: 2024-10-18

## 2024-10-18 RX ORDER — SODIUM CHLORIDE 9 MG/ML
INJECTION, SOLUTION INTRAVENOUS PRN
Status: DISCONTINUED | OUTPATIENT
Start: 2024-10-18 | End: 2024-10-24 | Stop reason: HOSPADM

## 2024-10-18 RX ORDER — POTASSIUM CHLORIDE 1500 MG/1
40 TABLET, EXTENDED RELEASE ORAL PRN
Status: ACTIVE | OUTPATIENT
Start: 2024-10-18 | End: 2024-10-21

## 2024-10-18 RX ORDER — ALLOPURINOL 100 MG/1
100 TABLET ORAL DAILY
Status: DISCONTINUED | OUTPATIENT
Start: 2024-10-18 | End: 2024-10-24 | Stop reason: HOSPADM

## 2024-10-18 RX ORDER — CARBIDOPA AND LEVODOPA 25; 100 MG/1; MG/1
1 TABLET ORAL NIGHTLY
Status: DISCONTINUED | OUTPATIENT
Start: 2024-10-18 | End: 2024-10-24 | Stop reason: HOSPADM

## 2024-10-18 RX ORDER — SODIUM CHLORIDE 0.9 % (FLUSH) 0.9 %
5-40 SYRINGE (ML) INJECTION EVERY 12 HOURS SCHEDULED
Status: DISCONTINUED | OUTPATIENT
Start: 2024-10-18 | End: 2024-10-24 | Stop reason: HOSPADM

## 2024-10-18 RX ORDER — ACETAMINOPHEN 325 MG/1
650 TABLET ORAL EVERY 6 HOURS PRN
Status: DISCONTINUED | OUTPATIENT
Start: 2024-10-18 | End: 2024-10-24 | Stop reason: HOSPADM

## 2024-10-18 RX ORDER — ENOXAPARIN SODIUM 100 MG/ML
1 INJECTION SUBCUTANEOUS 2 TIMES DAILY
Status: DISCONTINUED | OUTPATIENT
Start: 2024-10-18 | End: 2024-10-24 | Stop reason: HOSPADM

## 2024-10-18 RX ORDER — ATENOLOL 25 MG/1
50 TABLET ORAL DAILY
Status: DISCONTINUED | OUTPATIENT
Start: 2024-10-18 | End: 2024-10-20

## 2024-10-18 RX ORDER — SODIUM CHLORIDE 0.9 % (FLUSH) 0.9 %
5-40 SYRINGE (ML) INJECTION PRN
Status: DISCONTINUED | OUTPATIENT
Start: 2024-10-18 | End: 2024-10-24 | Stop reason: HOSPADM

## 2024-10-18 RX ORDER — CLOPIDOGREL BISULFATE 75 MG/1
75 TABLET ORAL DAILY
Status: DISCONTINUED | OUTPATIENT
Start: 2024-10-18 | End: 2024-10-24 | Stop reason: HOSPADM

## 2024-10-18 RX ORDER — ONDANSETRON 4 MG/1
4 TABLET, ORALLY DISINTEGRATING ORAL EVERY 8 HOURS PRN
Status: DISCONTINUED | OUTPATIENT
Start: 2024-10-18 | End: 2024-10-24 | Stop reason: HOSPADM

## 2024-10-18 RX ORDER — POLYETHYLENE GLYCOL 3350 17 G/17G
17 POWDER, FOR SOLUTION ORAL DAILY PRN
Status: DISCONTINUED | OUTPATIENT
Start: 2024-10-18 | End: 2024-10-24 | Stop reason: HOSPADM

## 2024-10-18 RX ORDER — ONDANSETRON 2 MG/ML
4 INJECTION INTRAMUSCULAR; INTRAVENOUS EVERY 6 HOURS PRN
Status: DISCONTINUED | OUTPATIENT
Start: 2024-10-18 | End: 2024-10-23 | Stop reason: SDUPTHER

## 2024-10-18 RX ORDER — ACETAMINOPHEN 650 MG/1
650 SUPPOSITORY RECTAL EVERY 6 HOURS PRN
Status: DISCONTINUED | OUTPATIENT
Start: 2024-10-18 | End: 2024-10-24 | Stop reason: HOSPADM

## 2024-10-18 RX ORDER — BUMETANIDE 0.25 MG/ML
0.5 INJECTION, SOLUTION INTRAMUSCULAR; INTRAVENOUS 2 TIMES DAILY
Status: DISCONTINUED | OUTPATIENT
Start: 2024-10-18 | End: 2024-10-19

## 2024-10-18 RX ORDER — POTASSIUM CHLORIDE 7.45 MG/ML
10 INJECTION INTRAVENOUS PRN
Status: ACTIVE | OUTPATIENT
Start: 2024-10-18 | End: 2024-10-21

## 2024-10-18 RX ORDER — AMLODIPINE BESYLATE 5 MG/1
5 TABLET ORAL DAILY
Status: DISCONTINUED | OUTPATIENT
Start: 2024-10-18 | End: 2024-10-24 | Stop reason: HOSPADM

## 2024-10-18 RX ADMIN — AMLODIPINE BESYLATE 5 MG: 5 TABLET ORAL at 18:43

## 2024-10-18 RX ADMIN — ATENOLOL 50 MG: 25 TABLET ORAL at 18:43

## 2024-10-18 RX ADMIN — FUROSEMIDE 40 MG: 10 INJECTION, SOLUTION INTRAMUSCULAR; INTRAVENOUS at 14:29

## 2024-10-18 RX ADMIN — CARBIDOPA AND LEVODOPA 1 TABLET: 25; 100 TABLET ORAL at 21:27

## 2024-10-18 RX ADMIN — CLOPIDOGREL BISULFATE 75 MG: 75 TABLET ORAL at 18:43

## 2024-10-18 RX ADMIN — BUMETANIDE 0.5 MG: 0.25 INJECTION INTRAMUSCULAR; INTRAVENOUS at 18:43

## 2024-10-18 NOTE — H&P
[facility]      Hospitalist - History & Physical      PCP: King Tolentino MD    Date of Admission: 10/18/2024    Date of Service: 10/18/2024    Chief Complaint:  Shortness of breath    History Of Present Illness:   The patient is a 84 y.o. male with CAD comes to the ED complaining of shortness of breath. Patient reports that he began to feel bad on Tuesday, had a fever on Wednesday that resolved with tylenol, and shortness of breath that started on Thursday. Patient also has some pedal edema that he reports has \"been there awhile.\"  Today, he began to cough productive of yellow sputum.  Patient is not requiring supplemental oxygen at this time but does have dyspnea on exertion. He was also found to be in Afib. He denies any history of Afib specifically but that he has had an irregular heart rate for \"20 years.\"  He denies any chest pain or nasal congestion. Denies nausea, vomiting, abdominal pain, and chest pain.     In ED: CXR with bilateral pulmonary infiltrates suggestive of multifocal pneumonia probably on background of chronic fibrotic lung   changes/chronic obstructive pulmonary disease; CT chest wo contrast with mild scarring at the lung bases, no evidence of pneumonia, bilateral pleural thickening and calcification, small right pleural effusion with fluid in the right major fissure; creatinine 1.4, BUN 27, GFR 49, BNP 7905, troponin 53-->50, WBC 12.6, H/H 12.4/40, PCR respiratory panel negative; EKG confirms Afib with controlled rate in 60s-70s. Will admit inpatient to hospitalist with consult to cardiology.     Past Medical History:        Diagnosis Date    Arthritis     COVID-19 08/2021    Skin cancer     On face       Past Surgical History:        Procedure Laterality Date    ABSCESS DRAINAGE Right     knee    CARDIAC CATHETERIZATION  03/19/2019    [facility]     CARDIAC SURGERY      cabg 2008    CAROTID ENDARTERECTOMY Left     [facility]    CAROTID ENDARTERECTOMY Right     Dr. Shaw    CORONARY

## 2024-10-18 NOTE — ED NOTES
ED TO INPATIENT SBAR HANDOFF    Patient Name: Calvin Morris   : 1940  84 y.o.   Family/Caregiver Present: Yes  Code Status Order: Prior    C-SSRS: Risk of Suicide: No Risk  Sitter No  Restraints:         Situation  Chief Complaint:   Chief Complaint   Patient presents with    Shortness of Breath     EDMONDS today, fever 2 days ago     Patient Diagnosis: Dyspnea on exertion [R06.09]     Brief Description of Patient's Condition: Pt presents to the emergency department evaluation regarding increasing shortness of breath. Patient states he has not felt well for the past couple of days and has noted some increased shortness of breath when he gets up and tries to walk around. He is also noted some increased swelling of his lower extremities over the last several days. He denies feelings of acute chest pain or feelings of palpitations. Has not really had a cough or nasal congestion symptoms. Denies any prior history of chronic lung disease and is not maintained on home oxygen therapy at baseline. Patient does have prior history of coronary artery disease with prior CABG. Most recently he underwent catheterization in 2019 that noted a normal ejection fraction.     Pt also noted to have new onset afib with controlled rate. Pt has been A&Ox4 throughout ED stay. Wife has been at bedside. He has been given lasix IV for his elevated BNP  Mental Status: oriented, alert, coherent, logical, thought processes intact, and able to concentrate and follow conversation  Arrived from: home    Imaging:   CT CHEST WO CONTRAST   Final Result   1.  Mild scarring at the lung bases.  No evidence of pneumonia.   2.  Bilateral pleural thickening and calcification, chronic.   3.  Small right pleural effusion with fluid in the right major fissure.   4.  Previous CABG.   5.  Cardiomegaly, atherosclerosis, coronary calcification.   6.  Gallstones in the gallbladder.   7.  Degenerative changes of the spine.   8.  Otherwise unremarkable  questions, please call Sending RN at [phone]    Electronically signed by: Electronically signed by Azalea Tadeo RN on 10/18/2024 at 4:23 PM

## 2024-10-18 NOTE — TELEPHONE ENCOUNTER
Caller: Florinda Gambino    Relationship: Emergency Contact    Best call back number:  [phone]    Who are you requesting to speak with (clinical staff, provider,  specific staff member):     CLINICAL STAFF    Do you know the name of the person who called:     FLORINDA    What was the call regarding:     STATED THAT ON WEDNESDAY PATIENT STARTED TO NOT FEEL GOOD. TODAY HAVING A REAL HARD TIME BREATHING.    WILL BE TAKING HIM TO [facility]

## 2024-10-18 NOTE — ED PROVIDER NOTES
[facility] EMERGENCY DEPT  eMERGENCY dEPARTMENT eNCOUnter      Pt Name: Calvin Morris  MRN: [id number]  Birthdate 1940  Date of evaluation: 10/18/2024  Provider: Erwin Vila MD    CHIEF COMPLAINT       Chief Complaint   Patient presents with    Shortness of Breath     EDMONDS today, fever 2 days ago         HISTORY OF PRESENT ILLNESS   (Location/Symptom, Timing/Onset,Context/Setting, Quality, Duration, Modifying Factors, Severity)  Note limiting factors.   Calvin Morris is a 84 y.o. male who presents to the emergency department evaluation regarding increasing shortness of breath.  Patient states he has not felt well for the past couple of days and has noted some increased shortness of breath when he gets up and tries to walk around.  He is also noted some increased swelling of his lower extremities over the last several days.  He denies feelings of acute chest pain or feelings of palpitations.  Has not really had a cough or nasal congestion symptoms.  Denies any prior history of chronic lung disease and is not maintained on home oxygen therapy at baseline.  Patient does have prior history of coronary artery disease with prior CABG.  Most recently he underwent catheterization in 2019 that noted a normal ejection fraction.    HPI    NursingNotes were reviewed.    REVIEW OF SYSTEMS    (2-9 systems for level 4, 10 or more for level 5)     Review of Systems   Constitutional:  Negative for chills and fever.   HENT:  Negative for congestion.    Respiratory:  Positive for shortness of breath. Negative for cough and wheezing.    Cardiovascular:  Positive for leg swelling. Negative for chest pain and palpitations.   Gastrointestinal:  Negative for abdominal distention and abdominal pain.   Neurological:  Negative for dizziness and syncope.   All other systems reviewed and are negative.           PAST MEDICALHISTORY     Past Medical History:   Diagnosis Date    Arthritis     COVID-19 08/2021    Skin cancer     On face          SURGICAL HISTORY       Past Surgical History:   Procedure Laterality Date    ABSCESS DRAINAGE Right     knee    CARDIAC CATHETERIZATION  03/19/2019    [facility]    CARDIAC SURGERY      cabg 2008    CAROTID ENDARTERECTOMY Left     [facility]    CAROTID ENDARTERECTOMY Right     Dr. Shaw    CORONARY ARTERY BYPASS GRAFT      ESOPHAGEAL DILATATION      EYE SURGERY      [facility]    JOINT REPLACEMENT      rtk    POLYPECTOMY      [facility]    SKIN CANCER EXCISION           CURRENT MEDICATIONS     Previous Medications    ALLOPURINOL (ZYLOPRIM) 100 MG TABLET        AMLODIPINE (NORVASC) 5 MG TABLET    Take 1 tablet by mouth daily    ATENOLOL (TENORMIN) 50 MG TABLET    Take 1 tablet by mouth daily    CARBIDOPA-LEVODOPA (SINEMET)  MG PER TABLET    Take 1 tablet by mouth nightly    CINNAMON 500 MG CAPS    Take 1 capsule by mouth nightly    CLOPIDOGREL (PLAVIX) 75 MG TABLET    TAKE 1 TABLET BY MOUTH EVERY DAY    MULTIPLE VITAMINS-MINERALS (ICAPS AREDS 2 PO)    Take 1 tablet by mouth daily     TOBRAMYCIN (TOBREX) 0.3 % OPHTHALMIC SOLUTION           ALLERGIES     Other, Niacin and related, Statins, and Zetia [ezetimibe]    FAMILY HISTORY       Family History   Problem Relation Age of Onset    Heart Disease Mother     Heart Disease Maternal Uncle           SOCIAL HISTORY       Social History     Socioeconomic History    Marital status:      Spouse name: None    Number of children: None    Years of education: None    Highest education level: None   Tobacco Use    Smoking status: Never    Smokeless tobacco: Never   Vaping Use    Vaping status: Never Used   Substance and Sexual Activity    Alcohol use: No    Drug use: No     Social Determinants of Health      Received from [facility]    Family and Community Support    Received from [facility]    Abuse Screen    Received from [facility]    Housing

## 2024-10-18 NOTE — PROGRESS NOTES
4 Eyes Skin Assessment     NAME:  Calvin Morris  YOB: 1940  MEDICAL RECORD NUMBER:  [id number]    The patient is being assessed for  Admission    I agree that at least one RN has performed a thorough Head to Toe Skin Assessment on the patient. ALL assessment sites listed below have been assessed.      Areas assessed by both nurses:    Head, Face, Ears, Shoulders, Back, Chest, Arms, Elbows, Hands, Sacrum. Buttock, Coccyx, Ischium, and Legs. Feet and Heels        Does the Patient have a Wound? No noted wound(s)       Elmo Prevention initiated by RN: No  Wound Care Orders initiated by RN: No    Pressure Injury (Stage 3,4, Unstageable, DTI, NWPT, and Complex wounds) if present, place Wound referral order by RN under : No    New Ostomies, if present place, Ostomy referral order under : No     Nurse 1 eSignature: Electronically signed by Efrem Rosas RN on 10/18/24 at 5:05 PM CDT    **SHARE this note so that the co-signing nurse can place an eSignature**    Nurse 2 eSignature: Electronically signed by SAVANAH TORRES RN on 10/18/24 at 5:10 PM CDT

## 2024-10-19 ENCOUNTER — APPOINTMENT (OUTPATIENT)
Age: 84
End: 2024-10-19
Payer: MEDICARE

## 2024-10-19 LAB
ANION GAP SERPL CALCULATED.3IONS-SCNC: 12 MMOL/L (ref 7–19)
BASOPHILS # BLD: 0 K/UL (ref 0–0.2)
BASOPHILS NFR BLD: 0.4 % (ref 0–1)
BUN SERPL-MCNC: 37 MG/DL (ref 8–23)
CALCIUM SERPL-MCNC: 9.4 MG/DL (ref 8.8–10.2)
CHLORIDE SERPL-SCNC: 95 MMOL/L (ref 98–111)
CO2 SERPL-SCNC: 28 MMOL/L (ref 22–29)
CREAT SERPL-MCNC: 1.5 MG/DL (ref 0.7–1.2)
ECHO AO ASC DIAM: 2.7 CM
ECHO AO ASCENDING AORTA INDEX: 1.58 CM/M2
ECHO AO ROOT DIAM: 1.6 CM
ECHO AO ROOT INDEX: 0.94 CM/M2
ECHO AO SINUS VALSALVA DIAM: 3 CM
ECHO AO SINUS VALSALVA INDEX: 1.75 CM/M2
ECHO AO ST JNCT DIAM: 2.4 CM
ECHO AV AREA PEAK VELOCITY: 2 CM2
ECHO AV AREA VTI: 1.9 CM2
ECHO AV AREA/BSA PEAK VELOCITY: 1.2 CM2/M2
ECHO AV AREA/BSA VTI: 1.1 CM2/M2
ECHO AV MEAN GRADIENT: 4 MMHG
ECHO AV MEAN VELOCITY: 0.9 M/S
ECHO AV PEAK GRADIENT: 6 MMHG
ECHO AV PEAK VELOCITY: 1.2 M/S
ECHO AV VELOCITY RATIO: 0.67
ECHO AV VTI: 27.8 CM
ECHO BSA: 1.69 M2
ECHO EST RA PRESSURE: 3 MMHG
ECHO IVC PROX: 1.5 CM
ECHO LA AREA 2C: 19.2 CM2
ECHO LA AREA 4C: 20.1 CM2
ECHO LA DIAMETER INDEX: 1.99 CM/M2
ECHO LA DIAMETER: 3.4 CM
ECHO LA MAJOR AXIS: 5.3 CM
ECHO LA MINOR AXIS: 5.3 CM
ECHO LA TO AORTIC ROOT RATIO: 2.13
ECHO LA VOL BP: 60 ML (ref 18–58)
ECHO LA VOL MOD A2C: 57 ML (ref 18–58)
ECHO LA VOL MOD A4C: 64 ML (ref 18–58)
ECHO LA VOL/BSA BIPLANE: 35 ML/M2 (ref 16–34)
ECHO LA VOLUME INDEX MOD A2C: 33 ML/M2 (ref 16–34)
ECHO LA VOLUME INDEX MOD A4C: 37 ML/M2 (ref 16–34)
ECHO LV E' LATERAL VELOCITY: 6.2 CM/S
ECHO LV E' SEPTAL VELOCITY: 5.3 CM/S
ECHO LV EDV A2C: 65 ML
ECHO LV EDV A4C: 59 ML
ECHO LV EDV INDEX A4C: 35 ML/M2
ECHO LV EDV NDEX A2C: 38 ML/M2
ECHO LV EF PHYSICIAN: 55 %
ECHO LV EJECTION FRACTION A2C: 55 %
ECHO LV EJECTION FRACTION A4C: 57 %
ECHO LV EJECTION FRACTION BIPLANE: 56 % (ref 55–100)
ECHO LV ESV A2C: 29 ML
ECHO LV ESV A4C: 26 ML
ECHO LV ESV INDEX A2C: 17 ML/M2
ECHO LV ESV INDEX A4C: 15 ML/M2
ECHO LV FRACTIONAL SHORTENING: 24 % (ref 28–44)
ECHO LV INTERNAL DIMENSION DIASTOLE INDEX: 2.69 CM/M2
ECHO LV INTERNAL DIMENSION DIASTOLIC: 4.6 CM (ref 4.2–5.9)
ECHO LV INTERNAL DIMENSION SYSTOLIC INDEX: 2.05 CM/M2
ECHO LV INTERNAL DIMENSION SYSTOLIC: 3.5 CM
ECHO LV IVSD: 1 CM (ref 0.6–1)
ECHO LV MASS 2D: 169.9 G (ref 88–224)
ECHO LV MASS INDEX 2D: 99.3 G/M2 (ref 49–115)
ECHO LV POSTERIOR WALL DIASTOLIC: 1.1 CM (ref 0.6–1)
ECHO LV RELATIVE WALL THICKNESS RATIO: 0.48
ECHO LVOT AREA: 3.1 CM2
ECHO LVOT AV VTI INDEX: 0.59
ECHO LVOT DIAM: 2 CM
ECHO LVOT MEAN GRADIENT: 1 MMHG
ECHO LVOT PEAK GRADIENT: 2 MMHG
ECHO LVOT PEAK VELOCITY: 0.8 M/S
ECHO LVOT STROKE VOLUME INDEX: 30.3 ML/M2
ECHO LVOT SV: 51.8 ML
ECHO LVOT VTI: 16.5 CM
ECHO MV A VELOCITY: 0.36 M/S
ECHO MV AREA VTI: 2 CM2
ECHO MV E DECELERATION TIME (DT): 169 MS
ECHO MV E VELOCITY: 1.07 M/S
ECHO MV E/A RATIO: 2.97
ECHO MV E/E' LATERAL: 17.26
ECHO MV E/E' RATIO (AVERAGED): 18.72
ECHO MV E/E' SEPTAL: 20.19
ECHO MV LVOT VTI INDEX: 1.54
ECHO MV MAX VELOCITY: 1.1 M/S
ECHO MV MEAN GRADIENT: 2 MMHG
ECHO MV MEAN VELOCITY: 0.7 M/S
ECHO MV PEAK GRADIENT: 5 MMHG
ECHO MV REGURGITANT PEAK GRADIENT: 88 MMHG
ECHO MV REGURGITANT PEAK VELOCITY: 4.7 M/S
ECHO MV VTI: 25.4 CM
ECHO RA AREA 4C: 9.2 CM2
ECHO RA END SYSTOLIC VOLUME APICAL 4 CHAMBER INDEX BSA: 9 ML/M2
ECHO RA VOLUME: 16 ML
ECHO RIGHT VENTRICULAR SYSTOLIC PRESSURE (RVSP): 37 MMHG
ECHO RV BASAL DIMENSION: 2.1 CM
ECHO RV INTERNAL DIMENSION: 2.4 CM
ECHO RV LONGITUDINAL DIMENSION: 5.7 CM
ECHO RV MID DIMENSION: 1.8 CM
ECHO RV TAPSE: 1.6 CM (ref 1.7–?)
ECHO TV REGURGITANT MAX VELOCITY: 2.93 M/S
ECHO TV REGURGITANT PEAK GRADIENT: 34 MMHG
EOSINOPHIL # BLD: 0 K/UL (ref 0–0.6)
EOSINOPHIL NFR BLD: 0.2 % (ref 0–5)
ERYTHROCYTE [DISTWIDTH] IN BLOOD BY AUTOMATED COUNT: 14.5 % (ref 11.5–14.5)
GLUCOSE SERPL-MCNC: 114 MG/DL (ref 70–99)
HCT VFR BLD AUTO: 37.5 % (ref 42–52)
HGB BLD-MCNC: 11.9 G/DL (ref 14–18)
IMM GRANULOCYTES # BLD: 0.1 K/UL
INR PPP: 1.19 (ref 0.88–1.18)
LV EF: 55 ML
LYMPHOCYTES # BLD: 1 K/UL (ref 1.1–4.5)
LYMPHOCYTES NFR BLD: 9.5 % (ref 20–40)
MAGNESIUM SERPL-MCNC: 2.1 MG/DL (ref 1.6–2.4)
MCH RBC QN AUTO: 29.2 PG (ref 27–31)
MCHC RBC AUTO-ENTMCNC: 31.7 G/DL (ref 33–37)
MCV RBC AUTO: 92.1 FL (ref 80–94)
MONOCYTES # BLD: 1.4 K/UL (ref 0–0.9)
MONOCYTES NFR BLD: 13.9 % (ref 0–10)
NEUTROPHILS # BLD: 7.8 K/UL (ref 1.5–7.5)
NEUTS SEG NFR BLD: 74.7 % (ref 50–65)
PLATELET # BLD AUTO: 244 K/UL (ref 130–400)
PMV BLD AUTO: 10.4 FL (ref 9.4–12.4)
POTASSIUM SERPL-SCNC: 4.5 MMOL/L (ref 3.5–5)
PROTHROMBIN TIME: 14.8 SEC (ref 12–14.6)
RBC # BLD AUTO: 4.07 M/UL (ref 4.7–6.1)
SODIUM SERPL-SCNC: 135 MMOL/L (ref 136–145)
WBC # BLD AUTO: 10.4 K/UL (ref 4.8–10.8)

## 2024-10-19 PROCEDURE — 1200000000 HC SEMI PRIVATE

## 2024-10-19 PROCEDURE — C8929 TTE W OR WO FOL WCON,DOPPLER: HCPCS

## 2024-10-19 PROCEDURE — 80048 BASIC METABOLIC PNL TOTAL CA: CPT

## 2024-10-19 PROCEDURE — 85025 COMPLETE CBC W/AUTO DIFF WBC: CPT

## 2024-10-19 PROCEDURE — 85610 PROTHROMBIN TIME: CPT

## 2024-10-19 PROCEDURE — 36415 COLL VENOUS BLD VENIPUNCTURE: CPT

## 2024-10-19 PROCEDURE — 6370000000 HC RX 637 (ALT 250 FOR IP)

## 2024-10-19 PROCEDURE — 6360000004 HC RX CONTRAST MEDICATION

## 2024-10-19 PROCEDURE — 94760 N-INVAS EAR/PLS OXIMETRY 1: CPT

## 2024-10-19 PROCEDURE — 6360000002 HC RX W HCPCS

## 2024-10-19 PROCEDURE — 93306 TTE W/DOPPLER COMPLETE: CPT | Performed by: INTERNAL MEDICINE

## 2024-10-19 PROCEDURE — 2580000003 HC RX 258

## 2024-10-19 PROCEDURE — 83735 ASSAY OF MAGNESIUM: CPT

## 2024-10-19 PROCEDURE — 6360000002 HC RX W HCPCS: Performed by: INTERNAL MEDICINE

## 2024-10-19 RX ORDER — BUMETANIDE 0.25 MG/ML
1 INJECTION, SOLUTION INTRAMUSCULAR; INTRAVENOUS 2 TIMES DAILY
Status: DISCONTINUED | OUTPATIENT
Start: 2024-10-19 | End: 2024-10-21

## 2024-10-19 RX ADMIN — ENOXAPARIN SODIUM 60 MG: 100 INJECTION SUBCUTANEOUS at 21:09

## 2024-10-19 RX ADMIN — SODIUM CHLORIDE, PRESERVATIVE FREE 10 ML: 5 INJECTION INTRAVENOUS at 09:33

## 2024-10-19 RX ADMIN — SODIUM CHLORIDE, PRESERVATIVE FREE 10 ML: 5 INJECTION INTRAVENOUS at 21:12

## 2024-10-19 RX ADMIN — ATENOLOL 50 MG: 25 TABLET ORAL at 09:27

## 2024-10-19 RX ADMIN — AMLODIPINE BESYLATE 5 MG: 5 TABLET ORAL at 09:27

## 2024-10-19 RX ADMIN — BUMETANIDE 1 MG: 0.25 INJECTION INTRAMUSCULAR; INTRAVENOUS at 17:43

## 2024-10-19 RX ADMIN — CLOPIDOGREL BISULFATE 75 MG: 75 TABLET ORAL at 09:27

## 2024-10-19 RX ADMIN — PERFLUTREN 1.5 ML: 6.52 INJECTION, SUSPENSION INTRAVENOUS at 09:10

## 2024-10-19 RX ADMIN — CARBIDOPA AND LEVODOPA 1 TABLET: 25; 100 TABLET ORAL at 21:09

## 2024-10-19 RX ADMIN — ENOXAPARIN SODIUM 60 MG: 100 INJECTION SUBCUTANEOUS at 09:28

## 2024-10-19 RX ADMIN — ALLOPURINOL 100 MG: 100 TABLET ORAL at 09:27

## 2024-10-19 RX ADMIN — BUMETANIDE 1 MG: 0.25 INJECTION INTRAMUSCULAR; INTRAVENOUS at 09:28

## 2024-10-19 NOTE — PROGRESS NOTES
10/18/24  0912   AST 16   ALT 5   BILITOT 0.9   ALKPHOS 140*     INR:   Recent Labs     10/19/24  0502   INR 1.19*     RAD:     Echo (TTE) complete (PRN contrast/bubble/strain/3D)    Result Date: 10/19/2024    Left Ventricle: Normal left ventricular systolic function with a visually estimated EF of 55 - 60%. Left ventricle size is normal. Mild posterior thickening. Normal wall motion. Normal diastolic function.   Aortic Valve: Trileaflet valve.   Tricuspid Valve: Mild regurgitation. The estimated RVSP is 37 mmHg.   Image quality is good. Contrast used: Definity.   No pericardial effusion       CT CHEST WO CONTRAST  Result Date: 10/18/2024    1.  Mild scarring at the lung bases.  No evidence of pneumonia. 2.  Bilateral pleural thickening and calcification, chronic. 3.  Small right pleural effusion with fluid in the right major fissure. 4.  Previous CABG. 5.  Cardiomegaly, atherosclerosis, coronary calcification. 6.  Gallstones in the gallbladder. 7.  Degenerative changes of the spine. 8.  Otherwise unremarkable noncontrast CT scan of the chest.  All CT scans are performed using dose optimization techniques as appropriate to the performed exam and include at least one of the following: Automated exposure control, adjustment of the mA and/or kV according to size, and the use of iterative reconstruction technique.  ______________________________________ Electronically signed by: MILEY VILLA M.D. Date:     10/18/2024 Time:    15:45       XR CHEST PORTABLE  Result Date: 10/18/2024  EXAM:  CHEST RADIOGRAPH; SINGLE VIEW  HISTORY:  Shortness of breath  COMPARISON:  None.  FINDINGS/IMMPRESSION:    Cardiomediastinal silhouette is unchanged.  Atherosclerosis in the aorta.  Sternotomy wires.  Degenerative changes.  Scoliosis.  Bilateral pulmonary infiltrates suggestive of multifocal pneumonia probably on background of chronic fibrotic lung changes/chronic obstructive pulmonary disease. No pneumothorax or large pleural effusions.   diuresis, rate controlled, OP zio, and outpatient follow up with cardiology    - currently rate controlled    - Continue Lovenox 1 mg/kg BID in house   - ECHO results as above   - Trend troponin- elevated but flat   - Serial and PRN EKGs  - Monitor on telemetry         Essential hypertension-    - Stable at this time    - Monitor BP closely         Dyslipidemia-    - Continue statin       Gout-    - Continue Allopurinol       Restless leg syndrome-   - supportive care           DVT Prophylaxis: Lovenox      Discharge planning: TBD        Further Orders per Clinical course/attending.     Electronically signed by ANTHONY Munroe CNP on 10/19/2024 at 2:20 PM       EMR Dragon/Transcription disclaimer:   Much of this encounter note is an electronic transcription/translation of spoken language to printed text. The electronic translation of spoken language may permit erroneous, or at times, nonsensical words or phrases to be inadvertently transcribed; although attempts have made to review the note for such errors, some may still exist.

## 2024-10-19 NOTE — PLAN OF CARE
Problem: Chronic Conditions and Co-morbidities  Goal: Patient's chronic conditions and co-morbidity symptoms are monitored and maintained or improved  Outcome: Progressing  Flowsheets (Taken 10/18/2024 1911)  Care Plan - Patient's Chronic Conditions and Co-Morbidity Symptoms are Monitored and Maintained or Improved:   Monitor and assess patient's chronic conditions and comorbid symptoms for stability, deterioration, or improvement   Collaborate with multidisciplinary team to address chronic and comorbid conditions and prevent exacerbation or deterioration   Update acute care plan with appropriate goals if chronic or comorbid symptoms are exacerbated and prevent overall improvement and discharge     Problem: Discharge Planning  Goal: Discharge to home or other facility with appropriate resources  Outcome: Progressing  Flowsheets (Taken 10/18/2024 1911)  Discharge to home or other facility with appropriate resources:   Identify barriers to discharge with patient and caregiver   Arrange for needed discharge resources and transportation as appropriate   Identify discharge learning needs (meds, wound care, etc)   Arrange for interpreters to assist at discharge as needed   Refer to discharge planning if patient needs post-hospital services based on physician order or complex needs related to functional status, cognitive ability or social support system     Problem: Safety - Adult  Goal: Free from fall injury  Outcome: Progressing     Problem: ABCDS Injury Assessment  Goal: Absence of physical injury  Outcome: Progressing

## 2024-10-19 NOTE — CONSULTS
[facility]  Cardiology Consult      Requesting MD:  Jonny Cárdenas MD   Admit Status:         History obtained from:   [] Patient  [] Other (specify):     Patient:  Calvin Morris  [id number]     Chief Complaint:   Chief Complaint   Patient presents with    Shortness of Breath     EDMONDS today, fever 2 days ago       HPI:   84 y.o. male with CAD comes to the ED complaining of shortness of breath. Patient reports that he began to feel bad on Tuesday, had a fever on Wednesday that resolved with tylenol, and shortness of breath that started on Thursday. Patient also has some pedal edema that he reports has \"been there awhile.\"  Today, he began to cough productive of yellow sputum.  Patient is not requiring supplemental oxygen at this time but does have dyspnea on exertion. He was also found to be in Afib. He denies any history of Afib specifically but that he has had an irregular heart rate for \"20 years.\"  He denies any chest pain or nasal congestion. Denies nausea, vomiting, abdominal pain, and chest pain.      In ED: CXR with bilateral pulmonary infiltrates suggestive of multifocal pneumonia probably on background of chronic fibrotic lung   changes/chronic obstructive pulmonary disease; CT chest wo contrast with mild scarring at the lung bases, no evidence of pneumonia, bilateral pleural thickening and calcification, small right pleural effusion with fluid in the right major fissure; creatinine 1.4, BUN 27, GFR 49, BNP 7905, troponin 53-->50, WBC 12.6, H/H 12.4/40, PCR respiratory panel negative; EKG confirms Afib with controlled rate in 60s-70s.          Review of Systems   Constitutional: Negative.    Eyes: Negative.    Respiratory:  Positive for shortness of breath.    Cardiovascular:  Positive for palpitations.   Gastrointestinal: Negative.    Endocrine: Negative.    Genitourinary: Negative.    Musculoskeletal: Negative.    Allergic/Immunologic: Negative.    Neurological: Negative.  coronary calcification. 6.  Gallstones in the gallbladder. 7.  Degenerative changes of the spine. 8.  Otherwise unremarkable noncontrast CT scan of the chest.  All CT scans are performed using dose optimization techniques as appropriate to the performed exam and include at least one of the following: Automated exposure control, adjustment of the mA and/or kV according to size, and the use of iterative reconstruction technique.  ______________________________________ Electronically signed by: MILEY VILLA M.D. Date:     10/18/2024 Time:    15:45     XR CHEST PORTABLE    Result Date: 10/18/2024  EXAM:  CHEST RADIOGRAPH; SINGLE VIEW  HISTORY:  Shortness of breath  COMPARISON:  None.  FINDINGS/IMMPRESSION:    Cardiomediastinal silhouette is unchanged.  Atherosclerosis in the aorta.  Sternotomy wires.  Degenerative changes.  Scoliosis.  Bilateral pulmonary infiltrates suggestive of multifocal pneumonia probably on background of chronic fibrotic lung changes/chronic obstructive pulmonary disease. No pneumothorax or large pleural effusions.  Short-term follow-up versus further evaluation CT advised.      ______________________________________ Electronically signed by: ADRIANA VARGAS M.D. Date:     10/18/2024 Time:    09:22     Prior cath  Conclusions      Triple-vessel disease   Patent LIMA to the distal LAD   All vein grafts are occluded   Successful PCI to distal circumflex and OM1 with drug-eluting stents      Recommendations      Medical management   If recurrent symptomatology can't consider PCI to RCA and 1st diagonal   (these are small caliber vessels)      Signatures      ----------------------------------------------------------------   Electronically signed by Lizandro Garduno MD(Performing Physician) on   03/19/2019 13:07  Assessment:  New onset afib  CABG/Cfx stent. Only LIMA to LAD patent  3. HTN  4. A/c CHF  HLP      Recommendations:  Iv diuresis  Rate controlled. OP zio  Medical mgmt of cad. No chest pain  OP fu

## 2024-10-20 LAB
ANION GAP SERPL CALCULATED.3IONS-SCNC: 13 MMOL/L (ref 7–19)
ANION GAP SERPL CALCULATED.3IONS-SCNC: 14 MMOL/L (ref 7–19)
ANION GAP SERPL CALCULATED.3IONS-SCNC: 15 MMOL/L (ref 7–19)
BASOPHILS # BLD: 0.1 K/UL (ref 0–0.2)
BASOPHILS NFR BLD: 0.7 % (ref 0–1)
BUN SERPL-MCNC: 51 MG/DL (ref 8–23)
BUN SERPL-MCNC: 57 MG/DL (ref 8–23)
BUN SERPL-MCNC: 57 MG/DL (ref 8–23)
CALCIUM SERPL-MCNC: 8.9 MG/DL (ref 8.8–10.2)
CALCIUM SERPL-MCNC: 8.9 MG/DL (ref 8.8–10.2)
CALCIUM SERPL-MCNC: 9.4 MG/DL (ref 8.8–10.2)
CHLORIDE SERPL-SCNC: 88 MMOL/L (ref 98–111)
CHLORIDE SERPL-SCNC: 90 MMOL/L (ref 98–111)
CHLORIDE SERPL-SCNC: 92 MMOL/L (ref 98–111)
CO2 SERPL-SCNC: 26 MMOL/L (ref 22–29)
CO2 SERPL-SCNC: 26 MMOL/L (ref 22–29)
CO2 SERPL-SCNC: 27 MMOL/L (ref 22–29)
CREAT SERPL-MCNC: 1.7 MG/DL (ref 0.7–1.2)
CREAT SERPL-MCNC: 2 MG/DL (ref 0.7–1.2)
CREAT SERPL-MCNC: 2 MG/DL (ref 0.7–1.2)
EOSINOPHIL # BLD: 0.1 K/UL (ref 0–0.6)
EOSINOPHIL NFR BLD: 0.7 % (ref 0–5)
ERYTHROCYTE [DISTWIDTH] IN BLOOD BY AUTOMATED COUNT: 14.3 % (ref 11.5–14.5)
GLUCOSE SERPL-MCNC: 128 MG/DL (ref 70–99)
GLUCOSE SERPL-MCNC: 151 MG/DL (ref 70–99)
GLUCOSE SERPL-MCNC: 183 MG/DL (ref 70–99)
HCT VFR BLD AUTO: 40.6 % (ref 42–52)
HGB BLD-MCNC: 13 G/DL (ref 14–18)
IMM GRANULOCYTES # BLD: 0.3 K/UL
LYMPHOCYTES # BLD: 1.3 K/UL (ref 1.1–4.5)
LYMPHOCYTES NFR BLD: 17.1 % (ref 20–40)
MAGNESIUM SERPL-MCNC: 2 MG/DL (ref 1.6–2.4)
MAGNESIUM SERPL-MCNC: 2.1 MG/DL (ref 1.6–2.4)
MAGNESIUM SERPL-MCNC: 2.1 MG/DL (ref 1.6–2.4)
MCH RBC QN AUTO: 28.2 PG (ref 27–31)
MCHC RBC AUTO-ENTMCNC: 32 G/DL (ref 33–37)
MCV RBC AUTO: 88.1 FL (ref 80–94)
MONOCYTES # BLD: 0.9 K/UL (ref 0–0.9)
MONOCYTES NFR BLD: 12 % (ref 0–10)
NEUTROPHILS # BLD: 4.9 K/UL (ref 1.5–7.5)
NEUTS SEG NFR BLD: 66.1 % (ref 50–65)
OSMOLALITY SERPL CALC.SUM OF ELEC: 296 MOSM/KG (ref 275–300)
OSMOLALITY URINE: 474 MOSM/KG (ref 250–1200)
PLATELET # BLD AUTO: 275 K/UL (ref 130–400)
PMV BLD AUTO: 10 FL (ref 9.4–12.4)
POTASSIUM SERPL-SCNC: 3.5 MMOL/L (ref 3.5–5)
POTASSIUM SERPL-SCNC: 3.5 MMOL/L (ref 3.5–5)
POTASSIUM SERPL-SCNC: 3.8 MMOL/L (ref 3.5–5)
RBC # BLD AUTO: 4.61 M/UL (ref 4.7–6.1)
SODIUM SERPL-SCNC: 128 MMOL/L (ref 136–145)
SODIUM SERPL-SCNC: 130 MMOL/L (ref 136–145)
SODIUM SERPL-SCNC: 133 MMOL/L (ref 136–145)
SODIUM UR-SCNC: 23 MMOL/L
WBC # BLD AUTO: 7.4 K/UL (ref 4.8–10.8)

## 2024-10-20 PROCEDURE — 6360000002 HC RX W HCPCS

## 2024-10-20 PROCEDURE — 6370000000 HC RX 637 (ALT 250 FOR IP)

## 2024-10-20 PROCEDURE — 1200000000 HC SEMI PRIVATE

## 2024-10-20 PROCEDURE — 2580000003 HC RX 258: Performed by: NURSE PRACTITIONER

## 2024-10-20 PROCEDURE — 85025 COMPLETE CBC W/AUTO DIFF WBC: CPT

## 2024-10-20 PROCEDURE — 2580000003 HC RX 258

## 2024-10-20 PROCEDURE — 80048 BASIC METABOLIC PNL TOTAL CA: CPT

## 2024-10-20 PROCEDURE — 83735 ASSAY OF MAGNESIUM: CPT

## 2024-10-20 PROCEDURE — 36415 COLL VENOUS BLD VENIPUNCTURE: CPT

## 2024-10-20 PROCEDURE — 84300 ASSAY OF URINE SODIUM: CPT

## 2024-10-20 PROCEDURE — 83935 ASSAY OF URINE OSMOLALITY: CPT

## 2024-10-20 PROCEDURE — 83930 ASSAY OF BLOOD OSMOLALITY: CPT

## 2024-10-20 RX ORDER — ATENOLOL 25 MG/1
25 TABLET ORAL DAILY
Status: DISCONTINUED | OUTPATIENT
Start: 2024-10-21 | End: 2024-10-24

## 2024-10-20 RX ORDER — 0.9 % SODIUM CHLORIDE 0.9 %
500 INTRAVENOUS SOLUTION INTRAVENOUS ONCE
Status: COMPLETED | OUTPATIENT
Start: 2024-10-20 | End: 2024-10-20

## 2024-10-20 RX ADMIN — AMLODIPINE BESYLATE 5 MG: 5 TABLET ORAL at 09:23

## 2024-10-20 RX ADMIN — ENOXAPARIN SODIUM 60 MG: 100 INJECTION SUBCUTANEOUS at 20:01

## 2024-10-20 RX ADMIN — SODIUM CHLORIDE, PRESERVATIVE FREE 10 ML: 5 INJECTION INTRAVENOUS at 20:01

## 2024-10-20 RX ADMIN — CARBIDOPA AND LEVODOPA 1 TABLET: 25; 100 TABLET ORAL at 20:01

## 2024-10-20 RX ADMIN — ALLOPURINOL 100 MG: 100 TABLET ORAL at 09:22

## 2024-10-20 RX ADMIN — ENOXAPARIN SODIUM 60 MG: 100 INJECTION SUBCUTANEOUS at 09:23

## 2024-10-20 RX ADMIN — ATENOLOL 50 MG: 25 TABLET ORAL at 09:22

## 2024-10-20 RX ADMIN — CLOPIDOGREL BISULFATE 75 MG: 75 TABLET ORAL at 09:23

## 2024-10-20 RX ADMIN — SODIUM CHLORIDE 500 ML: 9 INJECTION, SOLUTION INTRAVENOUS at 14:28

## 2024-10-20 NOTE — PROGRESS NOTES
[facility]      Progress Note    Patient:  Calvin Morris  YOB: 1940  Date of Service: 10/20/2024  MRN: [id number]   Acct: [id number]   Primary Care Physician: King Tolentino MD  Advance Directive: Full Code  Admit Date: 10/18/2024       Hospital Day: 2    Portions of this note have been copied forward, however, updated to reflect the most current clinical status of this patient.     CHIEF COMPLAINT shortness of breath     SUBJECTIVE:  Mr. Morris was resting in chair this morning.  Denies shortness of breath or chest pain this morning.  Denies nausea or vomiting.       CUMULATIVE HOSPITAL COURSE:   The patient is a 84 y.o. male with PMH of Arthritis, hypertension, hyperlipidemia, coagulopathy, and RLS who presented to [facility] ED for evaluation of shortness of breath.  Reported having fever at home on Tuesday and Wednesday that resolved with Tylenol.  Reported shortness of breath started on Thursday.  Reported pedal edema.  Reported cough and productive yellow sputum at times.  Reported increased dyspnea on exertion.  Was found to be in A-fib in ED.  Denied prior history of A-fib.  Denied chest pain.  Workup in ED revealed CXR with bilateral pulmonary infiltrates suggestive of multifocal pneumonia probably on background of chronic fibrotic lung   changes/chronic obstructive pulmonary disease; CT chest wo contrast with mild scarring at the lung bases, no evidence of pneumonia, bilateral pleural thickening and calcification, small right pleural effusion with fluid in the right major fissure; creatinine 1.4, BUN 27, GFR 49, BNP 7905, troponin 53-->50, WBC 12.6, H/H 12.4/40, PCR respiratory panel negative; EKG confirms Afib with controlled rate in 60s-70s.  Patient was admitted to hospital medicine for further evaluation with cardiology consultation. Cardiology recommended IV diuresis, rate control, Eliquis, OP zio, and outpatient follow up with cardiology. ECHO indicated normal LV size with EF of

## 2024-10-21 LAB
ANION GAP SERPL CALCULATED.3IONS-SCNC: 10 MMOL/L (ref 7–19)
BASOPHILS # BLD: 0 K/UL (ref 0–0.2)
BASOPHILS NFR BLD: 0.5 % (ref 0–1)
BUN SERPL-MCNC: 52 MG/DL (ref 8–23)
CALCIUM SERPL-MCNC: 8.8 MG/DL (ref 8.8–10.2)
CHLORIDE SERPL-SCNC: 93 MMOL/L (ref 98–111)
CO2 SERPL-SCNC: 28 MMOL/L (ref 22–29)
CREAT SERPL-MCNC: 1.7 MG/DL (ref 0.7–1.2)
EKG P AXIS: NORMAL DEGREES
EKG P-R INTERVAL: NORMAL MS
EKG Q-T INTERVAL: 382 MS
EKG Q-T INTERVAL: 390 MS
EKG Q-T INTERVAL: 418 MS
EKG QRS DURATION: 80 MS
EKG QRS DURATION: 82 MS
EKG QRS DURATION: 92 MS
EKG QTC CALCULATION (BAZETT): 396 MS
EKG QTC CALCULATION (BAZETT): 419 MS
EKG QTC CALCULATION (BAZETT): 421 MS
EKG T AXIS: -3 DEGREES
EKG T AXIS: 14 DEGREES
EKG T AXIS: 8 DEGREES
EOSINOPHIL # BLD: 0.2 K/UL (ref 0–0.6)
EOSINOPHIL NFR BLD: 2 % (ref 0–5)
ERYTHROCYTE [DISTWIDTH] IN BLOOD BY AUTOMATED COUNT: 14.1 % (ref 11.5–14.5)
GLUCOSE SERPL-MCNC: 114 MG/DL (ref 70–99)
HCT VFR BLD AUTO: 36.1 % (ref 42–52)
HGB BLD-MCNC: 11.4 G/DL (ref 14–18)
IMM GRANULOCYTES # BLD: 0.2 K/UL
IRON SATN MFR SERPL: 17 % (ref 14–50)
IRON SERPL-MCNC: 28 UG/DL (ref 59–158)
LYMPHOCYTES # BLD: 1.3 K/UL (ref 1.1–4.5)
LYMPHOCYTES NFR BLD: 16 % (ref 20–40)
MAGNESIUM SERPL-MCNC: 2.1 MG/DL (ref 1.6–2.4)
MCH RBC QN AUTO: 28.2 PG (ref 27–31)
MCHC RBC AUTO-ENTMCNC: 31.6 G/DL (ref 33–37)
MCV RBC AUTO: 89.4 FL (ref 80–94)
MONOCYTES # BLD: 1 K/UL (ref 0–0.9)
MONOCYTES NFR BLD: 12.4 % (ref 0–10)
NEUTROPHILS # BLD: 5.2 K/UL (ref 1.5–7.5)
NEUTS SEG NFR BLD: 66.9 % (ref 50–65)
PLATELET # BLD AUTO: 243 K/UL (ref 130–400)
PMV BLD AUTO: 10 FL (ref 9.4–12.4)
POTASSIUM SERPL-SCNC: 3.5 MMOL/L (ref 3.5–5)
RBC # BLD AUTO: 4.04 M/UL (ref 4.7–6.1)
SODIUM SERPL-SCNC: 131 MMOL/L (ref 136–145)
TIBC SERPL-MCNC: 161 UG/DL (ref 250–400)
WBC # BLD AUTO: 7.8 K/UL (ref 4.8–10.8)

## 2024-10-21 PROCEDURE — 1200000000 HC SEMI PRIVATE

## 2024-10-21 PROCEDURE — 6370000000 HC RX 637 (ALT 250 FOR IP)

## 2024-10-21 PROCEDURE — 80048 BASIC METABOLIC PNL TOTAL CA: CPT

## 2024-10-21 PROCEDURE — 6360000002 HC RX W HCPCS

## 2024-10-21 PROCEDURE — 93010 ELECTROCARDIOGRAM REPORT: CPT | Performed by: INTERNAL MEDICINE

## 2024-10-21 PROCEDURE — 85025 COMPLETE CBC W/AUTO DIFF WBC: CPT

## 2024-10-21 PROCEDURE — 83550 IRON BINDING TEST: CPT

## 2024-10-21 PROCEDURE — 83540 ASSAY OF IRON: CPT

## 2024-10-21 PROCEDURE — 94760 N-INVAS EAR/PLS OXIMETRY 1: CPT

## 2024-10-21 PROCEDURE — 2580000003 HC RX 258: Performed by: NURSE PRACTITIONER

## 2024-10-21 PROCEDURE — 83735 ASSAY OF MAGNESIUM: CPT

## 2024-10-21 PROCEDURE — 6360000002 HC RX W HCPCS: Performed by: NURSE PRACTITIONER

## 2024-10-21 PROCEDURE — 6370000000 HC RX 637 (ALT 250 FOR IP): Performed by: NURSE PRACTITIONER

## 2024-10-21 PROCEDURE — 94618 PULMONARY STRESS TESTING: CPT

## 2024-10-21 PROCEDURE — 2580000003 HC RX 258

## 2024-10-21 PROCEDURE — 36415 COLL VENOUS BLD VENIPUNCTURE: CPT

## 2024-10-21 RX ORDER — SODIUM FERRIC GLUCONATE COMPLEX IN SUCROSE 12.5 MG/ML
125 INJECTION INTRAVENOUS DAILY
Status: DISCONTINUED | OUTPATIENT
Start: 2024-10-21 | End: 2024-10-21 | Stop reason: SDUPTHER

## 2024-10-21 RX ADMIN — ALLOPURINOL 100 MG: 100 TABLET ORAL at 08:48

## 2024-10-21 RX ADMIN — CLOPIDOGREL BISULFATE 75 MG: 75 TABLET ORAL at 08:48

## 2024-10-21 RX ADMIN — AMLODIPINE BESYLATE 5 MG: 5 TABLET ORAL at 08:48

## 2024-10-21 RX ADMIN — ENOXAPARIN SODIUM 60 MG: 100 INJECTION SUBCUTANEOUS at 21:10

## 2024-10-21 RX ADMIN — CARBIDOPA AND LEVODOPA 1 TABLET: 25; 100 TABLET ORAL at 21:10

## 2024-10-21 RX ADMIN — SODIUM CHLORIDE 125 MG: 9 INJECTION, SOLUTION INTRAVENOUS at 21:10

## 2024-10-21 RX ADMIN — ENOXAPARIN SODIUM 60 MG: 100 INJECTION SUBCUTANEOUS at 08:48

## 2024-10-21 RX ADMIN — SODIUM CHLORIDE, PRESERVATIVE FREE 10 ML: 5 INJECTION INTRAVENOUS at 08:48

## 2024-10-21 RX ADMIN — SODIUM CHLORIDE, PRESERVATIVE FREE 10 ML: 5 INJECTION INTRAVENOUS at 21:10

## 2024-10-21 RX ADMIN — ATENOLOL 25 MG: 25 TABLET ORAL at 08:48

## 2024-10-21 NOTE — PROGRESS NOTES
[facility]      Progress Note    Patient:  Calvin Morris  YOB: 1940  Date of Service: 10/21/2024  MRN: [id number]   Acct: [id number]   Primary Care Physician: King Tolentino MD  Advance Directive: Full Code  Admit Date: 10/18/2024       Hospital Day: 3    Portions of this note have been copied forward, however, updated to reflect the most current clinical status of this patient.     CHIEF COMPLAINT shortness of breath    SUBJECTIVE: Wants to go home.      CUMULATIVE HOSPITAL COURSE:   Patient is 74-year-old past medical history of hypertension, hyperlipidemia, arthritis and restless leg syndrome.  He is on chronic anticoagulation.  Patient presented emergency room complaining of fever last week shortness of breath starting and an increase pedal edema.  Reported productive cough with yellow sputum.  Patient was found to have A-fib in the ED and denied prior history of such.  He also denied chest pain.  ER workup revealed bilateral pulmonary infiltrates suggesting of multifocal pneumonia.  Viral panel was negative.  EKG showed rate controlled A-fib.  Patient was admitted to hospitalist service and cardiology was consulted.  He was started on IV diuresis.  Echo showed LV size with EF of 55-60.  Patient is having bradycardia arrhythmia today..  Will hold his atenolol for now cardiology will reevaluate tomorrow.  His swelling has improved.  We have changed his diuretic to p.o. today.  His sodium is 131 today          Objective:   VITALS:  /67   Pulse 66   Temp 97.2 °F (36.2 °C) (Temporal)   Resp 18   Ht 1.727 m (5' 8\")   Wt 59.9 kg (132 lb)   SpO2 98%   BMI 20.07 kg/m²   24HR INTAKE/OUTPUT:  No intake or output data in the 24 hours ending 10/21/24 1621        Physical Exam  Vitals and nursing note reviewed.   Constitutional:       Appearance: Normal appearance.   HENT:      Head: Normocephalic.      Nose: Nose normal.      Mouth/Throat:      Mouth: Mucous membranes are moist.   Eyes:

## 2024-10-21 NOTE — PLAN OF CARE
Problem: Chronic Conditions and Co-morbidities  Goal: Patient's chronic conditions and co-morbidity symptoms are monitored and maintained or improved  10/21/2024 0015 by Devin Gama RN  Outcome: Progressing  10/21/2024 0015 by Devin Gama RN  Outcome: Progressing     Problem: Discharge Planning  Goal: Discharge to home or other facility with appropriate resources  Outcome: Progressing     Problem: Safety - Adult  Goal: Free from fall injury  Outcome: Progressing     Problem: ABCDS Injury Assessment  Goal: Absence of physical injury  Outcome: Progressing   Electronically signed by Devin Gama RN on 10/21/2024 at 12:16 AM

## 2024-10-21 NOTE — PROGRESS NOTES
Patient has mercy'd down to around 30 BPM on several occasions tonight accompanied by the following length of pauses: 2.37, 3.81, and 4.85 seconds.    Call placed to on-call cardiologist with the only option being to leave a voicemail. VM left reporting the above.    Electronically signed by Devin Gama RN on 10/20/2024 at 11:40 PM

## 2024-10-21 NOTE — PROGRESS NOTES
10/21/24 1417   Resting (Room Air)   SpO2 96   During Walk (Room Air)   SpO2 97   Walk/Assistance Device Cane   After Walk   Does the Patient Qualify for Home O2 No        10/21/24 1417   Resting (Room Air)   SpO2 96   During Walk (Room Air)   SpO2 97   Walk/Assistance Device Cane   After Walk   Does the Patient Qualify for Home O2 No

## 2024-10-22 PROBLEM — R00.1 BRADYCARDIA, UNSPECIFIED: Status: ACTIVE | Noted: 2024-10-18

## 2024-10-22 LAB
ALBUMIN SERPL-MCNC: 3.5 G/DL (ref 3.5–5.2)
ALP SERPL-CCNC: 144 U/L (ref 40–129)
ALT SERPL-CCNC: <5 U/L (ref 5–41)
ANION GAP SERPL CALCULATED.3IONS-SCNC: 12 MMOL/L (ref 7–19)
AST SERPL-CCNC: 12 U/L (ref 5–40)
BASOPHILS # BLD: 0.1 K/UL (ref 0–0.2)
BASOPHILS NFR BLD: 0.9 % (ref 0–1)
BILIRUB SERPL-MCNC: 0.4 MG/DL (ref 0.2–1.2)
BUN SERPL-MCNC: 42 MG/DL (ref 8–23)
CALCIUM SERPL-MCNC: 9 MG/DL (ref 8.8–10.2)
CHLORIDE SERPL-SCNC: 97 MMOL/L (ref 98–111)
CO2 SERPL-SCNC: 25 MMOL/L (ref 22–29)
CREAT SERPL-MCNC: 1.5 MG/DL (ref 0.7–1.2)
EOSINOPHIL # BLD: 0.1 K/UL (ref 0–0.6)
EOSINOPHIL NFR BLD: 0.8 % (ref 0–5)
ERYTHROCYTE [DISTWIDTH] IN BLOOD BY AUTOMATED COUNT: 14.4 % (ref 11.5–14.5)
GLUCOSE SERPL-MCNC: 146 MG/DL (ref 70–99)
HCT VFR BLD AUTO: 37.2 % (ref 42–52)
HGB BLD-MCNC: 11.6 G/DL (ref 14–18)
IMM GRANULOCYTES # BLD: 0.3 K/UL
LYMPHOCYTES # BLD: 1.1 K/UL (ref 1.1–4.5)
LYMPHOCYTES NFR BLD: 13.9 % (ref 20–40)
MCH RBC QN AUTO: 28.3 PG (ref 27–31)
MCHC RBC AUTO-ENTMCNC: 31.2 G/DL (ref 33–37)
MCV RBC AUTO: 90.7 FL (ref 80–94)
MONOCYTES # BLD: 1 K/UL (ref 0–0.9)
MONOCYTES NFR BLD: 12.1 % (ref 0–10)
NEUTROPHILS # BLD: 5.4 K/UL (ref 1.5–7.5)
NEUTS SEG NFR BLD: 68.3 % (ref 50–65)
PLATELET # BLD AUTO: 271 K/UL (ref 130–400)
PMV BLD AUTO: 9.8 FL (ref 9.4–12.4)
POTASSIUM SERPL-SCNC: 4.1 MMOL/L (ref 3.5–5)
PROT SERPL-MCNC: 6.7 G/DL (ref 6.4–8.3)
RBC # BLD AUTO: 4.1 M/UL (ref 4.7–6.1)
SODIUM SERPL-SCNC: 134 MMOL/L (ref 136–145)
WBC # BLD AUTO: 7.9 K/UL (ref 4.8–10.8)

## 2024-10-22 PROCEDURE — 6360000002 HC RX W HCPCS: Performed by: NURSE PRACTITIONER

## 2024-10-22 PROCEDURE — 94760 N-INVAS EAR/PLS OXIMETRY 1: CPT

## 2024-10-22 PROCEDURE — 36415 COLL VENOUS BLD VENIPUNCTURE: CPT

## 2024-10-22 PROCEDURE — 6370000000 HC RX 637 (ALT 250 FOR IP)

## 2024-10-22 PROCEDURE — 2580000003 HC RX 258: Performed by: NURSE PRACTITIONER

## 2024-10-22 PROCEDURE — 99232 SBSQ HOSP IP/OBS MODERATE 35: CPT | Performed by: INTERNAL MEDICINE

## 2024-10-22 PROCEDURE — 6360000002 HC RX W HCPCS

## 2024-10-22 PROCEDURE — 1200000000 HC SEMI PRIVATE

## 2024-10-22 PROCEDURE — 80053 COMPREHEN METABOLIC PANEL: CPT

## 2024-10-22 PROCEDURE — 2580000003 HC RX 258

## 2024-10-22 PROCEDURE — 85025 COMPLETE CBC W/AUTO DIFF WBC: CPT

## 2024-10-22 RX ADMIN — CARBIDOPA AND LEVODOPA 1 TABLET: 25; 100 TABLET ORAL at 19:46

## 2024-10-22 RX ADMIN — SODIUM CHLORIDE 125 MG: 9 INJECTION, SOLUTION INTRAVENOUS at 08:47

## 2024-10-22 RX ADMIN — ENOXAPARIN SODIUM 60 MG: 100 INJECTION SUBCUTANEOUS at 19:46

## 2024-10-22 RX ADMIN — ENOXAPARIN SODIUM 60 MG: 100 INJECTION SUBCUTANEOUS at 08:43

## 2024-10-22 RX ADMIN — CLOPIDOGREL BISULFATE 75 MG: 75 TABLET ORAL at 08:44

## 2024-10-22 RX ADMIN — SODIUM CHLORIDE, PRESERVATIVE FREE 10 ML: 5 INJECTION INTRAVENOUS at 19:46

## 2024-10-22 RX ADMIN — AMLODIPINE BESYLATE 5 MG: 5 TABLET ORAL at 08:44

## 2024-10-22 NOTE — PROGRESS NOTES
Physician Progress Note      PATIENT:               SOFIYA RICHARDS  [facility] #:                  [id number]  :                       1940  ADMIT DATE:       10/18/2024 8:50 AM  DISCH DATE:  RESPONDING  PROVIDER #:        NAKUL WALTER        QUERY TEXT:    Stage of Chronic Kidney Disease: Please provide further specificity, if known.    Clinical indicators include: creatinine, bun, gfr, bnp, ckd, pro-bnp  Options provided:  -- Chronic kidney disease stage 1  -- Chronic kidney disease stage 2  -- Chronic kidney disease stage 3  -- Chronic kidney disease stage 3a  -- Chronic kidney disease stage 3b  -- Chronic kidney disease stage 4  -- Chronic kidney disease stage 5  -- Chronic kidney disease stage 5, requiring dialysis  -- End stage renal disease  -- Other - I will add my own diagnosis  -- Disagree - Not applicable / Not valid  -- Disagree - Clinically Unable to determine / Unknown        PROVIDER RESPONSE TEXT:    The patient has chronic kidney disease stage 3b.      Electronically signed by:  NAKUL WALTER 10/22/2024 11:18 AM

## 2024-10-22 NOTE — PROGRESS NOTES
Cardiology Daily Note Navjot Mac MD      Patient:  Calvin Morris  [id number]    Patient Active Problem List    Diagnosis Date Noted    Unstable angina (HCC)     Dyspnea on exertion 10/18/2024    New onset of congestive heart failure (HCC) 10/18/2024    New onset a-fib (HCC) 10/18/2024    Gout 10/18/2024    Restless leg syndrome 10/18/2024    Bilateral carotid artery stenosis 02/28/2019    Skin cancer 02/22/2019    Colonic polyp 07/06/2018    Dysphagia 07/06/2018    GERD (gastroesophageal reflux disease) 07/06/2018    Dyslipidemia 07/03/2018    Irregular heart beat 10/24/2017    Acute blood loss anemia 12/12/2015    Cerebrovascular disease 12/12/2015    Osteoarthritis of right knee 12/12/2015    Coronary artery disease involving native coronary artery 12/11/2015    Essential hypertension 12/11/2015       Admit Date:  10/18/2024    Admission Problem List: Present on Admission:   Dyspnea on exertion   Essential hypertension   Dyslipidemia   New onset of congestive heart failure (HCC)   New onset a-fib (HCC)   Gout   Restless leg syndrome      Cardiac Specific Data:  Specialty Problems          Cardiology Problems    Unstable angina (HCC)        Coronary artery disease involving native coronary artery        Essential hypertension        Cerebrovascular disease        Bilateral carotid artery stenosis        New onset a-fib (HCC)        New onset of congestive heart failure (HCC)           Subjective:  Mr. Morris seen today admitted 10/18/2020 for increasing shortness of breath for several weeks.  Denies anginal chest pain.  Noted to be in atrial fibrillation.  proBNP 7905.  Echocardiogram shows normal left ventricular function no significant valvular heart disease.  Atenolol has been held.  Catheterization note from 2019 reviewed.  Last known episode of sinus rhythm in January 24.  Lengthy discussion with the patient and his wife.  Patient is ambulatory he is somewhat unsteady on his feet he does  benefits and risk.  He is agreeable will plan for tomorrow.    Navjot Mac MD, MD 10/22/2024 2:58 PM

## 2024-10-22 NOTE — PROGRESS NOTES
Telemetry notified floor nurse of 4.8 second pause.  Hospitalist APRN notified.  Cardiology to be re consulted due to new a fib now with pauses.

## 2024-10-22 NOTE — PLAN OF CARE
Problem: Chronic Conditions and Co-morbidities  Goal: Patient's chronic conditions and co-morbidity symptoms are monitored and maintained or improved  10/21/2024 2343 by Devin Gama RN  Outcome: Progressing  10/21/2024 2343 by Devin Gama RN  Outcome: Progressing     Problem: Discharge Planning  Goal: Discharge to home or other facility with appropriate resources  10/21/2024 2343 by Devin Gama RN  Outcome: Progressing  10/21/2024 2343 by Devin Gama RN  Outcome: Progressing     Problem: Safety - Adult  Goal: Free from fall injury  10/21/2024 2343 by Devin Gama RN  Outcome: Progressing  10/21/2024 2343 by Devin Gama RN  Outcome: Progressing     Problem: ABCDS Injury Assessment  Goal: Absence of physical injury  10/21/2024 2343 by Devin Gama RN  Outcome: Progressing  10/21/2024 2343 by Devin Gama RN  Outcome: Progressing   Electronically signed by Devin Gama RN on 10/21/2024 at 11:43 PM

## 2024-10-22 NOTE — CARE COORDINATION
10/22/24 0927   IMM Letter   IMM Letter given to Patient/Family/Significant other/Guardian/POA/by: agnes meier sw   IMM Letter date given: 10/22/24   IMM Letter time given: 0908     Second IMM given to patient and explained with patient verbalizing understanding.  All questions and concerns addressed     Signed letter placed in pt soft chart   Patient declined waiting 4 hr period prior to discharge.   Electronically signed by Agnes Meier on 10/22/2024 at 9:27 AM   
Yes       10/22/24 1325   Service Assessment   Patient Orientation Alert and Oriented   Cognition Alert   History Provided By Patient;Significant Other   Primary Caregiver Self   Accompanied By/Relationship spouse   Support Systems Spouse/Significant Other;Family Members   Patient's Healthcare Decision Maker is: Legal Next of Kin   PCP Verified by CM Yes   Last Visit to PCP Within last 3 months   Prior Functional Level Independent in ADLs/IADLs   Current Functional Level Independent in ADLs/IADLs   Can patient return to prior living arrangement Yes   Ability to make needs known: Good   Family able to assist with home care needs: Yes   Would you like for me to discuss the discharge plan with any other family members/significant others, and if so, who? Yes  (spouse)   Financial Resources Medicare   Community Resources None   CM/SW Referral Other (see comment)  (none)   Social/Functional History   Lives With Spouse   Type of Home House   Home Layout One level   Home Access Level entry   Bathroom Shower/Tub Tub/Shower unit   Bathroom Toilet Standard   Bathroom Equipment Grab bars in shower   Bathroom Accessibility Accessible   Home Equipment Cane   Receives Help From Family   ADL Assistance Independent   Homemaking Responsibilities No   Ambulation Assistance Independent   Transfer Assistance Independent   Active  Yes   Mode of Transportation Truck   Occupation Retired   Discharge Planning   Type of Residence House   Living Arrangements Spouse/Significant Other   Current Services Prior To Admission None   Potential Assistance Needed N/A   DME Ordered? No   Potential Assistance Purchasing Medications No   Type of Home Care Services None   Patient expects to be discharged to: House   Follow Up Appointment: Best Day/Time  Tuesday AM   One/Two Story Residence One story   History of falls? 0   Services At/After Discharge   Transition of Care Consult (CM Consult) N/A   Services At/After Discharge None    Resource

## 2024-10-22 NOTE — PROGRESS NOTES
[facility] Hospitalists      Progress Note    Patient:  Calvin Morris  YOB: 1940  Date of Service: 10/22/2024  MRN: [id number]   Acct: [id number]   Primary Care Physician: King Tolentino MD  Advance Directive: Full Code  Admit Date: 10/18/2024       Hospital Day: 4    Portions of this note have been copied forward, however, updated to reflect the most current clinical status of this patient.     CHIEF COMPLAINT shortness of breath    SUBJECTIVE: Wants to go home.      CUMULATIVE HOSPITAL COURSE:   Patient is 74-year-old past medical history of hypertension, hyperlipidemia, arthritis and restless leg syndrome.  He is on chronic anticoagulation.  Patient presented emergency room complaining of fever last week shortness of breath starting and an increase pedal edema.  Reported productive cough with yellow sputum.  Patient was found to have A-fib in the ED and denied prior history of such.  He also denied chest pain.  ER workup revealed bilateral pulmonary infiltrates suggesting of multifocal pneumonia.  Viral panel was negative.  EKG showed rate controlled A-fib.  Patient was admitted to hospitalist service and cardiology was consulted.  He was started on IV diuresis.  Echo showed LV size with EF of 55-60.  Patient is having bradycardia arrhythmia today..  Will hold his atenolol for now cardiology will reevaluate tomorrow.  His swelling has improved.  We have changed his diuretic to p.o. today.  His sodium is 131 today          Objective:   VITALS:  /67   Pulse 88   Temp 97.9 °F (36.6 °C)   Resp 16   Ht 1.727 m (5' 8\")   Wt 59.4 kg (131 lb)   SpO2 97%   BMI 19.92 kg/m²   24HR INTAKE/OUTPUT:  No intake or output data in the 24 hours ending 10/22/24 0832        Physical Exam  Vitals and nursing note reviewed.   Constitutional:       Appearance: Normal appearance.   HENT:      Head: Normocephalic.      Nose: Nose normal.      Mouth/Throat:      Mouth: Mucous membranes are moist.   Eyes:

## 2024-10-23 ENCOUNTER — APPOINTMENT (OUTPATIENT)
Dept: VASCULAR LAB | Age: 84
End: 2024-10-23
Attending: INTERNAL MEDICINE
Payer: MEDICARE

## 2024-10-23 ENCOUNTER — APPOINTMENT (OUTPATIENT)
Dept: GENERAL RADIOLOGY | Age: 84
End: 2024-10-23
Payer: MEDICARE

## 2024-10-23 PROCEDURE — 2580000003 HC RX 258

## 2024-10-23 PROCEDURE — 6360000002 HC RX W HCPCS: Performed by: INTERNAL MEDICINE

## 2024-10-23 PROCEDURE — 2580000003 HC RX 258: Performed by: INTERNAL MEDICINE

## 2024-10-23 PROCEDURE — 6370000000 HC RX 637 (ALT 250 FOR IP)

## 2024-10-23 PROCEDURE — 33274 TCAT INSJ/RPL PERM LDLS PM: CPT | Performed by: INTERNAL MEDICINE

## 2024-10-23 PROCEDURE — 6370000000 HC RX 637 (ALT 250 FOR IP): Performed by: NURSE PRACTITIONER

## 2024-10-23 PROCEDURE — 2500000003 HC RX 250 WO HCPCS: Performed by: INTERNAL MEDICINE

## 2024-10-23 PROCEDURE — 6360000004 HC RX CONTRAST MEDICATION: Performed by: INTERNAL MEDICINE

## 2024-10-23 PROCEDURE — C1786 PMKR, SINGLE, RATE-RESP: HCPCS | Performed by: INTERNAL MEDICINE

## 2024-10-23 PROCEDURE — 99152 MOD SED SAME PHYS/QHP 5/>YRS: CPT | Performed by: INTERNAL MEDICINE

## 2024-10-23 PROCEDURE — 2140000000 HC CCU INTERMEDIATE R&B

## 2024-10-23 PROCEDURE — 2709999900 HC NON-CHARGEABLE SUPPLY: Performed by: INTERNAL MEDICINE

## 2024-10-23 PROCEDURE — 6360000002 HC RX W HCPCS: Performed by: NURSE PRACTITIONER

## 2024-10-23 PROCEDURE — 02H63NZ INSERTION OF INTRACARDIAC PACEMAKER INTO RIGHT ATRIUM, PERCUTANEOUS APPROACH: ICD-10-PCS | Performed by: INTERNAL MEDICINE

## 2024-10-23 PROCEDURE — C1769 GUIDE WIRE: HCPCS | Performed by: INTERNAL MEDICINE

## 2024-10-23 PROCEDURE — 2580000003 HC RX 258: Performed by: NURSE PRACTITIONER

## 2024-10-23 PROCEDURE — C1894 INTRO/SHEATH, NON-LASER: HCPCS | Performed by: INTERNAL MEDICINE

## 2024-10-23 PROCEDURE — 71045 X-RAY EXAM CHEST 1 VIEW: CPT

## 2024-10-23 RX ORDER — ONDANSETRON 2 MG/ML
4 INJECTION INTRAMUSCULAR; INTRAVENOUS EVERY 6 HOURS PRN
Status: DISCONTINUED | OUTPATIENT
Start: 2024-10-23 | End: 2024-10-24 | Stop reason: HOSPADM

## 2024-10-23 RX ORDER — GUAIFENESIN 600 MG/1
600 TABLET, EXTENDED RELEASE ORAL 3 TIMES DAILY
Status: DISCONTINUED | OUTPATIENT
Start: 2024-10-23 | End: 2024-10-24 | Stop reason: HOSPADM

## 2024-10-23 RX ORDER — SODIUM CHLORIDE 9 MG/ML
INJECTION, SOLUTION INTRAVENOUS CONTINUOUS
Status: DISCONTINUED | OUTPATIENT
Start: 2024-10-23 | End: 2024-10-24 | Stop reason: HOSPADM

## 2024-10-23 RX ORDER — IOPAMIDOL 612 MG/ML
INJECTION, SOLUTION INTRAVASCULAR PRN
Status: DISCONTINUED | OUTPATIENT
Start: 2024-10-23 | End: 2024-10-23 | Stop reason: HOSPADM

## 2024-10-23 RX ORDER — MIDAZOLAM HYDROCHLORIDE 1 MG/ML
INJECTION, SOLUTION INTRAMUSCULAR; INTRAVENOUS PRN
Status: DISCONTINUED | OUTPATIENT
Start: 2024-10-23 | End: 2024-10-23 | Stop reason: HOSPADM

## 2024-10-23 RX ORDER — CHLORHEXIDINE GLUCONATE 40 MG/ML
SOLUTION TOPICAL ONCE
Status: DISCONTINUED | OUTPATIENT
Start: 2024-10-23 | End: 2024-10-23 | Stop reason: HOSPADM

## 2024-10-23 RX ORDER — FENTANYL CITRATE 50 UG/ML
INJECTION, SOLUTION INTRAMUSCULAR; INTRAVENOUS PRN
Status: DISCONTINUED | OUTPATIENT
Start: 2024-10-23 | End: 2024-10-23 | Stop reason: HOSPADM

## 2024-10-23 RX ORDER — SODIUM CHLORIDE 9 MG/ML
INJECTION, SOLUTION INTRAVENOUS CONTINUOUS
Status: DISCONTINUED | OUTPATIENT
Start: 2024-10-23 | End: 2024-10-23 | Stop reason: HOSPADM

## 2024-10-23 RX ADMIN — GUAIFENESIN 600 MG: 600 TABLET ORAL at 20:53

## 2024-10-23 RX ADMIN — CARBIDOPA AND LEVODOPA 1 TABLET: 25; 100 TABLET ORAL at 20:53

## 2024-10-23 RX ADMIN — SODIUM CHLORIDE, PRESERVATIVE FREE 10 ML: 5 INJECTION INTRAVENOUS at 20:55

## 2024-10-23 RX ADMIN — SODIUM CHLORIDE: 9 INJECTION, SOLUTION INTRAVENOUS at 14:53

## 2024-10-23 RX ADMIN — AMLODIPINE BESYLATE 5 MG: 5 TABLET ORAL at 09:42

## 2024-10-23 RX ADMIN — SODIUM CHLORIDE 125 MG: 9 INJECTION, SOLUTION INTRAVENOUS at 09:42

## 2024-10-23 NOTE — PROGRESS NOTES
[facility] Hospitalists      Progress Note    Patient:  Calvin Morris  YOB: 1940  Date of Service: 10/22/2024  MRN: [id number]   Acct: [id number]   Primary Care Physician: King Tolentino MD  Advance Directive: Full Code  Admit Date: 10/18/2024       Hospital Day: 5    Portions of this note have been copied forward, however, updated to reflect the most current clinical status of this patient.     CHIEF COMPLAINT shortness of breath    SUBJECTIVE: Wants to go home.      CUMULATIVE HOSPITAL COURSE:   Patient is 74-year-old past medical history of hypertension, hyperlipidemia, arthritis and restless leg syndrome.  He is on chronic anticoagulation.  Patient presented emergency room complaining of fever last week shortness of breath starting and an increase pedal edema.  Reported productive cough with yellow sputum.  Patient was found to have A-fib in the ED and denied prior history of such.  He also denied chest pain.  ER workup revealed bilateral pulmonary infiltrates suggesting of multifocal pneumonia.  Viral panel was negative.  EKG showed rate controlled A-fib.  Patient was admitted to hospitalist service and cardiology was consulted.  He was started on IV diuresis.  Echo showed LV size with EF of 55-60.  Patient is having bradycardia arrhythmia today..  Will hold his atenolol for now cardiology will reevaluate tomorrow.  His swelling has improved.  We have changed his diuretic to p.o. today.  His sodium is 131 today.  Awaiting cardiology to eval for episodes of bradycardia      Objective:   VITALS:  BP (!) 151/64   Pulse 89   Temp 97 °F (36.1 °C) (Temporal)   Resp 16   Ht 1.727 m (5' 8\")   Wt 59.4 kg (131 lb)   SpO2 100%   BMI 19.92 kg/m²   24HR INTAKE/OUTPUT:  No intake or output data in the 24 hours ending 10/23/24 0842        Physical Exam  Vitals and nursing note reviewed.   Constitutional:       Appearance: Normal appearance.   HENT:      Head: Normocephalic.      Nose: Nose normal.        Short-term follow-up versus further evaluation CT advised.      ______________________________________ Electronically signed by: ADRIANA VARGAS M.D. Date:     10/18/2024 Time:    09:22             Assessment/Plan   Principal Problem:    Dyspnea on exertion  Active Problems:    Essential hypertension    Dyslipidemia    New onset of congestive heart failure (HCC)    New onset a-fib (HCC)    Gout    Restless leg syndrome    Bradycardia, unspecified  Resolved Problems:    * No resolved hospital problems. *    Principal Problem:    Dyspnea on exertion   Cardiology following   Continuous telemetry   Daily weight   Strict intake and output  Active Problems:    Essential hypertension   Stable    At his baseline    Dyslipidemia   Continue statin    New onset of congestive heart failure (HCC)    New onset a-fib (HCC)   Continues to limit  .   Currently rate controlled    Atenolol now held because of bradycardia arrhythm  CKD   Daily lab   Strict I and O   Avoid hypotension         Restless leg syndrome  Resolved Problems:    * No resolved hospital problems. *      DVT Prophylaxis: lovenox       Discharge planning: TBD       Further Orders per Clinical course/attending.     Electronically signed by ANTHONY Colón CNP on 10/23/2024 at 8:42 AM       EMR Dragon/Transcription disclaimer:   Much of this encounter note is an electronic transcription/translation of spoken language to printed text. The electronic translation of spoken language may permit erroneous, or at times, nonsensical words or phrases to be inadvertently transcribed; although attempts have made to review the note for such errors, some may still exist.

## 2024-10-23 NOTE — PROGRESS NOTES
4 Eyes Skin Assessment     NAME:  Calvin Morris  YOB: 1940  MEDICAL RECORD NUMBER:  [id number]    The patient is being assessed for  Transfer to New Unit    I agree that at least one RN has performed a thorough Head to Toe Skin Assessment on the patient. ALL assessment sites listed below have been assessed.      Areas assessed by both nurses:    Head, Face, Ears, Shoulders, Back, Chest, Arms, Elbows, Hands, Sacrum. Buttock, Coccyx, Ischium, Legs. Feet and Heels, and Under Medical Devices         Does the Patient have a Wound? No noted wound(s)       Elmo Prevention initiated by RN: No  Wound Care Orders initiated by RN: No    Pressure Injury (Stage 3,4, Unstageable, DTI, NWPT, and Complex wounds) if present, place Wound referral order by RN under : No    New Ostomies, if present place, Ostomy referral order under : No     Nurse 1 eSignature: Electronically signed by Alisha Sharma RN on 10/23/24 at 6:43 PM CDT    **SHARE this note so that the co-signing nurse can place an eSignature**    Nurse 2 eSignature: Electronically signed by Reena Logan RN on 10/23/24 at 6:48 PM CDT

## 2024-10-23 NOTE — PLAN OF CARE
Problem: Chronic Conditions and Co-morbidities  Goal: Patient's chronic conditions and co-morbidity symptoms are monitored and maintained or improved  Outcome: Progressing     Problem: Discharge Planning  Goal: Discharge to home or other facility with appropriate resources  Outcome: Progressing     Problem: Safety - Adult  Goal: Free from fall injury  Outcome: Progressing     Problem: ABCDS Injury Assessment  Goal: Absence of physical injury  Outcome: Progressing   Electronically signed by Devin Gama RN on 10/22/2024 at 9:38 PM

## 2024-10-23 NOTE — PROGRESS NOTES
Calvin Morris arrived to room # 715-2.   Presented with: a-fib snf bradycardia  Mental Status: Patient is oriented, alert, coherent, logical, thought processes intact, and able to concentrate and follow conversation.   Vitals:    10/23/24 1610   BP: 127/68   Pulse: 87   Resp: 12   Temp: 98 °F (36.7 °C)   SpO2: 94%     Patient safety contract and falls prevention contract reviewed with patient Yes.  Oriented Patient to room.  Call light within reach. Yes.  Needs, issues or concerns expressed at this time: no.      Electronically signed by Reena Logan RN on 10/23/2024 at 6:49 PM

## 2024-10-23 NOTE — PROGRESS NOTES
Lung Parenchyma and Airways: Central airways are patent without endobronchial lesion.  Mild scarring at the lung bases.  No focal airspace disease.  No suspicious pulmonary nodule. Pleural Space: Bilateral posterior inferior pleural thickening and calcification.  Left medial pleural thickening.  Small amount of fluid in the right major fissure  No pneumothorax. Thoracic Inlet, Mediastinum, and Sharron: Thyroid gland is normal.  No lymphadenopathy. Heart, Vessels, and Pericardium: The main pulmonary artery is normal caliber.  The thoracic aorta is not dilated.  Calcification in the aorta consistent with atherosclerosis.  Coronary artery calcification.  The heart is enlarged.  There is no pericardial effusion or thickening. Bones and Soft Tissues: No fracture, lytic lesion, or blastic lesion.  Degenerative changes of the spine.  Chest wall soft tissues are unremarkable. Upper Abdomen:  The visualized portions of the liver, spleen, and adrenals are normal. Gallstones in the gallbladder.      1.  Mild scarring at the lung bases.  No evidence of pneumonia. 2.  Bilateral pleural thickening and calcification, chronic. 3.  Small right pleural effusion with fluid in the right major fissure. 4.  Previous CABG. 5.  Cardiomegaly, atherosclerosis, coronary calcification. 6.  Gallstones in the gallbladder. 7.  Degenerative changes of the spine. 8.  Otherwise unremarkable noncontrast CT scan of the chest.  All CT scans are performed using dose optimization techniques as appropriate to the performed exam and include at least one of the following: Automated exposure control, adjustment of the mA and/or kV according to size, and the use of iterative reconstruction technique.  ______________________________________ Electronically signed by: MILEY VILLA M.D. Date:     10/18/2024 Time:    15:45     XR CHEST PORTABLE    Result Date: 10/18/2024  EXAM:  CHEST RADIOGRAPH; SINGLE VIEW  HISTORY:  Shortness of breath  COMPARISON:  None.

## 2024-10-24 ENCOUNTER — READMISSION MANAGEMENT (OUTPATIENT)
Dept: CALL CENTER | Facility: HOSPITAL | Age: 84
End: 2024-10-24
Payer: COMMERCIAL

## 2024-10-24 VITALS
HEART RATE: 64 BPM | TEMPERATURE: 98.2 F | SYSTOLIC BLOOD PRESSURE: 134 MMHG | OXYGEN SATURATION: 97 % | BODY MASS INDEX: 19.85 KG/M2 | WEIGHT: 131 LBS | RESPIRATION RATE: 18 BRPM | HEIGHT: 68 IN | DIASTOLIC BLOOD PRESSURE: 71 MMHG

## 2024-10-24 LAB
ANION GAP SERPL CALCULATED.3IONS-SCNC: 10 MMOL/L (ref 7–19)
BUN SERPL-MCNC: 31 MG/DL (ref 8–23)
CALCIUM SERPL-MCNC: 9.1 MG/DL (ref 8.8–10.2)
CHLORIDE SERPL-SCNC: 99 MMOL/L (ref 98–111)
CO2 SERPL-SCNC: 25 MMOL/L (ref 22–29)
CREAT SERPL-MCNC: 1.2 MG/DL (ref 0.7–1.2)
ECHO BSA: 1.69 M2
ERYTHROCYTE [DISTWIDTH] IN BLOOD BY AUTOMATED COUNT: 14.3 % (ref 11.5–14.5)
GLUCOSE SERPL-MCNC: 123 MG/DL (ref 70–99)
HCT VFR BLD AUTO: 39.7 % (ref 42–52)
HGB BLD-MCNC: 12.3 G/DL (ref 14–18)
MCH RBC QN AUTO: 28.5 PG (ref 27–31)
MCHC RBC AUTO-ENTMCNC: 31 G/DL (ref 33–37)
MCV RBC AUTO: 91.9 FL (ref 80–94)
PLATELET # BLD AUTO: 269 K/UL (ref 130–400)
PMV BLD AUTO: 9.8 FL (ref 9.4–12.4)
POTASSIUM SERPL-SCNC: 4.5 MMOL/L (ref 3.5–5)
RBC # BLD AUTO: 4.32 M/UL (ref 4.7–6.1)
SODIUM SERPL-SCNC: 134 MMOL/L (ref 136–145)
WBC # BLD AUTO: 9.2 K/UL (ref 4.8–10.8)

## 2024-10-24 PROCEDURE — 6370000000 HC RX 637 (ALT 250 FOR IP): Performed by: INTERNAL MEDICINE

## 2024-10-24 PROCEDURE — 36415 COLL VENOUS BLD VENIPUNCTURE: CPT

## 2024-10-24 PROCEDURE — 6360000002 HC RX W HCPCS: Performed by: NURSE PRACTITIONER

## 2024-10-24 PROCEDURE — 85027 COMPLETE CBC AUTOMATED: CPT

## 2024-10-24 PROCEDURE — 2580000003 HC RX 258

## 2024-10-24 PROCEDURE — 6370000000 HC RX 637 (ALT 250 FOR IP): Performed by: NURSE PRACTITIONER

## 2024-10-24 PROCEDURE — 80048 BASIC METABOLIC PNL TOTAL CA: CPT

## 2024-10-24 PROCEDURE — 2580000003 HC RX 258: Performed by: NURSE PRACTITIONER

## 2024-10-24 PROCEDURE — 99232 SBSQ HOSP IP/OBS MODERATE 35: CPT | Performed by: INTERNAL MEDICINE

## 2024-10-24 PROCEDURE — 94760 N-INVAS EAR/PLS OXIMETRY 1: CPT

## 2024-10-24 PROCEDURE — 6370000000 HC RX 637 (ALT 250 FOR IP)

## 2024-10-24 RX ORDER — GUAIFENESIN 600 MG/1
600 TABLET, EXTENDED RELEASE ORAL 3 TIMES DAILY
Qty: 30 TABLET | Refills: 0 | Status: SHIPPED | OUTPATIENT
Start: 2024-10-24 | End: 2024-11-03

## 2024-10-24 RX ORDER — ATENOLOL 25 MG/1
25 TABLET ORAL DAILY
Status: DISCONTINUED | OUTPATIENT
Start: 2024-10-25 | End: 2024-10-24

## 2024-10-24 RX ORDER — ATENOLOL 25 MG/1
25 TABLET ORAL DAILY
Qty: 30 TABLET | Refills: 3 | Status: SHIPPED | OUTPATIENT
Start: 2024-10-25

## 2024-10-24 RX ORDER — ATENOLOL 25 MG/1
25 TABLET ORAL DAILY
Status: DISCONTINUED | OUTPATIENT
Start: 2024-10-24 | End: 2024-10-24 | Stop reason: HOSPADM

## 2024-10-24 RX ADMIN — SODIUM CHLORIDE, PRESERVATIVE FREE 10 ML: 5 INJECTION INTRAVENOUS at 08:06

## 2024-10-24 RX ADMIN — ALLOPURINOL 100 MG: 100 TABLET ORAL at 07:50

## 2024-10-24 RX ADMIN — APIXABAN 2.5 MG: 2.5 TABLET, FILM COATED ORAL at 10:53

## 2024-10-24 RX ADMIN — ATENOLOL 25 MG: 25 TABLET ORAL at 10:23

## 2024-10-24 RX ADMIN — SODIUM CHLORIDE 125 MG: 9 INJECTION, SOLUTION INTRAVENOUS at 08:09

## 2024-10-24 RX ADMIN — CLOPIDOGREL BISULFATE 75 MG: 75 TABLET ORAL at 07:50

## 2024-10-24 RX ADMIN — GUAIFENESIN 600 MG: 600 TABLET ORAL at 07:50

## 2024-10-24 RX ADMIN — AMLODIPINE BESYLATE 5 MG: 5 TABLET ORAL at 07:50

## 2024-10-24 NOTE — DISCHARGE SUMMARY
Discharge Summary    NAME: Calvin Morris  :  1940  MRN:  [id number]    Admit date:  10/18/2024  Discharge date:  10/24/2024    Admitting Physician:  No admitting provider for patient encounter.    Advance Directive: Full Code    Consults: cardiology    Primary Care Physician:  King Tolentino, MD    Discharge Diagnoses:  Principal Problem:    Dyspnea on exertion  Active Problems:    Essential hypertension    Dyslipidemia    New onset of congestive heart failure (HCC)    New onset a-fib (HCC)    Gout    Restless leg syndrome    Bradycardia, unspecified  Resolved Problems:    * No resolved hospital problems. *      Significant Diagnostic Studies:   Echo (TTE) complete (PRN contrast/bubble/strain/3D)    Result Date: 10/19/2024    Left Ventricle: Normal left ventricular systolic function with a visually estimated EF of 55 - 60%. Left ventricle size is normal. Mild posterior thickening. Normal wall motion. Normal diastolic function.   Aortic Valve: Trileaflet valve.   Tricuspid Valve: Mild regurgitation. The estimated RVSP is 37 mmHg.   Image quality is good. Contrast used: Definity.   No pericardial effusion     CT CHEST WO CONTRAST    Result Date: 10/18/2024  EXAM: CHEST CT WITHOUT CONTRAST  HISTORY: Suspected pneumonia on the chest x-ray.  TECHNIQUE: CT acquisition of the chest from the thoracic inlet to the upper abdomen without IV contrast administration.  2-D coronal and sagittal reformatted images were obtained from the axial source images. IV Contrast: None.  CT Dose Reduction Techniques Performed: Yes.  COMPARISON: Chest x-ray dated 10/18/2024.  FINDINGS: Lines, Tubes, Devices: Sternal wires and mediastinal clips from previous CABG. Lung Parenchyma and Airways: Central airways are patent without endobronchial lesion.  Mild scarring at the lung bases.  No focal airspace disease.  No suspicious pulmonary nodule. Pleural Space: Bilateral posterior inferior pleural thickening and calcification.  Left medial  [phone]  [address] [place] [address] [facility] [address]      Phone: [phone]   apixaban 2.5 MG Tabs tablet  atenolol 25 MG tablet  guaiFENesin 600 MG extended release tablet         Discharge Instructions:   Follow up with King Tolentino, MD in 3-7 days.  Take medications as directed.  Resume activity as tolerated.    Diet: ADULT DIET; Regular; Low Fat/Low Chol/High Fiber/ALMA; No Caffeine     Disposition: Patient is medically stable and will be discharged home.    Time spent on discharge 45.    Signed:  ANTHONY Colón - CNP  10/24/2024 1:04 PM

## 2024-10-25 ENCOUNTER — TRANSITIONAL CARE MANAGEMENT TELEPHONE ENCOUNTER (OUTPATIENT)
Dept: CALL CENTER | Facility: HOSPITAL | Age: 84
End: 2024-10-25
Payer: COMMERCIAL

## 2024-10-25 ENCOUNTER — CARE COORDINATION (OUTPATIENT)
Dept: CASE MANAGEMENT | Age: 84
End: 2024-10-25

## 2024-10-25 NOTE — CARE COORDINATION
Care Transitions Note    Initial Call - Call within 2 business days of discharge: Yes    Attempted to reach patient for transitions of care follow up. Unable to reach patient.    Outreach Attempts:   Unable to leave message.     Patient: Calvin Morris    Patient : 1940   MRN: [id number]    Reason for Admission: New Onset CHF, New Onset A. Fib, Other  Discharge Date: 10/24/24  RURS: Readmission Risk Score: 15.3    Last Discharge Facility       Date Complaint Diagnosis Description Type Department Provider    10/18/24 Shortness of Breath Acute congestive heart failure, unspecified heart failure type (HCC) ... ED to Hosp-Admission (Discharged) (ADMITTED) L Mike Martínez MD; Erwin Vila...          Was this an external facility discharge? No    Follow Up Appointment:   Patient has hospital follow up appointment scheduled within 7 days of discharge.    PCP, 10/29/2024, 1:45 pm  Future Appointments         Provider Specialty Dept Phone    2024 9:00 AM SCHEDULE, [facility] CARDI[facility] DEVICE Cardiology [phone]    2024 8:30 AM Michael Preciado APRN Cardiology [phone]    2025 1:00 PM Michael Preciado APRN Cardiology [phone]    3/13/2025 9:00 AM (Arrive by 8:45 AM) [facility] VASCULAR LAB 2 Vascular Surgery [phone]    3/13/2025 9:30 AM Michelle Bobby APRN Vascular Surgery [phone]            Plan for follow-up on next business day.      Alice Valdivia RN

## 2024-10-25 NOTE — OUTREACH NOTE
Call Center TCM Note      Flowsheet Row Responses   [facility] [place] by [facility] patient discharged from? Non-  [[facility]]   Does the patient have one of the following disease processes/diagnoses(primary or secondary)? Other   TCM attempt successful? Yes   Call start time 1317   Call end time 1318   Discharge diagnosis Insert or replace transcath PPM leadless   Person spoke with today (if not patient) and relationship Wife   Meds reviewed with patient/caregiver? Yes   Is the patient having any side effects they believe may be caused by any medication additions or changes? No   Does the patient have all medications ordered at discharge? Yes   Is the patient taking all medications as directed (includes completed medication regime)? Yes   Comments HOSP DC FU appt 10/29/24 145 pm   Does the patient have an appointment with their PCP within 7-14 days of discharge? Yes   Has home health visited the patient within 72 hours of discharge? N/A   Psychosocial issues? No   Did the patient receive a copy of their discharge instructions? Yes   Nursing interventions Reviewed instructions with patient   What is the patient's perception of their health status since discharge? Improving   Is the patient/caregiver able to teach back signs and symptoms related to disease process for when to call PCP? Yes   Is the patient/caregiver able to teach back signs and symptoms related to disease process for when to call 911? Yes   Is the patient/caregiver able to teach back the hierarchy of who to call/visit for symptoms/problems? PCP, Specialist, Home health nurse, Urgent Care, ED, 911 Yes   TCM call completed? Yes   Wrap up additional comments Wife states Pt is doing great and site looks fine. No needs.   Call end time 1318            Latoya Escalante RN    10/25/2024, 13:19 CDT

## 2024-10-27 LAB
EKG P AXIS: NORMAL DEGREES
EKG P-R INTERVAL: NORMAL MS
EKG Q-T INTERVAL: 410 MS
EKG QRS DURATION: 94 MS
EKG QTC CALCULATION (BAZETT): 441 MS
EKG T AXIS: 8 DEGREES

## 2024-10-28 ENCOUNTER — CARE COORDINATION (OUTPATIENT)
Dept: CASE MANAGEMENT | Age: 84
End: 2024-10-28

## 2024-10-28 NOTE — CARE COORDINATION
Attempted to reach patient for transitions of care follow up. Unable to reach patient.    Outreach Attempts:   Multiple attempts to contact patient at phone numbers on file.   HIPAA compliant voicemail left for patient.     Patient: Calvin Morris    Patient : 1940   MRN: [id number]    Reason for Admission: Shortness of breath [R06.02]  Dyspnea on exertion [R06.09]  New onset atrial fibrillation (HCC) [I48.91]  Acute congestive heart failure, unspecified heart failure type (HCC) [I50.9]    Discharge Date: 10/24/24  RURS: Readmission Risk Score: 15.3    Last Discharge Facility       Date Complaint Diagnosis Description Type Department Provider    10/18/24 Shortness of Breath Acute congestive heart failure, unspecified heart failure type (HCC) ... ED to Hosp-Admission (Discharged) (ADMITTED) L Mike Martínez MD; Erwin Vila...            Was this an external facility discharge? No    Follow Up Appointment:   Patient does not have a follow up appointment scheduled at time of call.  Unable to reach patient.  Future Appointments         Provider Specialty Dept Phone    2024 9:00 AM SCHEDULE, [facility] Cardiology [phone]    2024 8:30 AM Michael Preciado APRN Cardiology [phone]    2025 1:00 PM Michael Preciado APRN Cardiology [phone]    3/13/2025 9:00 AM (Arrive by 8:45 AM) [facility] VASCULAR LAB 2 Vascular Surgery [phone]    3/13/2025 9:30 AM Michelle Bobby APRN Vascular Surgery [phone]            No further follow-up call indicated     Carla Chan RN

## 2024-10-28 NOTE — PROGRESS NOTES
Physician Progress Note      PATIENT:               SOFIYA RICHARDS  [facility] #:                  [id number]  :                       1940  ADMIT DATE:       10/18/2024 8:50 AM  DISCH DATE:        10/24/2024 1:42 PM  RESPONDING  PROVIDER #:        AUREA JOSEPH          QUERY TEXT:    Pt admitted with new onset of A Fib and new osnet of acute on chronic CHF. Pt   noted to have creatinine of 1.4 at admission and up to 2.0. If possible,   please document in the progress notes and discharge summary if you are   evaluating and/or treating any of the following:    The medical record reflects the following:  Risk Factors: HTN  Clinical Indicators: Creatinine beginning @ admission, 1.4 1.5 2.0 1.7 2.0 1.7  Treatment: Serial chemistry panels, 0.9%  mg bolus.    Defined by Kidney Disease Improving Global Outcomes (KDIGO) clinical practice   guideline for acute kidney injury:  -Increase in SCr by greater than or equal to 0.3 mg/dl within 48 hours; or  -Increase or decrease in SCr to greater than or equal to 1.5 times baseline,   which is known or presumed to have occurred within the prior 7 days; or  -Urine volume < 0.5ml/kg/h for 6 hours  Options provided:  -- Acute kidney failure  -- Acute kidney injury  -- Other - I will add my own diagnosis  -- Disagree - Not applicable / Not valid  -- Disagree - Clinically unable to determine / Unknown  -- Refer to Clinical Documentation Reviewer    PROVIDER RESPONSE TEXT:    This patient has an Acute kidney injury.    Query created by: Carrie Schrader on 10/21/2024 11:53 AM      Electronically signed by:  AUREA JOSEPH 10/28/2024 8:20 AM

## 2024-10-29 ENCOUNTER — OFFICE VISIT (OUTPATIENT)
Dept: FAMILY MEDICINE CLINIC | Facility: CLINIC | Age: 84
End: 2024-10-29
Payer: MEDICARE

## 2024-10-29 VITALS
TEMPERATURE: 97.6 F | DIASTOLIC BLOOD PRESSURE: 78 MMHG | RESPIRATION RATE: 16 BRPM | BODY MASS INDEX: 22.18 KG/M2 | SYSTOLIC BLOOD PRESSURE: 112 MMHG | WEIGHT: 138 LBS | HEART RATE: 68 BPM | HEIGHT: 66 IN | OXYGEN SATURATION: 96 %

## 2024-10-29 DIAGNOSIS — Z23 NEED FOR INFLUENZA VACCINATION: ICD-10-CM

## 2024-10-29 DIAGNOSIS — I48.0 PAROXYSMAL ATRIAL FIBRILLATION: Primary | ICD-10-CM

## 2024-10-29 NOTE — PROGRESS NOTES
"Transitional Care Follow Up Visit  Subjective     Shabbir Gambino is a 84 y.o. male who presents for a transitional care management visit.    Within 48 business hours after discharge our office contacted him via telephone to coordinate his care and needs.      I reviewed and discussed the details of that call along with the discharge summary, hospital problems, inpatient lab results, inpatient diagnostic studies, and consultation reports with Shabbir.     Current outpatient and discharge medications have been reconciled for the patient.  Reviewed by: Rigoberto Barnes MD          10/24/2024     7:44 PM   Date of TCM Phone Call   [facility]   Date of Admission 10/18/2024   Date of Discharge 10/24/2024   Discharge Disposition Home or Self Care     Risk for Readmission (LACE) No data recorded    History of Present Illness  84-year-old male with acute atrial fibrillation and shortness of breath hospitalized in [facility] During Hospital Stay: Hospitalized at [facility] from the 18th through 24 October with acute atrial fibrillation and shortness of breath     The following portions of the patient's history were reviewed and updated as appropriate: allergies, current medications, past family history, past medical history, past social history, past surgical history, and problem list.    Review of Systems   Respiratory:  Negative for shortness of breath.    Cardiovascular:  Negative for chest pain and leg swelling.        Much improved from above transitional visit for acute atrial Fib   Musculoskeletal:  Positive for arthralgias.       Objective   /78 (BP Location: Left arm, Patient Position: Sitting, Cuff Size: Adult)   Pulse 68   Temp 97.6 °F (36.4 °C) (Temporal)   Resp 16   Ht 167.6 cm (66\")   Wt 62.6 kg (138 lb)   SpO2 96%   BMI 22.27 kg/m²   Physical Exam  Vitals and nursing note reviewed.   Constitutional:       Appearance: Normal appearance.   Eyes:      " Extraocular Movements: Extraocular movements intact.      Pupils: Pupils are equal, round, and reactive to light.   Cardiovascular:      Rate and Rhythm: Normal rate and regular rhythm.      Pulses: Normal pulses.      Heart sounds: Normal heart sounds.   Pulmonary:      Effort: Pulmonary effort is normal.      Breath sounds: Normal breath sounds.   Musculoskeletal:      Right lower leg: Edema present.      Left lower leg: Edema present.   Skin:     General: Skin is warm and dry.   Neurological:      General: No focal deficit present.      Mental Status: He is alert and oriented to person, place, and time.   Psychiatric:         Mood and Affect: Mood normal.         Behavior: Behavior normal.         Thought Content: Thought content normal.         Judgment: Judgment normal.         Assessment & Plan   Problems Addressed this Visit    None  Visit Diagnoses       Paroxysmal atrial fibrillation    -  Primary          Diagnoses         Codes Comments    Paroxysmal atrial fibrillation    -  Primary ICD-10-CM: I48.0  ICD-9-CM: 427.31             Plan above-flu shot-patient doing well-will talk to cardiology about transitioning to Coumadin instead of Eliquis 2.5

## 2024-11-01 ENCOUNTER — NURSE ONLY (OUTPATIENT)
Dept: CARDIOLOGY CLINIC | Age: 84
End: 2024-11-01

## 2024-11-01 DIAGNOSIS — Z51.89 VISIT FOR WOUND CHECK: Primary | ICD-10-CM

## 2024-11-01 NOTE — PROGRESS NOTES
Patient here for a wound check visit status post Pacemaker implant.  Incision dry and intact.  No redness, swelling, or drainage noted  Edges well approximated  Instructed patient to wash area with antibacterial soap and keep it clean and dry.  Reviewed discharged instructions and questions answered.  Reviewed [facility] home monitor and patient verbalized understanding  Follow up as scheduled

## 2024-11-26 ENCOUNTER — OFFICE VISIT (OUTPATIENT)
Dept: CARDIOLOGY CLINIC | Age: 84
End: 2024-11-26

## 2024-11-26 VITALS
SYSTOLIC BLOOD PRESSURE: 130 MMHG | HEIGHT: 68 IN | DIASTOLIC BLOOD PRESSURE: 68 MMHG | HEART RATE: 79 BPM | OXYGEN SATURATION: 97 % | BODY MASS INDEX: 20.16 KG/M2 | WEIGHT: 133 LBS

## 2024-11-26 DIAGNOSIS — R00.1 BRADYCARDIA: ICD-10-CM

## 2024-11-26 DIAGNOSIS — I48.19 PERSISTENT ATRIAL FIBRILLATION (HCC): ICD-10-CM

## 2024-11-26 DIAGNOSIS — I10 ESSENTIAL HYPERTENSION: Primary | ICD-10-CM

## 2024-11-26 DIAGNOSIS — Z79.01 CHRONIC ANTICOAGULATION: ICD-10-CM

## 2024-11-26 DIAGNOSIS — E78.5 HYPERLIPIDEMIA, UNSPECIFIED HYPERLIPIDEMIA TYPE: ICD-10-CM

## 2024-11-26 DIAGNOSIS — Z95.0 PACEMAKER: ICD-10-CM

## 2024-11-26 DIAGNOSIS — I25.10 CORONARY ARTERY DISEASE INVOLVING NATIVE CORONARY ARTERY OF NATIVE HEART WITHOUT ANGINA PECTORIS: ICD-10-CM

## 2024-11-26 NOTE — PROGRESS NOTES
Dear Dr. Rajan Ed, MD,    Thank you for allowing me to participate in the care of Mr. Calvin Morris. He presents today at the [facility]. As you know, Mr. Morris is a 84 y.o. male with history of hypertension, hyperlipidemia, carotid artery disease, and CAD who presents with the chief complaint of hospital follow-up. He is a patient of Dr. Garduno.  He presented to the ED on 10/18 with complaints of fever and shortness of breath.  He also had productive cough.  He was found to have pneumonia and being in atrial fibrillation.  He was found to have significant bradycardia and underwent pacemaker placement on 10/23.  He was discharged on 10/24. He was discharged on Eliquis and Atenolol. He has tolerated Eliquis. He has had no fevers. She denies any chest pain or SOA. he is sleeping on 1 pillow at night. he is not waking gasping for air. he denies any swelling. he has been compliant with his medications. his BP has been controlled. PCP follows labs and lipids.      He otherwise denies chest pain, SOA, EDMONDS, PND, orthopnea, syncope or near syncope. He has no other complaints.    Review of Systems   Constitutional: Negative for fever, chills, diaphoresis, activity change, appetite change, fatigue and unexpected weight change.   Eyes: Negative for photophobia, pain, redness and visual disturbance.   Respiratory: Negative for apnea, cough, chest tightness, shortness of breath, wheezing and stridor.    Cardiovascular: Negative for chest pain, palpitations and leg swelling.   Gastrointestinal: Negative for abdominal distention.   Genitourinary: Negative for dysuria, urgency and frequency.   Musculoskeletal: Negative for myalgias, arthralgias and gait problem.   Skin: Negative for color change, pallor, rash and wound.   Neurological: Negative for dizziness, tremors, speech difficulty, weakness and numbness.   Hematological: Does not bruise/bleed easily.   Psychiatric/Behavioral: Negative.        Past

## 2024-11-27 ENCOUNTER — OFFICE VISIT (OUTPATIENT)
Dept: FAMILY MEDICINE CLINIC | Facility: CLINIC | Age: 84
End: 2024-11-27
Payer: MEDICARE

## 2024-11-27 VITALS
HEART RATE: 86 BPM | BODY MASS INDEX: 21.21 KG/M2 | SYSTOLIC BLOOD PRESSURE: 114 MMHG | TEMPERATURE: 97.2 F | WEIGHT: 132 LBS | HEIGHT: 66 IN | DIASTOLIC BLOOD PRESSURE: 70 MMHG | OXYGEN SATURATION: 94 %

## 2024-11-27 DIAGNOSIS — R53.83 FATIGUE, UNSPECIFIED TYPE: ICD-10-CM

## 2024-11-27 DIAGNOSIS — I48.0 PAROXYSMAL ATRIAL FIBRILLATION: Primary | ICD-10-CM

## 2024-11-27 DIAGNOSIS — R06.02 SHORTNESS OF BREATH: ICD-10-CM

## 2024-11-27 NOTE — PROGRESS NOTES
Jaycee Gambino is a 84 y.o. male.     History of Present Illness  84-year-old male follow-up fluid retention      The following portions of the patient's history were reviewed and updated as appropriate: allergies, current medications, past family history, past medical history, past social history, past surgical history, and problem list.    Review of Systems   Respiratory:  Positive for shortness of breath.    Cardiovascular:  Negative for chest pain, palpitations and leg swelling.        Recent hospitalization for A-fib   Musculoskeletal:  Positive for arthralgias.       Objective   Physical Exam  Vitals and nursing note reviewed.   Constitutional:       Appearance: Normal appearance.   Eyes:      Extraocular Movements: Extraocular movements intact.      Pupils: Pupils are equal, round, and reactive to light.   Cardiovascular:      Rate and Rhythm: Normal rate and regular rhythm.   Pulmonary:      Effort: Pulmonary effort is normal.      Breath sounds: Normal breath sounds.   Musculoskeletal:      Right lower leg: No edema.      Left lower leg: No edema.   Neurological:      General: No focal deficit present.      Mental Status: He is alert and oriented to person, place, and time.   Psychiatric:         Mood and Affect: Mood normal.         Behavior: Behavior normal.         Assessment & Plan   Diagnoses and all orders for this visit:    1. Paroxysmal atrial fibrillation (Primary)  -     Basic Metabolic Panel  -     CBC & Differential    2. Fatigue, unspecified type  -     Basic Metabolic Panel  -     CBC & Differential    3. Shortness of breath  -     BNP       Plan above-see back 1 month

## 2024-11-28 LAB
BASOPHILS # BLD AUTO: 0.1 X10E3/UL (ref 0–0.2)
BASOPHILS NFR BLD AUTO: 1 %
BNP SERPL-MCNC: 217.6 PG/ML (ref 0–100)
BUN SERPL-MCNC: 29 MG/DL (ref 8–27)
BUN/CREAT SERPL: 18 (ref 10–24)
CALCIUM SERPL-MCNC: 9.3 MG/DL (ref 8.6–10.2)
CHLORIDE SERPL-SCNC: 98 MMOL/L (ref 96–106)
CO2 SERPL-SCNC: 23 MMOL/L (ref 20–29)
CREAT SERPL-MCNC: 1.63 MG/DL (ref 0.76–1.27)
EGFRCR SERPLBLD CKD-EPI 2021: 41 ML/MIN/1.73
EOSINOPHIL # BLD AUTO: 0.1 X10E3/UL (ref 0–0.4)
EOSINOPHIL NFR BLD AUTO: 1 %
ERYTHROCYTE [DISTWIDTH] IN BLOOD BY AUTOMATED COUNT: 14.1 % (ref 11.6–15.4)
GLUCOSE SERPL-MCNC: 151 MG/DL (ref 70–99)
HCT VFR BLD AUTO: 40.8 % (ref 37.5–51)
HGB BLD-MCNC: 13.1 G/DL (ref 13–17.7)
IMM GRANULOCYTES # BLD AUTO: 0.4 X10E3/UL (ref 0–0.1)
IMM GRANULOCYTES NFR BLD AUTO: 5 %
LYMPHOCYTES # BLD AUTO: 1.1 X10E3/UL (ref 0.7–3.1)
LYMPHOCYTES NFR BLD AUTO: 12 %
MCH RBC QN AUTO: 28.5 PG (ref 26.6–33)
MCHC RBC AUTO-ENTMCNC: 32.1 G/DL (ref 31.5–35.7)
MCV RBC AUTO: 89 FL (ref 79–97)
MONOCYTES # BLD AUTO: 0.9 X10E3/UL (ref 0.1–0.9)
MONOCYTES NFR BLD AUTO: 10 %
NEUTROPHILS # BLD AUTO: 6.6 X10E3/UL (ref 1.4–7)
NEUTROPHILS NFR BLD AUTO: 71 %
PLATELET # BLD AUTO: 249 X10E3/UL (ref 150–450)
POTASSIUM SERPL-SCNC: 4.6 MMOL/L (ref 3.5–5.2)
RBC # BLD AUTO: 4.59 X10E6/UL (ref 4.14–5.8)
SODIUM SERPL-SCNC: 135 MMOL/L (ref 134–144)
WBC # BLD AUTO: 9.2 X10E3/UL (ref 3.4–10.8)

## 2024-12-27 ENCOUNTER — TELEPHONE (OUTPATIENT)
Dept: FAMILY MEDICINE CLINIC | Facility: CLINIC | Age: 84
End: 2024-12-27

## 2024-12-27 NOTE — TELEPHONE ENCOUNTER
Patient's wife advised and states they just picked up a 90 days of the 50mg.  Advised wife they could break them in half.

## 2024-12-27 NOTE — TELEPHONE ENCOUNTER
Caller: Florinda Gambino    Relationship: Emergency Contact    Best call back number: [phone]     What is the best time to reach you: ANY    Who are you requesting to speak with (clinical staff, provider,  specific staff member): CLINICAL STAFF     What was the call regarding:     PATIENT'S WIFE IS WONDERING WHAT  ATENOLOL DOSAGE PATIENT SHOULD BE TAKING. PATIENT'S WIFE IS UNSURE IF HE SHOULD BE TAKING 50 MG OR 25 MG.     Is it okay if the provider responds through MyChart: PATIENT DOES NOT HAVE MYCHART

## 2025-01-13 RX ORDER — CARBIDOPA AND LEVODOPA 25; 100 MG/1; MG/1
TABLET ORAL
Qty: 180 TABLET | Refills: 0 | Status: SHIPPED | OUTPATIENT
Start: 2025-01-13

## 2025-01-13 NOTE — TELEPHONE ENCOUNTER
Rx Refill Note  Requested Prescriptions     Pending Prescriptions Disp Refills    carbidopa-levodopa (SINEMET)  MG per tablet [Pharmacy Med Name: CARBIDOPA-LEVODOPA 25-100MG TABS] 180 tablet 0     Sig: TAKE ONE TO TWO TABLETS BY MOUTH EVERY DAY AT BEDTIME      Last office visit with prescribing clinician: 11/27/2024   Last telemedicine visit with prescribing clinician: Visit date not found   Next office visit with prescribing clinician: 3/18/2025     Abena Yu MA  01/13/25, 13:31 CST

## 2025-01-16 ENCOUNTER — OFFICE VISIT (OUTPATIENT)
Age: 85
End: 2025-01-16
Payer: MEDICARE

## 2025-01-16 VITALS — HEIGHT: 68 IN | WEIGHT: 135 LBS | BODY MASS INDEX: 20.46 KG/M2

## 2025-01-16 DIAGNOSIS — Z86.73 HISTORY OF TRANSIENT ISCHEMIC ATTACK (TIA): ICD-10-CM

## 2025-01-16 DIAGNOSIS — M20.42 HAMMERTOE OF SECOND TOE OF LEFT FOOT: ICD-10-CM

## 2025-01-16 DIAGNOSIS — M21.372 ACQUIRED LEFT FOOT DROP: Primary | ICD-10-CM

## 2025-01-16 PROCEDURE — 1159F MED LIST DOCD IN RCRD: CPT | Performed by: NURSE PRACTITIONER

## 2025-01-16 PROCEDURE — G8420 CALC BMI NORM PARAMETERS: HCPCS | Performed by: NURSE PRACTITIONER

## 2025-01-16 PROCEDURE — 1036F TOBACCO NON-USER: CPT | Performed by: NURSE PRACTITIONER

## 2025-01-16 PROCEDURE — G8427 DOCREV CUR MEDS BY ELIG CLIN: HCPCS | Performed by: NURSE PRACTITIONER

## 2025-01-16 PROCEDURE — 1123F ACP DISCUSS/DSCN MKR DOCD: CPT | Performed by: NURSE PRACTITIONER

## 2025-01-16 PROCEDURE — 99204 OFFICE O/P NEW MOD 45 MIN: CPT | Performed by: NURSE PRACTITIONER

## 2025-01-16 ASSESSMENT — ENCOUNTER SYMPTOMS
VOMITING: 0
SHORTNESS OF BREATH: 0
COLOR CHANGE: 0
BLOOD IN STOOL: 0
NAUSEA: 0
DIARRHEA: 0
COUGH: 0
CONSTIPATION: 0

## 2025-01-16 NOTE — PROGRESS NOTES
Chief Complaint    Chief Complaint   Patient presents with    Nail Problem     Left foot 2nd toe        Body Part: Toenail 2nd toe left    When did the symptoms begin/Date of Onset? 6 months    Where did the injury happen? Other: NA    3 years ago had a TIA which affected his gait; foot droops  Injury Details    Previous similar problems or complaints? No    Severity of Pain: 7 but only when walking    Character of Pain: Throbbing    What makes your symptoms worse? Standing    How long does the pain last? Only when walking    Associated Symptoms: Other: NA    What makes your symptoms better? Rest    Previous Treatment for the Problem: Other: NA    Any special diagnostic tests or studies done? Other: NA ; Where? NA    Similar complaints on opposite side? No    Review of Systems    Review of Systems   Constitutional:  Negative for appetite change, chills, fatigue and fever.   Respiratory:  Negative for cough and shortness of breath.    Cardiovascular:  Negative for chest pain, palpitations and leg swelling.   Gastrointestinal:  Negative for blood in stool, constipation, diarrhea, nausea and vomiting.   Musculoskeletal:  Negative for arthralgias, gait problem and joint swelling.   Skin:  Negative for color change.   Neurological:  Negative for weakness.

## 2025-01-16 NOTE — PROGRESS NOTES
[facility]  [facility]  [address]  [facility] [id number] [phone]     Patient: Calvin Morris   YOB: 1940   Date: 1/16/2025   Visit Type:      History of Present Illness  Chief Complaint   Patient presents with    Nail Problem     Left foot 2nd toe       This is a 84 y.o. male presents today complaining of deformity of the second digit.  He relates the onset began 6 months ago to his toe.  He relates a history of TIA approximately 3 years ago.  He has since developed deformity of the second toe.  He states the toe is rick.  It is causing a callus to the tip of the toe.  It is causing him pain and discomfort.  He rates the pain a 7 out of 10 describes it as throbbing worse with standing and walking.  Better with rest.    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  He walks with a cane.  He relates that he drags his foot.  It causes him to stumble at times.  He does not have a brace for the lower extremity.  He is here today with his family.    Past Medical History:   Diagnosis Date    Arthritis     COVID-19 08/2021    Skin cancer     On face      Past Surgical History:   Procedure Laterality Date    ABSCESS DRAINAGE Right     knee    CARDIAC CATHETERIZATION  03/19/2019    [facility]     CARDIAC SURGERY      [facility] 2008    CAROTID ENDARTERECTOMY Left     [place]    CAROTID ENDARTERECTOMY Right     Dr. Shaw    CORONARY ARTERY BYPASS GRAFT      EP DEVICE PROCEDURE N/A 10/23/2024    Insert or replace transcath PPM leadless performed by Navjot Mac MD at [facility] CARDIAC CATH LAB    ESOPHAGEAL DILATATION      EYE SURGERY      summer cat    JOINT REPLACEMENT      rtk    POLYPECTOMY      Colon    SKIN CANCER EXCISION        Social History     Socioeconomic History    Marital status:      Spouse name: None    Number of children: None    Years of education: None    Highest education

## 2025-02-10 ENCOUNTER — TELEPHONE (OUTPATIENT)
Age: 85
End: 2025-02-10

## 2025-02-10 NOTE — TELEPHONE ENCOUNTER
Tried calling Pt back but wife answered the phone. Pt needs his appt to be moved off of [facility] onto Dr. Chowdhury. It Pt calls back please find an opening on Dr. Chowdhury schedule and move appt.

## 2025-02-10 NOTE — TELEPHONE ENCOUNTER
PT has question regarding his upcoming appointment. Pt is not sure if he is only supposed to see Dr. Chowdhury but is know on [facility]'s schedule

## 2025-02-10 NOTE — TELEPHONE ENCOUNTER
Called Pt to RS from [facility] schedule to in house procedure is only done by Dr. Chowdhury. This Pt needs to be seen via

## 2025-02-11 ENCOUNTER — OFFICE VISIT (OUTPATIENT)
Dept: CARDIOLOGY CLINIC | Age: 85
End: 2025-02-11

## 2025-02-11 VITALS
HEART RATE: 86 BPM | BODY MASS INDEX: 20.46 KG/M2 | SYSTOLIC BLOOD PRESSURE: 136 MMHG | WEIGHT: 135 LBS | DIASTOLIC BLOOD PRESSURE: 88 MMHG | OXYGEN SATURATION: 100 % | HEIGHT: 68 IN

## 2025-02-11 DIAGNOSIS — I48.19 PERSISTENT ATRIAL FIBRILLATION (HCC): ICD-10-CM

## 2025-02-11 DIAGNOSIS — E78.5 HYPERLIPIDEMIA, UNSPECIFIED HYPERLIPIDEMIA TYPE: ICD-10-CM

## 2025-02-11 DIAGNOSIS — I25.10 CORONARY ARTERY DISEASE INVOLVING NATIVE CORONARY ARTERY OF NATIVE HEART WITHOUT ANGINA PECTORIS: Primary | ICD-10-CM

## 2025-02-11 DIAGNOSIS — Z79.01 CHRONIC ANTICOAGULATION: ICD-10-CM

## 2025-02-11 DIAGNOSIS — R00.1 BRADYCARDIA: ICD-10-CM

## 2025-02-11 DIAGNOSIS — I10 ESSENTIAL HYPERTENSION: ICD-10-CM

## 2025-02-11 DIAGNOSIS — Z95.0 PACEMAKER: ICD-10-CM

## 2025-02-11 RX ORDER — FUROSEMIDE 20 MG/1
20 TABLET ORAL PRN
COMMUNITY
Start: 2024-10-30

## 2025-02-11 NOTE — PROGRESS NOTES
Dear Dr. Rajan Ed, MD,    Thank you for allowing me to participate in the care of Mr. Calvin Morris. He presents today at the [facility]. As you know, Mr. Morris is a 84 y.o. male with history of hypertension, hyperlipidemia, carotid artery disease, and CAD who presents with the chief complaint of follow-up for chronic cardiac conditions. He is a patient of Dr. Garduno.  Since last seen, he has been stable from a cardiac standpoint.  He denies any exertional chest pain.  He has EDMONDS which is chronic and stable.  He denies any fast or slow heart rhythms.  He recently see nephrology and had some leg swelling.  He took a few days of Lasix and stopped drinking the orange shoes and swelling has resolved.he is sleeping on 1 pillow at night. he is not waking gasping for air. he has been compliant with his medications. his BP has been controlled. PCP follows labs and lipids.        He otherwise denies chest pain, SOA, EDMONDS, PND, orthopnea, syncope or near syncope. He has no other complaints.    Review of Systems   Constitutional: Negative for fever, chills, diaphoresis, activity change, appetite change, fatigue and unexpected weight change.   Eyes: Negative for photophobia, pain, redness and visual disturbance.   Respiratory: Negative for apnea, cough, chest tightness, shortness of breath, wheezing and stridor.    Cardiovascular: Positive for leg swelling (improved). Negative for chest pain and palpitations.   Gastrointestinal: Negative for abdominal distention.   Genitourinary: Negative for dysuria, urgency and frequency.   Musculoskeletal: Negative for myalgias, arthralgias and gait problem.   Skin: Negative for color change, pallor, rash and wound.   Neurological: Negative for dizziness, tremors, speech difficulty, weakness and numbness.   Hematological: Does not bruise/bleed easily.   Psychiatric/Behavioral: Negative.        Past Medical History:   Diagnosis Date    Arthritis     COVID-19 08/2021

## 2025-02-13 RX ORDER — APIXABAN 2.5 MG/1
2.5 TABLET, FILM COATED ORAL 2 TIMES DAILY
Qty: 180 TABLET | Refills: 3 | Status: SHIPPED | OUTPATIENT
Start: 2025-02-13

## 2025-02-17 ENCOUNTER — TELEPHONE (OUTPATIENT)
Age: 85
End: 2025-02-17

## 2025-02-17 NOTE — TELEPHONE ENCOUNTER
Patients is wanting to reschedule appointment this coming Wednesday but doctor donis has no openings on his schedule. Appointing regarding \"Return for With Dr. Chowdhury in 4 weeks for flexor tenotomy procedure second digit, left foot.\"  Please call patient to get her rescheduled on to Dr chowdhury schedule

## 2025-02-27 ENCOUNTER — OFFICE VISIT (OUTPATIENT)
Age: 85
End: 2025-02-27
Payer: MEDICARE

## 2025-02-27 DIAGNOSIS — M21.372 ACQUIRED LEFT FOOT DROP: ICD-10-CM

## 2025-02-27 DIAGNOSIS — M67.02 CONTRACTURE OF LEFT ACHILLES TENDON: ICD-10-CM

## 2025-02-27 DIAGNOSIS — Z86.73 HISTORY OF TRANSIENT ISCHEMIC ATTACK (TIA): ICD-10-CM

## 2025-02-27 DIAGNOSIS — M20.42 HAMMERTOE OF SECOND TOE OF LEFT FOOT: Primary | ICD-10-CM

## 2025-02-27 PROCEDURE — 1036F TOBACCO NON-USER: CPT | Performed by: PODIATRIST

## 2025-02-27 PROCEDURE — 99213 OFFICE O/P EST LOW 20 MIN: CPT | Performed by: PODIATRIST

## 2025-02-27 PROCEDURE — 28010 INCISION OF TOE TENDON: CPT | Performed by: PODIATRIST

## 2025-02-27 PROCEDURE — G8420 CALC BMI NORM PARAMETERS: HCPCS | Performed by: PODIATRIST

## 2025-02-27 PROCEDURE — 1123F ACP DISCUSS/DSCN MKR DOCD: CPT | Performed by: PODIATRIST

## 2025-02-27 PROCEDURE — G8427 DOCREV CUR MEDS BY ELIG CLIN: HCPCS | Performed by: PODIATRIST

## 2025-02-27 PROCEDURE — 1159F MED LIST DOCD IN RCRD: CPT | Performed by: PODIATRIST

## 2025-02-27 RX ORDER — BUPIVACAINE HYDROCHLORIDE 5 MG/ML
1 INJECTION, SOLUTION EPIDURAL; INTRACAUDAL ONCE
Status: COMPLETED | OUTPATIENT
Start: 2025-02-27 | End: 2025-02-27

## 2025-02-27 RX ADMIN — BUPIVACAINE HYDROCHLORIDE 25 MG: 5 INJECTION, SOLUTION EPIDURAL; INTRACAUDAL at 16:05

## 2025-02-27 NOTE — PROGRESS NOTES
[facility]  [facility]  [address]  [facility] [address] [phone]     Patient: Calvin Morris   YOB: 1940   Date: 2/27/2025   Visit Type:  Follow up    Body Part:  left foot    When did the symptoms begin/Date of Onset or date of surgery?     Last Seen in Clinic: 01/16/2025     He relates the onset began 6 months ago to his toe.  He relates a history of TIA approximately 3 years ago.  He has since developed deformity of the second toe.  He states the toe is rick.  It is causing a callus to the tip of the toe.  It is causing him pain and discomfort.  He rates the pain a 7 out of 10 describes it as throbbing worse with standing and walking.  Better with rest.     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  He walks with a cane.  He relates that he drags his foot.  It causes him to stumble at times.     History of Present Illness  Chief Complaint   Patient presents with    Follow Up Left Foot       This is a 84 y.o. male  presents today complaining of a hammertoe to left foot.  Has had recurrent wounds at the tip of the left second toe.  Had a previous TIA and developed a foot drop as well as contracture of the toe.  He does have an ankle-foot orthosis with dorsiflexion assist from his last visit and that has really helped him.    Review of Systems  System  Neg/Pos  Details  Constitutional  Negative  Chills, Fatigue, Fever and Night Sweats  Respiratory  Negative  Chest Pain, Cough and Dyspnea  Cardio   Negative  Leg Swelling  GI   Negative  Abdominal Pain, Constipation, Nausea and Vomiting     Negative  Urinary Incontinence   Endocrine  Negative  Weight Gain and Weight Loss  MS   Negative  Except as noted in HPI and Chief Complaint    Past Medical History:   Diagnosis Date    Arthritis     COVID-19 08/2021    Skin cancer     On face      Past Surgical History:   Procedure Laterality

## 2025-03-03 DIAGNOSIS — I10 PRIMARY HYPERTENSION: ICD-10-CM

## 2025-03-03 RX ORDER — AMLODIPINE BESYLATE 5 MG/1
TABLET ORAL
Qty: 90 TABLET | Refills: 3 | Status: SHIPPED | OUTPATIENT
Start: 2025-03-03

## 2025-03-03 NOTE — TELEPHONE ENCOUNTER
Rx Refill Note  Requested Prescriptions     Pending Prescriptions Disp Refills    amLODIPine (NORVASC) 5 MG tablet [Pharmacy Med Name: AMLODIPINE BESYLATE 5MG TABS] 90 tablet 3     Sig: TAKE ONE TABLET BY MOUTH EVERY EVENING      Last office visit with prescribing clinician: 5/13/2024   Last telemedicine visit with prescribing clinician: Visit date not found   Next office visit with prescribing clinician: Visit date not found   CPE done                       Would you like a call back once the refill request has been completed: [] Yes [] No    If the office needs to give you a call back, can they leave a voicemail: [] Yes [] No    Abena Spain MA  03/03/25, 13:50 CST   0

## 2025-03-13 ENCOUNTER — HOSPITAL ENCOUNTER (OUTPATIENT)
Dept: VASCULAR LAB | Age: 85
Discharge: HOME | End: 2025-03-13
Payer: MEDICARE

## 2025-03-13 ENCOUNTER — OFFICE VISIT (OUTPATIENT)
Age: 85
End: 2025-03-13
Payer: MEDICARE

## 2025-03-13 ENCOUNTER — OFFICE VISIT (OUTPATIENT)
Dept: VASCULAR SURGERY | Age: 85
End: 2025-03-13
Payer: MEDICARE

## 2025-03-13 VITALS — HEART RATE: 77 BPM | OXYGEN SATURATION: 98 % | SYSTOLIC BLOOD PRESSURE: 141 MMHG | DIASTOLIC BLOOD PRESSURE: 77 MMHG

## 2025-03-13 VITALS — HEIGHT: 68 IN | WEIGHT: 135 LBS | BODY MASS INDEX: 20.46 KG/M2

## 2025-03-13 DIAGNOSIS — I65.23 BILATERAL CAROTID ARTERY STENOSIS: Primary | ICD-10-CM

## 2025-03-13 DIAGNOSIS — I65.23 BILATERAL CAROTID ARTERY STENOSIS: ICD-10-CM

## 2025-03-13 DIAGNOSIS — M21.372 ACQUIRED LEFT FOOT DROP: ICD-10-CM

## 2025-03-13 DIAGNOSIS — I70.213 ATHEROSCLER OF NATIVE ARTERY OF BOTH LEGS WITH INTERMIT CLAUDICATION: ICD-10-CM

## 2025-03-13 DIAGNOSIS — M20.42 HAMMERTOE OF SECOND TOE OF LEFT FOOT: Primary | ICD-10-CM

## 2025-03-13 DIAGNOSIS — I73.9 CLAUDICATION: ICD-10-CM

## 2025-03-13 PROCEDURE — G8420 CALC BMI NORM PARAMETERS: HCPCS | Performed by: NURSE PRACTITIONER

## 2025-03-13 PROCEDURE — 1036F TOBACCO NON-USER: CPT | Performed by: NURSE PRACTITIONER

## 2025-03-13 PROCEDURE — G8427 DOCREV CUR MEDS BY ELIG CLIN: HCPCS | Performed by: NURSE PRACTITIONER

## 2025-03-13 PROCEDURE — 1159F MED LIST DOCD IN RCRD: CPT | Performed by: NURSE PRACTITIONER

## 2025-03-13 PROCEDURE — 3077F SYST BP >= 140 MM HG: CPT | Performed by: NURSE PRACTITIONER

## 2025-03-13 PROCEDURE — 99214 OFFICE O/P EST MOD 30 MIN: CPT | Performed by: NURSE PRACTITIONER

## 2025-03-13 PROCEDURE — 93880 EXTRACRANIAL BILAT STUDY: CPT

## 2025-03-13 PROCEDURE — 1123F ACP DISCUSS/DSCN MKR DOCD: CPT | Performed by: NURSE PRACTITIONER

## 2025-03-13 PROCEDURE — 99213 OFFICE O/P EST LOW 20 MIN: CPT | Performed by: NURSE PRACTITIONER

## 2025-03-13 PROCEDURE — 3078F DIAST BP <80 MM HG: CPT | Performed by: NURSE PRACTITIONER

## 2025-03-13 ASSESSMENT — ENCOUNTER SYMPTOMS
CONSTIPATION: 0
COLOR CHANGE: 0
DIARRHEA: 0
VOMITING: 0
NAUSEA: 0
COUGH: 0
SHORTNESS OF BREATH: 0
BLOOD IN STOOL: 0

## 2025-03-13 NOTE — PROGRESS NOTES
Calvin Morris (:  1940) is a 84 y.o. male,Established patient, here for evaluation of the following chief complaint(s):  6 Month Follow-Up and Carotid Disease            SUBJECTIVE/OBJECTIVE:  He presents for follow up of carotid artery stenosis.  He has a known history of carotid artery stenosis for 1 - 5 years. His current treatment includes plavix and eliquis.He denies a history of CVA.  He reports has not had TIA's, episodes of lateralizing weakness and episodes of amaurosis fugax.    Of note, he had ulcer on left second toe.  Had surgery to release contracture.  States incision is healing well.  Still has dry ulcer at tip.      I have personally reviewed the following: problem list, current meds, allergies, PMH, PSH, family hx, and social hx  Calvin Morris is a 84 y.o. male with the following history as recorded in [facility]:  Patient Active Problem List    Diagnosis Date Noted    Unstable angina (HCC)     Dyspnea on exertion 10/18/2024    New onset of congestive heart failure (HCC) 10/18/2024    New onset a-fib (HCC) 10/18/2024    Gout 10/18/2024    Restless leg syndrome 10/18/2024    Bradycardia, unspecified 10/18/2024    Bilateral carotid artery stenosis 2019    Skin cancer 2019    Colonic polyp 2018    Dysphagia 2018    GERD (gastroesophageal reflux disease) 2018    Dyslipidemia 2018    Irregular heart beat 10/24/2017    Acute blood loss anemia 2015    Cerebrovascular disease 2015    Osteoarthritis of right knee 2015    Coronary artery disease involving native coronary artery 2015    Essential hypertension 2015     Current Outpatient Medications   Medication Sig Dispense Refill    ELIQUIS 2.5 MG TABS tablet Take 1 tablet by mouth twice daily 180 tablet 3    furosemide (LASIX) 20 MG tablet Take 1 tablet by mouth as needed      atenolol (TENORMIN) 25 MG tablet Take 1 tablet by mouth daily 30 tablet 3    allopurinol (ZYLOPRIM)

## 2025-03-13 NOTE — PROGRESS NOTES
[facility]  [facility]  [address]  [facility] [id number] [phone]     Patient: Calvin Morris   YOB: 1940   Date: 3/13/2025   Visit Type:  Follow up    Body Part: 2nd toe left    What was the date of tenotomy? 2/27/25        Review of Systems    Review of Systems   Constitutional:  Negative for appetite change, chills, fatigue and fever.   Respiratory:  Negative for cough and shortness of breath.    Cardiovascular:  Negative for chest pain, palpitations and leg swelling.   Gastrointestinal:  Negative for blood in stool, constipation, diarrhea, nausea and vomiting.   Musculoskeletal:  Negative for arthralgias, gait problem and joint swelling.   Skin:  Negative for color change.   Neurological:  Negative for weakness.

## 2025-03-13 NOTE — PROGRESS NOTES
[facility]  [facility] ORTHOPEDICS  [address]  [facility] [id number] [phone]     Patient: Calvin Morris   YOB: 1940   Date: 3/13/2025   Visit Type:      History of Present Illness  Chief Complaint   Patient presents with    Follow-up     Left 2nd toe       This is a 84 y.o. male presents today 2 weeks status post flexor tenotomy second digit left foot.  He does weight improvement in regards to deformity of the second toe of the left foot.  He states second toe is much straighter.  It is putting less pressure on the tip of the second toe.  Relates improvement in regards to the ulceration of the second digit left foot.  On his initial exam with me he was given order for a custom AFO brace.  He did get the brace and does relate it is helping his gait and balance.  He is tolerating the brace well.  Past Medical History:   Diagnosis Date    Arthritis     COVID-19 08/2021    Skin cancer     On face      Past Surgical History:   Procedure Laterality Date    ABSCESS DRAINAGE Right     knee    CARDIAC CATHETERIZATION  03/19/2019    [facility]     CARDIAC SURGERY      [facility] 2008    CAROTID ENDARTERECTOMY Left     [place]    CAROTID ENDARTERECTOMY Right     Dr. Shaw    CORONARY ARTERY BYPASS GRAFT      EP DEVICE PROCEDURE N/A 10/23/2024    Insert or replace transcath PPM leadless performed by Navjot Mac MD at [facility] CARDIAC CATH LAB    ESOPHAGEAL DILATATION      EYE SURGERY      [facility]    JOINT REPLACEMENT      rtk    POLYPECTOMY      Colon    SKIN CANCER EXCISION        Social History     Socioeconomic History    Marital status:      Spouse name: None    Number of children: None    Years of education: None    Highest education level: None   Tobacco Use    Smoking status: Never    Smokeless tobacco: Never   Vaping Use    Vaping status: Never Used   Substance and Sexual Activity    Alcohol use: No    Drug use: No

## 2025-03-15 LAB
VAS LEFT ARM BP DIA: 72 MMHG
VAS LEFT ARM BP: 130 MMHG
VAS LEFT CCA MID EDV: 19.6 CM/S
VAS LEFT CCA MID PSV: 112 CM/S
VAS LEFT CCA PROX EDV: 13.8 CM/S
VAS LEFT CCA PROX PSV: 69.3 CM/S
VAS LEFT ECA PSV: 200 CM/S
VAS LEFT ICA DIST EDV: 18.2 CM/S
VAS LEFT ICA DIST PSV: 63.9 CM/S
VAS LEFT ICA MID EDV: 15.5 CM/S
VAS LEFT ICA MID PSV: 67.7 CM/S
VAS LEFT ICA PROX EDV: 37.3 CM/S
VAS LEFT ICA PROX PSV: 185 CM/S
VAS RIGHT ARM BP DIA: 72 MMHG
VAS RIGHT ARM BP: 126 MMHG
VAS RIGHT CCA MID EDV: 18 CM/S
VAS RIGHT CCA MID PSV: 85.1 CM/S
VAS RIGHT CCA PROX EDV: 13.6 CM/S
VAS RIGHT CCA PROX PSV: 61.9 CM/S
VAS RIGHT ECA PSV: 94.4 CM/S
VAS RIGHT ICA DIST EDV: 15.8 CM/S
VAS RIGHT ICA DIST PSV: 53.6 CM/S
VAS RIGHT ICA MID EDV: 16.7 CM/S
VAS RIGHT ICA MID PSV: 86 CM/S
VAS RIGHT ICA PROX EDV: 28 CM/S
VAS RIGHT ICA PROX PSV: 204 CM/S
VAS RIGHT VERTEBRAL EDV: 11.6 CM/S
VAS RIGHT VERTEBRAL PSV: 52.2 CM/S

## 2025-03-17 ENCOUNTER — TELEPHONE (OUTPATIENT)
Dept: VASCULAR SURGERY | Age: 85
End: 2025-03-17

## 2025-03-17 NOTE — TELEPHONE ENCOUNTER
Called and spoke to the patient's wife.  She voiced understanding.             ----- Message from ANTHONY Brooks sent at 3/17/2025  8:27 AM CDT -----  Please let patient know I spoke with ortho.  They think everything is okay.  Will plan to see him back in 6 months or sooner if needed

## 2025-03-18 ENCOUNTER — OFFICE VISIT (OUTPATIENT)
Dept: FAMILY MEDICINE CLINIC | Facility: CLINIC | Age: 85
End: 2025-03-18
Payer: MEDICARE

## 2025-03-18 VITALS
HEIGHT: 66 IN | BODY MASS INDEX: 21.66 KG/M2 | HEART RATE: 65 BPM | SYSTOLIC BLOOD PRESSURE: 129 MMHG | WEIGHT: 134.8 LBS | DIASTOLIC BLOOD PRESSURE: 74 MMHG | RESPIRATION RATE: 18 BRPM | TEMPERATURE: 98.6 F | OXYGEN SATURATION: 98 %

## 2025-03-18 DIAGNOSIS — Z12.11 SCREENING FOR COLORECTAL CANCER: ICD-10-CM

## 2025-03-18 DIAGNOSIS — Z00.00 MEDICARE ANNUAL WELLNESS VISIT, SUBSEQUENT: ICD-10-CM

## 2025-03-18 DIAGNOSIS — R73.9 ELEVATED BLOOD SUGAR: ICD-10-CM

## 2025-03-18 DIAGNOSIS — D64.9 ANEMIA, UNSPECIFIED TYPE: ICD-10-CM

## 2025-03-18 DIAGNOSIS — E55.9 VITAMIN D DEFICIENCY: ICD-10-CM

## 2025-03-18 DIAGNOSIS — I10 PRIMARY HYPERTENSION: Primary | ICD-10-CM

## 2025-03-18 DIAGNOSIS — E78.2 MIXED HYPERLIPIDEMIA: ICD-10-CM

## 2025-03-18 DIAGNOSIS — Z12.12 SCREENING FOR COLORECTAL CANCER: ICD-10-CM

## 2025-03-18 DIAGNOSIS — R41.3 MEMORY LOSS: ICD-10-CM

## 2025-03-18 DIAGNOSIS — E79.0 HYPERURICEMIA: ICD-10-CM

## 2025-03-18 DIAGNOSIS — R53.83 FATIGUE, UNSPECIFIED TYPE: ICD-10-CM

## 2025-03-18 LAB
BILIRUB BLD-MCNC: NEGATIVE MG/DL
CLARITY, POC: CLEAR
COLOR UR: YELLOW
GLUCOSE UR STRIP-MCNC: NEGATIVE MG/DL
KETONES UR QL: NEGATIVE
LEUKOCYTE EST, POC: NEGATIVE
NITRITE UR-MCNC: NEGATIVE MG/ML
PH UR: 5.5 [PH] (ref 5–8)
PROT UR STRIP-MCNC: NEGATIVE MG/DL
RBC # UR STRIP: ABNORMAL /UL
SP GR UR: 1.02 (ref 1–1.03)
UROBILINOGEN UR QL: ABNORMAL

## 2025-03-18 RX ORDER — FUROSEMIDE 20 MG/1
1 TABLET ORAL AS NEEDED
COMMUNITY
Start: 2024-10-30

## 2025-03-18 NOTE — PROGRESS NOTES
Subjective   The ABCs of the Annual Wellness Visit  Medicare Wellness Visit      Shabbir Gambino is a 84 y.o. patient who presents for a Medicare Wellness Visit.    The following portions of the patient's history were reviewed and   updated as appropriate: allergies, current medications, past family history, past medical history, past social history, past surgical history, and problem list.    Compared to one year ago, the patient's physical   health is the same.  Compared to one year ago, the patient's mental   health is the same.    Recent Hospitalizations:  He was not admitted to the hospital during the last year.     Current Medical Providers:  Patient Care Team:  Rigoberto Barnes MD as PCP - General (Family Medicine)  Rigoberto Barnes MD as PCP - Family Medicine  Rigoberto Barnes MD Hodges, Christopher Ray, PA as Physician Assistant (Neurology)  Stanton Escalante MD as Consulting Physician (Cardiology)  Randy Gomez MD as Consulting Physician (Gastroenterology)    Outpatient Medications Prior to Visit   Medication Sig Dispense Refill    amLODIPine (NORVASC) 5 MG tablet TAKE ONE TABLET BY MOUTH EVERY EVENING 90 tablet 3    apixaban (ELIQUIS) 2.5 MG tablet tablet Take 1 tablet by mouth 2 (Two) Times a Day.      atenolol (TENORMIN) 50 MG tablet Take 1 tablet by mouth Daily. (Patient taking differently: Take 0.5 tablets by mouth Daily.) 90 tablet 3    carbidopa-levodopa (SINEMET)  MG per tablet TAKE ONE TO TWO TABLETS BY MOUTH EVERY DAY AT BEDTIME 180 tablet 0    Cholecalciferol (Vitamin D) 50 MCG (2000 UT) capsule Take  by mouth 3 (Three) Times a Week. Monday, Wednesday, Friday      CINNAMON PO Take 500 mg by mouth Daily.      cloNIDine (Catapres) 0.1 MG tablet Take 1 at bedtime for blood pressure 90 tablet 3    clopidogrel (PLAVIX) 75 MG tablet TAKE 1 TABLET BY MOUTH EVERY DAY 90 tablet 3    furosemide (LASIX) 20 MG tablet Take 1 tablet by mouth As Needed.      indapamide  "(LOZOL) 2.5 MG tablet Take 1 tablet by mouth Every Morning.      Multiple Vitamins-Minerals (EYE VITAMINS PO) Take 1 tablet by mouth Daily.       No facility-administered medications prior to visit.     No opioid medication identified on active medication list. I have reviewed chart for other potential  high risk medication/s and harmful drug interactions in the elderly.      Aspirin is not on active medication list.  Aspirin use is not indicated based on review of current medical condition/s. Risk of harm outweighs potential benefits.  .    Patient Active Problem List   Diagnosis    Vascular disease    RLS (restless legs syndrome)    Insomnia    Hypertension    Gout    Axonal polyneuropathy    Atherosclerotic coronary vascular disease    Arthritis    Esophageal dysphagia    Dysplasia of colon    Irregular heart beat    Cerebrovascular disease    Leukocytosis    Weight loss    Pharyngoesophageal dysphagia    History of adenomatous polyp of colon    Skin cancer    Coronary artery disease involving native coronary artery    Peripheral vascular disease    Coagulopathy     Advance Care Planning Advance Directive is not on file.  ACP discussion was declined by the patient. Patient does not have an advance directive, declines further assistance.            Objective   Vitals:    03/18/25 0809   BP: 129/74   BP Location: Left arm   Patient Position: Sitting   Cuff Size: Adult   Pulse: 65   Resp: 18   Temp: 98.6 °F (37 °C)   TempSrc: Temporal   SpO2: 98%   Weight: 61.1 kg (134 lb 12.8 oz)   Height: 167.6 cm (66\")   PainSc: 0-No pain       Estimated body mass index is 21.76 kg/m² as calculated from the following:    Height as of this encounter: 167.6 cm (66\").    Weight as of this encounter: 61.1 kg (134 lb 12.8 oz).    BMI is within normal parameters. No other follow-up for BMI required.           Does the patient have evidence of cognitive impairment? No                                                                       "                          Health  Risk Assessment    Smoking Status:  Social History     Tobacco Use   Smoking Status Never   Smokeless Tobacco Never     Alcohol Consumption:  Social History     Substance and Sexual Activity   Alcohol Use No       Fall Risk Screen  STEADI Fall Risk Assessment was completed, and patient is at MODERATE risk for falls. Assessment completed on:3/18/2025    Depression Screening   Little interest or pleasure in doing things? Not at all   Feeling down, depressed, or hopeless? Not at all   PHQ-2 Total Score 0      Health Habits and Functional and Cognitive Screening:      3/12/2024     8:09 AM   Functional & Cognitive Status   Do you have difficulty preparing food and eating? No    Do you have difficulty bathing yourself, getting dressed or grooming yourself? No    Do you have difficulty using the toilet? No    Do you have difficulty moving around from place to place? Yes    Do you have trouble with steps or getting out of a bed or a chair? Yes   Current Diet Well Balanced Diet   Dental Exam Up to date   Eye Exam Up to date   Exercise (times per week) 0 times per week   Current Exercises Include No Regular Exercise   Do you need help using the phone?  No   Are you deaf or do you have serious difficulty hearing?  No   Do you need help to go to places out of walking distance? No   Do you need help shopping? No   Do you need help preparing meals?  No   Do you need help with housework?  No   Do you need help with laundry? No   Do you need help taking your medications? No   Do you need help managing money? No   Do you ever drive or ride in a car without wearing a seat belt? No   Have you felt unusual stress, anger or loneliness in the last month? No   Who do you live with? Spouse   If you need help, do you have trouble finding someone available to you? No   Have you been bothered in the last four weeks by sexual problems? No   Do you have difficulty concentrating, remembering or making decisions?  No       Data saved with a previous flowsheet row definition           Age-appropriate Screening Schedule:  Refer to the list below for future screening recommendations based on patient's age, sex and/or medical conditions. Orders for these recommended tests are listed in the plan section. The patient has been provided with a written plan.    Health Maintenance List  Health Maintenance   Topic Date Due    ZOSTER VACCINE (1 of 2) Never done    RSV Vaccine - Adults (1 - 1-dose 75+ series) Never done    ANNUAL WELLNESS VISIT  03/12/2025    COVID-19 Vaccine (7 - 2024-25 season) 03/17/2025    COLORECTAL CANCER SCREENING  05/09/2025    LIPID PANEL  09/17/2025    INFLUENZA VACCINE  Completed    Pneumococcal Vaccine 50+  Completed    TDAP/TD VACCINES  Discontinued                                                                                                                                                CMS Preventative Services Quick Reference  Risk Factors Identified During Encounter  Fall Risk-High or Moderate: Information on Fall Prevention Shared in After Visit Summary    The above risks/problems have been discussed with the patient.  Pertinent information has been shared with the patient in the After Visit Summary.  An After Visit Summary and PPPS were made available to the patient.    Follow Up:   Next Medicare Wellness visit to be scheduled in 1 year.         Additional E&M Note during same encounter follows:  Patient has additional, significant, and separately identifiable condition(s)/problem(s) that require work above and beyond the Medicare Wellness Visit     Chief Complaint  Annual Exam (fasting)    Subjective   84-year-old male for Medicare wellness visit    Osman is also being seen today for an annual adult preventative physical exam.     Review of Systems   HENT:  Positive for hearing loss.    Respiratory:  Negative for shortness of breath.    Cardiovascular:  Negative for chest pain and leg swelling.        " Recent evaluation by vascular surgeons levi somers   Musculoskeletal:  Positive for arthralgias.   Neurological:         Foot drop on left brace    All other systems reviewed and are negative.             Objective   Vital Signs:  /74 (BP Location: Left arm, Patient Position: Sitting, Cuff Size: Adult)   Pulse 65   Temp 98.6 °F (37 °C) (Temporal)   Resp 18   Ht 167.6 cm (66\")   Wt 61.1 kg (134 lb 12.8 oz)   SpO2 98%   BMI 21.76 kg/m²   Physical Exam  Vitals and nursing note reviewed.   Constitutional:       Appearance: Normal appearance.   HENT:      Right Ear: Tympanic membrane and ear canal normal.      Left Ear: Tympanic membrane and ear canal normal.      Ears:      Comments: Hearing aid on the right     Mouth/Throat:      Mouth: Mucous membranes are moist.   Eyes:      Extraocular Movements: Extraocular movements intact.      Pupils: Pupils are equal, round, and reactive to light.   Neck:      Vascular: Carotid bruit present.      Comments: Recent carotid Doppler stable  Cardiovascular:      Rate and Rhythm: Normal rate and regular rhythm.      Pulses: Normal pulses.      Heart sounds: Normal heart sounds.   Pulmonary:      Effort: Pulmonary effort is normal.      Breath sounds: Normal breath sounds.   Abdominal:      General: Abdomen is flat.      Palpations: Abdomen is soft. There is no mass.      Tenderness: There is no abdominal tenderness.   Genitourinary:     Comments: Deferred because of age  Musculoskeletal:      Right lower leg: No edema.      Left lower leg: No edema.   Skin:     General: Skin is warm and dry.   Neurological:      General: No focal deficit present.      Mental Status: He is alert and oriented to person, place, and time.   Psychiatric:         Mood and Affect: Mood normal.         Behavior: Behavior normal.         Thought Content: Thought content normal.         Judgment: Judgment normal.         The following data was reviewed by: Rigoberto Barnes MD on " 03/18/2025:           Assessment and Plan      Primary hypertension           Fatigue, unspecified type    Orders:    TSH    T4, free    CBC & Differential    Elevated blood sugar    Orders:    POC Urinalysis Dipstick, Multipro    Mixed hyperlipidemia       Orders:    Comprehensive Metabolic Panel    Lipid Panel    Hyperuricemia    Orders:    Uric Acid    Memory loss    Orders:    Vitamin B12    Folate    Vitamin D deficiency    Orders:    Vitamin D,25-Hydroxy    Anemia, unspecified type    Orders:    Iron Profile    Vitamin B12    Folate    Medicare annual wellness visit, subsequent         Screening for colorectal cancer    Orders:    Occult Blood, Fecal By Immunoassay - Stool, Per Rectum; Future    Plan above-RSV vaccine recommended-already done       Follow Up   Return in about 6 months (around 9/18/2025), or if symptoms worsen or fail to improve.  Patient was given instructions and counseling regarding his condition or for health maintenance advice. Please see specific information pulled into the AVS if appropriate.

## 2025-03-18 NOTE — PATIENT INSTRUCTIONS
Advance Care Planning and Advance Directives     You make decisions on a daily basis - decisions about where you want to live, your career, your home, your life. Perhaps one of the most important decisions you face is your choice for future medical care. Take time to talk with your family and your healthcare team and start planning today.  Advance Care Planning is a process that can help you:  Understand possible future healthcare decisions in light of your own experiences  Reflect on those decision in light of your goals and values  Discuss your decisions with those closest to you and the healthcare professionals that care for you  Make a plan by creating a document that reflects your wishes    Surrogate Decision Maker  In the event of a medical emergency, which has left you unable to communicate or to make your own decisions, you would need someone to make decisions for you.  It is important to discuss your preferences for medical treatment with this person while you are in good health.     Qualities of a surrogate decision maker:  Willing to take on this role and responsibility  Knows what you want for future medical care  Willing to follow your wishes even if they don't agree with them  Able to make difficult medical decisions under stressful circumstances    Advance Directives  These are legal documents you can create that will guide your healthcare team and decision maker(s) when needed. These documents can be stored in the electronic medical record.    Living Will - a legal document to guide your care if you have a terminal condition or a serious illness and are unable to communicate. States vary by statute in document names/types, but most forms may include one or more of the following:        -  Directions regarding life-prolonging treatments        -  Directions regarding artificially provided nutrition/hydration        -  Choosing a healthcare decision maker        -  Direction regarding organ/tissue  donation    Durable Power of  for Healthcare - this document names an -in-fact to make medical decisions for you, but it may also allow this person to make personal and financial decisions for you. Please seek the advice of an  if you need this type of document.    **Advance Directives are not required and no one may discriminate against you if you do not sign one.    Medical Orders  Many states allow specific forms/orders signed by your physician to record your wishes for medical treatment in your current state of health. This form, signed in personal communication with your physician, addresses resuscitation and other medical interventions that you may or may not want.      For more information or to schedule a time with a [facility] Advance Care Planning Facilitator contact: [employer] or call [phone] and someone will contact you directly.    Medicare Wellness  Personal Prevention Plan of Service     Date of Office Visit:    Encounter Provider:  Rigoberto Barnes MD  Place of Service:  [facility]  Patient Name: Shabbir Gambino  :  1940    As part of the Medicare Wellness portion of your visit today, we are providing you with this personalized preventive plan of services (PPPS). This plan is based upon recommendations of the [place] Preventive Services Task Force (USPSTF) and the Advisory Committee on Immunization Practices (ACIP).    This lists the preventive care services that should be considered, and provides dates of when you are due. Items listed as completed are up-to-date and do not require any further intervention.    Health Maintenance   Topic Date Due   • ZOSTER VACCINE (1 of 2) Never done   • RSV Vaccine - Adults (1 - 1-dose 75+ series) Never done   • ANNUAL WELLNESS VISIT  2025   • COVID-19 Vaccine (2024- season) 2025   • COLORECTAL CANCER SCREENING  2025   • LIPID PANEL   09/17/2025   • INFLUENZA VACCINE  Completed   • Pneumococcal Vaccine 50+  Completed   • TDAP/TD VACCINES  Discontinued       No orders of the defined types were placed in this encounter.      No follow-ups on file.

## 2025-03-19 LAB
25(OH)D3+25(OH)D2 SERPL-MCNC: 77.9 NG/ML (ref 30–100)
ALBUMIN SERPL-MCNC: 4.1 G/DL (ref 3.5–5.2)
ALBUMIN/GLOB SERPL: 1.5 G/DL
ALP SERPL-CCNC: 196 U/L (ref 39–117)
ALT SERPL-CCNC: 11 U/L (ref 1–41)
AST SERPL-CCNC: 21 U/L (ref 1–40)
BASOPHILS # BLD AUTO: 0.03 10*3/MM3 (ref 0–0.2)
BASOPHILS NFR BLD AUTO: 0.4 % (ref 0–1.5)
BILIRUB SERPL-MCNC: 0.4 MG/DL (ref 0–1.2)
BUN SERPL-MCNC: 32 MG/DL (ref 8–23)
BUN/CREAT SERPL: 21.2 (ref 7–25)
CALCIUM SERPL-MCNC: 9.7 MG/DL (ref 8.6–10.5)
CHLORIDE SERPL-SCNC: 100 MMOL/L (ref 98–107)
CHOLEST SERPL-MCNC: 157 MG/DL (ref 0–200)
CO2 SERPL-SCNC: 26 MMOL/L (ref 22–29)
CREAT SERPL-MCNC: 1.51 MG/DL (ref 0.76–1.27)
EGFRCR SERPLBLD CKD-EPI 2021: 45 ML/MIN/1.73
EOSINOPHIL # BLD AUTO: 0.19 10*3/MM3 (ref 0–0.4)
EOSINOPHIL NFR BLD AUTO: 2.7 % (ref 0.3–6.2)
ERYTHROCYTE [DISTWIDTH] IN BLOOD BY AUTOMATED COUNT: 14.3 % (ref 12.3–15.4)
FOLATE SERPL-MCNC: 7.95 NG/ML (ref 4.78–24.2)
GLOBULIN SER CALC-MCNC: 2.7 GM/DL
GLUCOSE SERPL-MCNC: 107 MG/DL (ref 65–99)
HCT VFR BLD AUTO: 42 % (ref 37.5–51)
HDLC SERPL-MCNC: 42 MG/DL (ref 40–60)
HGB BLD-MCNC: 13.6 G/DL (ref 13–17.7)
IMM GRANULOCYTES # BLD AUTO: 0.21 10*3/MM3 (ref 0–0.05)
IMM GRANULOCYTES NFR BLD AUTO: 3 % (ref 0–0.5)
IRON SATN MFR SERPL: 16 % (ref 20–50)
IRON SERPL-MCNC: 46 MCG/DL (ref 59–158)
LDLC SERPL CALC-MCNC: 92 MG/DL (ref 0–100)
LYMPHOCYTES # BLD AUTO: 1.12 10*3/MM3 (ref 0.7–3.1)
LYMPHOCYTES NFR BLD AUTO: 15.8 % (ref 19.6–45.3)
MCH RBC QN AUTO: 28.2 PG (ref 26.6–33)
MCHC RBC AUTO-ENTMCNC: 32.4 G/DL (ref 31.5–35.7)
MCV RBC AUTO: 87 FL (ref 79–97)
MONOCYTES # BLD AUTO: 0.69 10*3/MM3 (ref 0.1–0.9)
MONOCYTES NFR BLD AUTO: 9.7 % (ref 5–12)
NEUTROPHILS # BLD AUTO: 4.87 10*3/MM3 (ref 1.7–7)
NEUTROPHILS NFR BLD AUTO: 68.4 % (ref 42.7–76)
NRBC BLD AUTO-RTO: 0 /100 WBC (ref 0–0.2)
PLATELET # BLD AUTO: 244 10*3/MM3 (ref 140–450)
POTASSIUM SERPL-SCNC: 4.8 MMOL/L (ref 3.5–5.2)
PROT SERPL-MCNC: 6.8 G/DL (ref 6–8.5)
RBC # BLD AUTO: 4.83 10*6/MM3 (ref 4.14–5.8)
SODIUM SERPL-SCNC: 137 MMOL/L (ref 136–145)
T4 FREE SERPL-MCNC: 0.99 NG/DL (ref 0.92–1.68)
TIBC SERPL-MCNC: 294 MCG/DL
TRIGL SERPL-MCNC: 129 MG/DL (ref 0–150)
TSH SERPL DL<=0.005 MIU/L-ACNC: 2.01 UIU/ML (ref 0.27–4.2)
UIBC SERPL-MCNC: 248 MCG/DL (ref 112–346)
URATE SERPL-MCNC: 5.5 MG/DL (ref 3.4–7)
VIT B12 SERPL-MCNC: 397 PG/ML (ref 211–946)
VLDLC SERPL CALC-MCNC: 23 MG/DL (ref 5–40)
WBC # BLD AUTO: 7.11 10*3/MM3 (ref 3.4–10.8)

## 2025-03-22 LAB — HEMOCCULT STL QL IA: NEGATIVE

## 2025-05-13 ENCOUNTER — TELEPHONE (OUTPATIENT)
Dept: CARDIOLOGY CLINIC | Age: 85
End: 2025-05-13

## 2025-05-16 DIAGNOSIS — Z95.0 PACEMAKER: Primary | ICD-10-CM

## 2025-05-16 DIAGNOSIS — I48.19 PERSISTENT ATRIAL FIBRILLATION (HCC): ICD-10-CM

## 2025-05-16 DIAGNOSIS — R00.1 BRADYCARDIA: ICD-10-CM

## 2025-05-18 ENCOUNTER — RESULTS FOLLOW-UP (OUTPATIENT)
Dept: CARDIOLOGY CLINIC | Age: 85
End: 2025-05-18

## 2025-05-21 RX ORDER — CLOPIDOGREL BISULFATE 75 MG/1
75 TABLET ORAL DAILY
Qty: 90 TABLET | Refills: 3 | Status: SHIPPED | OUTPATIENT
Start: 2025-05-21

## 2025-05-21 NOTE — TELEPHONE ENCOUNTER
Caller: Mulu Gambinochele Cat    Relationship: Emergency Contact    Best call back number: [phone]     Requested Prescriptions:   Requested Prescriptions     Pending Prescriptions Disp Refills    clopidogrel (PLAVIX) 75 MG tablet 90 tablet 3     Sig: Take 1 tablet by mouth Daily.        Pharmacy where request should be sent: [facility] [address] [place] [address] [address] [facility] [phone]  - [phone] FX     Last office visit with prescribing clinician: 3/18/2025   Last telemedicine visit with prescribing clinician: Visit date not found   Next office visit with prescribing clinician: Visit date not found     Additional details provided by patient: PATIENT HAS ONE TABLET LEFT    Does the patient have less than a 3 day supply:  [x] Yes  [] No      Daiana Frost Rep   05/21/25 08:29 CDT

## 2025-05-28 DIAGNOSIS — I10 PRIMARY HYPERTENSION: ICD-10-CM

## 2025-05-28 RX ORDER — ATENOLOL 50 MG/1
50 TABLET ORAL DAILY
Qty: 90 TABLET | Refills: 3 | Status: SHIPPED | OUTPATIENT
Start: 2025-05-28

## 2025-05-28 NOTE — TELEPHONE ENCOUNTER
Rx Refill Note  Requested Prescriptions     Pending Prescriptions Disp Refills    atenolol (TENORMIN) 50 MG tablet [Pharmacy Med Name: ATENOLOL 50 MG TABLET] 90 tablet 2     Sig: TAKE 1 TABLET BY MOUTH DAILY.      Last office visit with prescribing clinician: 5/13/2024   Last telemedicine visit with prescribing clinician: Visit date not found   Next office visit with prescribing clinician: Visit date not found   CPE done 3/18/2025                      Would you like a call back once the refill request has been completed: [] Yes [] No    If the office needs to give you a call back, can they leave a voicemail: [] Yes [] No    Abena Spain MA  05/28/25, 14:26 CDT

## 2025-07-08 NOTE — TELEPHONE ENCOUNTER
Rx Refill Note  Requested Prescriptions     Pending Prescriptions Disp Refills    carbidopa-levodopa (SINEMET)  MG per tablet [Pharmacy Med Name: CARBIDOPA-LEVODOPA 25-100MG TABS] 180 tablet 0     Sig: TAKE ONE TO TWO TABLETS BY MOUTH EVERY DAY AT BEDTIME      Last office visit with prescribing clinician: 3/18/2025   Last telemedicine visit with prescribing clinician: Visit date not found   Next office visit with prescribing clinician: Visit date not found                         Would you like a call back once the refill request has been completed: [] Yes [] No    If the office needs to give you a call back, can they leave a voicemail: [] Yes [] No    Abena Spain MA  07/08/25, 16:04 CDT

## 2025-07-09 RX ORDER — CARBIDOPA AND LEVODOPA 25; 100 MG/1; MG/1
TABLET ORAL
Qty: 180 TABLET | Refills: 1 | Status: SHIPPED | OUTPATIENT
Start: 2025-07-09

## 2025-07-21 DIAGNOSIS — R00.1 BRADYCARDIA: ICD-10-CM

## 2025-07-21 DIAGNOSIS — Z95.0 PACEMAKER: Primary | ICD-10-CM

## 2025-07-21 DIAGNOSIS — I48.19 PERSISTENT ATRIAL FIBRILLATION (HCC): ICD-10-CM

## 2025-07-23 ENCOUNTER — TELEPHONE (OUTPATIENT)
Dept: FAMILY MEDICINE CLINIC | Facility: CLINIC | Age: 85
End: 2025-07-23
Payer: COMMERCIAL

## 2025-07-23 ENCOUNTER — TELEPHONE (OUTPATIENT)
Dept: CARDIOLOGY CLINIC | Age: 85
End: 2025-07-23

## 2025-07-23 RX ORDER — ALLOPURINOL 100 MG/1
100 TABLET ORAL DAILY
COMMUNITY
Start: 2025-04-21 | End: 2025-07-23 | Stop reason: SDUPTHER

## 2025-07-23 RX ORDER — ALLOPURINOL 100 MG/1
100 TABLET ORAL DAILY
Qty: 90 TABLET | Refills: 2 | Status: SHIPPED | OUTPATIENT
Start: 2025-07-23

## 2025-07-23 NOTE — TELEPHONE ENCOUNTER
Patient wife called stating that patient has been in [facility] after a fall. They told her to not give him his Eliquis until this coming  and to not give him his Plavix until  due to hematoma to brain. She was really concerned about this since he has not had either since this past . She just wanted to have this looked at by Cardio and see if they thought this was reasonable. Michael Mazariegos is off and Shaan is out so would you mind to advise on this. I attached [facility] discharge note below.                 [facility] TRAUMA SURGERY DISCHARGE SUMMARY     Calvin Morris - GLORY (1940) (84 y.o. male) - MRN [id number]     HOSPITAL ADMISSION:     Service: Trauma   Attending: Arturo Carr MD  Primary Team: TRAUMA RESIDENT  Date of Admission: 2025  Date of Discharge: 2025  Facility: [facility]  Room: B39  PCP: King Tolentino MD    Additional Discharge Diagnoses:   Principal Problem:  Intraparenchymal hematoma of brain due to trauma, with unknown loss of consciousness status, initial encounter (CMS/[facility])  Active Problems:  Ground-level fall  Radial head fracture  Acute pain due to trauma  At risk for venous thromboembolism (VTE)  At high risk for constipation  Resolved Problems:  * No resolved hospital problems. *        Synopsis / Trauma Inpt Problem List:  Admission Date: 25    Code status:     HPI: mechanical fall with head strike, on plavix and Eliquis, -LOC, extensive heart history. GCS 15, AO x4, reversed w/ DDAVP    Problems:  - BL frontal IPH, acute R frontal SDH (cog pending)  - R radial head fx (non-op)     Traumagram:   Head: bilateral IPH and SDH  Face: def  Cspine: neg  Chest: neg  Abd/Pelvis: neg  T/L: neg    **Incidentals**  - Mild cardiomegaly. Confluent perihilar and subpleural opacities are favored to, likely atelectasis or scarring. Small consolidative opacity in the left lower lobe may represent sequela of aspiration.  - Calcified

## 2025-07-23 NOTE — TELEPHONE ENCOUNTER
Wife calling about med refill for allopurinol--she doesn't know mg or any information--label is worn off.  ??? Had filled this year---Discard by 04.21.26      [facility]

## 2025-07-24 NOTE — PROGRESS NOTES
Orthopaedic History and Physical - New Patient    NAME:  Calvin Morris   : 1940  MRN: [id number]    2025     CHIEF COMPLAINT:  right elbow pain    HISTORY OF PRESENT ILLNESS:   The patient is a 84 y.o. right-hand-dominant male who presents to the office for evaluation and treatment of right elbow pain following a fall at home 2025.  Pain is described as achy, associated with decreased range of motion worse with daily use. Treatment has consisted of x-ray imaging at [facility] during which he was treated for an intraparenchymal hematoma of the brain from his fall.  There was concern for occult nondisplaced radial head fracture.  Pain is rated a 5-6/10.    Past Medical History:        Diagnosis Date    Arthritis     COVID-2021    Skin cancer     On face       Past Surgical History:        Procedure Laterality Date    ABSCESS DRAINAGE Right     knee    CARDIAC CATHETERIZATION  2019    [facility]     [facility]GERY      [facility]     CAROTID ENDARTERECTOMY Left     [place]    CAROTID ENDARTERECTOMY Right     Dr. Shaw    CORONARY ARTERY BYPASS GRAFT      EP DEVICE PROCEDURE N/A 10/23/2024    Insert or replace transcath PPM leadless performed by Navjot Mac MD at [facility] CARDIAC CATH LAB    ESOPHAGEAL DILATATION      EYE SURGERY      [facility]    JOINT REPLACEMENT      rtk    POLYPECTOMY      Colon    SKIN CANCER EXCISION         Current Medications:   Prior to Admission medications    Medication Sig Start Date End Date Taking? Authorizing Provider   atenolol (TENORMIN) 50 MG tablet  25  Yes Ebenezer Grimes MD   indapamide (LOZOL) 2.5 MG tablet Take 1 tablet by mouth every morning   Yes Ebenezer Grimes MD   vitamin D 50 MCG (2000) CAPS capsule Take 1 capsule by mouth daily   Yes Ebenezer Grimes MD   ELIQUIS 2.5 MG TABS tablet Take 1 tablet by mouth twice daily 25  Yes Vanessa Pickett, APRN - NP   clopidogrel (PLAVIX) 75 MG tablet TAKE 1 TABLET BY MOUTH

## 2025-07-28 ENCOUNTER — OFFICE VISIT (OUTPATIENT)
Age: 85
End: 2025-07-28
Payer: MEDICARE

## 2025-07-28 VITALS — BODY MASS INDEX: 19.55 KG/M2 | HEIGHT: 68 IN | WEIGHT: 129 LBS

## 2025-07-28 DIAGNOSIS — S59.901A ELBOW INJURY, RIGHT, INITIAL ENCOUNTER: ICD-10-CM

## 2025-07-28 DIAGNOSIS — M25.521 PAIN IN RIGHT ELBOW: Primary | ICD-10-CM

## 2025-07-28 DIAGNOSIS — Z91.81 STATUS POST FALL: ICD-10-CM

## 2025-07-28 DIAGNOSIS — S50.11XA CONTUSION OF RIGHT ELBOW AND FOREARM, INITIAL ENCOUNTER: ICD-10-CM

## 2025-07-28 PROCEDURE — G8420 CALC BMI NORM PARAMETERS: HCPCS

## 2025-07-28 PROCEDURE — G8427 DOCREV CUR MEDS BY ELIG CLIN: HCPCS

## 2025-07-28 PROCEDURE — 1159F MED LIST DOCD IN RCRD: CPT

## 2025-07-28 PROCEDURE — 1123F ACP DISCUSS/DSCN MKR DOCD: CPT

## 2025-07-28 PROCEDURE — 1036F TOBACCO NON-USER: CPT

## 2025-07-28 PROCEDURE — 1160F RVW MEDS BY RX/DR IN RCRD: CPT

## 2025-07-28 PROCEDURE — 99203 OFFICE O/P NEW LOW 30 MIN: CPT

## 2025-07-28 RX ORDER — MULTIVIT-MIN/IRON/FOLIC ACID/K 18-600-40
2000 CAPSULE ORAL DAILY
COMMUNITY

## 2025-07-28 RX ORDER — INDAPAMIDE 2.5 MG/1
2.5 TABLET ORAL EVERY MORNING
COMMUNITY

## 2025-07-28 RX ORDER — ATENOLOL 50 MG/1
TABLET ORAL
COMMUNITY
Start: 2025-05-28

## 2025-08-07 ENCOUNTER — TELEPHONE (OUTPATIENT)
Dept: CARDIOLOGY CLINIC | Age: 85
End: 2025-08-07

## 2025-08-12 ENCOUNTER — OFFICE VISIT (OUTPATIENT)
Dept: CARDIOLOGY CLINIC | Age: 85
End: 2025-08-12

## 2025-08-12 VITALS
HEART RATE: 92 BPM | OXYGEN SATURATION: 98 % | BODY MASS INDEX: 19.7 KG/M2 | DIASTOLIC BLOOD PRESSURE: 62 MMHG | HEIGHT: 68 IN | SYSTOLIC BLOOD PRESSURE: 128 MMHG | WEIGHT: 130 LBS

## 2025-08-12 DIAGNOSIS — R00.1 BRADYCARDIA: ICD-10-CM

## 2025-08-12 DIAGNOSIS — I48.19 PERSISTENT ATRIAL FIBRILLATION (HCC): ICD-10-CM

## 2025-08-12 DIAGNOSIS — Z79.01 CHRONIC ANTICOAGULATION: ICD-10-CM

## 2025-08-12 DIAGNOSIS — E78.5 HYPERLIPIDEMIA, UNSPECIFIED HYPERLIPIDEMIA TYPE: ICD-10-CM

## 2025-08-12 DIAGNOSIS — I25.10 CORONARY ARTERY DISEASE INVOLVING NATIVE CORONARY ARTERY OF NATIVE HEART WITHOUT ANGINA PECTORIS: Primary | ICD-10-CM

## 2025-08-12 DIAGNOSIS — Z95.0 PACEMAKER: ICD-10-CM

## 2025-08-12 DIAGNOSIS — I10 ESSENTIAL HYPERTENSION: ICD-10-CM

## (undated) DEVICE — THE CHANNEL CLEANING BRUSH IS A NYLON FLEXI BRUSH ATTACHED TO A FLEXIBLE PLASTIC SHEATH DESIGNED TO SAFELY REMOVE DEBRIS FROM FLEXIBLE ENDOSCOPES.

## (undated) DEVICE — BNDR ABD 4PANEL 12IN 46 TO 62IN

## (undated) DEVICE — THE SINGLE USE ETRAP – POLYP TRAP IS USED FOR SUCTION RETRIEVAL OF ENDOSCOPICALLY REMOVED POLYPS.: Brand: ETRAP

## (undated) DEVICE — SENSR O2 OXIMAX FNGR A/ 18IN NONSTR

## (undated) DEVICE — PAD LAP CHOLE: Brand: MEDLINE INDUSTRIES, INC.

## (undated) DEVICE — Device: Brand: DEFENDO AIR/WATER/SUCTION AND BIOPSY VALVE

## (undated) DEVICE — SNAR POLYP SENSATION MICRO OVL 13 240X40

## (undated) DEVICE — MASK,OXYGEN,MED CONC,ADLT,7' TUB, UC: Brand: PENDING

## (undated) DEVICE — LAPAROSCOPIC MONOPOLAR CORD: Brand: VALLEYLAB

## (undated) DEVICE — TBG SMPL FLTR LINE NASL 02/C02 A/ BX/100

## (undated) DEVICE — PINNACLE INTRODUCER SHEATH: Brand: PINNACLE

## (undated) DEVICE — MAJOR DOUBLE BASIN W/GOWNS II: Brand: MEDLINE INDUSTRIES, INC.

## (undated) DEVICE — BIOPATCH™ ANTIMICROBIAL DRESSING WITH CHLORHEXIDINE GLUCONATE IS A HYDROPHILLIC POLYURETHANE ABSORPTIVE FOAM WITH CHLORHEXIDINE GLUCONATE (CHG) WHICH INHIBITS BACTERIAL GROWTH UNDER THE DRESSING. THE DRESSING IS INTENDED TO BE USED TO ABSORB EXUDATE, COVER A WOUND CAUSED BY VASCULAR AND NONVASCULAR PERCUTANEOUS MEDICAL DEVICES DURING SURGERY, AS WELL AS REDUCE LOCAL INFECTION AND COLONIZATION OF MICROORGANISMS.: Brand: BIOPATCH

## (undated) DEVICE — INTRODUCER SHTH W51XH73XL557MM D13MM DIA7.8MM HYDRPHLC

## (undated) DEVICE — GLV SURG TRIUMPH MICRO PF LTX 7.5 STRL

## (undated) DEVICE — MARKR SPOT ENDOSCOPIC LESION INJ

## (undated) DEVICE — TRY PREP SCRB VAG PVP

## (undated) DEVICE — SUT SILK 3/0 SH CR8 18IN C013D

## (undated) DEVICE — SUT SILK 2/0 SH CR8 18IN CR8 C012D

## (undated) DEVICE — Device: Brand: NOMOLINE™ LH ADULT NASAL CO2 CANNULA WITH O2 4M

## (undated) DEVICE — CONMED SCOPE SAVER BITE BLOCK, 20X27 MM: Brand: SCOPE SAVER

## (undated) DEVICE — ELECTRD BLD EDGE/INSUL1P 2.4X5.1MM STRL

## (undated) DEVICE — SUT SILK 2/0 SUTUPAK TIES 24IN SA75H

## (undated) DEVICE — 3M™ IOBAN™ 2 ANTIMICROBIAL INCISE DRAPE 6650EZ: Brand: IOBAN™ 2

## (undated) DEVICE — SUT VIC 3/0 RB1 27IN UD VCP215H

## (undated) DEVICE — DRSNG SURESITE WNDW 4X4.5

## (undated) DEVICE — MINI ENDOCUT SCISSOR TIP, DISPOSABLE: Brand: RENEW

## (undated) DEVICE — PK TURNOVER RM ADV

## (undated) DEVICE — ANTIBACTERIAL VIOLET BRAIDED (POLYGLACTIN 910), SYNTHETIC ABSORBABLE SUTURE: Brand: COATED VICRYL

## (undated) DEVICE — SPNG GZ WOVN 4X4IN 12PLY 10/BX STRL

## (undated) DEVICE — NDL SCLEROTHRPY INTERJECT 25G 4 240 CLR

## (undated) DEVICE — ENDOGATOR AUXILIARY WATER JET CONNECTOR: Brand: ENDOGATOR

## (undated) DEVICE — A RAPID TEST FOR THE QUALITATIVE DETECTION OF RESPIRATORY SYNCYTIAL VIRUS (RSV) IN NASAL WASH AND NASOPHARYNGEAL SWAB SPECIMENS.: Brand: BINAXNOW RSV

## (undated) DEVICE — WIPE THERAWASH SLV SPEC CARE 2PK

## (undated) DEVICE — MSK O2 MD CONCENTR A/ LF 7FT 1P/U

## (undated) DEVICE — CUFF,BP,DISP,1 TUBE,ADULT,HP: Brand: MEDLINE

## (undated) DEVICE — GUIDEWIRE VASC L260CM DIA0.035IN TIP L7CM PTFE S STL STR

## (undated) DEVICE — TUBING CNTRST DEL NO SYR HI PRSS INJ FEM LUER LCK ROT ADPT

## (undated) DEVICE — SUT VIC 4/0 P3 18IN UD VCP494H

## (undated) DEVICE — GOWN,NON-REINFORCED,SIRUS,SET IN SLV,XL: Brand: MEDLINE

## (undated) DEVICE — ENSEAL LAPAROSCOPIC TISSUE SEALER G2 ARTICULATING STRAIGHT JAW FOR USE WITH G2 GENERATOR 5MM DIAMETER 35CM SHAFT LENGTH: Brand: ENSEAL

## (undated) DEVICE — Device

## (undated) DEVICE — BARRIER, ABSORBABLE, ADHESION: Brand: SEPRAFILM®

## (undated) DEVICE — SUT VIC 3/0 SH 36IN VCP527H

## (undated) DEVICE — TOWEL,OR,DSP,ST,BLUE,DLX,10/PK,8PK/CS: Brand: MEDLINE

## (undated) DEVICE — YANKAUER,BULB TIP WITH VENT: Brand: ARGYLE

## (undated) DEVICE — TOTAL TRAY, 16FR 10ML SIL FOLEY, URN: Brand: MEDLINE

## (undated) DEVICE — PACK,UNIVERSAL,NO GOWNS: Brand: MEDLINE

## (undated) DEVICE — SUT PROLN 1 CT1 30IN 8425H

## (undated) DEVICE — SUT SILK 3/0 SUTUPAK TIES 24IN SA74H

## (undated) DEVICE — DILATOR COONS TAPER: Brand: COONS

## (undated) DEVICE — FRCP BX RADJAW4 NDL 2.8 240 STD OG

## (undated) DEVICE — SOLUTION IV 0.9% NACL IRRIGATION 3000ML BAG

## (undated) DEVICE — SUT VIC 0 UR6 27IN VCP603H